# Patient Record
Sex: MALE | Race: WHITE
[De-identification: names, ages, dates, MRNs, and addresses within clinical notes are randomized per-mention and may not be internally consistent; named-entity substitution may affect disease eponyms.]

---

## 2018-04-24 ENCOUNTER — HOSPITAL ENCOUNTER (OUTPATIENT)
Dept: HOSPITAL 53 - M SDC | Age: 58
Discharge: HOME | End: 2018-04-24
Attending: ORTHOPAEDIC SURGERY
Payer: MEDICARE

## 2018-04-24 DIAGNOSIS — Z91.041: ICD-10-CM

## 2018-04-24 DIAGNOSIS — M75.121: ICD-10-CM

## 2018-04-24 DIAGNOSIS — M19.011: Primary | ICD-10-CM

## 2018-04-24 DIAGNOSIS — I10: ICD-10-CM

## 2018-04-24 DIAGNOSIS — E11.9: ICD-10-CM

## 2018-04-24 DIAGNOSIS — S43.431A: ICD-10-CM

## 2018-04-24 DIAGNOSIS — Z91.018: ICD-10-CM

## 2018-04-24 DIAGNOSIS — K21.9: ICD-10-CM

## 2018-04-24 LAB — GLUCOSE BLDC GLUCOMTR-MCNC: 118 MG/DL (ref 70–105)

## 2018-04-24 PROCEDURE — 29823 SHO ARTHRS SRG XTNSV DBRDMT: CPT

## 2018-04-24 RX ADMIN — MIDAZOLAM 1 MG: 1 INJECTION INTRAMUSCULAR; INTRAVENOUS at 11:10

## 2018-04-24 RX ADMIN — FENTANYL CITRATE 1 MCG: 50 INJECTION, SOLUTION INTRAMUSCULAR; INTRAVENOUS at 11:15

## 2018-04-24 RX ADMIN — SODIUM CHLORIDE, POTASSIUM CHLORIDE, SODIUM LACTATE AND CALCIUM CHLORIDE 1 MLS/HR: 600; 310; 30; 20 INJECTION, SOLUTION INTRAVENOUS at 10:15

## 2018-04-24 RX ADMIN — DEXTROSE MONOHYDRATE 1 MG: 50 INJECTION, SOLUTION INTRAVENOUS at 13:17

## 2018-04-24 RX ADMIN — FENTANYL CITRATE 1 MCG: 50 INJECTION, SOLUTION INTRAMUSCULAR; INTRAVENOUS at 11:10

## 2018-12-27 ENCOUNTER — HOSPITAL ENCOUNTER (OUTPATIENT)
Dept: HOSPITAL 53 - M LRY | Age: 58
End: 2018-12-27
Attending: NURSE PRACTITIONER
Payer: MEDICARE

## 2018-12-27 DIAGNOSIS — E11.9: Primary | ICD-10-CM

## 2018-12-27 DIAGNOSIS — E78.49: ICD-10-CM

## 2018-12-27 DIAGNOSIS — I10: ICD-10-CM

## 2018-12-27 LAB
ALBUMIN SERPL BCG-MCNC: 4.1 GM/DL (ref 3.2–5.2)
ALT SERPL W P-5'-P-CCNC: 26 U/L (ref 12–78)
BASOPHILS # BLD AUTO: 0.1 10^3/UL (ref 0–0.2)
BASOPHILS NFR BLD AUTO: 0.8 % (ref 0–1)
BILIRUB SERPL-MCNC: 0.6 MG/DL (ref 0.2–1)
BUN SERPL-MCNC: 17 MG/DL (ref 7–18)
CALCIUM SERPL-MCNC: 9.3 MG/DL (ref 8.5–10.1)
CHLORIDE SERPL-SCNC: 105 MEQ/L (ref 98–107)
CHOLEST SERPL-MCNC: 145 MG/DL (ref ?–200)
CHOLEST/HDLC SERPL: 3.62 {RATIO} (ref ?–5)
CO2 SERPL-SCNC: 23 MEQ/L (ref 21–32)
CREAT SERPL-MCNC: 0.97 MG/DL (ref 0.7–1.3)
EOSINOPHIL # BLD AUTO: 0.1 10^3/UL (ref 0–0.5)
EOSINOPHIL NFR BLD AUTO: 1 % (ref 0–3)
EST. AVERAGE GLUCOSE BLD GHB EST-MCNC: 143 MG/DL (ref 60–110)
GFR SERPL CREATININE-BSD FRML MDRD: > 60 ML/MIN/{1.73_M2} (ref 56–?)
GLUCOSE SERPL-MCNC: 144 MG/DL (ref 70–100)
HCT VFR BLD AUTO: 45.5 % (ref 42–52)
HDLC SERPL-MCNC: 40 MG/DL (ref 40–?)
HGB BLD-MCNC: 15.2 G/DL (ref 13.5–17.5)
LDLC SERPL CALC-MCNC: 65 MG/DL (ref ?–100)
LYMPHOCYTES # BLD AUTO: 2.3 10^3/UL (ref 1.5–4.5)
LYMPHOCYTES NFR BLD AUTO: 20.3 % (ref 24–44)
MCH RBC QN AUTO: 29.5 PG (ref 27–33)
MCHC RBC AUTO-ENTMCNC: 33.4 G/DL (ref 32–36.5)
MCV RBC AUTO: 88.3 FL (ref 80–96)
MONOCYTES # BLD AUTO: 0.8 10^3/UL (ref 0–0.8)
MONOCYTES NFR BLD AUTO: 7.2 % (ref 0–5)
NEUTROPHILS # BLD AUTO: 7.9 10^3/UL (ref 1.8–7.7)
NEUTROPHILS NFR BLD AUTO: 70.4 % (ref 36–66)
NONHDLC SERPL-MCNC: 105 MG/DL
PLATELET # BLD AUTO: 245 10^3/UL (ref 150–450)
POTASSIUM SERPL-SCNC: 4 MEQ/L (ref 3.5–5.1)
PROT SERPL-MCNC: 7.6 GM/DL (ref 6.4–8.2)
RBC # BLD AUTO: 5.15 10^6/UL (ref 4.3–6.1)
SODIUM SERPL-SCNC: 138 MEQ/L (ref 136–145)
TRIGL SERPL-MCNC: 201 MG/DL (ref ?–150)
WBC # BLD AUTO: 11.2 10^3/UL (ref 4–10)

## 2019-03-05 ENCOUNTER — HOSPITAL ENCOUNTER (OUTPATIENT)
Dept: HOSPITAL 53 - M SFHCLERA | Age: 59
End: 2019-03-05
Attending: NURSE PRACTITIONER
Payer: MEDICARE

## 2019-03-05 DIAGNOSIS — J02.9: Primary | ICD-10-CM

## 2019-04-13 ENCOUNTER — HOSPITAL ENCOUNTER (OUTPATIENT)
Dept: HOSPITAL 53 - M SFHCLERA | Age: 59
End: 2019-04-13
Attending: NURSE PRACTITIONER
Payer: MEDICARE

## 2019-04-13 DIAGNOSIS — J02.9: Primary | ICD-10-CM

## 2021-03-28 ENCOUNTER — HOSPITAL ENCOUNTER (OUTPATIENT)
Dept: HOSPITAL 53 - M LABSMTC | Age: 61
End: 2021-03-28
Attending: ANESTHESIOLOGY
Payer: MEDICARE

## 2021-03-28 DIAGNOSIS — Z01.812: Primary | ICD-10-CM

## 2021-03-28 DIAGNOSIS — Z20.822: ICD-10-CM

## 2021-04-24 ENCOUNTER — HOSPITAL ENCOUNTER (OUTPATIENT)
Dept: HOSPITAL 53 - M LABSMTC | Age: 61
End: 2021-04-24
Attending: ANESTHESIOLOGY
Payer: MEDICARE

## 2021-04-24 DIAGNOSIS — Z20.822: ICD-10-CM

## 2021-04-24 DIAGNOSIS — Z01.818: Primary | ICD-10-CM

## 2021-04-29 ENCOUNTER — HOSPITAL ENCOUNTER (OUTPATIENT)
Dept: HOSPITAL 53 - M OPP | Age: 61
Discharge: HOME | End: 2021-04-29
Attending: INTERNAL MEDICINE
Payer: MEDICARE

## 2021-04-29 VITALS — HEIGHT: 70 IN | BODY MASS INDEX: 39.65 KG/M2 | WEIGHT: 277 LBS

## 2021-04-29 VITALS — SYSTOLIC BLOOD PRESSURE: 184 MMHG | DIASTOLIC BLOOD PRESSURE: 108 MMHG

## 2021-04-29 DIAGNOSIS — Z79.84: ICD-10-CM

## 2021-04-29 DIAGNOSIS — Z12.11: Primary | ICD-10-CM

## 2021-04-29 DIAGNOSIS — K64.8: ICD-10-CM

## 2021-04-29 DIAGNOSIS — Z91.018: ICD-10-CM

## 2021-04-29 DIAGNOSIS — F17.210: ICD-10-CM

## 2021-04-29 DIAGNOSIS — Z80.0: ICD-10-CM

## 2021-04-29 DIAGNOSIS — Z88.8: ICD-10-CM

## 2021-04-29 DIAGNOSIS — K63.5: ICD-10-CM

## 2021-04-29 DIAGNOSIS — Z91.048: ICD-10-CM

## 2021-04-29 DIAGNOSIS — Z79.899: ICD-10-CM

## 2021-04-29 DIAGNOSIS — Z79.891: ICD-10-CM

## 2021-04-29 DIAGNOSIS — E11.9: ICD-10-CM

## 2021-04-29 DIAGNOSIS — Z86.010: ICD-10-CM

## 2021-04-29 NOTE — ROOR
________________________________________________________________________________

Patient Name: Carlos Bryant              Procedure Date: 4/29/2021 9:29 AM

MRN: [id number]                          Account Number: [id number]

YOB: 1960               Age: 60

Room: [facility]                            Gender: Male

Note Status: Finalized                 

________________________________________________________________________________

 

Procedure:            Colonoscopy

Indications:          High risk colon cancer surveillance: Personal history of 

                      colonic polyps

Providers:            Danie LEWIS MD

Referring MD:         Crystal Carrington NP

Requesting Provider:  

Medicines:            Monitored Anesthesia Care

Complications:        No immediate complications.

________________________________________________________________________________

Procedure:            Pre-Anesthesia Assessment:

                      - The heart rate, respiratory rate, oxygen saturations, 

                      blood pressure, adequacy of pulmonary ventilation, and 

                      response to care were monitored throughout the procedure.

                      The Colonoscope was introduced through the anus and 

                      advanced to the cecum, identified by appendiceal orifice 

                      and ileocecal valve. The colonoscopy was technically 

                      difficult and complex due to a redundant colon. The 

                      patient tolerated the procedure well. The quality of the 

                      bowel preparation was good.

                                                                                

Findings:

     The perianal and digital rectal examinations were normal.

     Three sessile polyps were found in the sigmoid colon and splenic flexure. 

     The polyps were 5 to 7 mm in size. These polyps were removed with a cold 

     snare. Resection and retrieval were complete.

     Internal hemorrhoids were found during retroflexion. The hemorrhoids were 

     moderate.

     The exam was otherwise without abnormality on direct and retroflexion 

     views.

                                                                                

Impression:           - Three 5 to 7 mm polyps in the sigmoid colon and at the 

                      splenic flexure, removed with a cold snare. Resected and 

                      retrieved.

                      - Internal hemorrhoids.

                      - The examination was otherwise normal on direct and 

                      retroflexion views.

Recommendation:       - Repeat colonoscopy in 3 years for surveillance.

                                                                                

Procedure Code(s):    --- Professional ---

                      99364, Colonoscopy, flexible; with removal of tumor(s), 

                      polyp(s), or other lesion(s) by snare technique

Diagnosis Code(s):    --- Professional ---

                      K64.8, Other hemorrhoids

                      K63.5, Polyp of colon

                      Z86.010, Personal history of colonic polyps

 

CPT copyright 2019 [employer]. All rights reserved.

 

The codes documented in this report are preliminary and upon  review may 

be revised to meet current compliance requirements.

 

Danie Lewis MD

________________

Danie LEWIS MD

4/29/2021 10:14:42 AM

Electronically signed by Danie LEWIS MD

Number of Addenda: 0

 

Note Initiated On: 4/29/2021 9:29 AM

Estimated Blood Loss: Estimated blood loss: none.

## 2021-06-16 ENCOUNTER — HOSPITAL ENCOUNTER (OUTPATIENT)
Dept: HOSPITAL 53 - M RAD | Age: 61
End: 2021-06-16
Attending: NURSE PRACTITIONER
Payer: COMMERCIAL

## 2021-06-16 DIAGNOSIS — N28.89: Primary | ICD-10-CM

## 2021-06-16 PROCEDURE — 74183 MRI ABD W/O CNTR FLWD CNTR: CPT

## 2021-06-17 NOTE — REP
INDICATION:

MASS LIKE LESION IN KIDNEYS.



COMPARISON:

[facility] 06/11/2021.



TECHNIQUE:

Multiple sequences obtained in the axial coronal planes prior to and following the

intravenous administration of 20 mL ProHance.



FINDINGS:

There is an enhancing solid mass in the lower pole of the right kidney consistent with

renal cell carcinoma.  This measures approximately 6.0 x 4.1 cm.  In the mid right

kidney there are 2 benign nonenhancing cysts measuring 1 cm in diameter.  In the left

kidney there are several benign nonenhancing cysts, the largest is bilobed and

exophytic in the lower pole and measures 5.4 cm in maximum diameter.  There is no

hydronephrosis bilaterally.



The visualized liver demonstrates diffuse fatty infiltration.  The gallbladder is

grossly unremarkable.  There is no intrahepatic or extrahepatic biliary dilatation.

The visualized spleen is unremarkable.  The adrenals and pancreas demonstrate no

abnormality.  I see no significant adenopathy in the visualized abdomen.  There is no

free fluid.



IMPRESSION:

There is an enhancing solid mass in the lower pole of the right kidney consistent with

renal cell carcinoma.  This measures approximately 6.0 x 4.1 cm.  Multiple bilateral

renal cysts.  No adenopathy.





<Electronically signed by Justice Winter > 06/17/21 [id number]

## 2021-09-08 ENCOUNTER — HOSPITAL ENCOUNTER (OUTPATIENT)
Dept: HOSPITAL 53 - M RAD | Age: 61
End: 2021-09-08
Attending: INTERNAL MEDICINE
Payer: COMMERCIAL

## 2021-09-08 DIAGNOSIS — E04.2: Primary | ICD-10-CM

## 2021-09-08 NOTE — REP
INDICATION:

RT THYROID NODULE.



COMPARISON:

CT cervical spine 06/17/2021.



TECHNIQUE:

Real-time sonographic evaluation of thyroid performed.



FINDINGS:

Right lobe measures 5.2 x 2.5 x 2.0 cm and left lobe 5.5 x 2.3 x 1.6 cm.  In the mid

right lobe there is a cyst with mild wall thickening and a few tiny echogenic foci in

the wall.  This measures 2.9 x 1.5 x 2.0 cm.  In the left lower pole there is a

hypoechoic nodule which measures 7 x 8 x 6 mm containing low-level echoes.  There is

an adjacent heterogeneous nodule mixed echogenicity measuring 1.2 x 1.0 x 1.0 cm.



IMPRESSION:

Cyst in the right lobe of the thyroid demonstrates mild wall thickening and a few

echogenic punctate calcifications in the wall of the cyst.  This appears benign.



Extremely hypoechoic nodule in the left lower pole could represent a complex cyst or

solid nodule, measuring 8 mm in maximum diameter.  Utilizing TI-RADS criteria this is

at most a TR 4 lesion, and given its subcentimeter size no follow-up is needed.



Heterogeneous nodule in the left lower pole 1.2 cm in diameter is relatively

isoechoic.  This is a TR 3 lesion and since it is less than 1.5 cm no follow-up is

needed.





<Electronically signed by Justice Winter > 09/08/21 [id number]

## 2021-11-09 ENCOUNTER — HOSPITAL ENCOUNTER (EMERGENCY)
Dept: HOSPITAL 53 - M ED | Age: 61
Discharge: HOME | End: 2021-11-09
Payer: COMMERCIAL

## 2021-11-09 VITALS — DIASTOLIC BLOOD PRESSURE: 92 MMHG | SYSTOLIC BLOOD PRESSURE: 180 MMHG

## 2021-11-09 VITALS — WEIGHT: 266.56 LBS | HEIGHT: 70 IN | BODY MASS INDEX: 38.16 KG/M2

## 2021-11-09 DIAGNOSIS — Z90.5: ICD-10-CM

## 2021-11-09 DIAGNOSIS — F17.200: ICD-10-CM

## 2021-11-09 DIAGNOSIS — Z79.899: ICD-10-CM

## 2021-11-09 DIAGNOSIS — C64.1: ICD-10-CM

## 2021-11-09 DIAGNOSIS — I10: ICD-10-CM

## 2021-11-09 DIAGNOSIS — E11.649: Primary | ICD-10-CM

## 2021-11-09 NOTE — CCD
Continuity of Care Document (CCD)

                             Created on: 10/28/2021



Carlos Bryant

External Reference #: [id number]

: 1960

Sex: Male



Demographics





                          Address                   [address] [place] [address]

 

                          Home Phone                [phone]

 

                          Preferred Language        Unknown

 

                          Marital Status            Never 

 

                          Worship Affiliation     Unknown

 

                          Race                      White

 

                          Ethnic Group              Not  or 





Author





                          Author                    Carlos CARRINGTON FN

 

                          Organization              Unknown

 

                          Address                   [address] [place] [address]



 

                          Phone                     [phone]







Care Team Providers





                    Care Team Member Name Role                Phone

 

                    [facility]                [phone]

 

                    Moris Desai   AUTM                [phone]

 

                    Crysatl Carrington  AUTM                [phone]







Problems





                    Active Problems     Provider            Date

 

                    Type II diabetes mellitus uncontrolled BELLA Severino, P

NP Onset: 2020

 

                    Essential hypertension BELLA Severino, PNP Onset: 2020

 

                    Pure hypercholesterolemia BELLA Severino, PNP Onset: 

 

                    Gastroesophageal reflux disease BELLA Severino, PNP Onse

t: 2020

 

                    Secondary erectile dysfunction BELLA Severino, PNP Onset

: 2020

 

                    Taking medication   BELLA Severino, PNP Onset: 

0

 

                    Right side sciatica BELLA Severino, PNP Onset: 

0

 

                    Secondary erectile dysfunction BELLA Severino, PNP Onset

: 2021







Social History





                Type            Date            Description     Comments

 

                Birth Sex                       Unknown          

 

                Tobacco Use     Start: Unknown End:  Quit             

 

                Tobacco Use     Start: Unknown  Never Smoked Cigars  

 

                Tobacco Use     Start: Unknown  Never Smoked A Pipe  

 

                Tobacco Use     Start: Unknown  Never Used Smokeless Tobacco  

 

                ETOH Use                        Denies alcohol use  

 

                Recreational Drug Use                 Denies Drug Use  

 

                Tobacco Use     Start: Unknown End: Unknown Patient is a former 

smoker  







Allergies and adverse reactions





             Active Allergies Criticality  Reaction | Severity Comments     Date

 

             Iodinated Diagnostic Agents Unable to assess criticality           

                2019







Medications





           Active Medications SIG        Qnty       Indications Ordering Provide

r Date

 

                          Fenofibrate                     145mg Tablets         

          1 tablet by 

mouth every day for triglycerides 90tabs                          Darian barboza MD 2021

 

                          Glipizide ER                     10mg Tablets ER 24HR 

                  Take 1 

Tablet By Mouth Once A Day 30tabs                          Olga Lidia tuttle M.D. 2021

 

                          Trazodone HCL                     50mg Tablets        

           take 1 tab by 

mouth at bedtime. 30tabs                          Darian Gomez MD 2021

 

                                        Blood Pressure Kit/Oscillating/Digital  

                    Kit                 

                Use as directed to monitor blood pressure twice a day 1units    

                      Darian Gomez MD                                      2021

 

                          Lisinopril                     10mg Tablets           

        take two tablets 

by mouth in the morning and 1 tab at bed time 90tabs                          Moi Gomez MD 

2021

 

                          Jardiance                     25mg Tablets            

       take one by mouth 

once a day      90tabs                          BELLA Severino, PNP 20

20

 

                          Carisoprodol                     350mg Tablets        

           1 by mouth at 

bedtime as needed                                 Unknown         

 

                          Blood Glucose Test                      Strips        

           please provide 

glucose test strips, covered by pt insurance for twice daily bs test dx: e11.65 

                    E11.65              Unknown             

 

                                        Blood Glucose Monitoring System         

            W/Device Kit                

             for three times a day blood sugar testing dx: e11.65              E

11.65       Unknown      



 

                          Mucinex                     600mg Tablets ER 12HR     

              1 by mouth 

twice a day as needed                                 Unknown         

 

                          Omeprazole                     20mg Capsules DR       

            1 by mouth 

every day  Written by Dr Malik                                 Unknown         



 

                          Topamax                     50mg Tablets              

     1 by mouth twice a 

day for headaches 180tabs                         BELLA Severino, PNP 

 

                          Novolog                     100Unit/ML Solution       

            10-15 units at

bedtime per sliding scale                                 Unknown         

 

                    Simple Diagnostics Lancing Device                      Misc 

                                      

                                        Unknown             

 

                          Hydrochlorothiazide                     25mg Tablets  

                 take one 

tablet by mouth every day 90tabs                          BELLA Severino, PN

P 

 

                          Lovastatin                     40mg Tablets           

        take one tablet by

mouth every evening with meal 90tabs                          BELLA Severino

, PNP 

 

                          Metformin HCL                     1000mg Tablets      

             take one 

tablet by mouth twice a day with food 180tabs                         BELLA Severino, PNP 



 

                          Meloxicam                     15mg Tablets            

       take one tablet by 

mouth every day 90tabs                          KIT Severino-BC, PNP 

 

                          Sildenafil Citrate                     20mg Tablets   

                Take One 

Tablet By Mouth as Directed                                 Unknown         

 

                                        Pharmacist Choice Ultra Thin Lancets 31G

                     31G Misc           

                                                    Unknown      

 

                          Hydrocodone-Acetaminophen                     5-325mg 

Tablets                   

Take One Tablet By Mouth Every 4 Hours Maximum Daily Dose   6 Tablets Written by
Dr Malik                                       Unknown         







Immunizations





                                        Description

 

                                        No Information Available







Vital Signs





                Date            Vital           Result          Comment

 

                10/25/2021  1:29pm BP Systolic     150 mmHg         

 

                    BP Diastolic        82 mmHg              

 

                    Heart Rate          76 /min              

 

                    Body Temperature    98.7 F             

 

                    Respiratory Rate    16 /min              

 

                    O2 % BldC Oximetry  96 %                 

 

                    Weight              266.00 lb            

 

                    Weight              120.658 kg           

 

                    Height              70 inches           5'10"

 

                    BMI (Body Mass Index) 38.2 kg/m2           

 

                    BSA (Body Surface Area) 2.36 m2              

 

                2021  8:07am BP Systolic     154 mmHg         

 

                    BP Diastolic        92 mmHg              

 

                    Heart Rate          70 /min              

 

                    Body Temperature    98.1 F             

 

                    Respiratory Rate    16 /min              

 

                    O2 % BldC Oximetry  97 %                 

 

                    Weight              266.50 lb            

 

                    Weight              120.884 kg           

 

                    Height              70 inches           5'10"

 

                    BMI (Body Mass Index) 38.2 kg/m2           

 

                    BSA (Body Surface Area) 2.36 m2              







Results





        Test    Acquired Date Facility Test    Result  H/L     Range   Note

 

                    RSV Dna Probe       10/25/2021          [facility]

             RSV Antigen  POSITIVE     Abnormal     Normal: Negative  

 

             RSV Antigen Reenter POSITIVE     Abnormal     Normal: Negative 1

 

                    Laboratory test finding 10/25/2021          [facility]

             Coronavirus Covid-19 Not Detected               Not Detected 2

 

                    Laboratory test finding 10/25/2021          [facility]

             Covid-19     <pending>                               

 

                    Covid-19            2021          [facility]

             Sars-CoV-2, Kristie Not Detected               Not Detected 3

 

             Sars-CoV-2, Kristie 2 Day Tat Performed                               

 

                    CBC With Differential 2021          [facility]

             White Blood Count 8.5 10       Normal       4.0-10.0      

 

             Red Blood Count 4.80 10      Normal       4.30-6.10     

 

             Hemoglobin   14.5 g/dL    Normal       13.5-17.5     

 

             Hematocrit   43.8 %       Normal       42.0-52.0     

 

             Mean Corpuscular Volume 91.3 fl      Normal       80.0-96.0     

 

             Mean Corpuscular Hemoglobin 30.2 pg      Normal       27.0-33.0    

 

 

             Mean Corpuscular HGB Conc 33.1 g/dL    Normal       32.0-36.5     

 

             Red Cell Distribution Width 14.0 %       Normal       11.5-14.5    

 

 

             Platelet Count, Automated 238 10       Normal       150-450       

 

             Neutrophils % 57.8 %       Normal       36.0-66.0     

 

             Lymph %      27.3 %       Normal       24.0-44.0     

 

             Mono %       11.2 %       High         2.0-8.0       

 

             Eos %        2.3 %        Normal       0.0-3.0       

 

             Baso %       1.2 %        High         0.0-1.0       

 

             Immature Granulocyte % 0.2 %        Normal       0-3.0         

 

             Nucleated Red Blood Cell % 0.0 %        Normal       0-0           

 

             Neutrophils # 4.9 10       Normal       1.5-8.5       

 

             Lymph #      2.3 10       Normal       1.5-5.0       

 

             Mono #       1.0 10       High         0.0-0.8       

 

             Eos #        0.2 10       Normal       0.0-0.5       

 

             Baso #       0.1 10       Normal       0.0-0.2       

 

                    Comprehensive Metabolic Profil 2021          [facility]

             Glucose, Fasting 112 mg/dL    High                 

 

             Blood Urea Nitrogen 31 mg/dL     High         7-18          

 

             Creatinine For GFR 2.36 mg/dL   High         0.70-1.30     

 

             Glomerular Filtration Rate 30.0         Low          >49          4

 

             Sodium Level 140 mEq/L    Normal       136-145       

 

             Potassium Serum 3.9 mEq/L    Normal       3.5-5.1       

 

             Chloride Level 109 mEq/L    High                 

 

             Carbon Dioxide Level 23 mEq/L     Normal       21-32         

 

             Anion Gap    8 mEq/L      Normal       8-16          

 

             Calcium Level 9.7 mg/dL    Normal       8.8-10.2      

 

             Ast/Sgot     15 U/L       Normal       7-37          

 

             Alt/SGPT     26 U/L       Normal       12-78         

 

             Alkaline Phosphatase 67 U/L       Normal               

 

             Bilirubin,Total 0.4 mg/dL    Normal       0.2-1.0       

 

             Total Protein 8.0 GM/DL    Normal       6.4-8.2       

 

             Albumin      4.3 GM/DL    Normal       3.2-5.2       

 

             Albumin/Globulin Ratio 1.2          Normal                     

 

                    Comprehensive Metabolic Panel 2021          [facility]

             Comprehensive Metabo (SEE NOTE)                             5, 6

 

             Sodium       139 mEq/L                 134 - 153     

 

             Potassium    4.4 mEq/L                 3.6 - 5.0     

 

             Chloride     105 mEq/L                 98 - 107      

 

             Co2          22 mEq/L                  22 - 30       

 

             Glucose      143 mg/dL    High         70 - 99       

 

             BUN          26 mg/dL     High         7 - 21        

 

             Creatinine   2.1 mg/dL    High         0.7 - 1.5     

 

             BUN/Creat    12                        8 - 27        

 

             Total Protein 6.9 g/dL                  6.3 - 8.2     

 

             Albumin      4.5 g/dL                  3.9 - 5.0     

 

             Globulin     2.4 GM/DL                 2.4 - 3.2     

 

             A/G Ratio    1.9                       0.8 - 2.0     

 

             Calcium      9.8 mg/dL                 8.4 - 10.2    

 

             Total Bili   <0.7 mg/dL                0.2 - 1.3     

 

             Alkaline Phos 53 U/L                    38 - 126      

 

             Sgot/Ast     16 U/L                    5 - 40        

 

             SGPT/Alt     12 U/L                    7 - 56        

 

             Anion Gap    12.0 mmol/L               8.0 - 16.0    

 

             Age          60 yrs                                  

 

             Non-Aa GFR   34 mL/min                               

 

             Afr Amer GFR 42 mL/min                              7

 

                    Lipid Panel         2021          [facility]

             Cve Panel    (SEE NOTE)                             8, 9

 

             Cholesterol  208 mg/dL    High         131 - 200     

 

             Triglycerides 328 mg/dL    High         35 - 160      

 

             HDL          42 mg/dL                  29 - 86       

 

             LDL          123 mg/dL                 65 - 175      

 

             Risk Factor  5.0          High         3.4 - 4.9     

 

             LDL/HDL      2.93                      1.00 - 3.55  10

 

                    Laboratory test finding 2021          [facility]

l

             TSH Highly Sensitive 0.57 uIU/mL               0.47 - 5.01   

 

             PSA - Diagnostic 1.05 ng/mL                0.00 - 4.00  11

 

                    Laboratory test finding 2021          [facility]

l

             Hgba1c       6.0 %                     4.4 - 6.1    12

 

                    CMP W/Egfr          2021          [facility]

             Comprehensive Metabo (SEE NOTE)                             13

 

             Sodium       138 mEq/L                 134 - 153     

 

             Potassium    4.5 mEq/L                 3.6 - 5.0     

 

             Chloride     104 mEq/L                 98 - 107      

 

             Co2          21 mEq/L     Low          22 - 30       

 

             Glucose      134 mg/dL    High         70 - 99       

 

             BUN          31 mg/dL     High         7 - 21        

 

             Creatinine   2.1 mg/dL    High         0.7 - 1.5     

 

             BUN/Creat    15                        8 - 27        

 

             Total Protein 7.7 g/dL                  6.3 - 8.2     

 

             Albumin      4.9 g/dL                  3.9 - 5.0     

 

             Globulin     2.8 GM/DL                 2.4 - 3.2     

 

             A/G Ratio    1.8                       0.8 - 2.0     

 

             Calcium      10.4 mg/dL   High         8.4 - 10.2    

 

             Total Bili   <0.7 mg/dL                0.2 - 1.3     

 

             Alkaline Phos 66 U/L                    38 - 126      

 

             Sgot/Ast     15 U/L                    5 - 40        

 

             SGPT/Alt     11 U/L                    7 - 56        

 

             Anion Gap    13.0 mmol/L               8.0 - 16.0    

 

             Age          60 yrs                                  

 

             Non-Aa GFR   34 mL/min                               

 

             Afr Amer GFR 42 mL/min                              14

 

                    CBC W/Auto Differential 2021          [facility]

l

             CBC W/Automated Diff (SEE NOTE)                             15

 

             WBC          8.2 10^3/uL               4.2 - 11.0    

 

             RBC          4.95 10^6/uL              4.50 - 6.30   

 

             Hemoglobin   15.0 g/dL                 14.0 - 16.0   

 

             Hematocrit   45.8 %                    41.0 - 51.0   

 

             MCV          92.5 fL                   80.0 - 94.0   

 

             MCH          30.3 pg                   27.0 - 34.0   

 

             MCHC         32.8 g/dL                 31.0 - 36.0   

 

             RDW          13.7 %                    11.5 - 14.8   

 

             Platelets    257 10^3/uL               150 - 450     

 

             MPV          10.8 fL      High         7.4 - 10.4    

 

             Neut         68.1 %                    37.0 - 80.0   

 

             Lymph        18.6 %       Low          25.0 - 40.0   

 

             Mono         8.3 %        High         3.0 - 8.0     

 

             Eos          2.8 %                     0.0 - 7.0     

 

             Baso         1.8 %                     0.0 - 2.0     

 

             %Ig          0.4 %        High         0.0 - 0.0     

 

             %NRBC        0.0 %                     0.0 - 0.0     

 

             #Neut        5.57 10^3/uL              2.00 - 6.90   

 

             #Lymph       1.52 10^3/uL              0.60 - 3.40   

 

             #Mono        0.68 10^3/uL              0.00 - 0.90   

 

             #Eos         0.23 10^3/uL              0.00 - 0.70   

 

             #Baso        0.15 10^3/uL              0.00 - 0.20   

 

             #Ig          0.03 10^3/uL              0.00 - 0.10   

 

             #NRBC        0.00 10^3/uL              0.00 - 0.00   

 

             Manual Diff  NOT INDICATED                             

 

             RBC Morph    NOT INDICATED                             

 

                    CBC W/Auto Differential 2021          [facility]

             CBC W/Automated Diff (SEE NOTE)                             16

 

             WBC          9.3 10^3/uL               4.2 - 11.0    

 

             RBC          5.69 10^6/uL              4.50 - 6.30   

 

             Hemoglobin   17.4 g/dL    High         14.0 - 16.0   

 

             Hematocrit   51.0 %                    41.0 - 51.0   

 

             MCV          89.6 fL                   80.0 - 94.0   

 

             MCH          30.6 pg                   27.0 - 34.0   

 

             MCHC         34.1 g/dL                 31.0 - 36.0   

 

             RDW          12.6 %                    11.5 - 14.8   

 

             Platelets    221 10^3/uL               150 - 450     

 

             MPV          9.7 fL                    7.4 - 10.4    

 

             Neut         64.4 %                    37.0 - 80.0   

 

             Lymph        23.2 %       Low          25.0 - 40.0   

 

             Mono         8.4 %        High         3.0 - 8.0     

 

             Eos          2.3 %                     0.0 - 7.0     

 

             Baso         1.4 %                     0.0 - 2.0     

 

             %Ig          0.3 %        High         0.0 - 0.0     

 

             %NRBC        0.0 %                     0.0 - 0.0     

 

             #Neut        6.01 10^3/uL              2.00 - 6.90   

 

             #Lymph       2.16 10^3/uL              0.60 - 3.40   

 

             #Mono        0.78 10^3/uL              0.00 - 0.90   

 

             #Eos         0.21 10^3/uL              0.00 - 0.70   

 

             #Baso        0.13 10^3/uL              0.00 - 0.20   

 

             #Ig          0.03 10^3/uL              0.00 - 0.10   

 

             #NRBC        0.00 10^3/uL              0.00 - 0.00   

 

             Manual Diff  NOT INDICATED                             

 

             RBC Morph    NOT INDICATED                             

 

                    [facility]Egfr          [facility] ([facility]                             17

 

             Sodium       137 mEq/L                 134 - 153     

 

             Potassium    3.9 mEq/L                 3.6 - 5.0     

 

             Chloride     99 mEq/L                  98 - 107      

 

             Co2          25 mEq/L                  22 - 30       

 

             Glucose      166 mg/dL    High         70 - 99       

 

             BUN          19 mg/dL                  7 - 21        

 

             Creatinine   1.0 mg/dL                 0.7 - 1.5     

 

             BUN/Creat    19                        8 - 27        

 

             Total Protein 7.8 g/dL                  6.3 - 8.2     

 

             Albumin      4.8 g/dL                  3.9 - 5.0     

 

             Globulin     3.0 GM/DL                 2.4 - 3.2     

 

             A/G Ratio    1.6                       0.8 - 2.0     

 

             Calcium      10.2 mg/dL                8.4 - 10.2    

 

             Total Bili   0.7 mg/dL                 0.2 - 1.3     

 

             Alkaline Phos 84 U/L                    38 - 126      

 

             Sgot/Ast     29 U/L                    5 - 40        

 

             SGPT/Alt     29 U/L                    7 - 56        

 

             Anion Gap    13.0 mmol/L               8.0 - 16.0    

 

             Age          60 yrs                                  

 

             Non-Aa GFR   >60 mL/min                              

 

             Afr Amer GFR >60 mL/min                             18

 

                    Laboratory test finding 2021          [facility]

             Hgba1c       7.1 %        High         4.4 - 6.1    19

 

                    Lipid Panel         2021          [facility]

             Cve Panel    (SEE NOTE)                             20

 

             Cholesterol  182 mg/dL                 131 - 200     

 

             Triglycerides 304 mg/dL    High         35 - 160      

 

             HDL          44 mg/dL                  29 - 86       

 

             LDL          105 mg/dL                 65 - 175      

 

             Risk Factor  4.1                       3.4 - 4.9     

 

             LDL/HDL      2.39                      1.00 - 3.55  21

 

                    Laboratory test finding 2021          [facility]

             TSH Highly Sensitive 0.69 uIU/mL               0.47 - 5.01   

 

                    Laboratory test finding 2021          [facility]

l

             PSA - Diagnostic 0.71 ng/mL                0.00 - 4.00  22







                          1                         {      PROCEDURAL CONTROL   

VALID                                            )

{      KIT LOT #             [id number]                                     )

{      KIT EXP DATE          22                                     )



 

                          2                         This nucleic acid amplificat

ion test was developed and its performance

characteristics determined by [facility]. Nucleic acid

amplification tests include RT-PCR and TMA. This test has not been

FDA cleared or approved. This test has been authorized by FDA under

an Emergency Use Authorization (EUA). This test is only authorized

for the duration of time the declaration that circumstances exist

justifying the authorization of the emergency use of in vitro

diagnostic tests for detection of SARS-CoV-2 virus and/or diagnosis

of COVID-19 infection under section 564(b)(1) of the Act, 21 U.S.C.

                                        360bbb-3(b) (1), unless the authorizatio

n is terminated or revoked

sooner.

When diagnostic testing is negative, the possibility of a false

negative result should be considered in the context of a patient's

recent exposures and the presence of clinical signs and symptoms

consistent with COVID-19. An individual without symptoms of COVID-19

and who is not shedding SARS-CoV-2 virus would expect to have a

negative (not detected) result in this assay.



 

                          3                         This nucleic acid amplificat

ion test was developed and its performance

characteristics determined by [facility]. Nucleic acid

amplification tests include RT-PCR and TMA. This test has not been

FDA cleared or approved. This test has been authorized by FDA under

an Emergency Use Authorization (EUA). This test is only authorized

for the duration of time the declaration that circumstances exist

justifying the authorization of the emergency use of in vitro

diagnostic tests for detection of SARS-CoV-2 virus and/or diagnosis

of COVID-19 infection under section 564(b)(1) of the Act, 21 U.S.C.

                                        360bbb-3(b) (1), unless the authorizatio

n is terminated or revoked

sooner.

When diagnostic testing is negative, the possibility of a false

negative result should be considered in the context of a patient's

recent exposures and the presence of clinical signs and symptoms

consistent with COVID-19. An individual without symptoms of COVID-19

and who is not shedding SARS-CoV-2 virus would expect to have a

negative (not detected) result in this assay.



 

                          4                         Units are mL/min/1.73 m2



Chronic Kidney Disease Staging per [facility]:



Stage I & II   GFR >=60       Normal to Mildly Decreased

Stage III      GFR 30-59      Moderately Decreased

Stage IV       GFR 15-29      Severely Decreased

Stage V        GFR <15        Very Little GFR Left

ESRD           GFR <15 on RRT



 

                          5                         Is patient fasting?  N

 

                          6                         COMPREHENSIVE METABOLIC PANE

L



 

                          7                         Male GFR Interprentation

                                        20-49 yrs     >60 mL/min   Normal

                                        50-59 yrs     >56 mL/min   Normal

                                        60-69 yrs     >49 mL/min   Normal

                                        70-79yrs      >42 mL/min   Normal

                                        80 and above  >35 mL/min   Normal

Female GFR  Interpretation

                                        20-39 yrs     >60 mL/min   Normal

                                        40-49 yrs     >58 mL/min   Normal

                                        50-59 yrs     >51 mL/min   Normal

                                        60-69 yrs     >45 mL/min   Normal

                                        70-79 yrs     >39 mL/min   Normal

                                        80 and above  >32 mL/min   Normal



 

                          8                         Is patient fasting?  Y

 

                          9                         LIPID PANEL



 

                          10                        CVE

RISK           CHOL/HDL       LDL/HDL

MEN:

                                        1/2  AVERAGE          3.43          1.00

AVERAGE          4.97          3.55

                                        2X   AVERAGE          9.55          6.25

                                        3X   AVERAGE         23.99          7.99

WOMEN:

                                        1/2  AVERAGE          3.27          1.47

AVERAGE          4.44          3.22

                                        2X   AVERAGE          7.05          5.03

                                        3X   AVERAGE         11.04          6.14



 

                          11                        \BLDo\PSA INTERPRETATION\BLD

x\

The PSA assay should not be used alone for a screening test

or diagnosis for presence or absence of malignant disease.

Predictions of disease recurrence should not be based solely

on values obtained from serial patient serum values.

The PSA result was determined by "[employer]", on the Roche

SUNITHA 6000. Values obtained with different assay methods

or kits cannot be used interchangeably.



 

                          12                        {A1]

{HB]



 

                          13                        COMPREHENSIVE METABOLIC PANE

L



 

                          14                        Male GFR Interprentation

                                        20-49 yrs     >60 mL/min   Normal

                                        50-59 yrs     >56 mL/min   Normal

                                        60-69 yrs     >49 mL/min   Normal

                                        70-79yrs      >42 mL/min   Normal

                                        80 and above  >35 mL/min   Normal

Female GFR  Interpretation

                                        20-39 yrs     >60 mL/min   Normal

                                        40-49 yrs     >58 mL/min   Normal

                                        50-59 yrs     >51 mL/min   Normal

                                        60-69 yrs     >45 mL/min   Normal

                                        70-79 yrs     >39 mL/min   Normal

                                        80 and above  >32 mL/min   Normal



 

                          15                        COMPLETE BLOOD COUNT



 

                          16                        COMPLETE BLOOD COUNT



 

                          17                        COMPREHENSIVE METABOLIC PANE

L



 

                          18                        Male GFR Interprentation

                                        20-49 yrs     >60 mL/min   Normal

                                        50-59 yrs     >56 mL/min   Normal

                                        60-69 yrs     >49 mL/min   Normal

                                        70-79yrs      >42 mL/min   Normal

                                        80 and above  >35 mL/min   Normal

Female GFR  Interpretation

                                        20-39 yrs     >60 mL/min   Normal

                                        40-49 yrs     >58 mL/min   Normal

                                        50-59 yrs     >51 mL/min   Normal

                                        60-69 yrs     >45 mL/min   Normal

                                        70-79 yrs     >39 mL/min   Normal

                                        80 and above  >32 mL/min   Normal



 

                          19                        {A1]

{HB]



 

                          20                        LIPID PANEL



 

                          21                        CVE

RISK           CHOL/HDL       LDL/HDL

MEN:

                                        1/2  AVERAGE          3.43          1.00

AVERAGE          4.97          3.55

                                        2X   AVERAGE          9.55          6.25

                                        3X   AVERAGE         23.99          7.99

WOMEN:

                                        1/2  AVERAGE          3.27          1.47

AVERAGE          4.44          3.22

                                        2X   AVERAGE          7.05          5.03

                                        3X   AVERAGE         11.04          6.14



 

                          22                        \BLDo\PSA INTERPRETATION\BLD

x\

The PSA assay should not be used alone for a screening test

or diagnosis for presence or absence of malignant disease.

Predictions of disease recurrence should not be based solely

on values obtained from serial patient serum values.

The PSA result was determined by "[employer]", on the Roche

SUNITHA 6000. Values obtained with different assay methods

or kits cannot be used interchangeably.









Procedures





                Date            Code            Description     Status

 

                10/25/2021      06562           Office/Outpatient Established Mo

d MDM 30-39 Min Completed

 

                2021      51313           Office/Outpatient Established SF

 MDM 10-19 Min Completed

 

                2021      61631           Office/Outpatient Established Mo

d MDM 30-39 Min Completed

 

                2021      22074           Office/Outpatient Established Mo

d MDM 30-39 Min Completed

 

                2021      36076           Office/Outpatient Established Mo

d MDM 30-39 Min Completed

 

                2021      46262           Electrocardiogram Complete Compl

eted







[employer]





                                        Description

 

                                        No Information Available







Encounters





           Type       Date       Location   Provider   Dx         Diagnosis

 

             Office Visit 10/25/2021  1:20p [facility] Crystal gamboa, [facility] 

R05.9                                   Cough, unspecified

 

                          Z11.52                    Encounter for screening for 

Covid-19

 

                          I10                       Essential (primary) hyperten

julia







Assessments





                Date            Code            Description     Provider

 

                10/25/2021      R05.9           Cough, unspecified Crystal Carrington

, [facility]

 

                10/25/2021      Z11.52          Encounter for screening for Covi

d-19 Crystal Pringleills, [facility]

 

                10/25/2021      I10             Essential (primary) hypertension

 Crystal Nevills, [facility]

 

                2021      E11.65          Type 2 diabetes mellitus with hy

perglycemia [facility]

 

                2021      E78.00          Pure hypercholesterolemia, unspe

cified [facility]

 

                2021      E11.65          Type 2 diabetes mellitus with hy

perglycemia Crystal Nevills, [facility]

 

                2021      E78.00          Pure hypercholesterolemia, unspe

cified Crystal Nevills, [facility]

 

                2021      I10             Essential (primary) hypertension

 Crystal Nevills, [facility]

 

                2021      K21.9           Gastro-esophageal reflux disease

 without esophagitis Crystal 

Nevills, P

 

                2021      R91.1           Solitary pulmonary nodule Crystal 

Nevills, FNP

 

                2021      G47.33          Obstructive sleep apnea (adult) 

(pediatric) Crystal Nevills, FNP

 

                2021      C64.1           Malignant neoplasm of right kidn

ey, except renal pelvis Crystal 

Nevills, FNP

 

                2021      Z79.899         Other long term (current) drug t

herapy Crystal Nevills, FNP

 

                2021      E11.65          Type 2 diabetes mellitus with hy

perglycemia Crystal Nevills, FNP

 

                2021      E78.00          Pure hypercholesterolemia, unspe

cified Crystal Nevills, FNP

 

                2021      Z12.5           Encounter for screening for jennifer

gnant neoplasm of prostate 

Crystal Nevills, FNP

 

                2021      I10             Essential (primary) hypertension

 Crystal Nevills, FNP

 

                2021      K21.9           Gastro-esophageal reflux disease

 without esophagitis Crystal 

Nevills, FNP

 

                2021      R91.1           Solitary pulmonary nodule Crystal 

Nevills, FNP

 

                2021      G47.33          Obstructive sleep apnea (adult) 

(pediatric) Crystal Nevills, FNP

 

                2021      C64.1           Malignant neoplasm of right kidn

ey, except renal pelvis Crystal 

Nevills, FNP

 

                2021      Z79.899         Other long term (current) drug t

herapy Crystal Nevills, FNP

 

                2021      E11.65          Type 2 diabetes mellitus with hy

perglycemia Crystal Nevills, FNP

 

                2021      E78.00          Pure hypercholesterolemia, unspe

cified Crystal Nevills, FNP

 

                2021      I10             Essential (primary) hypertension

 Crystal Nevills, FNP

 

                2021      N52.1           Erectile dysfunction due to dise

ases classified elsewhere Crystal

Nevills, FNP

 

                2021      R07.89          Other chest pain Crystal Nevills, 

FNP

 

                2021      K21.9           Gastro-esophageal reflux disease

 without esophagitis Crystal 

Nevills, FNP

 

                2021      Z79.899         Other long term (current) drug t

herapy Crystal Nevills, FNP

 

                2021      E11.65          Type 2 diabetes mellitus with hy

perglycemia Crystal Nevills, [facility]

 

                2021      E78.00          Pure hypercholesterolemia, unspe

cified Crystal Carrington, [facility]

 

                2021      Z79.899         Other long term (current) drug t

herapy Crystal Carrington, [facility]

 

                2021      Z12.5           Encounter for screening for jennifer

gnant neoplasm of prostate 

Crystalneal Pringleills, [facility]

 

                2021      Z68.41          Body mass index [BMI]40.0-44.9, 

adult Crystal Carrington, [facility]

 

                2021      N52.1           Erectile dysfunction due to dise

ases classified elsewhere Crystalneal Pringleills, [facility]

 

                2021      I10             Essential (primary) hypertension

 Crystal Carrington, [facility]

 

                2021      R07.89          Other chest pain Crystal PinoakilaSHAVONNE







Plan of Treatment

Future Appointment(s):* 2021  8:20 am - SHAVONNE Watson at [facility]

* 2021  8:00 am - [facility] at [facility]

10/25/2021 - Crystal SHAVONNE Carrington* R05.9 Cough, unspecified* Comments:* He had the
   labs done to test for RSV.Continue with Tesalon Perles as needed for 
  cough.Further plans to follow the lab results.We will continue to monitor.



* Follow up:* Patient to be contacted as soon as results are back. IF CONDITIONS
   WORSEN  AS NEEDED





* Z11.52 Encounter for screening for Covid-19* Comments:* He had the swabs done 
  to test for COVID.Further plans to follow the lab results.We will continue to 
  monitor.





* I10 Essential (primary) hypertension* Comments:* His BP was noted at 
  150/82.The patient was advised to continue with the current line of therapies.
   He will benefit from maintaining a low-sodium diet.  We will continue to 
  monitor.









Functional Status





                                        Description

 

                                        No Information Available







Mental Status





                                        Description

 

                                        No Information Available







Referrals





                Refer to      Reason for Referral Status          Appt Date

 

                          Ishmael Puckett        Patient requesting referral 

for pain management of chronic 

neck and back pain.       Sent                      

 

                                        [address] [place] [address] [phone]

 

                                          

 

                          Ingrid Velázquez,   Patient s/p recent right nep

hectomy.  Post op creatinine

 elevated at 2.1 on two separate occasions.  Please evaluate.  Thank you!  Sent 

                     

 [address]

 

                                        [place] [address]

 

                                        [phone]

 

                                          

 

                          [facility] Please see this pleasant

 59 y/o male s/p Right 

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  Closed               

     2021

 

                                        [address] [place] [address]

 

                                        [phone]

 

                                          

 

                          [facility] chest 2021 jeancarlos

ws evidence for bilateral 

pleural plaquing consistent ith previous asbestos exposure. There is also a 
nodular density superior segment LLL which warrants follow up study.  Please 
evaluate.  Thank you.     Closed                    [address]

 

                                        [place] [address]

 

                                        [phone]

 

                                          

 

                                                    Please evaluate finding of m

asslike lesion in the lower pole of the right 

kidney and systic lesions in the left kidney per imaging.  MRI of the Abdomen 
and Pelvis with and without contrast states most likely a large renal cell 
carcinoma.PT will hand deliver discs at appt. PLEASE EVALUATE AND TREAT AS SOON 
AS POSSIBLE. THANK YOU.   Closed                    2021

 

                                          

 

                Caleb Powers MD Chest pain.  HTN.  Current EKG showing PVC's

.  Closed          

06/15/2021

 

                                        [address] [place] [id number] [phone]

## 2021-11-09 NOTE — CCD
Summarization Of Episode

                             Created on: 2021



Joe Bryant

External Reference #: [id number]

: 1960

Sex: Male



Demographics





                          Address                   [address] [id number]

 

                          Home Phone                [phone]

 

                          Preferred Language        English

 

                          Marital Status            Unknown

 

                          Sabianist Affiliation     Unknown

 

                          Race                      White

 

                          Ethnic Group              Not  or 





Author





                          Author                    [facility]

 

                          Organization              [facility]

 

                          Address                   Unknown

 

                          Phone                     Unavailable







Support





                Name            Relationship    Address         Phone

 

                UE              Next Of Kin     Unknown         Unavailable

 

                    BETYAILEENFER    Next Of Kin         [id number] [id number] [phone]

 

                    NONE, PT PER        Next Of Kin         -

                          -, -  -                   -

 

                DISABLED        Next Of Kin     Unknown         Unavailable

 

                    CONTACT, OTHER NO   Next Of Kin         -

                                        -

                          -, [place]  -                  -

 

                    MAXIMUS PATIÑO       Next Of Kin         [id number] [phone]

 

                [place] [address] Unavailable







Care Team Providers





                    Care Team Member Name Role                Phone

 

                    LUIS MANUEL RUSSELL MD  Unavailable         Unavailable

 

                    NATALIOLUIS MANUEL PIZANO MD  Unavailable         Unavailable

 

                    NATALIOLUIS MANUEL MD  Unavailable         Unavailable

 

                    NATALIOLUIS MANUEL MD  Unavailable         Unavailable

 

                    NATALIOLUIS MANUEL MD  Unavailable         Unavailable

 

                    NATALIOLUIS MANUEL MD  Unavailable         Unavailable

 

                    NATALIOLUIS MANUEL MD  Unavailable         Unavailable

 

                    NATALIOLUIS MANUEL MD  Unavailable         Unavailable

 

                    NATALIOLUIS MANUEL MD  Unavailable         Unavailable

 

                    NATALIOLUIS MANUEL MD  Unavailable         Unavailable

 

                    NATALIOLUIS MANUEL MD  Unavailable         Unavailable

 

                    NATALIOLUIS MANUEL PIZANO MD  Unavailable         Unavailable

 

                    NATALIOLUIS MANUEL PIZANO MD  Unavailable         Unavailable

 

                    NATALIOLUIS MANUEL PIZANO MD  Unavailable         Unavailable

 

                    NATALIOLUIS MANUEL PIZANO MD  Unavailable         Unavailable

 

                    NATALIOLUIS MANUEL MD  Unavailable         Unavailable

 

                    NATALIOLUIS MANUEL MD  Unavailable         Unavailable

 

                    NATALIOLUIS MANUEL MD  Unavailable         Unavailable

 

                    NATALIOLUIS MANUEL PIZANO MD  Unavailable         Unavailable

 

                    NATALIOLUIS MANUEL PIZANO MD  Unavailable         Unavailable

 

                    NATALIOLUIS MANUEL MD  Unavailable         Unavailable

 

                    NATALIOLUIS MANUEL PIZANO MD  Unavailable         Unavailable

 

                    NATALIOLUIS MANUEL MD  Unavailable         Unavailable

 

                    NATALIOLUIS MANUEL MD  Unavailable         Unavailable

 

                    NATALIOLUIS MANUEL PIZANO MD  Unavailable         Unavailable

 

                    NATALIOLUIS MANUEL PIZANO MD  Unavailable         Unavailable

 

                    NATALIOLUIS MANUEL PIZANO MD  Unavailable         Unavailable

 

                    NATALIOLUIS MANUEL PIZANO MD  Unavailable         Unavailable

 

                    NATALIOLUIS MANUEL PIZANO MD  Unavailable         Unavailable

 

                    NATALIOLUIS MANUEL PIZANO MD  Unavailable         Unavailable

 

                    NATALIOLUIS MANUEL MD  Unavailable         Unavailable

 

                    NATALIOLUIS MANUEL MD  Unavailable         Unavailable

 

                    NATALIO, LUIS MANUEL WALLACE MD  Unavailable         Unavailable

 

                    NATALIO, A RODRIGO MD  Unavailable         Unavailable

 

                    NATALIO, A RODRIGO MD  Unavailable         Unavailable

 

                    NATALIO, A RODRIGO MD  Unavailable         Unavailable

 

                    NATALIO, A RODRIGO MD  Unavailable         Unavailable

 

                    NATALIO, LUIS MANUEL BOWMANEY MD  Unavailable         Unavailable

 

                    NATALIO, A RODRIGO MD  Unavailable         Unavailable

 

                    NATALIO, A RODRIGO MD  Unavailable         Unavailable

 

                    NATALIO, A RODRIGO MD  Unavailable         Unavailable

 

                    NATALIO, A RODRIGO MD  Unavailable         Unavailable

 

                    NATALIO, A RODRIGO MD  Unavailable         Unavailable

 

                    NATALIO, A RODRIGO MD  Unavailable         Unavailable

 

                    NATALIO, A RODRIGO MD  Unavailable         Unavailable

 

                    NATALIO, A RODRIGO MD  Unavailable         Unavailable

 

                    NATALIO, A RODRIGO MD  Unavailable         Unavailable

 

                    NATALIO, A RODRIGO MD  Unavailable         Unavailable

 

                    NATALIO, A RODRIGO MD  Unavailable         Unavailable

 

                    NATALIO, A RODRIGO MD  Unavailable         Unavailable

 

                    NATALIO, A RODRIGO MD  Unavailable         Unavailable

 

                    NATALIO, LUIS MANUEL BOWMANEY MD  Unavailable         Unavailable

 

                    NATALIO, A RODRIGO MD  Unavailable         Unavailable

 

                    NATALIO, A RODRIGO MD  Unavailable         Unavailable

 

                    NATALIO, A RODRIGO MD  Unavailable         Unavailable

 

                    NATALIO, LUIS MANUEL WALLACE MD  Unavailable         Unavailable

 

                    NATALIO, LUIS MANUEL WALLACE MD  Unavailable         Unavailable

 

                    NATALIO, LUIS MANUEL WALLACE MD  Unavailable         Unavailable

 

                    NATALIO, LUIS MANUEL WALLACE MD  Unavailable         Unavailable

 

                    NATALIO, LUIS MANUEL WALLACE MD  Unavailable         Unavailable

 

                    NATALIO, A RODRIGO MD  Unavailable         Unavailable

 

                    NATALIO, A RODRIGO MD  Unavailable         Unavailable

 

                    NATALIO, LUIS MANUEL WALLACE MD  Unavailable         Unavailable

 

                    NATALIO, LUIS MANUEL WALLACE MD  Unavailable         Unavailable

 

                    NATALIO, LUIS MANUEL WALLACE MD  Unavailable         Unavailable

 

                    NATALIO, LUIS MANUEL BOWMANEY MD  Unavailable         Unavailable

 

                    NATALIO, A RODRIGO MD  Unavailable         Unavailable

 

                    NATALIO, A RODRIGO MD  Unavailable         Unavailable

 

                    NATALIO, LUIS MANUEL BOWMANEY MD  Unavailable         Unavailable

 

                    NATALIO, LUIS MANUEL WALLACE MD  Unavailable         Unavailable

 

                    NATALIO, LUIS MANUEL WALLACE MD  Unavailable         Unavailable

 

                    NATALIO, LUIS MANUEL WALLACE MD  Unavailable         Unavailable

 

                    NATALIO, LUIS MANUEL WALLACE MD  Unavailable         Unavailable

 

                    NATALIO, LUIS MANUEL WALLACE MD  Unavailable         Unavailable

 

                    NATALIO, LUIS MANUEL BOWMANEY MD  Unavailable         Unavailable

 

                    NATALIO, A RODRIGO MD  Unavailable         Unavailable

 

                    NATALIO, A RODRIGO MD  Unavailable         Unavailable

 

                    NATALIO, LUIS MANUEL WALLACE MD  Unavailable         Unavailable

 

                    NATALIO, LUIS MANUEL WALLACE MD  Unavailable         Unavailable

 

                    NATALIO, LUIS MANUEL WALLACE MD  Unavailable         Unavailable

 

                    NATALIO, A RODRIGO MD  Unavailable         Unavailable

 

                    NATALIO, A RODRIGO MD  Unavailable         Unavailable

 

                    NATALIO, A RODRIGO MD  Unavailable         Unavailable

 

                    Nevills, C Crystal NP Unavailable         Unavailable

 

                    Nevills, C Crystal NP Unavailable         Unavailable

 

                    Nevills, C Crystal NP Unavailable         Unavailable

 

                    Nevills, C Crystal NP Unavailable         Unavailable

 

                    Nevills, C Crystal NP Unavailable         Unavailable

 

                    Nevills, C Crystal NP Unavailable         Unavailable

 

                    Nevills, C Crystal NP Unavailable         Unavailable

 

                    Nevills, C Crystal NP Unavailable         Unavailable

 

                    Nevills, C Crystal NP Unavailable         Unavailable

 

                    Nevills, C Crystal NP Unavailable         Unavailable

 

                    Nevills, C Crystal NP Unavailable         Unavailable

 

                    Nevills, C Crystal NP Unavailable         Unavailable

 

                    Nevills, C Crystal NP Unavailable         Unavailable

 

                    Nevills, C Crystal NP Unavailable         Unavailable

 

                    Nevills, C Crystal NP Unavailable         Unavailable

 

                    Nevills, C Crystal NP Unavailable         Unavailable

 

                    Nevills, C Crystal NP Unavailable         Unavailable

 

                    Nevills, C Crystal NP Unavailable         Unavailable

 

                    Nevills, C Crystal NP Unavailable         Unavailable

 

                    Nevills, C Crystal NP Unavailable         Unavailable

 

                    Nevills, C Crystal NP Unavailable         Unavailable

 

                    Nevills, C Crystal NP Unavailable         Unavailable

 

                    Nevills, C Crystal NP Unavailable         Unavailable

 

                    Nevills, C Crystal NP Unavailable         Unavailable

 

                    Nevills, C Crystal NP Unavailable         Unavailable

 

                    Nevills, C Crystal NP Unavailable         Unavailable

 

                    Nevills, C Crystal NP Unavailable         Unavailable

 

                    Nevills, C Crystal NP Unavailable         Unavailable

 

                    Nevills, C Crystal NP Unavailable         Unavailable

 

                    Nevills, C Crystal NP Unavailable         Unavailable

 

                    Nevills, C Crystal NP Unavailable         Unavailable

 

                    Nevills, C Crystal NP Unavailable         Unavailable

 

                    Nevills, C Crystal NP Unavailable         Unavailable

 

                    Nevills, C Crystal NP Unavailable         Unavailable

 

                    HALL, MAXIMUS GALLOWAY MD Unavailable         Unavailable

 

                    HALL, MAXIMUS GALLOWAY MD Unavailable         Unavailable

 

                    HALL, MAXIMUS GALLOWAY MD Unavailable         Unavailable

 

                    HALL, MAXIMUS GALLOWAY MD Unavailable         Unavailable

 

                    HALL, MAXIMUS GALLOWAY MD Unavailable         Unavailable

 

                    HALL, MAXIMUS GALLOWAY MD Unavailable         Unavailable

 

                    HALL, MAXIMUS GALLOWAY MD Unavailable         Unavailable

 

                    HALL, MAXIMUS GALLOWAY MD Unavailable         Unavailable

 

                    HALL, MAXIMUS GALLOWAY MD Unavailable         Unavailable

 

                    NIDIAENOLUANNE PIMENTELER PA Unavailable         Unavailable

 

                    SYMENOLUANNE PIMENTELER PA Unavailable         Unavailable

 

                    SYMENOLUANNE PIMENTELER PA Unavailable         Unavailable

 

                    SYMENOMARGARITO G CHRISTOPHER PA Unavailable         Unavailable

 

                    SYMENOW G FABYER PA Unavailable         Unavailable

 

                    SYMENOMARGARITO G NORBERTOOPHER PA Unavailable         Unavailable

 

                    SYMENOW G CHRISTOPHER PA Unavailable         Unavailable

 

                    SYMENOW, G CHRISTOPHER PA Unavailable         Unavailable

 

                    SYMENOW G CHRISTOPHER PA Unavailable         Unavailable

 

                    SYMENOW G CHRISTOPHER PA Unavailable         Unavailable

 

                    SYMENOW G CHRISTOPHER PA Unavailable         Unavailable

 

                    SYMENOW G CHRISTOPHER PA Unavailable         Unavailable

 

                    SYMENOW, G CHRISTOPHER PA Unavailable         Unavailable

 

                    SYMENOW, G CHRISTOPHER PA Unavailable         Unavailable

 

                    SYMENOW, G CHRISTOPHER PA Unavailable         Unavailable

 

                    SYMENOW, G CHRISTOPHER PA Unavailable         Unavailable

 

                    NATALIO, LUIS MANUEL WALLACE MD  Unavailable         Unavailable

 

                    NATALIO, LUIS MANUEL WALLACE MD  Unavailable         Unavailable

 

                    NATALIO, LUIS MANUEL WALLACE MD  Unavailable         Unavailable

 

                    NATALIO, A RODRIGO MD  Unavailable         Unavailable

 

                    NATALIO, A RODRIGO MD  Unavailable         Unavailable

 

                    NATALIO, A RODRIGO MD  Unavailable         Unavailable

 

                    NATALIO, A RODRIGO SALAS  Unavailable         Unavailable

 

                    NATALIO, A RODRIGO MD  Unavailable         Unavailable

 

                    NATALIO, A RODRIGO MD  Unavailable         Unavailable

 

                    NATALIO, A RODRIGO MD  Unavailable         Unavailable

 

                    NATALIO, A RODRIGO MD  Unavailable         Unavailable

 

                    NATALIO, A RODRIGO MD  Unavailable         Unavailable

 

                    NATALIO, A RODRIGO MD  Unavailable         Unavailable

 

                    NATALIO, A RODRIGO MD  Unavailable         Unavailable

 

                    NATALIO, A RODRIGO MD  Unavailable         Unavailable

 

                    NATALIO, LUIS MANUEL WALLACE MD  Unavailable         Unavailable

 

                    NATALIO, A RODRIGO MD  Unavailable         Unavailable

 

                    NATALIO, A RODRIGO SALAS  Unavailable         Unavailable

 

                    NATALIO, A RODRIGO MD  Unavailable         Unavailable

 

                    NATALIO, LUIS MANUEL WALLACE MD  Unavailable         Unavailable

 

                    NATALIO, LUIS MANUEL WALLACE MD  Unavailable         Unavailable

 

                    NATALIO, LUIS MANUEL WALLACE MD  Unavailable         Unavailable

 

                    NATALIO, LUIS MANUEL WALLACE MD  Unavailable         Unavailable

 

                    NATALIO, LUIS MANUEL WALLACE MD  Unavailable         Unavailable

 

                    NATALIO, LUIS MANUEL WALLACE MD  Unavailable         Unavailable

 

                    NATALIO, A RODRIGO MD  Unavailable         Unavailable

 

                    NATALIO, LUIS MANUEL WALLACE MD  Unavailable         Unavailable

 

                    NATALIO, LUIS MANUEL WALLACE MD  Unavailable         Unavailable

 

                    NATALIO, LUIS MANUEL WALLACE MD  Unavailable         Unavailable

 

                    NATALIO, LUIS MANUEL WALLACE MD  Unavailable         Unavailable

 

                    NATALIO, LUIS MANUEL WALLACE MD  Unavailable         Unavailable

 

                    NATALIO, LUIS MANUEL WALLACE MD  Unavailable         Unavailable

 

                    NATALIO, A RODRIGO MD  Unavailable         Unavailable

 

                    NATALIO, LUIS MANUEL WALLACE MD  Unavailable         Unavailable

 

                    NATALIO, LUIS MANUEL WALLACE MD  Unavailable         Unavailable

 

                    NATALIO, LUIS MANUEL WALLACE MD  Unavailable         Unavailable

 

                    NATALIO, LUIS MANUEL WALLACE MD  Unavailable         Unavailable

 

                    NATALIO, LUIS MANUEL WALLACE MD  Unavailable         Unavailable

 

                    NATALIO, LUIS MANUEL WALLACE MD  Unavailable         Unavailable

 

                    NATALIO, LUIS MANUEL WALLACE MD  Unavailable         Unavailable

 

                    NATALIO, A RODRIGO SALAS  Unavailable         Unavailable

 

                    NATALIO, LUIS MANUEL WALLACE MD  Unavailable         Unavailable

 

                    NATALIO, LUIS MANUEL WALLACE MD  Unavailable         Unavailable

 

                    NATALIO, LUIS MANUEL WALLACE MD  Unavailable         Unavailable

 

                    NATALIO, LUIS MANUEL WALLACE MD  Unavailable         Unavailable

 

                    NATALIO, LUIS MANUEL WALLACE MD  Unavailable         Unavailable

 

                    NATALIO, LUIS MANUEL WALLACE MD  Unavailable         Unavailable

 

                    NATALIO, A RODRIGO SALAS  Unavailable         Unavailable

 

                    NATALIO, LUIS MANUEL WALLACE MD  Unavailable         Unavailable

 

                    NATALIO, LUIS MANUEL WALLACE MD  Unavailable         Unavailable

 

                    NATALIO, LUIS MANUEL WALLACE MD  Unavailable         Unavailable

 

                    NATALIO, A RODRIGO MD  Unavailable         Unavailable

 

                    NATALIO, A RODRIGO MD  Unavailable         Unavailable

 

                    NATALIO, A RODRIGO MD  Unavailable         Unavailable

 

                    NATALIO, A RODRIGO MD  Unavailable         Unavailable

 

                    NATALIO, A RODRIGO MD  Unavailable         Unavailable

 

                    NATALIO, A RODRIGO MD  Unavailable         Unavailable

 

                    NATALIO, A RODRIGO MD  Unavailable         Unavailable

 

                    NATALIO, A RODRIGO MD  Unavailable         Unavailable

 

                    NATALIO, A RODRIGO MD  Unavailable         Unavailable

 

                    NATALIO, A RODRIGO MD  Unavailable         Unavailable

 

                    NATALIO, A RODRIGO MD  Unavailable         Unavailable

 

                    NATALIO, A ORDRIGO MD  Unavailable         Unavailable

 

                    NATALIO, A RODRIGO MD  Unavailable         Unavailable

 

                    NATALIO, A RODRIGO MD  Unavailable         Unavailable

 

                    NATALIO, A RODRIGO MD  Unavailable         Unavailable

 

                    NATALIO, A RODRIGO MD  Unavailable         Unavailable

 

                    NATALIO, A RODRIGO MD  Unavailable         Unavailable

 

                    NATALIO, A RODRIGO MD  Unavailable         Unavailable

 

                    NATALIO, A RODRIGO MD  Unavailable         Unavailable

 

                    NATALIO, A RODRIGO MD  Unavailable         Unavailable

 

                    NATALIO, A RODRIGO MD  Unavailable         Unavailable

 

                    NATALIO, A RODRIGO MD  Unavailable         Unavailable

 

                    NATALIO, A RODRIGO MD  Unavailable         Unavailable

 

                    NATALIO, A RODRIGO MD  Unavailable         Unavailable

 

                    NATALIO, A RODRIGO MD  Unavailable         Unavailable

 

                    NATALIO, A RODRIGO MD  Unavailable         Unavailable

 

                    NATALIO, A RODRIGO MD  Unavailable         Unavailable

 

                    NATALIO, A RODRIGO MD  Unavailable         Unavailable

 

                    NATALIO, A RODRIGO MD  Unavailable         Unavailable

 

                    NATALIO, A RODRIGO MD  Unavailable         Unavailable

 

                    NATALIO, A RODRIGO MD  Unavailable         Unavailable

 

                    NATALIO, A RODRIGO MD  Unavailable         Unavailable

 

                    NATALIO, A RODRIGO MD  Unavailable         Unavailable

 

                    NATALIO, A RODRIGO MD  Unavailable         Unavailable

 

                    NATALIO, A RODRIGO MD  Unavailable         Unavailable

 

                    NATALIO, A RODRIGO MD  Unavailable         Unavailable

 

                    NATALIO, A RODRIGO MD  Unavailable         Unavailable

 

                    NATALIO, A RODRIGO MD  Unavailable         Unavailable

 

                    NATALIO, A RODRIGO MD  Unavailable         Unavailable

 

                    NATALIO, A RODRIGO MD  Unavailable         Unavailable

 

                    NATALIO, A RODRIGO MD  Unavailable         Unavailable

 

                    NATALIO, A RODRIGO MD  Unavailable         Unavailable

 

                    NATALIO, A RODRIGO MD  Unavailable         Unavailable

 

                    NATALIO, A RODRIGO MD  Unavailable         Unavailable

 

                    NATALIO, A RODRIGO MD  Unavailable         Unavailable

 

                    NATALIO, A RODRIGO MD  Unavailable         Unavailable

 

                    NATALIO, A RODRIGO MD  Unavailable         Unavailable

 

                    NATALIO, A RODRIGO MD  Unavailable         Unavailable

 

                    NATALIO, A RODRIGO MD  Unavailable         Unavailable

 

                    NATALIO, A RODRIGO MD  Unavailable         Unavailable

 

                    NATALIO, A RODRIGO MD  Unavailable         Unavailable

 

                    NATALIO, A RODRIGO MD  Unavailable         Unavailable

 

                    NATALIO, A RODRIGO MD  Unavailable         Unavailable

 

                    NATALIO, A RODRIGO MD  Unavailable         Unavailable

 

                    NATALIO, A RODRIGO MD  Unavailable         Unavailable

 

                    NATALIO, A RODRIGO MD  Unavailable         Unavailable

 

                    NATALIO, A RODRIGO MD  Unavailable         Unavailable

 

                    NATALIO, LUIS MANUEL BOWMANEY MD  Unavailable         Unavailable

 

                    NATALIO, A RODRIGO MD  Unavailable         Unavailable

 

                    NATALIO, A RODRIGO MD  Unavailable         Unavailable

 

                    NATALIO, A RODRIGO MD  Unavailable         Unavailable

 

                    NATALIO, A RODRIGO MD  Unavailable         Unavailable

 

                    NATALIO, A RODRIGO MD  Unavailable         Unavailable

 

                    NATALIO, A RODRIGO MD  Unavailable         Unavailable

 

                    NATALIO, A RODRIGO MD  Unavailable         Unavailable

 

                    NATALIO, A RODRIGO MD  Unavailable         Unavailable

 

                    NATALIO, A RODRIGO MD  Unavailable         Unavailable

 

                    NATALIO, A RODRIGO MD  Unavailable         Unavailable

 

                    NATALIO, A RODRIGO MD  Unavailable         Unavailable

 

                    NATALIO, A RODRIGO MD  Unavailable         Unavailable

 

                    NATALIO, A RODRIGO MD  Unavailable         Unavailable

 

                    NATALIO, A RODRIGO MD  Unavailable         Unavailable

 

                    NATALIO, A RODRIGO MD  Unavailable         Unavailable

 

                    NATALIO, A RODRIGO MD  Unavailable         Unavailable

 

                    NATALIO, A RODRIGO MD  Unavailable         Unavailable

 

                    NATALIO, A RODRIGO MD  Unavailable         Unavailable

 

                    NATALIO, A RODRIGO MD  Unavailable         Unavailable

 

                    NATALIO, A RODRIGO MD  Unavailable         Unavailable

 

                    NATALIO, A RODRIGO MD  Unavailable         Unavailable

 

                    NATALIO, A RODRIGO MD  Unavailable         Unavailable

 

                    NATALIO, A RODRIGO MD  Unavailable         Unavailable

 

                    NATALIO, A RODRIGO MD  Unavailable         Unavailable

 

                    NATALIO, A RODRIGO MD  Unavailable         Unavailable

 

                    NATALIO, A RODRIGO MD  Unavailable         Unavailable

 

                    NATALIO, A RODRIGO MD  Unavailable         Unavailable

 

                    NATALIO, A RODRIGO MD  Unavailable         Unavailable

 

                    NATALIO, A RODRIGO MD  Unavailable         Unavailable

 

                    NATALIO, A RODRIGO MD  Unavailable         Unavailable

 

                    NATALIO, A RODRIGO MD  Unavailable         Unavailable

 

                    NATALIO, A RODRIGO MD  Unavailable         Unavailable

 

                    NATALIO, A RODRIGO MD  Unavailable         Unavailable

 

                    NATALIO, A RODRIGO MD  Unavailable         Unavailable

 

                    NATALIO, A RODRIGO MD  Unavailable         Unavailable

 

                    NATALIO, A RODRIGO MD  Unavailable         Unavailable

 

                    NATALIO, A RODRIGO MD  Unavailable         Unavailable

 

                    NATALIO, A RODRIGO MD  Unavailable         Unavailable

 

                    NATALIO, A RODRIGO MD  Unavailable         Unavailable

 

                    NATALIO, A RODRIGO MD  Unavailable         Unavailable

 

                    NATALIO, A RODRIGO MD  Unavailable         Unavailable

 

                    NATALIO, A RODRIGO MD  Unavailable         Unavailable

 

                    NATALIO, A RODRIGO MD  Unavailable         Unavailable

 

                    NATALIO, A RODRIGO MD  Unavailable         Unavailable

 

                    NATALIO, A RODRIGO MD  Unavailable         Unavailable

 

                    NATALIO, A RODRIGO MD  Unavailable         Unavailable

 

                    NATALIO, A RODRIGO MD  Unavailable         Unavailable

 

                    NATALIO, A RODRIGO MD  Unavailable         Unavailable

 

                    NATALIO, A RODRIGO MD  Unavailable         Unavailable

 

                    NATALIO, A RODRIGO MD  Unavailable         Unavailable

 

                    NATALIO, A RODRIGO MD  Unavailable         Unavailable

 

                    NATALIO, A RODRIGO MD  Unavailable         Unavailable

 

                    NATALIO, A RODRIGO MD  Unavailable         Unavailable

 

                    NATALIO, A RODRIGO MD  Unavailable         Unavailable

 

                    NATALIO, A RODRIGO MD  Unavailable         Unavailable

 

                    LUIS MANUEL RUSSELL MD  Unavailable         Unavailable

 

                    LUIS MANUEL RUSSELL MD  Unavailable         Unavailable

 

                    Crystal Carrington C    Unavailable         Unavailable

 

                    VALDIVIA, W RENÉE PA Unavailable         Unavailable

 

                    VALDIVIA, W RENÉE PA Unavailable         Unavailable

 

                    VALDIVIA, W RENÉE PA Unavailable         Unavailable

 

                    VALDIVIA, W RENÉE PA Unavailable         Unavailable

 

                    VALDIVIA, W RENÉE PA Unavailable         Unavailable

 

                    VALDIVIA, W RENÉE PA Unavailable         Unavailable

 

                    VALDIVIA, W RENÉE PA Unavailable         Unavailable

 

                    VALDIVIA, W RENÉE PA Unavailable         Unavailable

 

                    VALDIVIA, W RENÉE PA Unavailable         Unavailable

 

                    VALDIVIA, W RENÉE PA Unavailable         Unavailable

 

                    VALDIVIA, W RENÉE PA Unavailable         Unavailable

 

                    VALDIVIA, W RENÉE PA Unavailable         Unavailable

 

                    VALDIVIA, W RENÉE PA Unavailable         Unavailable

 

                    VALDIVIA, W RENÉE PA Unavailable         Unavailable

 

                    VALDIVIA, W RENÉE PA Unavailable         Unavailable

 

                    VALDIVIA, W RENÉE PA Unavailable         Unavailable

 

                    VALDIVIA, W RENÉE PA Unavailable         Unavailable

 

                    VALDIVIA, W RENÉE PA Unavailable         Unavailable

 

                    VALDIVIA, W RENÉE PA Unavailable         Unavailable

 

                    VALDIVIA, W RENÉE PA Unavailable         Unavailable

 

                    VALDIVIA, W RENÉE PA Unavailable         Unavailable

 

                    VALDIVIA, W RENÉE PA Unavailable         Unavailable

 

                    VALDIVIA, W RENÉE PA Unavailable         Unavailable

 

                    VALDIVIA, W RENÉE PA Unavailable         Unavailable

 

                    VALDIVIA, W RENÉE PA Unavailable         Unavailable

 

                    VALDIVIA, W RENÉE PA Unavailable         Unavailable

 

                    VALDIVIA, W RENÉE PA Unavailable         Unavailable

 

                    VALDIVIA, W RENÉE PA Unavailable         Unavailable

 

                    VALDIVIA, W RENÉE PA Unavailable         Unavailable

 

                    VALDIVIA, W RENÉE PA Unavailable         Unavailable

 

                    VALDIVIA, W RENÉE PA Unavailable         Unavailable

 

                    VALDIVIA, W RENÉE PA Unavailable         Unavailable

 

                    VALDIVIA, W RENÉE PA Unavailable         Unavailable

 

                    VALDIVIA, W RENÉE PA Unavailable         Unavailable

 

                    VALDIVIA, W RENÉE PA Unavailable         Unavailable

 

                    VALDIVIA, W RENÉE PA Unavailable         Unavailable

 

                    VALDIVIA, W RENÉE PA Unavailable         Unavailable

 

                    VALDIVIA, W RENÉE PA Unavailable         Unavailable

 

                    VALDIVIA, W RENÉE PA Unavailable         Unavailable

 

                    VALDIVIA, W RENÉE PA Unavailable         Unavailable

 

                    VALDIVIA, W RENÉE PA Unavailable         Unavailable

 

                    VALDIVIA, W RENÉE PA Unavailable         Unavailable

 

                    VALDIVIA, W RENÉE PA Unavailable         Unavailable

 

                    VALDIVIA, W RENÉE PA Unavailable         Unavailable

 

                    RASHARD, PRANAY SERGE PA Unavailable         Unavailable

 

                    RASHARD, PRANAY SERGE PA Unavailable         Unavailable

 

                    RASHARD, PRANAY SERGE PA Unavailable         Unavailable

 

                    RASHARD, PRANAY SERGE PA Unavailable         Unavailable

 

                    RASHARD, PRANAY SERGE PA Unavailable         Unavailable

 

                    RASHARD, PRANAY SERGE PA Unavailable         Unavailable

 

                    RASHARD, PRANAY SERGE PA Unavailable         Unavailable

 

                    RASHARD, PRANAY SERGE PA Unavailable         Unavailable

 

                    RASHARD, PRANAY SERGE PA Unavailable         Unavailable

 

                    RASHARD, PRANAY SERGE PA Unavailable         Unavailable

 

                    RASHARD, PRANAY SERGE PA Unavailable         Unavailable

 

                    RASHARD, PRANAY SERGE PA Unavailable         Unavailable

 

                    RASHARD, PRANAY SERGE PA Unavailable         Unavailable

 

                    RASHARD, PRANAY SERGE PA Unavailable         Unavailable

 

                    RASHARD, PRANAY SERGE PA Unavailable         Unavailable

 

                    RASHARD, PRANAY SERGE PA Unavailable         Unavailable

 

                    RASHARD, PRANAY SERGE PA Unavailable         Unavailable

 

                    RASHARD, PRANAY SERGE PA Unavailable         Unavailable

 

                    RASHARD, PRANAY SERGE PA Unavailable         Unavailable

 

                    RASHARD, PRANAY SERGE PA Unavailable         Unavailable

 

                    RASHARD, PRANAY SERGE PA Unavailable         Unavailable

 

                    RASHARD, PRANAY SERGE PA Unavailable         Unavailable

 

                    Jaya, Meryl Connie ANP-BC Unavailable         Unavailable

 

                    Jaya, Meryl Connie ANP-BC Unavailable         Unavailable

 

                    Jaya, Meryl Connie ANP-BC Unavailable         Unavailable

 

                    Jaya, Meryl Connie ANP-BC Unavailable         Unavailable

 

                    Jaya, Meryl Connie ANP-BC Unavailable         Unavailable

 

                    Jaya, Meryl Connie ANP-BC Unavailable         Unavailable

 

                    Jaya, Meryl Connie ANP-BC Unavailable         Unavailable

 

                    Jaya, Meryl Connie ANP-BC Unavailable         Unavailable

 

                    Jaya, Meryl Connie ANP-BC Unavailable         Unavailable

 

                    Jaya, Meryl Connie ANP-BC Unavailable         Unavailable

 

                    Jaya, Meryl Connie ANP-BC Unavailable         Unavailable

 

                    Jaya, Meryl Connie ANP-BC Unavailable         Unavailable

 

                    Jaya, Meryl Connie ANP-BC Unavailable         Unavailable

 

                    Jaya, Meryl Connie ANP-BC Unavailable         Unavailable

 

                    Jaya, Meryl Connie ANP-BC Unavailable         Unavailable

 

                    Jaya, Meryl Connie ANP-BC Unavailable         Unavailable

 

                    Jaya, Meryl Connie ANP-BC Unavailable         Unavailable

 

                    Jaya, Meryl Connie ANP-BC Unavailable         Unavailable

 

                    Jaya, Meryl Connie ANP-BC Unavailable         Unavailable

 

                    Jaya, Meryl Connie ANP-BC Unavailable         Unavailable

 

                    Jaya, Meryl Connie ANP-BC Unavailable         Unavailable

 

                    Jaya, Meryl Connie ANP-BC Unavailable         Unavailable

 

                    Jaya, Meryl Connie ANP-BC Unavailable         Unavailable

 

                    Jaya, Meryl Connie ANP-BC Unavailable         Unavailable

 

                    Jaya, Meryl Connie ANP-BC Unavailable         Unavailable

 

                    Jaya, Meryl Connie ANP-BC Unavailable         Unavailable

 

                    Jaya, Meryl Connie ANP-BC Unavailable         Unavailable

 

                    Jaya, Meryl Connie ANP-BC Unavailable         Unavailable

 

                    Jaya, Meryl Connie ANP-BC Unavailable         Unavailable

 

                    Jaya, Meryl Connie ANP-BC Unavailable         Unavailable

 

                    Jaya, Meryl Connie ANP-BC Unavailable         Unavailable

 

                    Jaya, Meryl Connie ANP-BC Unavailable         Unavailable

 

                    Jaya, Meryl Connie ANP-BC Unavailable         Unavailable

 

                    Jaya, Meryl Connie ANP-BC Unavailable         Unavailable

 

                    Jaya, Meryl Connie ANP-BC Unavailable         Unavailable

 

                    Jaya, Meryl Connie ANP-BC Unavailable         Unavailable

 

                    Jaya, Meryl Connie ANP-BC Unavailable         Unavailable

 

                    Jaya, Meryl Connie ANP-BC Unavailable         Unavailable

 

                    Jaya, Meryl Connie ANP-BC Unavailable         Unavailable

 

                    Jaya, Meryl Connie ANP-BC Unavailable         Unavailable

 

                    Jaya, Meryl Connie ANP-BC Unavailable         Unavailable

 

                    Jaya, Meryl Connie ANP-BC Unavailable         Unavailable

 

                    Jaya, Meryl Connie ANP-BC Unavailable         Unavailable

 

                    Jaya, Meryl Connie ANP-BC Unavailable         Unavailable

 

                    Jaya, Meryl Connie ANP-BC Unavailable         Unavailable

 

                    Jaya, Meryl Connie ANP-BC Unavailable         Unavailable

 

                    Jaya, Meryl Connie ANP-BC Unavailable         Unavailable

 

                    Jaya, Meryl Connie ANP-BC Unavailable         Unavailable

 

                    Jaya, Meryl Connie ANP-BC Unavailable         Unavailable

 

                    Jaya, Meryl Connie ANP-BC Unavailable         Unavailable

 

                    Jaya, Meryl Connie ANP-BC Unavailable         Unavailable

 

                    Jaya, Meryl Connie ANP-BC Unavailable         Unavailable

 

                    Jaya, Meryl Connie ANP-BC Unavailable         Unavailable

 

                    Jaya, Meryl Connie ANP-BC Unavailable         Unavailable

 

                    Jaya, Meryl Connie ANP-BC Unavailable         Unavailable

 

                    Jaya, Meryl Connie ANP-BC Unavailable         Unavailable

 

                    Jaya, Meryl Connie ANP-BC Unavailable         Unavailable

 

                    Jaya, Meryl Connie ANP-BC Unavailable         Unavailable

 

                    Jaya, Meryl Connie ANP-BC Unavailable         Unavailable

 

                    Jaya, Meryl Connie ANP-BC Unavailable         Unavailable

 

                    Jaya, Meryl Connie ANP-BC Unavailable         Unavailable

 

                    Jaya, Meryl Connie ANP-BC Unavailable         Unavailable

 

                    Jaya, Meryl Connie ANP-BC Unavailable         Unavailable

 

                    Jaya, Meryl Connie ANP-BC Unavailable         Unavailable

 

                    Jaya, Meryl Connie ANP-BC Unavailable         Unavailable

 

                    Jaya, Meryl Connie ANP-BC Unavailable         Unavailable

 

                    COREY Rosas FNP  Unavailable         Unavailable

 

                    COREY Rosas FNP  Unavailable         Unavailable

 

                    COREY Rosas FNP  Unavailable         Unavailable

 

                    Barter, D Abisai FNP  Unavailable         Unavailable

 

                    Barter, D Abisai FNP  Unavailable         Unavailable

 

                    Barter, D Abisai FNP  Unavailable         Unavailable

 

                    Barter, D Abisai FNP  Unavailable         Unavailable

 

                    Barter, D Abisai FNP  Unavailable         Unavailable

 

                    Barter, D Abisai FNP  Unavailable         Unavailable

 

                    Barter, D Abisai FNP  Unavailable         Unavailable

 

                    Barter, D Abisai FNP  Unavailable         Unavailable

 

                    Barter, D Abisai FNP  Unavailable         Unavailable

 

                    Barter, D Abisai FNP  Unavailable         Unavailable

 

                    Barter, D Abisai FNP  Unavailable         Unavailable

 

                    Barter, D Abisai FNP  Unavailable         Unavailable

 

                    Barter, D Abisai FNP  Unavailable         Unavailable

 

                    Barter, D Abisai FNP  Unavailable         Unavailable

 

                    Barter, D Abisai FNP  Unavailable         Unavailable

 

                    Barter, D Abisai FNP  Unavailable         Unavailable

 

                    Barter, D Abisai FNP  Unavailable         Unavailable

 

                    Barter, D Abisai FNP  Unavailable         Unavailable

 

                    Barter, D Abisai FNP  Unavailable         Unavailable

 

                    Barter, D Abisai FNP  Unavailable         Unavailable

 

                    Barter, D Abisai FNP  Unavailable         Unavailable

 

                    Barter, D Abisai FNP  Unavailable         Unavailable

 

                    Barter, D Abisai FNP  Unavailable         Unavailable

 

                    Barter, D Abisai FNP  Unavailable         Unavailable

 

                    Barter, D Abisai FNP  Unavailable         Unavailable

 

                    Barter, D Abisai FNP  Unavailable         Unavailable

 

                    Barter, D Abisai FNP  Unavailable         Unavailable

 

                    Barter, D Abisai FNP  Unavailable         Unavailable

 

                    Barter, D Abisai FNP  Unavailable         Unavailable

 

                    Barter, D Abisai FNP  Unavailable         Unavailable

 

                    Barter, D Abisai FNP  Unavailable         Unavailable

 

                    Barter, D Abisai FNP  Unavailable         Unavailable

 

                    Barter, D Abisai FNP  Unavailable         Unavailable

 

                    Barter, D Abisai FNP  Unavailable         Unavailable

 

                    Barter, D Abisai FNP  Unavailable         Unavailable

 

                    Barter, D Abisai FNP  Unavailable         Unavailable

 

                    Barter, D Abisai FNP  Unavailable         Unavailable

 

                    Barter, D Abisai FNP  Unavailable         Unavailable

 

                    Barter, D Abisai FNP  Unavailable         Unavailable

 

                    Barter, D Abisai FNP  Unavailable         Unavailable

 

                    Barter, D Abisai FNP  Unavailable         Unavailable

 

                    Barter, D Abisai FNP  Unavailable         Unavailable

 

                    Barter, D Abisai FNP  Unavailable         Unavailable

 

                    Barter, D Abisai FNP  Unavailable         Unavailable

 

                    Barter, D Abisai FNP  Unavailable         Unavailable

 

                    Barter, D Abisai FNP  Unavailable         Unavailable

 

                    Barter, D Abisai FNP  Unavailable         Unavailable

 

                    Barter, D Abisai FNP  Unavailable         Unavailable

 

                    Barter, D Abisai FNP  Unavailable         Unavailable

 

                    Barter, D Abisai FNP  Unavailable         Unavailable

 

                    JAY, MAQBOOL CALEB MD Unavailable         Unavailable

 

                    JAY, MAQBOOL CALEB MD Unavailable         Unavailable

 

                    JAY, MAQBOOL CALEB SALAS Unavailable         Unavailable

 

                    JAY, MAQBOOL CALEB MD Unavailable         Unavailable

 

                    JAY, MAQBOOL CALEB MD Unavailable         Unavailable

 

                    JAY, MAQBOOL CALEB MD Unavailable         Unavailable

 

                    JAY, MAQBOOL CALEB MD Unavailable         Unavailable

 

                    JAY, MAQBOOL CALEB MD Unavailable         Unavailable

 

                    JAY, MAQBOOL CALEB MD Unavailable         Unavailable

 

                    JAY, MAQBOOL CALEB MD Unavailable         Unavailable

 

                    JAY, MAQBOOL CALEB MD Unavailable         Unavailable

 

                    JAY, MAQBOOL CALEB MD Unavailable         Unavailable

 

                    JAY, MAQBOOL CALEB MD Unavailable         Unavailable

 

                    JAY, MAQBOOL CALEB MD Unavailable         Unavailable

 

                    JAY, MAQBOOL CALEB MD Unavailable         Unavailable

 

                    JAY, MAQBOOL CALEB MD Unavailable         Unavailable

 

                    JAY, MAQBOOL CALEB MD Unavailable         Unavailable

 

                    JAY, MAQBOOL CALEB MD Unavailable         Unavailable

 

                    JAY, MAQBOOL CALEB MD Unavailable         Unavailable

 

                    JAY, MAQBOOL CALEB MD Unavailable         Unavailable

 

                    JAY, MAQBOOL CALEB MD Unavailable         Unavailable

 

                    JAY, MAQBOOL CALEB MD Unavailable         Unavailable

 

                    JAY, MAQBOOL CALEB MD Unavailable         Unavailable

 

                    JAY, MAQBOOL CALEB MD Unavailable         Unavailable

 

                    JAY, MAQBOOL CALEB MD Unavailable         Unavailable

 

                    JAY, MAQBOOL CALEB MD Unavailable         Unavailable

 

                    JAY, MAQBOOL CALEB MD Unavailable         Unavailable

 

                    JAY, MAQBOOL CALEB MD Unavailable         Unavailable

 

                    JAY, MAQBOOL CALEB MD Unavailable         Unavailable

 

                    JAY, MAQBOOL CALEB MD Unavailable         Unavailable

 

                    JAY, MAQBOOL CALEB MD Unavailable         Unavailable

 

                    JAY, MAQBOOL CALEB MD Unavailable         Unavailable

 

                    JAY, MAQBOOL CALEB MD Unavailable         Unavailable

 

                    JAY, MAQBOOL CALEB MD Unavailable         Unavailable

 

                    JAY, MAQBOOL CALEB MD Unavailable         Unavailable

 

                    JAY, MAQBOOL CALEB MD Unavailable         Unavailable

 

                    JAY, MAQBOOL CALEB MD Unavailable         Unavailable

 

                    JAY, MAQBOOL CALEB MD Unavailable         Unavailable

 

                    JAY, MAQBOOL CALEB MD Unavailable         Unavailable

 

                    JAY, MAQBOOL CALEB MD Unavailable         Unavailable

 

                    JAY, MAQBOOL CALEB MD Unavailable         Unavailable

 

                    JAY, MAQBOOL CALEB MD Unavailable         Unavailable

 

                    JAY, MAQBOOL CALEB MD Unavailable         Unavailable

 

                    JAY, MAQBOOL CALEB MD Unavailable         Unavailable

 

                    JAY, MAQBOOL CALEB MD Unavailable         Unavailable

 

                    JAY, MAQBOOL CALEB MD Unavailable         Unavailable

 

                    JAY, MAQBOOL CALEB MD Unavailable         Unavailable

 

                    JAY, MAQBOOL CALEB MD Unavailable         Unavailable

 

                    JAY, MAQBOOL CALEB MD Unavailable         Unavailable

 

                    JAY, MAQBOOL CALEB MD Unavailable         Unavailable

 

                    JAY, MAQBOOL CALEB MD Unavailable         Unavailable

 

                    JAY, MAQBOOL CALEB MD Unavailable         Unavailable

 

                    JAY, MAQBOOL CALEB MD Unavailable         Unavailable

 

                    JAY, MAQBOOL CALEB MD Unavailable         Unavailable

 

                    JAY, MAQBOOL CALEB MD Unavailable         Unavailable

 

                    JAY, MAQBOOL CALEB MD Unavailable         Unavailable

 

                    JAY, MAQBOOL CALEB MD Unavailable         Unavailable

 

                    JAY, MAQBOOL CALEB MD Unavailable         Unavailable

 

                    JAY, MAQBOOL CALEB MD Unavailable         Unavailable

 

                    JAY, MAQBOOL CALEB MD Unavailable         Unavailable

 

                    JAY, MAQBOOL CALEB MD Unavailable         Unavailable

 

                    JAY, MAQBOOL CALEB MD Unavailable         Unavailable

 

                    JAY, MAQBOOL CALEB MD Unavailable         Unavailable

 

                    JAY, MAQBOOL CALEB MD Unavailable         Unavailable

 

                    JAY, MAQBOOL CALEB MD Unavailable         Unavailable

 

                    JAY, MAQBOOL CALEB MD Unavailable         Unavailable

 

                    JAY, MAQBOOL CALEB MD Unavailable         Unavailable

 

                    JAY, MAQBOOL CALEB MD Unavailable         Unavailable

 

                    JAY, MAQBOOL CALEB MD Unavailable         Unavailable

 

                    JAY, MAQBOOL CALEB MD Unavailable         Unavailable

 

                    JAY, MAQBOOL CALEB MD Unavailable         Unavailable

 

                    JAY, MAQBOOL CALEB MD Unavailable         Unavailable

 

                    JAY, MAQBOOL CALEB MD Unavailable         Unavailable

 

                    JAY, MAQBOOL CALEB MD Unavailable         Unavailable

 

                    JAY, MAQBOOL CALEB MD Unavailable         Unavailable

 

                    JAY, MAQBOOL CALEB MD Unavailable         Unavailable

 

                    JAY, MAQBOOL CALEB MD Unavailable         Unavailable

 

                    JAY, MAQBOOL CALEB MD Unavailable         Unavailable

 

                    Nevills, C Crystal NP Unavailable         Unavailable

 

                    Nevills, C Crystal NP Unavailable         Unavailable

 

                    Nevills, C Crystal NP Unavailable         Unavailable

 

                    Nevills, C Crystal NP Unavailable         Unavailable

 

                    Nevills, C Crystal NP Unavailable         Unavailable

 

                    Nevills, C Crystal NP Unavailable         Unavailable

 

                    Nevills, C Crystal NP Unavailable         Unavailable

 

                    Nevills, C Crystal NP Unavailable         Unavailable

 

                    Nevills, C Crystal NP Unavailable         Unavailable

 

                    Nevills, C Crystal NP Unavailable         Unavailable

 

                    Nevills, C Crystal NP Unavailable         Unavailable

 

                    Nevills, C Crystal NP Unavailable         Unavailable

 

                    Nevills, C Crystal NP Unavailable         Unavailable

 

                    Nevills, C Crystal NP Unavailable         Unavailable

 

                    Nevills, C Crystal NP Unavailable         Unavailable

 

                    Nevills, C Crystal NP Unavailable         Unavailable

 

                    Nevills, C Crystal NP Unavailable         Unavailable

 

                    Nevills, C Crystal NP Unavailable         Unavailable

 

                    Nevills, C Crystal NP Unavailable         Unavailable

 

                    Nevills, C Crystal NP Unavailable         Unavailable

 

                    Nevills, C Crystal NP Unavailable         Unavailable

 

                    Nevills, C Crystal NP Unavailable         Unavailable

 

                    Nevills, C Crystal NP Unavailable         Unavailable

 

                    Nevills, C Crystal NP Unavailable         Unavailable

 

                    Nevills, C Crystal NP Unavailable         Unavailable

 

                    Nevills, C Crystal NP Unavailable         Unavailable

 

                    Nevills, C Crystal NP Unavailable         Unavailable

 

                    Nevills, C Crystal NP Unavailable         Unavailable

 

                    Nevills, C Crystal NP Unavailable         Unavailable

 

                    Nevills, C Crystal NP Unavailable         Unavailable

 

                    Nevills, C Crystal NP Unavailable         Unavailable

 

                    Nevills, C Crystal NP Unavailable         Unavailable

 

                    Nevills, C Crystal NP Unavailable         Unavailable

 

                    Nevills, C Crystal NP Unavailable         Unavailable

 

                    NATALIO, LUIS MANUEL WALLACE MD  Unavailable         Unavailable

 

                    NATALIO, LUIS MANUEL WALLACE MD  Unavailable         Unavailable

 

                    NATALIO, LUIS MANUEL WALLACE MD  Unavailable         Unavailable

 

                    NATALIO, LUIS MANUEL WALLACE MD  Unavailable         Unavailable

 

                    NATALIO, LUIS MANUEL WALLACE MD  Unavailable         Unavailable

 

                    NATALIO, LUIS MANUEL WALLACE MD  Unavailable         Unavailable

 

                    NATALIO, LUIS MANUEL WALLACE MD  Unavailable         Unavailable

 

                    NATALIO, LUIS MANUEL WALLACE MD  Unavailable         Unavailable

 

                    NATALIO, LUIS MANUEL WALLACE MD  Unavailable         Unavailable

 

                    NATALIO, LUIS MANUEL WALLACE MD  Unavailable         Unavailable

 

                    NATALIO, LUIS MANUEL WALLACE MD  Unavailable         Unavailable

 

                    NATALIO, LUIS MANUEL WALLACE MD  Unavailable         Unavailable

 

                    NATALIO, LUIS MANUEL WALLACE MD  Unavailable         Unavailable

 

                    NATALIO, LUIS MANUEL WALLACE MD  Unavailable         Unavailable

 

                    NATALIO, LUIS MANUEL WALLACE MD  Unavailable         Unavailable

 

                    NATALIO, LUIS MANUEL WALLACE MD  Unavailable         Unavailable

 

                    NATALIO, LUIS MANUEL WALLACE MD  Unavailable         Unavailable

 

                    NATALIO, LUIS MANUEL WALLACE MD  Unavailable         Unavailable

 

                    NATALIO, LUIS MANUEL WALLACE MD  Unavailable         Unavailable

 

                    NATALIO, LUIS MANUEL WALLACE MD  Unavailable         Unavailable

 

                    NATALIO, LUIS MANUEL WALLACE MD  Unavailable         Unavailable

 

                    NATALIO, LUIS MANUEL WALLACE MD  Unavailable         Unavailable

 

                    NATALIO, LUIS MANUEL WALLACE MD  Unavailable         Unavailable

 

                    NATALIO, LUIS MANUEL WALLACE MD  Unavailable         Unavailable

 

                    NATALIO, LUIS MANUEL WALLACE MD  Unavailable         Unavailable

 

                    NATALIO, A RODRIGO MD  Unavailable         Unavailable

 

                    NATALIO, A RODRIGO MD  Unavailable         Unavailable

 

                    NATALIO, LUIS MANUEL BOWMANEY MD  Unavailable         Unavailable

 

                    NATALIO, A RODRIGO MD  Unavailable         Unavailable

 

                    NATALIO, A RODRIGO MD  Unavailable         Unavailable

 

                    NATALIO, A RODRIGO MD  Unavailable         Unavailable

 

                    NATALIO, A RODRIGO MD  Unavailable         Unavailable

 

                    NATALIO, A RODRIGO MD  Unavailable         Unavailable

 

                    NATALIO, A RODRIGO MD  Unavailable         Unavailable

 

                    NATALIO, A RODRIGO MD  Unavailable         Unavailable

 

                    NATALIO, A RODRIGO MD  Unavailable         Unavailable

 

                    NATALIO, A RODRIGO MD  Unavailable         Unavailable

 

                    NATALIO, A RODRIGO MD  Unavailable         Unavailable

 

                    NATALIO, A RODRIGO MD  Unavailable         Unavailable

 

                    NATALIO, A RODRIGO MD  Unavailable         Unavailable

 

                    NATALIO, A RODRIGO MD  Unavailable         Unavailable

 

                    NATALIO, A RODRIGO MD  Unavailable         Unavailable

 

                    NATALIO, A RODRIGO MD  Unavailable         Unavailable

 

                    NATALIO, A RODRIGO MD  Unavailable         Unavailable

 

                    NATALIO, A RODRIGO MD  Unavailable         Unavailable

 

                    NATALIO, A RODRIGO MD  Unavailable         Unavailable

 

                    NATALIO, A RODRIGO MD  Unavailable         Unavailable

 

                    NATALIO, A RODRIGO MD  Unavailable         Unavailable

 

                    NATALIO, A RODRIGO MD  Unavailable         Unavailable

 

                    NATALIO, A RODRIGO MD  Unavailable         Unavailable

 

                    NATALIO, A RODRIGO MD  Unavailable         Unavailable

 

                    NATALIO, A RODRIGO MD  Unavailable         Unavailable

 

                    NATALIO, A RODRIGO MD  Unavailable         Unavailable

 

                    NATALIO, A RODRIGO MD  Unavailable         Unavailable

 

                    NATALIO, A RODRIGO MD  Unavailable         Unavailable

 

                    NATALIO, A RODRIGO MD  Unavailable         Unavailable

 

                    NATALIO, A RODRIGO MD  Unavailable         Unavailable

 

                    NATALIO, A RODRIGO MD  Unavailable         Unavailable

 

                    NATALIO, A RODRIGO MD  Unavailable         Unavailable

 

                    NATALIO, A RODRIGO MD  Unavailable         Unavailable

 

                    NATALIO, A RODRIGO MD  Unavailable         Unavailable

 

                    NATALIO, A RODRIGO MD  Unavailable         Unavailable

 

                    NATALIO, A RODRIGO MD  Unavailable         Unavailable

 

                    NATALIO, A RODRIGO MD  Unavailable         Unavailable

 

                    NATALIO, A RODRIGO MD  Unavailable         Unavailable

 

                    NATALIO, A RODRIGO MD  Unavailable         Unavailable

 

                    NATALIO, A RODRIGO MD  Unavailable         Unavailable

 

                    NATALIO, A RODRIGO MD  Unavailable         Unavailable

 

                    NATALIO, A RODRIGO MD  Unavailable         Unavailable

 

                    NATALIO, A RODRIGO MD  Unavailable         Unavailable

 

                    NATALIO, A RODRIGO MD  Unavailable         Unavailable

 

                    NATALIO, A RODRIGO MD  Unavailable         Unavailable

 

                    NATALIO, A RODRIGO MD  Unavailable         Unavailable

 

                    NATALIO, A RODRIGO MD  Unavailable         Unavailable

 

                    NATALIO, A RODRIGO MD  Unavailable         Unavailable

 

                    NATALIO, A RODRIGO MD  Unavailable         Unavailable

 

                    NATALIO, A RODRIGO MD  Unavailable         Unavailable

 

                    NATALIO, A RODRIGO MD  Unavailable         Unavailable

 

                    NATALIO, A RODRIGO MD  Unavailable         Unavailable

 

                    NATALIO, A RODRIGO MD  Unavailable         Unavailable

 

                    NATALIO, A RODRIGO MD  Unavailable         Unavailable

 

                    NATALIO, A RODRIGO MD  Unavailable         Unavailable

 

                    NATALIO, A RODRIGO MD  Unavailable         Unavailable

 

                    DANIEL, L GÉNESIS PA Unavailable         Unavailable

 

                    DANIEL, L GÉNESIS PA Unavailable         Unavailable

 

                    DANIEL, L GÉNESIS PA Unavailable         Unavailable

 

                    DANIEL, L GÉNESIS PA Unavailable         Unavailable

 

                    DANIEL, L GÉNESIS PA Unavailable         Unavailable

 

                    DANIEL, L GÉNESIS PA Unavailable         Unavailable

 

                    DANIEL, L GÉNESIS PA Unavailable         Unavailable

 

                    DANIEL, L GÉNESIS PA Unavailable         Unavailable

 

                    DANIEL, L GÉNESIS PA Unavailable         Unavailable

 

                    DANIEL, L GÉNESIS PA Unavailable         Unavailable

 

                    DANIEL, L GÉNESIS PA Unavailable         Unavailable

 

                    DANIEL, L GÉNESIS PA Unavailable         Unavailable

 

                    DANIEL, L GÉNESIS PA Unavailable         Unavailable

 

                    DANIEL, L GÉNESIS PA Unavailable         Unavailable

 

                    DANIEL, L GÉNESIS PA Unavailable         Unavailable

 

                    DANIEL, L GÉNESIS PA Unavailable         Unavailable

 

                    DANIEL, L GÉNESIS PA Unavailable         Unavailable

 

                    DANIEL, L GÉNESIS PA Unavailable         Unavailable

 

                    DANIEL, L GÉNESIS PA Unavailable         Unavailable

 

                    DANIEL, L GÉNESIS PA Unavailable         Unavailable

 

                    DANIEL, L GÉNESIS PA Unavailable         Unavailable

 

                    DANIEL, L GÉNESIS PA Unavailable         Unavailable

 

                    Dieudonne Gonzalez MD Unavailable         Unavailable

 

                    Dieudonne Gonzalez MD Unavailable         Unavailable

 

                    Dieudonne Gonzalez MD Unavailable         Unavailable

 

                    Dieudonne Gonzalez MD Unavailable         Unavailable

 

                    Dieudonne Gonzalez MD Unavailable         Unavailable

 

                    Dieudonne Gonzalez MD Unavailable         Unavailable

 

                    Dieudonne Gonzalez MD Unavailable         Unavailable

 

                    Dieudonne Gonzalez MD Unavailable         Unavailable

 

                    Dieudonne Gonzalez MD Unavailable         Unavailable

 

                    Dieudonne Gonzalez MD Unavailable         Unavailable

 

                    Dieudonne Gonzalez MD Unavailable         Unavailable

 

                    Dieudonne Gonzalez MD Unavailable         Unavailable

 

                    Dieudonne Gonzalez MD Unavailable         Unavailable

 

                    Dieudonne Gonzalez MD Unavailable         Unavailable

 

                    Dieudonne Gonzalez MD Unavailable         Unavailable

 

                    Dieudonne Gonzalez MD Unavailable         Unavailable

 

                    Dieudonne Gonzalez MD Unavailable         Unavailable

 

                    Dieudonne Gonzalez MD Unavailable         Unavailable

 

                    Dieudonne Gonzalez MD Unavailable         Unavailable

 

                    Dieudonne Gonzalez MD Unavailable         Unavailable

 

                    Dieudonne Gonzalez MD Unavailable         Unavailable

 

                    Dieudonne Gonzalez MD Unavailable         Unavailable

 

                    Dieudonne Gonzalez MD Unavailable         Unavailable

 

                    Dieudonne Gonzalez MD Unavailable         Unavailable

 

                    Dieudonne Gonzalez MD Unavailable         Unavailable

 

                    Dieudonne Gonzalez MD Unavailable         Unavailable

 

                    Deiudonne Gonzalez MD Unavailable         Unavailable

 

                    Dieudonne Gonzalez MD Unavailable         Unavailable

 

                    Dieudonne Gonzalez MD Unavailable         Unavailable

 

                    Dieudonne Gonzalez MD Unavailable         Unavailable

 

                    Dieudonne Gonzalez MD Unavailable         Unavailable

 

                    Dieudonne Gonzalez MD Unavailable         Unavailable

 

                    Dieudonne Gonzalez MD Unavailable         Unavailable

 

                    Dieudonne Gonzalez MD Unavailable         Unavailable

 

                    Dieudonne Gonzalez MD Unavailable         Unavailable

 

                    Dieudonne Gonzalez MD Unavailable         Unavailable

 

                    Dieudonne Gonzalez MD Unavailable         Unavailable

 

                    Dieudonne Gonzalez MD Unavailable         Unavailable

 

                    Dieudonne Gonzalez MD Unavailable         Unavailable

 

                    Dieudonne Gonzalez MD Unavailable         Unavailable

 

                    Dieudonne Gonzalez MD Unavailable         Unavailable

 

                    Dieudonne Gonzalez MD Unavailable         Unavailable

 

                    Dieudonne Gonzalez MD Unavailable         Unavailable

 

                    Dieudonne Gonzalez MD Unavailable         Unavailable

 

                    Dieudonne Gonzalez MD Unavailable         Unavailable

 

                    Dieudonne Gonzalez MD Unavailable         Unavailable

 

                    Dieudonne Gonzalez MD Unavailable         Unavailable

 

                    Dieudonne Gonzalez MD Unavailable         Unavailable

 

                    Dieudonne Gonzalez MD Unavailable         Unavailable

 

                    Dieudonne Gonzalez MD Unavailable         Unavailable

 

                    Dieudonne Gonzalez MD Unavailable         Unavailable

 

                    Dieudonne Gonzalez MD Unavailable         Unavailable

 

                    Dieudonne Gonzalez MD Unavailable         Unavailable

 

                    Dieudonne Gonzalez MD Unavailable         Unavailable

 

                    Jaya, Meryl Connie ANP-BC Unavailable         Unavailable

 

                    Jaya, Meryl Connie ANP-BC Unavailable         Unavailable

 

                    Jaya, Meryl Connie ANP-BC Unavailable         Unavailable

 

                    Jaya, Meryl Connie ANP-BC Unavailable         Unavailable

 

                    Jaya, Meryl Connie ANP-BC Unavailable         Unavailable

 

                    Jaya, Meryl Connie ANP-BC Unavailable         Unavailable

 

                    Jaya, Meryl Connie ANP-BC Unavailable         Unavailable

 

                    Jaya, Meryl Connie ANP-BC Unavailable         Unavailable

 

                    Jaya, Meryl Connie ANP-BC Unavailable         Unavailable

 

                    Jaya, Meryl Connie ANP-BC Unavailable         Unavailable

 

                    Jaya, Meryl Connie ANP-BC Unavailable         Unavailable

 

                    Jaya, Meryl Connie ANP-BC Unavailable         Unavailable

 

                    Jaya, Meryl Connie ANP-BC Unavailable         Unavailable

 

                    Jaya, Meryl Connie ANP-BC Unavailable         Unavailable

 

                    Jaya, Meryl Connie ANP-BC Unavailable         Unavailable

 

                    Jaya, Meryl Connie ANP-BC Unavailable         Unavailable

 

                    Jaya, Meryl Connie ANP-BC Unavailable         Unavailable

 

                    Jaya, Meryl Connie ANP-BC Unavailable         Unavailable

 

                    Jaya, Meryl Connie ANP-BC Unavailable         Unavailable

 

                    Jaya, Meryl Connie ANP-BC Unavailable         Unavailable

 

                    Jaya, Meryl Connie ANP-BC Unavailable         Unavailable

 

                    Jaya, Meryl Connie ANP-BC Unavailable         Unavailable

 

                    Jaya, Meryl Connie ANP-BC Unavailable         Unavailable

 

                    Jaya, Meryl Connie ANP-BC Unavailable         Unavailable

 

                    Jaya, Meryl Connie ANP-BC Unavailable         Unavailable

 

                    Jaya, Meryl Connie ANP-BC Unavailable         Unavailable

 

                    Jaya, Meryl Connie ANP-BC Unavailable         Unavailable

 

                    Jaya, Meryl Connie ANP-BC Unavailable         Unavailable

 

                    Jaya, Meryl Connie ANP-BC Unavailable         Unavailable

 

                    Jaya, Meryl Connie ANP-BC Unavailable         Unavailable

 

                    Jaya, Meryl Connie ANP-BC Unavailable         Unavailable

 

                    Jaya, Meryl Connie ANP-BC Unavailable         Unavailable

 

                    Jaya, Meryl Connie ANP-BC Unavailable         Unavailable

 

                    Jaya, Meryl Connie ANP-BC Unavailable         Unavailable

 

                    Jaya, Meryl Connie ANP-BC Unavailable         Unavailable

 

                    Jaya, Meryl Connie ANP-BC Unavailable         Unavailable

 

                    Jaya, Meryl Connie ANP-BC Unavailable         Unavailable

 

                    Jaya, Meryl Connie ANP-BC Unavailable         Unavailable

 

                    Jaya, Meryl Connie ANP-BC Unavailable         Unavailable

 

                    Jaya, Meryl Connie ANP-BC Unavailable         Unavailable

 

                    Jaya, Meryl Connie ANP-BC Unavailable         Unavailable

 

                    Jaya, Meryl Connie ANP-BC Unavailable         Unavailable

 

                    Jaya, Meryl Connie ANP-BC Unavailable         Unavailable

 

                    Jaya, Meryl Connie ANP-BC Unavailable         Unavailable

 

                    Jaya, Meryl Connie ANP-BC Unavailable         Unavailable

 

                    Jaya, Meryl Connie ANP-BC Unavailable         Unavailable

 

                    Jaya, Meryl Connie ANP-BC Unavailable         Unavailable

 

                    Jaya, Meryl Connie ANP-BC Unavailable         Unavailable

 

                    Jaya, Meryl Connie ANP-BC Unavailable         Unavailable

 

                    Jaya, Meryl Connie ANP-BC Unavailable         Unavailable

 

                    Jaya, Meryl Connie ANP-BC Unavailable         Unavailable

 

                    Jaya, Meryl Connie ANP-BC Unavailable         Unavailable

 

                    Jaya, Meryl Connie ANP-BC Unavailable         Unavailable

 

                    Jaya, Meryl Connie ANP-BC Unavailable         Unavailable

 

                    Jaya, Meryl Connie ANP-BC Unavailable         Unavailable

 

                    Jaya, Meryl Connie ANP-BC Unavailable         Unavailable

 

                    Jaya, Emryl Connie ANP-BC Unavailable         Unavailable

 

                    Jaya, Meryl Connie ANP-BC Unavailable         Unavailable

 

                    Jaya, Meryl Connie ANP-BC Unavailable         Unavailable

 

                    Jaya, Meryl Connie ANP-BC Unavailable         Unavailable

 

                    Jaya, Meryl Connie ANP-BC Unavailable         Unavailable

 

                    Ajya, Meryl Connie ANP-BC Unavailable         Unavailable

 

                    Jaya, Meryl Connie ANP-BC Unavailable         Unavailable

 

                    Jaya, Meryl Connie ANP-BC Unavailable         Unavailable

 

                    Jaya, Meryl Connie ANP-BC Unavailable         Unavailable

 

                    Jaya, Meryl Connie ANP-BC Unavailable         Unavailable



                                  



Re-disclosure Warning

          The records that you are about to access may contain information from 
federally-assisted alcohol or drug abuse programs. If such information is 
present, then the following federally mandated warning applies: This information
has been disclosed to you from records protected by federal confidentiality 
rules (42 CFR part 2). The federal rules prohibit you from making any further 
disclosure of this information unless further disclosure is expressly permitted 
by the written consent of the person to whom it pertains or as otherwise 
permitted by 42 CFR part 2. A general authorization for the release of medical 
or other information is NOT sufficient for this purpose. The Federal rules 
restrict any use of the information to criminally investigate or prosecute any 
alcohol or drug abuse patient.The records that you are about to access may 
contain highly sensitive health information, the redisclosure of which is 
protected by Article 27-F of the [facility] Public Health law. If you 
continue you may have access to information: Regarding HIV / AIDS; Provided by 
facilities licensed or operated by the [facility] Office of Mental Health; 
or Provided by the [facility] Office for People With Developmental 
Disabilities. If such information is present, then the following [place] 
mandated warning applies: This information has been disclosed to you from 
confidential records which are protected by state law. State law prohibits you 
from making any further disclosure of this information without the specific 
written consent of the person to whom it pertains, or as otherwise permitted by 
law. Any unauthorized further disclosure in violation of state law may result in
a fine or FDC sentence or both. A general authorization for the release of 
medical or other information is NOT sufficient authorization for further disc
losure.                                                                         
    



Family History

          



             Family Member Name Family Member Gender Family Member Status Date o

f Status 

Description                             Data Source(s)

 

           Unknown    Male       Problem                          [facility] ([facility]



                                                                                
       



Encounters

          



           Encounter  Providers  Location   Date       Indications Data Source(s

)

 

                Outpatient      Attender: Crystal Carrington NPConsultant: Abisai SCHNEIDER                 2021 

07:59:00 AM EDT - 2021 07:59:00 AM EDT                           [facility]

 

                Outpatient      Attender: Crystal Carrington  Family Practice 10/25

/2021 01:20:00 PM EDT

                                                    [facility] ([facility]

[facility])

 

                Outpatient      Attender: Crystal Carrington NPConsultant: Abisai emmanuel [facility]                 10/25/2021 

01:03:00 PM EDT - 10/25/2021 01:03:00 PM EDT                           [facility]

 

                Outpatient      Attender: Crystal Carrington NPConsultant: Abisai SCHNEIDER                 2021 

11:51:00 AM EDT - 2021 12:51:00 PM EDT                           [facility]

 

                Outpatient      Attender: Crystal GONZALEZonsultant: Abisai MARVIN                 2021 

09:24:00 AM EDT - 2021 09:24:00 AM EDT                           [facility]

 

                Outpatient      Attender: Crystal GONZALEZonsultant: Abisai SCHNEIDER                 2021 

10:39:00 AM EDT - 2021 10:39:00 AM EDT                           [facility]

 

                Outpatient      Attender: Crystal Carrington NP [facility]  10:20:00 AM EDT

                                                    MEDENT ([facility])

 

                Outpatient      Attender: Crystal Carrington NPConsultant: Abisai emmanuel [facility]                 2021 

08:02:00 AM EDT - 2021 08:02:00 AM EDT                           [facility]

 

                Outpatient      Attender: Crystal Carrington NP [facility]  08:00:00 AM EDT

                                                    MEDENT ([facility])

 

                Office Visit    Attender: RODRIGO Reilly/ [facility]

 2021 

03:15:00 PM EDT                                     MEDENT ([facility])

 

           Outpatient Attender: RODRIGO RUSSELL MD            2021 06:33:21 A

M EDT            Lab [place]

[facility]

 

                Inpatient       Attender: RODRIGO RUSSELL MDAdmitter: RODRIGO RUSSELL MD                 2021 

05:30:00 AM EDT - 2021 01:14:00 PM EDT RIGHT KIDNEY MASS D49.511 [facility]

 

                                        RIGHT KIDNEY MASS D49.511 

 

                                        Patient discharged. 

 

                           ([facility]) Attender: VICKIE RUSSELL MDAdmitter: RODRIGO RUSSELL MDConsultant: Crystal Carrington                 2021 05:30:00 AM EDT 

                [facility]

 

                Outpatient      Attender: CALEB POWERS MD [facility]  01:30:00 PM EDT

                                                    MEDENT ([facility])

 

                Outpatient      Attender: RODRIGO RUSSELL MDConsultant: Abisai Rosas

 SOURAV                 2021 

12:31:00 PM EDT - 2021 01:31:00 PM EDT                           [facility]

 

                Outpatient      Attender: Crystal Carrington NPConsultant: Abisai MARVIN                 2021 

09:17:00 AM EDT - 2021 09:17:00 AM EDT                           [facility]

 

                Outpatient      Attender: RODRIGO Reilly/ [facility]

 2021 03:00:00 

PM EDT                                              MEDENT ([facility])

 

                Outpatient      Attender: Moris Palmer/Yi/García/CURTIS cherry 2021 

10:00:00 AM EDT                                     MEDENT ([facility]

act[facility], )

 

                Unknown                         [address]

[place] [address] 2021 12:00:00 AM 

EDT                                                 [id number] ([facility])

 

                Outpatient      Attender: CALEB POWERS MD Medical [facility] 06/15

/2021 02:45:00 PM EDT

                                                    MEDENT ([facility])

 

                          Emergency                 Attender: MADISON HALL MDA

tender: GÉNESIS Loaiza: Connie BELTRANBC              EMERGENCY ROOM-ER         2021 08:30:00 PM EDT -

 2021 10:05:00 

PM EDT                                              [facility]

 

                                        Patient discharged. 

 

                Outpatient      Attender: Crystal Carrington NPConsultant: Abisai emmanuel [facility]                 2021 

08:57:00 AM EDT - 2021 08:57:00 AM EDT                           [facility]

 

                Outpatient      Attender: Crystal Carrington NPConsultant: Abisai emmanuel [facility]                 2021 

07:58:00 AM EDT - 2021 07:58:00 AM EDT                           [facility]

 

                Outpatient      Attender: Connie PANTOJA-AYESHAonsultant: Abisai espinoza [facility]                 2021 

08:50:00 AM EST - 2021 08:50:00 AM [facility]

 

                Outpatient      Attender: Connie PANTOJA-AYESHAonsultant: Abisai epsinoza [facility]                 2021 

08:19:00 AM [facility] - 2021 08:19:00 AM [facility]

 

                Outpatient      Attender: Connie PANTOJA-AYESHAonsultant: Abisai espinoza [facility]                 2020 

08:39:00 AM [facility] - 2020 08:39:00 AM [facility]

 

                    Emergency           Attender: CHRISTOPHER SYMENOW PAReferrer

: Connie PANTOJA-BC EMERGENCY 

ROOM-ER             02/15/2020 09:00:00 AM EST - 02/15/2020 09:07:00 AM [facility]

 

                                        Patient discharged. 

 

                Emergency       Attender: SERGE MATHEWS                  10:22:00 AM EST - 

2017 11:00:00 AM [facility]

 

                Emergency       Attender: RENÉE MATHEWS                 2014 11:55:00 AM EDT - 2014

01:44:00 PM [facility]

 

                Emergency       Attender: RENÉE MATHEWS                 2014 06:52:00 PM EDT - 2014

07:49:00 PM [facility]



                                                                                
                                                                                
                                                                                
                                                                                
                                                      



Immunizations

          



             Vaccine      Date         Status       Description  Data Source(s)

 

                          COVID-19 (Moderna), mRNA, LNP-S, PF, 100 mcg/0.5 mL do

se 2021 12:00:00 AM 

EDT                 completed                               [facility]

 

             COVID-19 VACCINE Moderna 2021 12:00:00 AM EDT completed      

           [facility]

 

                                        Vaccine Series Complete: YESThis Data wa

s Submitted to [facility]. 

 

                          COVID-19 (Moderna), mRNA, LNP-S, PF, 100 mcg/0.5 mL do

se 2021 12:00:00 AM 

EDT                 completed                               [facility]

 

             COVID-19 VACCINE Moderna 2021 12:00:00 AM EDT completed      

           [facility]

 

                                        Vaccine Series Complete: NOThis Data was

 Submitted to [facility]. 



                                                                                
                                     



Medications

          



          Medication Brand Name Start Date Product Form Dose      Route     Admi

nistrative 

Instructions Pharmacy Instructions Status     Indications Reaction   Description

 Data 

Source(s)

 

                          Acetaminophen 325 MG / Hydrocodone Bitartrate 5 MG Ora

l Tablet 5-325 mg 

HYDROCODONE/ACETAMINOPHEN 2021 12:00:00 AM EDT tablet       180           

            TAKE 2 TABLETS

BY MOUTH 3 TIMES A DAY MAXIMUM DAILY DOSE = 6 TABLETS TAKE 2 TABLETS BY MOUTH 3 

TIMES A DAY MAXIMUM DAILY DOSE = 6 TABLETS SOLD: 2021                     

                   [facility]

 

      Zithromax Z-Edson Zithromax Z-Edson 2021 12:00:00 AM EDT                

               active             

                                        MEDENT ([facility])

 

                    benzonatate 100 MG Oral Capsule [Tessalon Perles] Tessalon P

erles     2021 

12:00:00 AM EDT               ORAL                 active                      M

EDENT ([facility])

 

             benzonatate 100 MG Oral Capsule BENZONATATE  2021 12:00:00 AM

 EDT capsule      

30                                      TAKE ONE CAPSULE BY MOUTH EVERY 8 HOURS 

AS NEEDED FOR COUGH TAKE ONE 

CAPSULE BY MOUTH EVERY 8 HOURS AS NEEDED FOR COUGH SOLD: 2021             

                           [facility]

 

          250 mg              2021 12:00:00 AM EDT tablet    6            

       TAKE BY MOUTH AS DIRECTED 

COMPLETE ALL OF ANTIBIOTIC AS DIRECTED  TAKE BY MOUTH AS DIRECTED COMPLETE ALL O

F

ANTIBIOTIC AS DIRECTED SOLD: 2021                                        [facility]

[facility]

 

          350 mg              10/29/2021 12:00:00 AM EDT tablet    90           

       TAKE 1 TABLET BY MOUTH 3 TIMES A

DAY MAXIMUM DAILY DOSE = 3 TABLETS      TAKE 1 TABLET BY MOUTH 3 TIMES A DAY MAX

IMUM 

DAILY DOSE = 3 TABLETS SOLD: 10/29/2021                                        [facility]

[facility]

 

                          Acetaminophen 325 MG / Hydrocodone Bitartrate 5 MG Ora

l Tablet 5-325 mg 

HYDROCODONE/ACETAMINOPHEN 10/24/2021 12:00:00 AM EDT tablet       60            

            TAKE ONE TABLET

BY MOUTH EVERY 4 HOURS MAXIMUM DAILY DOSE = 6 TABLETS TAKE ONE TABLET BY MOUTH 

EVERY 4 HOURS MAXIMUM DAILY DOSE = 6 TABLETS SOLD: 10/25/2021                   

                     [facility]

 

                          Acetaminophen 325 MG / Hydrocodone Bitartrate 5 MG Ora

l Tablet 5-325 mg 

HYDROCODONE/ACETAMINOPHEN 2021 12:00:00 AM EDT tablet       120           

            TAKE ONE 

TABLET BY MOUTH EVERY 6 HOURS . MAXIMUM DAILY DOSE = 4 TABLETS TAKE ONE TABLET 

BY MOUTH EVERY 6 HOURS . MAXIMUM DAILY DOSE = 4 TABLETS SOLD: 2021        

                                

[facility]

 

          350 mg              2021 12:00:00 AM EDT tablet    90           

       TAKE ONE TABLET BY MOUTH THREE 

TIMES A DAY , MAXIMUM DAILY DOSE = 3 TABLETS TAKE ONE TABLET BY MOUTH THREE 

TIMES A DAY , MAXIMUM DAILY DOSE = 3 TABLETS SOLD: 2021                   

                     [facility]

 

          20 mg               2021 12:00:00 AM EDT capsule,delayed release

(DR/EC) 60                  TAKE ONE 

CAPSULE BY MOUTH TWICE A DAY MAXIMUM DAILY DOSE = 2 CAPSULES TAKE ONE CAPSULE BY

MOUTH TWICE A DAY MAXIMUM DAILY DOSE = 2 CAPSULES SOLD: 2021              

                          [facility]

 

          20 mg               2021 12:00:00 AM EDT capsule,delayed release

(DR/EC) 60                  TAKE ONE 

CAPSULE BY MOUTH TWICE A DAY MAXIMUM DAILY DOSE = 2 CAPSULES TAKE ONE CAPSULE BY

MOUTH TWICE A DAY MAXIMUM DAILY DOSE = 2 CAPSULES SOLD: 10/11/2021              

                          [facility]

 

          10 mg               2021 12:00:00 AM EDT tablet extended release

 24hr 30                  TAKE 1 

TABLET BY MOUTH ONCE A DAY TAKE 1 TABLET BY MOUTH ONCE A DAY SOLD: 10/10/2021   

              

                                                    [facility]

 

          10 mg               2021 12:00:00 AM EDT tablet extended release

 24hr 30                  TAKE 1 

TABLET BY MOUTH ONCE A DAY TAKE 1 TABLET BY MOUTH ONCE A DAY SOLD: 2021   

              

                                                    [facility]

 

          350 mg              2021 12:00:00 AM EDT tablet    90           

       TAKE 1 TABLET BY MOUTH 3 TIMES A

DAY MAXIMUM DAILY DOSE = 3 TABLETS      TAKE 1 TABLET BY MOUTH 3 TIMES A DAY MAX

IMUM 

DAILY DOSE = 3 TABLETS SOLD: 2021                                        [facility]

[facility]

 

                          Acetaminophen 325 MG / Hydrocodone Bitartrate 5 MG Ora

l Tablet 5-325 mg 

HYDROCODONE/ACETAMINOPHEN 2021 12:00:00 AM EDT tablet       180           

            TAKE 1 TABLET 

BY MOUTH EVERY 4 HOURS MAX DAILY DOSE = 6 TABLETS TAKE 1 TABLET BY MOUTH EVERY 4

HOURS MAX DAILY DOSE = 6 TABLETS SOLD: 2021                               

         [facility]

 

          10 mg               2021 12:00:00 AM EDT tablet    90           

       TAKE TWO TABLETS BY MOUTH EVERY 

MORNING & 1 AT BEDTIME    TAKE TWO TABLETS BY MOUTH EVERY MORNING & 1 AT BEDTIME

 

SOLD: 10/06/2021                                                 [facility]

 

          10 mg               2021 12:00:00 AM EDT tablet    90           

       TAKE TWO TABLETS BY MOUTH EVERY 

MORNING & 1 AT BEDTIME    TAKE TWO TABLETS BY MOUTH EVERY MORNING & 1 AT BEDTIME

 

SOLD: 2021                                                 [facility]

 

                          Acetaminophen 325 MG / Hydrocodone Bitartrate 5 MG Ora

l Tablet 5-325 mg 

HYDROCODONE/ACETAMINOPHEN 2021 12:00:00 AM EDT tablet       180           

            TAKE ONE 

TABLET BY MOUTH EVERY 4 HOURS MAXIMUM DAILY DOSE = 6 TABLETS TAKE ONE TABLET BY 

MOUTH EVERY 4 HOURS MAXIMUM DAILY DOSE = 6 TABLETS SOLD: 2021             

                           [facility]

 

          350 mg              2021 12:00:00 AM EDT tablet    90           

       TAKE ONE TABLET BY MOUTH THREE 

TIMES A DAY MAXIMUM DAILY DOSE = 3 TABLETS TAKE ONE TABLET BY MOUTH THREE TIMES 

A DAY MAXIMUM DAILY DOSE = 3 TABLETS SOLD: 2021                           

             [facility]

 

      Technetium TC 99M [place]       2021 12:00:00 AM EDT              

                 completed        

                                                    MEDENT ([facility])

 

                                        Medication administered onsite 

 

       Dobutamine IV Injection 250MG        2021 12:00:00 AM EDT          

                          completed  

                                                            MEDENT ([facility])

 

                                        Medication administered onsite 

 

                Docusate Sodium 100 MG Oral Capsule [Colace] Colace          2021 12:00:00 AM EDT  

                ORAL                    active                          MEDENT (

[facility])

 

          350 mg              2021 12:00:00 AM EDT tablet    90           

       TAKE ONE TABLET BY MOUTH THREE 

TIMES A DAY MAXIMUM DAILY DOSE = 3 TABLETS TAKE ONE TABLET BY MOUTH THREE TIMES 

A DAY MAXIMUM DAILY DOSE = 3 TABLETS SOLD: 2021                           

             [facility]

 

                          Acetaminophen 325 MG / Hydrocodone Bitartrate 5 MG Ora

l Tablet 5-325 mg 

HYDROCODONE/ACETAMINOPHEN 2021 12:00:00 AM EDT tablet       180           

            TAKE ONE 

TABLET BY MOUTH EVERY 4 HOURS MAXIMUM DAILY DOSE = 6 TABLETS TAKE ONE TABLET BY 

MOUTH EVERY 4 HOURS MAXIMUM DAILY DOSE = 6 TABLETS SOLD: 2021             

                           [facility]

 

          10 mg               2021 12:00:00 AM EDT tablet    60           

       TAKE TWO TABLETS BY MOUTH ONCE 

DAILY      TAKE TWO TABLETS BY MOUTH ONCE DAILY SOLD: 2021                

                  [facility]

 

                          Acetaminophen 325 MG / Hydrocodone Bitartrate 5 MG Ora

l Tablet 5-325 mg 

HYDROCODONE/ACETAMINOPHEN 2021 12:00:00 AM EDT tablet       180           

            TAKE 1 TABLET 

BY MOUTH EVERY 4 HOURS MAX DAILY DOSE = 6 TABLETS TAKE 1 TABLET BY MOUTH EVERY 4

 HOURS MAX DAILY DOSE = 6 TABLETS SOLD: 2021                              

          [facility]

 

          350 mg              2021 12:00:00 AM EDT tablet    90           

       TAKE ONE TABLET BY MOUTH THREE 

TIMES A DAY MAXIMUM DAILY DOSE = 3 TABLETS TAKE ONE TABLET BY MOUTH THREE TIMES 

A DAY MAXIMUM DAILY DOSE = 3 TABLETS SOLD: 2021                           

             [facility]

 

          10 mg               2021 12:00:00 AM EDT tablet extended release

 24hr 30                  TAKE 1 

TABLET BY MOUTH ONCE A DAY TAKE 1 TABLET BY MOUTH ONCE A DAY SOLD: 2021   

              

                                                    [facility]

 

          10 mg               2021 12:00:00 AM EDT tablet extended release

 24hr 30                  TAKE 1 

TABLET BY MOUTH ONCE A DAY TAKE 1 TABLET BY MOUTH ONCE A DAY SOLD: 2021   

              

                                                    [facility]

 

          145 mg              2021 12:00:00 AM EDT tablet    90           

       TAKE ONE TABLET BY MOUTH EVERY 

DAY FOR TRIGLYCERIDES     TAKE ONE TABLET BY MOUTH EVERY DAY FOR TRIGLYCERIDES S

OLD:

 2021                                                     [facility]

 

          10 mg               2021 12:00:00 AM EDT tablet extended release

 24hr 30                  TAKE 1 

TABLET BY MOUTH ONCE A DAY TAKE 1 TABLET BY MOUTH ONCE A DAY SOLD: 2021   

              

                                                    [facility]

 

                    24 HR Glipizide 10 MG Extended Release Oral Tablet Glipizide

 ER        2021 

12:00:00 AM EDT                                    active                      M

EDENT ([facility])

 

          Fenofibrate 145 MG Oral Tablet Fenofibrate 2021 12:00:00 AM EDT 

                    ORAL       

                      active                                      MEDENT ([facility])

 

          50 mg               2021 12:00:00 AM EDT tablet    90           

       TAKE THREE TABLETS BY MOUTH AT 

BEDTIME    TAKE THREE TABLETS BY MOUTH AT BEDTIME SOLD: 2021              

                    [facility]

 

          50 mg               2021 12:00:00 AM EDT tablet    90           

       TAKE THREE TABLETS BY MOUTH AT 

BEDTIME    TAKE THREE TABLETS BY MOUTH AT BEDTIME SOLD: 2021              

                    [facility]

 

          10 mg               2021 12:00:00 AM EDT tablet    60           

       TAKE TWO TABLETS BY MOUTH ONCE 

DAILY      TAKE TWO TABLETS BY MOUTH ONCE DAILY SOLD: 2021                

                  [facility]

 

          50 mg               2021 12:00:00 AM EDT tablet    90           

       TAKE THREE TABLETS BY MOUTH AT 

BEDTIME    TAKE THREE TABLETS BY MOUTH AT BEDTIME SOLD: 2021              

                    [facility]

 

                    Trazodone Hydrochloride 50 MG Oral Tablet Trazodone HCL     

  2021 12:00:00 AM 

EDT                  ORAL                 active                      MEDENT ([facility])

 

        Blood Pressure Kit/Oscillating/Digital         2021 12:00:00 AM ED

T                                         

active                                                          MEDENT ([facility])

 

        Lisinopril 10 MG Oral Tablet Lisinopril 2021 12:00:00 AM EDT      

           ORAL                    

active                                                          MEDENT ([facility])

 

          50 mg               2021 12:00:00 AM EDT tablet    90           

       TAKE THREE TABLETS BY MOUTH AT 

BEDTIME    TAKE THREE TABLETS BY MOUTH AT BEDTIME SOLD: 2021              

                    [facility]

 

          10 mg               2021 12:00:00 AM EDT tablet    60           

       TAKE TWO TABLETS BY MOUTH ONCE 

DAILY      TAKE TWO TABLETS BY MOUTH ONCE DAILY SOLD: 2021                

                  [facility]

 

          50 mg               2021 12:00:00 AM EDT tablet    90           

       TAKE THREE TABLETS BY MOUTH AT 

BEDTIME    TAKE THREE TABLETS BY MOUTH AT BEDTIME SOLD: 10/10/2021              

                    [facility]

 

                          Acetaminophen 325 MG / Hydrocodone Bitartrate 5 MG Ora

l Tablet 5-325 mg 

HYDROCODONE/ACETAMINOPHEN 2021 12:00:00 AM EDT tablet       180           

            TAKE ONE 

TABLET BY MOUTH EVERY 4 HOURS MAXIMUM DAILY DOSE = 6 TABLETS TAKE ONE TABLET BY 

MOUTH EVERY 4 HOURS MAXIMUM DAILY DOSE = 6 TABLETS SOLD: 2021             

                           [facility]

 

          50 mg               2021 12:00:00 AM EDT tablet    90           

       TAKE THREE TABLETS BY MOUTH AT 

BEDTIME    TAKE THREE TABLETS BY MOUTH AT BEDTIME SOLD: 2021              

                    [facility]

 

          350 mg              2021 12:00:00 AM EDT tablet    90           

       TAKE ONE TABLET BY MOUTH THREE 

TIMES A DAY MAXIMUM DAILY DOSE = 3 TABLETS TAKE ONE TABLET BY MOUTH THREE TIMES 

A DAY MAXIMUM DAILY DOSE = 3 TABLETS SOLD: 2021                           

             [facility]

 

          350 mg              2021 12:00:00 AM EDT tablet    90           

       TAKE 1 TABLET BY MOUTH 3 TIMES A

 DAY MAXIMUM DAILY DOSE = 3 TABLETS     TAKE 1 TABLET BY MOUTH 3 TIMES A DAY MAX

IMUM

 DAILY DOSE = 3 TABLETS SOLD: 2021                                        

[facility]

 

          5-325 mg            2021 12:00:00 AM EDT tablet    180          

       TAKE ONE TABLET BY MOUTH 

EVERY 4 HOURS MAXIMUM DAILY DOSE = 6 TABLETS TAKE ONE TABLET BY MOUTH EVERY 4 

HOURS MAXIMUM DAILY DOSE = 6 TABLETS SOLD: 2021                           

             [facility]

 

          5-325 mg            2021 12:00:00 AM EST tablet    180          

       TAKE ONE TABLET BY MOUTH 

EVERY 4 HOURS MAXIMUM DAILY DOSE = 6 TABLETS TAKE ONE TABLET BY MOUTH EVERY 4 

HOURS MAXIMUM DAILY DOSE = 6 TABLETS SOLD: 2021                           

             [facility]

 

          20 mg               2021 12:00:00 AM EST capsule,delayed release

(DR/EC) 60                  TAKE ONE 

CAPSULE BY MOUTH TWICE A DAY TAKE ONE CAPSULE BY MOUTH TWICE A DAY SOLD: 

2021                                                      [facility]

 

          20 mg               2021 12:00:00 AM EST capsule,delayed release

(DR/EC) 60                  TAKE ONE 

CAPSULE BY MOUTH TWICE A DAY TAKE ONE CAPSULE BY MOUTH TWICE A DAY SOLD: 

2021                                                      [facility]

 

          20 mg               2021 12:00:00 AM EST capsule,delayed release

(DR/EC) 60                  TAKE ONE 

CAPSULE BY MOUTH TWICE A DAY TAKE ONE CAPSULE BY MOUTH TWICE A DAY SOLD: 

2021                                                      [facility]

 

          20 mg               2021 12:00:00 AM EST capsule,delayed release

(DR/EC) 60                  TAKE ONE 

CAPSULE BY MOUTH TWICE A DAY TAKE ONE CAPSULE BY MOUTH TWICE A DAY SOLD: 

2021                                                      [facility]

 

          350 mg              2021 12:00:00 AM EST tablet    90           

       TAKE ONE TABLET BY MOUTH THREE 

TIMES A DAY MAXIMUM DAILY DOSE = 3 TABLETS TAKE ONE TABLET BY MOUTH THREE TIMES 

A DAY MAXIMUM DAILY DOSE = 3 TABLETS SOLD: 2021                           

             [facility]

 

          20 mg               2021 12:00:00 AM EST capsule,delayed release

(DR/EC) 60                  TAKE ONE 

CAPSULE BY MOUTH TWICE A DAY TAKE ONE CAPSULE BY MOUTH TWICE A DAY SOLD: 

2021                                                      [facility]

 

          20 mg               2021 12:00:00 AM EST capsule,delayed release

(DR/EC) 60                  TAKE ONE 

CAPSULE BY MOUTH TWICE A DAY TAKE ONE CAPSULE BY MOUTH TWICE A DAY SOLD: 

2021                                                      [facility]

 

                    Magnesium Hydroxide 80 MG/ML Oral Suspension Milk Of Magnesi

a    2021 

12:00:00 AM EST               ORAL                 active                      M

EDENT ([facility], 

)

 

        Suprep Bowel Prep Kit Suprep Bowel Prep Kit 2021 12:00:00 AM EST  

                                

             active                                              MEDENT ([facility] )

 

             Carisoprodol 350 MG Oral Tablet CARISOPRODOL 2021 12:00:00 AM

 EST tablet       

90                                      TAKE ONE TABLET BY MOUTH THREE TIMES A D

AY MAXIMUM DAILY DOSE = 3 TABLETS 

TAKE ONE TABLET BY MOUTH THREE TIMES A DAY MAXIMUM DAILY DOSE = 3 TABLETS SOLD: 

2021                                                      [facility]

 

          5-325 mg            2021 12:00:00 AM EST tablet    180          

       TAKE ONE TABLET BY MOUTH 

EVERY 4 HOURS MAXIMUM DAILY DOSE = 6 TABLETS TAKE ONE TABLET BY MOUTH EVERY 4 

HOURS MAXIMUM DAILY DOSE = 6 TABLETS SOLD: 2021                           

             [facility]

 

          25 mg               2021 12:00:00 AM EST tablet    30           

       TAKE ONE TABLET BY MOUTH EVERY 

DAY        TAKE ONE TABLET BY MOUTH EVERY DAY SOLD: 2021                  

                [facility]

 

          25 mg               2021 12:00:00 AM EST tablet    30           

       TAKE ONE TABLET BY MOUTH EVERY 

DAY        TAKE ONE TABLET BY MOUTH EVERY DAY SOLD: 2021                  

                [facility]

 

          25 mg               2021 12:00:00 AM EST tablet    30           

       TAKE ONE TABLET BY MOUTH EVERY 

DAY        TAKE ONE TABLET BY MOUTH EVERY DAY SOLD: 2021                  

                [facility]

 

          25 mg               2021 12:00:00 AM EST tablet    30           

       TAKE ONE TABLET BY MOUTH EVERY 

DAY        TAKE ONE TABLET BY MOUTH EVERY DAY SOLD: 2021                  

                [facility]

 

          25 mg               2021 12:00:00 AM EST tablet    30           

       TAKE ONE TABLET BY MOUTH EVERY 

DAY        TAKE ONE TABLET BY MOUTH EVERY DAY SOLD: 2021                  

                [facility]

 

          25 mg               2021 12:00:00 AM EST tablet    30           

       TAKE ONE TABLET BY MOUTH EVERY 

DAY        TAKE ONE TABLET BY MOUTH EVERY DAY SOLD: 2021                  

                [facility]

 

          5 mg                01/15/2021 12:00:00 AM EST tablet    90           

       TAKE ONE TABLET BY MOUTH EVERY DAY

           TAKE ONE TABLET BY MOUTH EVERY DAY SOLD: 2021                  

                [facility]

 

          1,000 mg            01/15/2021 12:00:00 AM EST tablet    180          

       TAKE ONE TABLET BY MOUTH 

TWICE A DAY WITH FOOD     TAKE ONE TABLET BY MOUTH TWICE A DAY WITH FOOD SOLD: 

2021                                                      [facility]

 

          Lovastatin 40 MG Oral Tablet LOVASTATIN 01/15/2021 12:00:00 AM EST tab

let    90                  

TAKE ONE TABLET BY MOUTH EVERY EVENING WITH A MEAL TAKE ONE TABLET BY MOUTH 

EVERY EVENING WITH A MEAL SOLD: 2021                                      

  [facility]

 

          50 mg               01/15/2021 12:00:00 AM EST tablet    180          

       TAKE ONE TABLET BY MOUTH TWICE A

 DAY FOR HEADACHES        TAKE ONE TABLET BY MOUTH TWICE A DAY FOR HEADACHES SOL

D: 

2021                                                      [facility]

 

                    Metformin hydrochloride 1000 MG Oral Tablet 1,000 mg METFORM

IN HCL       01/15/2021 

12:00:00 AM EST tablet       180                       TAKE ONE TABLET BY MOUTH 

TWICE A DAY WITH FOOD TAKE

 ONE TABLET BY MOUTH TWICE A DAY WITH FOOD SOLD: 2021                     

                   [facility]

 

          Lovastatin 40 MG Oral Tablet LOVASTATIN 01/15/2021 12:00:00 AM EST tab

let    90                  

TAKE ONE TABLET BY MOUTH EVERY EVENING WITH A MEAL TAKE ONE TABLET BY MOUTH 

EVERY EVENING WITH A MEAL SOLD: 2021                                      

  [facility]

 

          5 mg                01/15/2021 12:00:00 AM EST tablet    90           

       TAKE ONE TABLET BY MOUTH EVERY DAY

           TAKE ONE TABLET BY MOUTH EVERY DAY SOLD: 2021                  

                [facility]

 

          Lovastatin 40 MG Oral Tablet LOVASTATIN 01/15/2021 12:00:00 AM EST tab

let    90                  

TAKE ONE TABLET BY MOUTH EVERY EVENING WITH A MEAL TAKE ONE TABLET BY MOUTH 

EVERY EVENING WITH A MEAL SOLD: 2021                                      

  [facility]

 

          1,000 mg            01/15/2021 12:00:00 AM EST tablet    180          

       TAKE ONE TABLET BY MOUTH 

TWICE A DAY WITH FOOD     TAKE ONE TABLET BY MOUTH TWICE A DAY WITH FOOD SOLD: 

2021                                                      [facility]

 

          10 mg               01/15/2021 12:00:00 AM EST tablet    90           

       TAKE ONE TABLET BY MOUTH EVERY 

DAY        TAKE ONE TABLET BY MOUTH EVERY DAY SOLD: 2021                  

                [facility]

 

          Lovastatin 40 MG Oral Tablet LOVASTATIN 01/15/2021 12:00:00 AM EST tab

let    90                  

TAKE ONE TABLET BY MOUTH EVERY EVENING WITH A MEAL TAKE ONE TABLET BY MOUTH 

EVERY EVENING WITH A MEAL SOLD: 2021                                      

  [facility]

 

          50 mg               01/15/2021 12:00:00 AM EST tablet    180          

       TAKE ONE TABLET BY MOUTH TWICE A

 DAY FOR HEADACHES        TAKE ONE TABLET BY MOUTH TWICE A DAY FOR HEADACHES SOL

D: 

2021                                                      [facility]

 

          50 mg               01/15/2021 12:00:00 AM EST tablet    180          

       TAKE ONE TABLET BY MOUTH TWICE A

 DAY FOR HEADACHES        TAKE ONE TABLET BY MOUTH TWICE A DAY FOR HEADACHES SOL

D: 

2021                                                      [facility]

 

          1,000 mg            01/15/2021 12:00:00 AM EST tablet    180          

       TAKE ONE TABLET BY MOUTH 

TWICE A DAY WITH FOOD     TAKE ONE TABLET BY MOUTH TWICE A DAY WITH FOOD SOLD: 

2021                                                      [facility]

 

          50 mg               01/15/2021 12:00:00 AM EST tablet    180          

       TAKE ONE TABLET BY MOUTH TWICE A

 DAY FOR HEADACHES        TAKE ONE TABLET BY MOUTH TWICE A DAY FOR HEADACHES SOL

D: 

2021                                                      [facility]

 

          350 mg              2021 12:00:00 AM EST tablet    90           

       TAKE ONE TABLET BY MOUTH THREE 

TIMES A DAY MAXIMUM DAILY DOSE = 3 TABLETS TAKE ONE TABLET BY MOUTH THREE TIMES 

A DAY MAXIMUM DAILY DOSE = 3 TABLETS SOLD: 2021                           

             [facility]

 

          5-325 mg            2021 12:00:00 AM EST tablet    180          

       TAKE ONE TABLET BY MOUTH 

EVERY 4 HOURS MAXIMUM DAILY DOSE = 6 TABLETS TAKE ONE TABLET BY MOUTH EVERY 4 

HOURS MAXIMUM DAILY DOSE = 6 TABLETS SOLD: 2021                           

             [facility]

 

          5-325 mg            2020 12:00:00 AM EST tablet    180          

       TAKE ONE TABLET BY MOUTH 

EVERY 4 HOURS MAXIMUM DAILY DOSE = 6 TABLETS TAKE ONE TABLET BY MOUTH EVERY 4 

HOURS MAXIMUM DAILY DOSE = 6 TABLETS SOLD: 2020                           

             [facility]

 

          350 mg              2020 12:00:00 AM EST tablet    90           

       TAKE 1 TABLET BY MOUTH 3 TIMES A

 DAY MAXIMUM DAILY DOSE = 3 TABLETS     TAKE 1 TABLET BY MOUTH 3 TIMES A DAY MAX

IMUM

 DAILY DOSE = 3 TABLETS SOLD: 2020                                        

[facility]

 

          100 mg              2020 12:00:00 AM EST capsule   28           

       TAKE ONE CAPSULE BY MOUTH TWICE

 A DAY FOR 2 WEEKS        TAKE ONE CAPSULE BY MOUTH TWICE A DAY FOR 2 WEEKS SOLD

: 

2020                                                      [facility]

 

          20 mg               11/10/2020 12:00:00 AM EST capsule,delayed release

(DR/EC) 60                  TAKE ONE 

CAPSULE BY MOUTH TWICE A DAY TAKE ONE CAPSULE BY MOUTH TWICE A DAY SOLD: 

2021                                                      [facility]

 

          20 mg               11/10/2020 12:00:00 AM EST capsule,delayed release

(DR/EC) 60                  TAKE ONE 

CAPSULE BY MOUTH TWICE A DAY TAKE ONE CAPSULE BY MOUTH TWICE A DAY SOLD: 

11/10/2020                                                      [facility]

 

          20 mg               11/10/2020 12:00:00 AM EST capsule,delayed release

(DR/EC) 60                  TAKE ONE 

CAPSULE BY MOUTH TWICE A DAY TAKE ONE CAPSULE BY MOUTH TWICE A DAY SOLD: 

2021                                                      [facility]

 

          20 mg               11/10/2020 12:00:00 AM EST capsule,delayed release

(DR/EC) 60                  TAKE ONE 

CAPSULE BY MOUTH TWICE A DAY TAKE ONE CAPSULE BY MOUTH TWICE A DAY SOLD: 

12/10/2020                                                      [facility]

 

                    empagliflozin 25 MG Oral Tablet [Jardiance] Jardiance       

    2020 12:00:00 AM EST

                     ORAL                 active                      [facility]

 

          5-325 mg            2020 12:00:00 AM EST tablet    180          

       TAKE 1 TABLET BY MOUTH EVERY 

4 HOURS MAXIMUM DAILY DOSE = 6 TABLETS  TAKE 1 TABLET BY MOUTH EVERY 4 HOURS 

MAXIMUM DAILY DOSE = 6 TABLETS SOLD: 2020                                 

       [facility]

 

          25 mg               2020 12:00:00 AM EST tablet    14           

       TAKE 1 TABLET BY MOUTH ONCE A DAY

 FOR 14 DAYS TAKE 1 TABLET BY MOUTH ONCE A DAY FOR 14 DAYS SOLD: 2020     

                      

                                        [facility]

 

          350 mg              2020 12:00:00 AM EST tablet    90           

       TAKE 1 TABLET BY MOUTH THREE 

TIMES A DAY MAXIMUM DAILY DOSE = 3 TABLETS TAKE 1 TABLET BY MOUTH THREE TIMES A 

DAY MAXIMUM DAILY DOSE = 3 TABLETS SOLD: 2020                             

           [facility]

 

          25 mg               10/14/2020 12:00:00 AM EDT tablet    90           

       TAKE ONE TABLET BY MOUTH EVERY 

DAY        TAKE ONE TABLET BY MOUTH EVERY DAY SOLD: 2021                  

                [facility]

 

          25 mg               10/14/2020 12:00:00 AM EDT tablet    90           

       TAKE ONE TABLET BY MOUTH EVERY 

DAY        TAKE ONE TABLET BY MOUTH EVERY DAY SOLD: 2021                  

                [facility]

 

          15 mg               10/14/2020 12:00:00 AM EDT tablet    90           

       TAKE ONE TABLET BY MOUTH EVERY 

DAY        TAKE ONE TABLET BY MOUTH EVERY DAY SOLD: 2020                  

                [facility]

 

          15 mg               10/14/2020 12:00:00 AM EDT tablet    90           

       TAKE ONE TABLET BY MOUTH EVERY 

DAY        TAKE ONE TABLET BY MOUTH EVERY DAY SOLD: 2021                  

                [facility]

 

          15 mg               10/14/2020 12:00:00 AM EDT tablet    90           

       TAKE ONE TABLET BY MOUTH EVERY 

DAY        TAKE ONE TABLET BY MOUTH EVERY DAY SOLD: 2021                  

                [facility]

 

          25 mg               10/14/2020 12:00:00 AM EDT tablet    90           

       TAKE ONE TABLET BY MOUTH EVERY 

DAY        TAKE ONE TABLET BY MOUTH EVERY DAY SOLD: 2020                  

                [facility]

 

          25 mg               10/14/2020 12:00:00 AM EDT tablet    90           

       TAKE ONE TABLET BY MOUTH EVERY 

DAY        TAKE ONE TABLET BY MOUTH EVERY DAY SOLD: 2021                  

                [facility]

 

          15 mg               10/14/2020 12:00:00 AM EDT tablet    90           

       TAKE ONE TABLET BY MOUTH EVERY 

DAY        TAKE ONE TABLET BY MOUTH EVERY DAY SOLD: 2021                  

                [facility]

 

          5-325 mg            10/02/2020 12:00:00 AM EDT tablet    180          

       TAKE ONE TABLET BY MOUTH 

EVERY 4 HOURS MAXIMUM DAILY DOSE = 6 TABLETS TAKE ONE TABLET BY MOUTH EVERY 4 

HOURS MAXIMUM DAILY DOSE = 6 TABLETS SOLD: 10/02/2020                           

             [facility]

 

          350 mg              10/02/2020 12:00:00 AM EDT tablet    90           

       TAKE ONE TABLET BY MOUTH THREE 

TIMES A DAY MAXIMUM DAILY DOSE = 3 TABLETS TAKE ONE TABLET BY MOUTH THREE TIMES 

A DAY MAXIMUM DAILY DOSE = 3 TABLETS SOLD: 10/02/2020                           

             [facility]

 

          300 mg              2020 12:00:00 AM EDT tablet    15           

       TAKE ONE TABLET BY MOUTH EVERY 

DAY        TAKE ONE TABLET BY MOUTH EVERY DAY SOLD: 2020                  

                [facility]

 

          300 mg              2020 12:00:00 AM EDT tablet    5            

       TAKE ONE TABLET BY MOUTH EVERY 

DAY        TAKE ONE TABLET BY MOUTH EVERY DAY SOLD: 10/19/2020                  

                [facility]

 

          20 mg               2020 12:00:00 AM EDT capsule,delayed release

(DR/EC) 60                  TAKE 1 

CAPSULE BY MOUTH TWO TIMES A DAY MAXIMUM DAILY DOSE = 2 TAKE 1 CAPSULE BY MOUTH 

TWO TIMES A DAY MAXIMUM DAILY DOSE = 2 SOLD: 10/02/2020                         

               [facility]



                                                                                
                                                                                
                                                                                
                                                                                
                                                                                
                                                                                
                                                                                
                                                                                
                                                                                
                                                                                
                                                                                
                                                                                
                                                                                
                                                                                
        



Insurance Providers

          



             Payer name   Policy type / Coverage type Policy ID    Covered party

 ID Covered 

party's relationship to [employer] Policy [employer]             Plan Information

 

                [facility] [employer] [facility] () Workers Compensation [id number]    

[id number] Self                                    4

[id number]

 

                [facility] [employer] [facility] () Workers Compensation [id number]    

[id number] Self                                    4

[id number]

 

                [facility] Workers Compensation [id number]    

[id number] Self                                    4

[id number]

 

                [facility] Workers Compensation [id number]    

[id number] Self                                    4

[id number]

 

                [facility] Workers Compensation [id number]    

[id number] Self                                    4

[id number]

 

                [facility] Workers Compensation [id number]    

[id number] Self                                    4

[id number]

 

                [facility] Workers Compensation [id number]    

[id number] Self                                    4

[id number]

 

                [facility] Workers Compensation [id number]    

[id number] Self                                    4

[id number]

 

                [facility] Workers Compensation [id number]    

[id number] Self                                    4

[id number]

 

                [facility] Workers Compensation [id number]     

[id number] Self                                    4

[id number]

 

                [employer] Workers Compensation [id number]     

[id number] Self                                    4

[id number]

 

                [employer]) Workers Compensation [id number]     

[id number] Self                                    4

[id number]

 

                [facility] Workers Compensation [id number]     

[id number] Self                                    4

[id number]

 

                [facility] Workers Compensation [id number]     

[id number] Self                                    4

[id number]

 

                [facility] Workers Compensation [id number]     

[id number] Self                                    4

[id number]

 

                [facility] Workers Compensation [id number]     

[id number] Self                                    4

[id number]

 

                [facility] Workers Compensation [id number]     

[id number] Self                                    4

[id number]

 

          MEDICARE            [id number]           SP                  [id number]

A

 

                [employer] [facility] Workers Compensation [id number]        

[id number] Self                                    6

[id number]

 

                [employer] ([facility] Workers Compensation [id number]        

[id number] Self                                    6

[id number]

 

          MEDICARE COMPLETE           [id number]           SP                  [id number]

[id number]

 

                [facility]) Commercial      [id number]     

[id number] Self                                    9

[id number]

 

          MEDICARE COMPLETE           [id number]           SP                  [id number]

[id number]

 

          [facility]        [id number]           18            

      [id number]

 

          [employer] CP DUAL COMPL-CLINIC [place]        [id number]           18            

      [id number]

 

          [facility] MEDICARE COMPLETE - CLINIC           [id number]           18      

            [id number]

 

          [employer] CP DUAL COMP  -PHYSICIAN [place]        [id number]           18      

            [id number]

 

          [employer] CP DUAL COMPL-CLINIC [place]        [id number]           18          

        [id number]

 

                    ANSI-Medicare Part B [id number]   

                            

[id number]

 

          [employer] MEDICARE HMO         [id number]           18                  

[id number]

 

                    ANSI-Medicare Part B [id number]   

                            

[id number]

 

                    ANSI-Medicare Part B [id number]   

                            

[id number]

 

          SELF PAY  SP        UNAVAILABLE                               UNAVAILA

BLE

 

          [facility]        [id number]             S         

          [id number]

 

                              [id number]                               [id number]

A

 

                    ANSI-Medicare Part B [id number]   

                            

[id number]

 

          [employer] GOLD PLUS            -O/P         [id number]           18     

             [id number]

 

          [employer] GOLD           [id number]           SP                  [id number]

7

 

          [employer] GOLD CLASSIC           [id number]           S                   

[id number]

 

          [employer] GOLD PLUS            -PHYSICIAN         [id number]           1

8                  [id number]

 

          MEDICARE  BRITTANY       [id number] S                   [id number]

[id number]

 

          [employer]    HEA       [id number] S                   [id number]

 

          MEDICARE COMPLETE           [id number]           SP                  [id number]

[id number]

 

          [employer] GOLD           [id number]           SP                  [id number]

7

 

          [facility] MED[facility]           [id number]           S          

         [id number]

 

          [facility] MEDICARE           [id number]           S            

       [id number]

 

          [facility] MEDICARE DIVISION           [id number]           S            

       [id number]

 

          MEDICARE - [place]           [id number]           S                  

 [id number]

 

          [employer] CHOICE CARE CLAIMS   -PHYSICIAN           [id number]           1

8                  [id number]

 

          [employer] CHOICE CARE CLAIMS   -CLINIC           [id number]           18  

                [id number]

 

          MEDICARE COMPLETE           [id number]           SP                  

[id number]

 

          [employer] MEDICARE COMPLETE [place]        [id number]           18               

   [id number]

 

          UNHC MEDICARE COMPLETE    -PHYS [place]        [id number]           18      

            [id number]

 

          [employer] MEDICARE COMPLETE [place]        [id number]           18             

     [id number]

 

          [facility] MEDICARE COMPLETE [place]        [id number]           18             

     [id number]

 

          [facility] MEDICARE COMPLETE    -PHYS CO        [id number]           18    

              [id number]

 

          MEDICARE COMPLETE-UHC O         [id number] S               

    [id number]

 

          [employer] MEDICARE           [id number]           S            

       [id number]



                                                                                
                                                                                
                                                                                
                                                                                
                                                                                
                                                                                
                                                                                
                                                                                
                  



Problems, Conditions, and Diagnoses

          



           Code       Display Name Description Problem Type Effective Dates Data

 Source(s)

 

             I10          Essential (primary) hypertension Essential (primary) h

ypertension Diagnosis    

10/25/2021 01:03:00 PM EDT              [facility]

 

                           ENCOUNTER FOR SCREENING FOR COVID-19 ENCOUNTER F

OR SCREENING FOR COVID-19 

Diagnosis                 10/25/2021 01:03:00 PM EDT [facility]

 

                    B974                Respiratory syncytial virus as the cause

 of diseases classified elsewhere 

Respiratory syncytial virus as the cause of diseases classified elsewhere 

Diagnosis                 10/25/2021 01:03:00 PM EDT [facility]

 

                    J069                Acute upper respiratory infection, unspe

cified Acute upper respiratory 

infection, unspecified Diagnosis           10/25/2021 01:03:00 PM EDT [facility]

 

                    I08360              Other long term (current) drug therapy O

ther long term (current) drug 

therapy             Diagnosis           2021 10:39:00 AM [facility]

 

                    [id number]                Malignant neoplasm of right kidney, exce

pt renal pelvis Malignant neoplasm 

of right kidney, except renal pelvis Diagnosis           2021 10:39:00 AM 

T 

[facility]

 

                                   Obstructive sleep apnea (adult) (pediatr

ic) Obstructive sleep apnea 

(adult) (pediatric) Diagnosis           2021 10:39:00 AM T [facility]

 

             R911         Solitary pulmonary nodule Solitary pulmonary nodule Di

agnosis    2021 

10:39:00 AM T                         [facility]

 

                    K219                Gastro-esophageal reflux disease without

 esophagitis Gastro-esophageal 

reflux disease without esophagitis Diagnosis           2021 10:39:00 AM ED

[facility]

 

                                   Pure hypercholesterolemia, unspecified P

ure hypercholesterolemia, 

unspecified         Diagnosis           2021 10:39:00 AM [facility]

 

                                   Type 2 diabetes mellitus with hyperglyce

kyler Type 2 diabetes mellitus with 

hyperglycemia       Diagnosis           2021 10:39:00 AM [facility]

 

                    Z125                Encounter for screening for malignant ne

oplasm of prostate Encounter for 

screening for malignant neoplasm of prostate Diagnosis                  08:02:00 AM 

[facility]

 

                    [id number]              Neoplasm of unspecified behavior of righ

t kidney Neoplasm of unspecified 

behavior of right kidney Diagnosis           2021 12:31:00 PM [facility]

 

                                   Other specified abnormal findings of blo

od chemistry Other specified 

abnormal findings of blood chemistry Diagnosis           2021 12:31:00 PM 

[facility]

 

                [id number]          Other abnormal findings in urine Other abnormal 

findings in urine 

Diagnosis                 2021 12:31:00 PM [facility]

 

             Y93.89       Activity, other specified ACTIVITY, OTHER SPECIFIED Di

agnosis    2021 

08:30:00 PM [facility]

 

                    Y92.89              Other specified places as the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                  Date                Data Source

 

                    [id number]         10/25/2021 01:25:00 PM EDT [facility] ([facility])









          Name      Value     Range     Interpretation Code Description Data Karen

rce(s) Supporting 

Document(s)

 

                                        Respiratory syncytial virus Ag [Presence

] in Unspecified specimen by Immunoassay

           Laboratory test result                                  [facility] ([facility])  









                    ID                  Date                Data Source

 

                    [id number]         2021 12:04:00 PM EDT [facility]









          Name      Value     Range     Interpretation Code Description Data Karen

rce(s) Supporting 

Document(s)

 

          SARS coronavirus 2 RNA Not Detected                               [facility]

[place]     

 

                                        This lab was ordered by [facility]

lay and reported by [facility]. 









                    ID                  Date                Data Source

 

                    [id number]     2021 04:20:00 PM EDT [facility]









          Name      Value     Range     Interpretation Code Description Data Karen

rce(s) Supporting 

Document(s)

 

          SARS-CoV-2, KRISTIE Not Detected Not Detected                     [facility]  

 

                                        This nucleic acid amplification test was

 developed and its 

performancecharacteristics determined by [facility]. Nucleic 
acidamplification tests include RT-PCR and TMA. This test has not beenFDA 
cleared or approved. This test has been authorized by FDA underan Emergency Use 
Authorization (EUA). This test is only authorizedfor the duration of time the 
declaration that circumstances existjustifying the authorization of the 
emergency use of in vitrodiagnostic tests for detection of SARS-CoV-2 virus 
and/or diagnosisof COVID-19 infection under section 564(b)(1) of the Act, 21 
U.S.C.360bbb-3(b) (1), unless the authorization is terminated or 
revokedsooner.When diagnostic testing is negative, the possibility of a 
falsenegative result should be considered in the context of a patient'srecent 
exposures and the presence of clinical signs and symptomsconsistent with COVID-
19. An individual without symptoms of COVID-19and who is not shedding SARS-CoV-2
 virus would expect to have anegative (not detected) result in this assay. 

 

          SARS-CoV-2, KRISTIE 2 DAY TAT Performed                               [facility]  









                    ID                  Date                Data Source

 

                    [id number]         2021 11:35:00 AM EDT [facility] ([facility])









          Name      Value     Range     Interpretation Code Description Data Karen

rce(s) Supporting 

Document(s)

 

           Laboratory test finding (navigational concept) Laboratory test result

                                  

[facility] ([facility])  

 

           Sars-CoV-2, Kristie Laboratory test result                               

   [facility] ([facility])                                 

 

                                        This nucleic acid amplification test was

 developed and its performance

characteristics determined by [facility]. Nucleic acid

amplification tests include RT-PCR and TMA. This test has not been

FDA cleared or approved. This test has been authorized by FDA under

an Emergency Use Authorization (EUA). This test is only authorized

for the duration of time the declaration that circumstances exist

justifying the authorization of the emergency use of in vitro

diagnostic tests for detection of SARS-CoV-2 virus and/or diagnosis

of COVID-19 infection under section 564(b)(1) of the Act, 21 U.S.C.

                                        360bbb-3(b) (1), unless the authorizatio

n is terminated or revoked

sooner.

When diagnostic testing is negative, the possibility of a false

negative result should be considered in the context of a patient's

recent exposures and the presence of clinical signs and symptoms

consistent with COVID-19. An individual without symptoms of COVID-19

and who is not shedding SARS-CoV-2 virus would expect to have a

negative (not detected) result in this assay.

 









                    ID                  Date                Data Source

 

                    [id number]         2021 03:48:00 PM EDT [facility] ([facility])









          Name      Value     Range     Interpretation Code Description Data Karen

rce(s) Supporting 

Document(s)

 

           Glucose, Fasting 112 mg/dL       Above high normal            M

[facility] ([facility])                        

 

           Blood Urea Nitrogen 31 mg/dL   7-18       Above high normal          

  [facility] ([facility])                        

 

           Creatinine For GFR 2.36 mg/dL 0.70-1.30  Above high normal           

 [facility] ([facility])                   

 

           Glomerular Filtration Rate 30.0                  Below low normal    

        [facility] ([facility])                        

 

                                        <content>Units are mL/min/1.73 

m2</content><br/><content></content><br/><content>Chronic Kidney Disease Staging
 per NKF:</content><br/><content></content><br/><content>Stage I & II   GFR >=60
       Normal to Mildly Decreased</content><br/><content>Stage III      GFR 30-
59      Moderately Decreased</content><br/><content>Stage IV       GFR 15-29    
  Severely Decreased</content><br/><content>Stage V        GFR <15        Very 
Little GFR Left</content><br/><content>ESRD           GFR <15 on 
RRT</content><br/><content></content> 

 

           Sodium Level 140 meq/L  136-145    Normal (applies to non-numeric res

ults)            [facility] 

([facility])         

 

           Potassium Serum 3.9 meq/L  3.5-5.1    Normal (applies to non-numeric 

results)            

[facility] ([facility])  

 

           Chloride Level 109 meq/L       Above high normal            [facility]

[facility] ([facility])                        

 

           Carbon Dioxide Level 23 meq/L   21-32      Normal (applies to non-num

serge results)            

[facility] ([facility])  

 

           Calcium Level 9.7 mg/dL  8.8-10.2   Normal (applies to non-numeric re

sults)            

[facility] ([facility])  

 

           Anion Gap  8 meq/L    8-16       Normal (applies to non-numeric resul

ts)            [facility] 

([facility])         

 

           Ast/Sgot   15 U/L     7-37       Normal (applies to non-numeric resul

ts)            [facility] ([facility])                   

 

           Alt/SGPT   26 U/L     12-78      Normal (applies to non-numeric resul

ts)            [facility] ([facility])                  

 

           Alkaline Phosphatase 67 U/L          Normal (applies to non-num

serge results)            

[facility] ([facility])  

 

           Bilirubin,Total 0.4 mg/dL  0.2-1.0    Normal (applies to non-numeric 

results)            

[facility] ([facility])  

 

           Total Protein 8.0 GM/DL  6.4-8.2    Normal (applies to non-numeric re

sults)            [facility]        

 

           Albumin    4.3 GM/DL  3.2-5.2    Normal (applies to non-numeric resul

ts)            [facility]         

 

           Albumin/Globulin Ratio 1.2                   Normal (applies to non-n

umeric results)            [facility] 

([facility])         









                    ID                  Date                Data Source

 

                    [id number]         2021 03:48:00 PM EDT [facility] ([facility])









          Name      Value     Range     Interpretation Code Description Data Karen

rce(s) Supporting 

Document(s)

 

           Red Blood Count 4.80 10    4.30-6.10  Normal (applies to non-numeric 

results)            

[facility]  

 

           White Blood Count 8.5 10     4.0-10.0   Normal (applies to non-numeri

c results)            

[facility] ([facility])  

 

           Hematocrit 43.8 %     42.0-52.0  Normal (applies to non-numeric resul

ts)            [facility]         

 

           Hemoglobin 14.5 g/dL  13.5-17.5  Normal (applies to non-numeric resul

ts)            [facility]         

 

                Mean Corpuscular Volume 91.3 fl         80.0-96.0       Normal (

applies to non-numeric 

results)                                [facility] ([facility]) 

 

 

                Mean Corpuscular Hemoglobin 30.2 pg         27.0-33.0       Norm

al (applies to non-numeric 

results)                                [facility] ([facility]) 

 

 

                Mean Corpuscular HGB Conc 33.1 g/dL       32.0-36.5       Normal

 (applies to non-numeric 

results)                                [facility]) 

 

 

                Platelet Count, Automated 238 10          150-450         Normal

 (applies to non-numeric results)

                                        [facility] ([facility]) 

 

 

                Red Cell Distribution Width 14.0 %          11.5-14.5       Norm

al (applies to non-numeric 

results)                                [facility]) 

 

 

           Neutrophils % 57.8 %     36.0-66.0  Normal (applies to non-numeric re

sults)            [facility] 

([facility])         

 

           Mono %     11.2 %     2.0-8.0    Above high normal            [facility] 

([facility])                                 

 

           Lymph %    27.3 %     24.0-44.0  Normal (applies to non-numeric resul

ts)            [facility] 

([facility])         

 

           Eos %      2.3 %      0.0-3.0    Normal (applies to non-numeric resul

ts)            [facility] ([facility])                   

 

           Immature Granulocyte % 0.2 %      0-3.0      Normal (applies to non-n

umeric results)            

[facility] ([facility])  

 

           Baso %     1.2 %      0.0-1.0    Above high normal            [facility] 

([facility])

                                         

 

           Neutrophils # 4.9 10     1.5-8.5    Normal (applies to non-numeric re

sults)            [facility] 

([facility])         

 

           Nucleated Red Blood Cell % 0.0 %      0-0        Normal (applies to n

on-numeric results)            

[facility] ([facility])  

 

           Lymph #    2.3 10     1.5-5.0    Normal (applies to non-numeric resul

ts)            [facility] 

([facility])         

 

           Mono #     1.0 10     0.0-0.8    Above high normal            [facility] 

([facility])                                 

 

           Eos #      0.2 10     0.0-0.5    Normal (applies to non-numeric resul

ts)            [facility] ([facility])                   

 

           Baso #     0.1 10     0.0-0.2    Normal (applies to non-numeric resul

ts)            [facility] ([facility])                  









                    ID                  Date                Data Source

 

                    [id number]     2021 07:01:00 PM EDT [facility]









          Name      Value     Range     Interpretation Code Description Data Karen

rce(s) Supporting 

Document(s)

 

          COMPREHENSIVE METABOLIC PANEL                                         

[facility]  

 

                                            COMPREHENSIVE METABOLIC PANEL 

 

           Sodium [Moles/volume] in Serum or Plasma 139 mEq/L  134 - 153        

                [facility]                                 

 

           Potassium [Moles/volume] in Serum or Plasma 4.4 mEq/L  3.6 - 5.0     

                   [facility]                            

 

           Chloride [Moles/volume] in Serum or Plasma 105 mEq/L  98 - 107       

                  [facility]                                 

 

           Carbon dioxide, total [Moles/volume] in Serum or Plasma 22 MEQ/L   22

 - 30                          

[facility]                   

 

           Glucose [Mass/volume] in Serum or Plasma 143 MG/DL  70 - 99    H     

                [facility]                                 

 

          BUN       26 MG/DL  7 - 21    H                   [facility]  

 

           Creatinine [Mass/volume] in Serum or Plasma 2.1 MG/DL  0.7 - 1.5  H  

                   [facility]                            

 

          BUN/CREAT 12        8 - 27                        [facility]  

 

           Protein [Mass/volume] in Serum or Plasma 6.9 G/DL   6.3 - 8.2        

                [facility]                                 

 

           Albumin [Mass/volume] in Serum or Plasma 4.5 G/DL   3.9 - 5.0        

                [facility]                                 

 

           Globulin [Mass/volume] in Serum by calculation 2.4 GM/DL  2.4 - 3.2  

                      [facility]                            

 

          A/G RATIO 1.9       0.8 - 2.0                     [facility]  

 

           Calcium [Mass/volume] in Serum or Plasma 9.8 MG/DL  8.4 - 10.2       

                [facility]                                 

 

           Bilirubin.total [Mass/volume] in Serum or Plasma <0.7 MG/DL 0.2 - 1.3

                        

[facility]                   

 

                    Alkaline phosphatase [Enzymatic activity/volume] in Serum or

 Plasma 53 U/L              38 - 

126                                             [facility]  

 

                          Aspartate aminotransferase [Enzymatic activity/volume]

 in Serum or Plasma 16 U/L

             5 - 40                                 [facility]  

 

                    Alanine aminotransferase [Enzymatic activity/volume] in Seru

m or Plasma 12 U/L              7

- 56                                            [facility]  

 

           Anion gap 3 in Serum or Plasma 12.0 mmol/L 8.0 - 16.0                

       [facility]

                                         

 

          AGE       60 yrs                                  [facility]

al  

 

          [facility]AA G[facility] 34 mL[facility]min                               [facility]

[facility]  

 

          AFR AMER GFR 42 mL/min                               [facility]

[facility]  

 

                                                                     Male GFR In

terprentation                  20-49 yrs

    >60 mL/min   Normal                  50-59 yrs     >56 mL/min   Normal      
           60-69 yrs     >49 mL/min   Normal                  70-79yrs      >42 
mL/min   Normal                  80 and above  >35 mL/min   Normal              
     Female GFR  Interpretation                  20-39 yrs     >60 mL/min   
Normal                  40-49 yrs     >58 mL/min   Normal                  50-59
yrs     >51 mL/min   Normal                  60-69 yrs     >45 mL/min   Normal  
               70-79 yrs     >39 mL/min   Normal                  80 and above  
>32 mL/min   Normal 









                    ID                  Date                Data Source

 

                    [id number]         2021 09:29:00 AM EDT [facility] ([facility])









          Name      Value     Range     Interpretation Code Description Data Karen

rce(s) Supporting 

Document(s)

 

           Comprehensive [facility] Laboratory test result                          

        [facility] ([facility])                                 

 

                                        Is patient fasting?  N 

 

          Sodium    139 meq/L 134-153                       [facility] ([facility])  

 

                                        Is patient fasting?  N 

 

          Potassium 4.4 meq/L 3.6-5.0                       [facility] ([facility])  

 

                                        Is patient fasting?  N 

 

          Chloride  105 meq/L                         [facility] ([facility])  

 

                                        Is patient fasting?  N 

 

          Co2       22 meq/L  22-30                         [facility] ([facility])  

 

                                        Is patient fasting?  N 

 

           Glucose    143 mg/dL  70-99      Above high normal            [facility] 

([facility])                                 

 

                                        Is patient fasting?  N 

 

          BUN       26 mg/dL  7-21      Above high normal           [facility]  

 

                                        Is patient fasting?  N 

 

           Creatinine 2.1 mg/dL  0.7-1.5    Above high normal            [facility] 

([facility])                                 

 

                                        Is patient fasting?  N 

 

          BUN/Creat 12        8-27                          [facility]  

 

                                        Is patient fasting?  N 

 

          Total Protein 6.9 g/dL  6.3-8.2                       [facility] ([facility])  

 

                                        Is patient fasting?  N 

 

          Albumin   4.5 g/dL  3.9-5.0                       [facility] ([facility])  

 

                                        Is patient fasting?  N 

 

          Globulin  2.4 GM/DL 2.4-3.2                       [facility]  

 

                                        Is patient fasting?  N 

 

          A/G Ratio 1.9       0.8-2.0                       [facility] ([facility])  

 

                                        Is patient fasting?  N 

 

          Calcium   9.8 mg/dL 8.4-10.2                      [facility] ([facility])  

 

                                        Is patient fasting?  N 

 

           Total Bili Laboratory test result 0.2-1.3                          [facility]

[facility] ([facility])                                 

 

                                        Is patient fasting?  N 

 

          Alkaline Phos 53 U/L                            [facility] ([facility])  

 

                                        Is patient fasting?  N 

 

          Sgot/Ast  16 U/L    5-40                          [facility] ([facility])  

 

                                        Is patient fasting?  N 

 

          SGPT/Alt  12 U/L    7-56                          [facility] ([facility])  

 

                                        Is patient fasting?  N 

 

          Anion Gap 12.0 mmol/L 8.0-16.0                      [facility] ([facility])  

 

                                        Is patient fasting?  N 

 

          Age       60 yrs                                  [facility] ([facility])  

 

                                        Is patient fasting?  N 

 

          Non-Aa GFR 34 mL/min                               [facility] ([facility])  

 

                                        Is patient fasting?  N 

 

          Afr Amer GFR 42 mL/min                               [facility] ([facility])  

 

                                        Is patient fasting?  N 









                    ID                  Date                Data Source

 

                    [id number]         2021 08:41:00 AM EDT [facility] ([facility])









          Name      Value     Range     Interpretation Code Description Data Karen

rce(s) Supporting 

Document(s)

 

           Thyrotropin [Units/volume] in Serum or Plasma 0.57 uIU/mL 0.47-5.01  

                      [facility] 

([facility])         

 

                                        Is patient fasting?  Y 

 

           Prostate specific Ag [Mass/volume] in Serum or Plasma 1.05 ng/mL 0.00

-4.00                        

[facility] ([facility])  

 

                                        Is patient fasting?  Y 









                    ID                  Date                Data Source

 

                    [id number]         2021 08:41:00 AM EDT [facility] ([facility])









          Name      Value     Range     Interpretation Code Description Data Karen

rce(s) Supporting 

Document(s)

 

          Cve Panel Laboratory test result                               [facility] 

([facility])  

 

                                        Is patient fasting?  Y 

 

           Triglycerides 328 mg/dL       Above high normal            [facility]

NT ([facility])                        

 

                                        Is patient fasting?  Y 

 

           Cholesterol 208 mg/dL  131-200    Above high normal            [facility]

 ([facility])                                

 

                                        Is patient fasting?  Y 

 

          HDL       42 mg/dL  29-86                         [facility] ([facility])  

 

                                        Is patient fasting?  Y 

 

          LDL       123 mg/dL                         [facility] ([facility])  

 

                                        Is patient fasting?  Y 

 

           Risk Factor 5.0        3.4-4.9    Above high normal            [facility]

 ([facility])                                 

 

                                        Is patient fasting?  Y 

 

          LDL/HDL   2.93      1.00-3.55                     [facility] ([facility])  

 

                                        Is patient fasting?  Y 









                    ID                  Date                Data Source

 

                    [id number]         2021 08:41:00 AM EDT [facility] ([facility])









          Name      Value     Range     Interpretation Code Description Data Karen

rce(s) Supporting 

Document(s)

 

           Hemoglobin A1c/Hemoglobin.total in Blood 6.0 %      4.4-6.1          

                [facility] ([facility])                       

 

                                        Is patient fasting?  Y 









                    ID                  Date                Data Source

 

                    [id number]         2021 08:41:00 AM EDT [facility] ([facility])









          Name      Value     Range     Interpretation Code Description Data Karen

rce(s) Supporting 

Document(s)

 

           Comprehensive Metabo Laboratory test result                          

        [facility] ([facility])                                

 

                                        Is patient fasting?  Y 

 

          Sodium    138 meq/L 134-153                       [facility] ([facility])  

 

                                        Is patient fasting?  Y 

 

          Potassium 4.5 meq/L 3.6-5.0                       [facility] ([facility])  

 

                                        Is patient fasting?  Y 

 

          Chloride  104 meq/L                         [facility] ([facility])  

 

                                        Is patient fasting?  Y 

 

          Co2       21 meq/L  22-30     Below low normal           [facility] ([facility])  

 

                                        Is patient fasting?  Y 

 

           Glucose    134 mg/dL  70-99      Above high normal            [facility] 

([facility])                                 

 

                                        Is patient fasting?  Y 

 

          BUN       31 mg/dL  7-21      Above high normal           [facility] ([facility])  

 

                                        Is patient fasting?  Y 

 

           Creatinine 2.1 mg/dL  0.7-1.5    Above high normal            [facility] 

([facility])                                 

 

                                        Is patient fasting?  Y 

 

          BUN/Creat 15        8-27                          [facility] ([facility])  

 

                                        Is patient fasting?  Y 

 

          Total Protein 7.7 g/dL  6.3-8.2                       [facility] ([facility])  

 

                                        Is patient fasting?  Y 

 

          Albumin   4.9 g/dL  3.9-5.0                       [facility] ([facility])  

 

                                        Is patient fasting?  Y 

 

          Globulin  2.8 GM/DL 2.4-3.2                       [facility] ([facility])  

 

                                        Is patient fasting?  Y 

 

          A/G Ratio 1.8       0.8-2.0                       [facility] ([facility])  

 

                                        Is patient fasting?  Y 

 

           Calcium    10.4 mg/dL 8.4-10.2   Above high normal            [facility] 

([facility])                                 

 

                                        Is patient fasting?  Y 

 

           Total Bili Laboratory test result 0.2-1.3                          ME

[facility] ([facility])                                 

 

                                        Is patient fasting?  Y 

 

          Sgot/Ast  15 U/L    5-40                          [facility] ([facility])  

 

                                        Is patient fasting?  Y 

 

          Alkaline Phos 66 U/L                            [facility] ([facility])  

 

                                        Is patient fasting?  Y 

 

          SGPT/Alt  11 U/L    7-56                          [facility] ([facility])  

 

                                        Is patient fasting?  Y 

 

          Anion Gap 13.0 mmol/L 8.0-16.0                      [facility] ([facility])  

 

                                        Is patient fasting?  Y 

 

          Age       60 yrs                                  [facility] ([facility])  

 

                                        Is patient fasting?  Y 

 

          Non-Aa GFR 34 mL/min                               [facility] ([facility])  

 

                                        Is patient fasting?  Y 

 

          Afr Amer GFR 42 mL/min                               [facility] ([facility])  

 

                                        Is patient fasting?  Y 









                    ID                  Date                Data Source

 

                    [id number]         2021 08:41:00 AM EDT [facility] ([facility])









          Name      Value     Range     Interpretation Code Description Data Karen

rce(s) Supporting 

Document(s)

 

           CBC W/Automated Diff Laboratory test result                          

        [facility] ([facility])                                

 

                                        Is patient fasting?  Y 

 

          WBC       8.2 10^3/uL 4.2-11.0                      [facility] ([facility])  

 

                                        Is patient fasting?  Y 

 

          RBC       4.95 10^6/uL 4.50-6.30                     [facility] ([facility])  

 

                                        Is patient fasting?  Y 

 

          Hemoglobin 15.0 g/dL 14.0-16.0                     [facility] ([facility])  

 

                                        Is patient fasting?  Y 

 

          MCV       92.5 fL   80.0-94.0                     [facility] ([facility])  

 

                                        Is patient fasting?  Y 

 

          Hematocrit 45.8 %    41.0-51.0                     [facility] ([facility])  

 

                                        Is patient fasting?  Y 

 

          MCH       30.3 pg   27.0-34.0                     [facility] ([facility])  

 

                                        Is patient fasting?  Y 

 

          MCHC      32.8 g/dL 31.0-36.0                     [facility] ([facility])  

 

                                        Is patient fasting?  Y 

 

          RDW       13.7 %    11.5-14.8                     [facility] ([facility])  

 

                                        Is patient fasting?  Y 

 

           MPV        10.8 fL    7.4-10.4   Above high normal            [facility] 

([facility])

                                         

 

                                        Is patient fasting?  Y 

 

          Platelets 257 10^3/uL 150-450                       [facility] ([facility])  

 

                                        Is patient fasting?  Y 

 

          Neut      68.1 %    37.0-80.0                     [facility] ([facility])  

 

                                        Is patient fasting?  Y 

 

          Mono      8.3 %     3.0-8.0   Above high normal           [facility] ([facility])  

 

                                        Is patient fasting?  Y 

 

           Lymph      18.6 %     25.0-40.0  Below low normal            [facility] (

[facility])                                 

 

                                        Is patient fasting?  Y 

 

          Eos       2.8 %     0.0-7.0                       [facility] ([facility])  

 

                                        Is patient fasting?  Y 

 

          Baso      1.8 %     0.0-2.0                       [facility] ([facility])  

 

                                        Is patient fasting?  Y 

 

          %Ig       0.4 %     0.0-0.0   Above high normal           [facility] ([facility])  

 

                                        Is patient fasting?  Y 

 

          %NRBC     0.0 %     0.0-0.0                       [facility] ([facility])  

 

                                        Is patient fasting?  Y 

 

          #Neut     5.57 10^3/uL 2.00-6.90                     [facility] ([facility])  

 

                                        Is patient fasting?  Y 

 

          #Lymph    1.52 10^3/uL 0.60-3.40                     [facility] ([facility])  

 

                                        Is patient fasting?  Y 

 

          #Mono     0.68 10^3/uL 0.00-0.90                     [facility] ([facility])  

 

                                        Is patient fasting?  Y 

 

          #Eos      0.23 10^3/uL 0.00-0.70                     [facility] ([facility])  

 

                                        Is patient fasting?  Y 

 

          #Baso     0.15 10^3/uL 0.00-0.20                     [facility] ([facility])  

 

                                        Is patient fasting?  Y 

 

          #Ig       0.03 10^3/uL 0.00-0.10                     [facility] ([facility])  

 

                                        Is patient fasting?  Y 

 

          #NRBC     0.00 10^3/uL 0.00-0.00                     [facility] ([facility])  

 

                                        Is patient fasting?  Y 

 

           Manual Diff Laboratory test result                                  M

[facility] ([facility])

                                         

 

                                        Is patient fasting?  Y 

 

          RBC Morph Laboratory test result                               [facility] 

([facility])  

 

                                        Is patient fasting?  Y 









                    ID                  Date                Data Source

 

                    [id number]     2021 06:45:00 PM EDT [facility]









          Name      Value     Range     Interpretation Code Description Data Karen

rce(s) Supporting 

Document(s)

 

          CVE PANEL                                         [facility]

al  

 

                                            LIPID PANEL 

 

           Cholesterol [Mass/volume] in Serum or Plasma 208 MG/DL  131 - 200  H 

                    [facility]                            

 

           Deprecated Triglyceride [Mass/volume] in Serum or Plasma 328 MG/DL  3

5 - 160   H                     

[facility]                   

 

          HDL       42 MG/DL  29 - 86                       [facility]

al  

 

                    Cholesterol in LDL [Mass/volume] in Serum or Plasma by Direc

t assay 123 mg/dL           65

 - 175                                          [facility]  

 

                    Cholesterol.total/Cholesterol in HDL [Mass Ratio] in Serum o

r Plasma 5.0                 3.4 - 

4.9             H                               [facility]  

 

          LDL/HDL   2.93      1.00 - 3.55                     [facility]

ital  

 

                                        CVE         RISK           CHOL/HDL     

  LDL/HDLMEN:    1/2  AVERAGE          

3.43          1.00         AVERAGE          4.97          3.55    2X   AVERAGE  
       9.55          6.25    3X   AVERAGE         23.99          7.99WOMEN:    
1/2  AVERAGE          3.27          1.47         AVERAGE          4.44          
3.22    2X   AVERAGE          7.05          5.03    3X   AVERAGE         11.04  
       6.14 









                    ID                  Date                Data Source

 

                    [id number]     2021 06:45:00 PM EDT [facility]









          Name      Value     Range     Interpretation Code Description Data Karen

rce(s) Supporting 

Document(s)

 

          COMPREHENSIVE METABOLIC PANEL                                         

[facility]  

 

                                            COMPREHENSIVE METABOLIC PANEL 

 

           Sodium [Moles/volume] in Serum or Plasma 138 mEq/L  134 - 153        

                [facility]                                 

 

           Potassium [Moles/volume] in Serum or Plasma 4.5 mEq/L  3.6 - 5.0     

                   [facility]                            

 

           Chloride [Moles/volume] in Serum or Plasma 104 mEq/L  98 - 107       

                  [facility]                                 

 

           Carbon dioxide, total [Moles/volume] in Serum or Plasma 21 MEQ/L   22

 - 30    L                     

[facility]                   

 

           Glucose [Mass/volume] in Serum or Plasma 134 MG/DL  70 - 99    H     

                [facility]                                 

 

          BUN       31 MG/DL  7 - 21    H                   [facility]

al  

 

           Creatinine [Mass/volume] in Serum or Plasma 2.1 MG/DL  0.7 - 1.5  H  

                   [facility]                            

 

          BUN/CREAT 15        8 - 27                        [facility]

al  

 

           Protein [Mass/volume] in Serum or Plasma 7.7 G/DL   6.3 - 8.2        

                [facility]                                 

 

           Albumin [Mass/volume] in Serum or Plasma 4.9 G/DL   3.9 - 5.0        

                [facility]                                 

 

           Globulin [Mass/volume] in Serum by calculation 2.8 GM/DL  2.4 - 3.2  

                      [facility]                            

 

          A/G RATIO 1.8       0.8 - 2.0                     [facility]  

 

           Calcium [Mass/volume] in Serum or Plasma 10.4 MG/DL 8.4 - 10.2 H     

                [facility]                                

 

           Bilirubin.total [Mass/volume] in Serum or Plasma <0.7 MG/DL 0.2 - 1.3

                        

[facility]                   

 

                    Alkaline phosphatase [Enzymatic activity/volume] in Serum or

 Plasma 66 U/L              38 - 

126                                             [facility]  

 

                          Aspartate aminotransferase [Enzymatic activity/volume]

 in Serum or Plasma 15 U/L

             5 - 40                                 [facility]  

 

                    Alanine aminotransferase [Enzymatic activity/volume] in Seru

m or Plasma 11 U/L              7

- 56                                            [facility]  

 

           Anion gap 3 in Serum or Plasma 13.0 mmol/L 8.0 - 16.0                

       [facility]

                                         

 

          AGE       60 yrs                                  [facility]

[facility]  

 

          NON-AA GFR 34 mL/min                               [facility]

[facility]  

 

          AFR AMER GFR 42 mL/min                               [facility]

pital  

 

                                                                     Male GFR In

terprentation                  20-49 yrs

    >60 mL/min   Normal                  50-59 yrs     >56 mL/min   Normal      
           60-69 yrs     >49 mL/min   Normal                  70-79yrs      >42 
mL/min   Normal                  80 and above  >35 mL/min   Normal              
     Female GFR  Interpretation                  20-39 yrs     >60 mL/min   
Normal                  40-49 yrs     >58 mL/min   Normal                  50-59
yrs     >51 mL/min   Normal                  60-69 yrs     >45 mL/min   Normal  
               70-79 yrs     >39 mL/min   Normal                  80 and above  
>32 mL/min   Normal 









                    ID                  Date                Data Source

 

                    [id number]     2021 06:24:00 PM EDT [facility]









          Name      Value     Range     Interpretation Code Description Data Karen

rce(s) Supporting 

Document(s)

 

                          Thyrotropin [Units/volume] in Serum or Plasma by Detec

tion limit <= 0.05 mIU/L 

0.57 uIU/mL  0.47 - 5.01                            [facility]  









                    ID                  Date                Data Source

 

                    [id number]     2021 06:24:00 PM EDT [facility]









          Name      Value     Range     Interpretation Code Description Data Karen

rce(s) Supporting 

Document(s)

 

             Prostate specific Ag [Mass/volume] in Serum or Plasma 1.05 ng/mL   

0.00 - 4.00                

                          [facility]     

 

                                                            \BLDo\PSA INTERPRETA

TION\BLDx\      The PSA assay should not

 be used alone for a screening test       or diagnosis for presence or absence 
of malignant disease.      Predictions of disease recurrence should not be based
 solely          on values obtained from serial patient serum values.        The
 PSA result was determined by "[employer]", on the Roche        SUNITHA 6000. Values 
obtained with different assay methods               or kits cannot be used 
interchangeably. 









                    ID                  Date                Data Source

 

                    [id number]     2021 05:59:00 PM EDT [facility]









          Name      Value     Range     Interpretation Code Description Data Karen

rce(s) Supporting 

Document(s)

 

           Hemoglobin A1c/Hemoglobin.total in Blood 6.0 %      4.4 - 6.1        

                [facility]                                 

 

                                        {A1]{HB] 









                    ID                  Date                Data Source

 

                    [id number]     2021 05:45:00 PM EDT [facility]









          Name      Value     Range     Interpretation Code Description Data Karen

rce(s) Supporting 

Document(s)

 

          CBC W/AUTOMATED DIFF                                         [facility]  

 

                                            COMPLETE BLOOD COUNT 

 

           Leukocytes [#/volume] in Blood by Automated count 8.2 10^3/uL 4.2 - 1

1.0                       

[facility]                   

 

             Erythrocytes [#/volume] in Blood by Automated count 4.95 10^6/uL 4.

50 - 6.30                

                          [facility]     

 

           Hemoglobin [Mass/volume] in Blood 15.0 g/dL  14.0 - 16.0             

          [facility]                                 

 

           Hematocrit [Volume Fraction] of Blood by Automated count 45.8 %     4

1.0 - 51.0                       

[facility]                   

 

                    Erythrocyte mean corpuscular volume [Entitic volume] by Auto

mated count 92.5 fL             

80.0 - 94.0                                     [facility]  

 

                          Erythrocyte mean corpuscular hemoglobin [Entitic mass]

 by Automated count 30.3 

pg           27.0 - 34.0                            [facility]  

 

                                        Erythrocyte mean corpuscular hemoglobin 

concentration [Mass/volume] by Automated

 count     32.8 g/dL  31.0 - 36.0                       [facility]  

 

             Erythrocyte distribution width [Ratio] by Automated count 13.7 %   

    11.5 - 14.8                

                          [facility]     

 

           Platelets [#/volume] in Blood by Automated count 257 10^3/uL 150 - 45

0                        

[facility]                   

 

                    Platelet mean volume [Entitic volume] in Blood by Automated 

count 10.8 fL             7.4 - 

10.4            H                               [facility]  

 

           Neutrophils/100 leukocytes in Blood by Automated count 68.1 %     37.

0 - 80.0                       

[facility]                   

 

           Lymphocytes/100 leukocytes in Blood by Manual count 18.6 %     25.0 -

 40.0 L                     

[facility]                   

 

           Monocytes/100 leukocytes in Blood by Automated count 8.3 %      3.0 -

 8.0  H                     

[facility]                   

 

           Eosinophils/100 leukocytes in Blood by Automated count 2.8 %      0.0

 - 7.0                        

[facility]                   

 

           Basophils/100 leukocytes in Blood by Automated count 1.8 %      0.0 -

 2.0                        

[facility]                   

 

          %IG       0.4 %     0.0 - 0.0 H                   [facility]

al  

 

          %NRBC     0.0 %     0.0 - 0.0                     [facility]

al  

 

           Neutrophils [#/volume] in Blood by Automated count 5.57 10^3/uL 2.00 

- 6.90                       

[facility]                   

 

           Lymphocytes [#/volume] in Blood by Automated count 1.52 10^3/uL 0.60 

- 3.40                       

[facility]                   

 

           Monocytes [#/volume] in Blood by Automated count 0.68 10^3/uL 0.00 - 

0.90                       

[facility]                   

 

           Eosinophils [#/volume] in Blood by Automated count 0.23 10^3/uL 0.00 

- 0.70                       

[facility]                   

 

           Basophils [#/volume] in Blood by Automated count 0.15 10^3/uL 0.00 - 

0.20                       

[facility]                   

 

          #IG       0.03 10^3/uL 0.00 - 0.10                     [facility]

ospital  

 

          #NRBC     0.00 10^3/uL 0.00 - 0.00                     [facility] H

ospital  

 

          MANUAL DIFF NOT INDICATED                               [facility]  

 

          RBC MORPH NOT INDICATED                               [facility]

spital  









                    ID                  Date                Data Source

 

                    [id number]         2021 02:48:00 PM EDT [facility] 

[facility])









          Name      Value     Range     Interpretation Code Description Data Karen

rce(s) Supporting 

Document(s)

 

           Creatinine [Mass/volume] in Serum or Plasma 2.66 mg/dL 0.72-1.25     

                   [facility] 

([facility])  

 

           Glucose [Mass/volume] in Serum or Plasma 64.0 mg/dL 70.0-99.0        

                [facility] 

([facility])  

 

             Carbon dioxide, total [Moles/volume] in Serum or Plasma 21.0 mmol/L

  22.0-31.0                 

                          [facility] ([facility])  

 

           Calcium [Mass/volume] in Serum or Plasma 10.1 mg/dL 8.4-10.2         

                [facility] 

([facility])  

 

           Urea nitrogen [Mass/volume] in Serum or Plasma 35.0 mg/dL 7.0-24.0   

                      [facility] 

([facility])  

 

           Sodium [Moles/volume] in Serum or Plasma 140.0 mmol/L 136.0-145.0    

                   [facility] 

([facility])  

 

          BUN/Creat Ratio 13.2                                    [facility] ([facility])  

 

           Anion gap in Serum or Plasma 16.2                                    

    [facility] ([facility])                     

 

          K         4.2 mmol/L 3.6-5.2                       [facility] ([facility])  

 

           Chloride [Moles/volume] in Serum or Plasma 107.0 mmol/L 98.0-107.0   

                    [facility] 

([facility])  

 

           eGFR -  Descent 29.0                                        ME

[facility]ENT ([facility])                                      

 

           eGFR -- Non- Descent 23.9                                     

   [facility] ([facility]

 [facility])                                  









                    ID                  Date                Data Source

 

                    [id number]         2021 02:25:00 PM EDT [facility] 

[facility])









          Name      Value     Range     Interpretation Code Description Data Karen

rce(s) Supporting 

Document(s)

 

           Glucose [Presence] in Urine Laboratory test result                   

               [facility] ([facility])             

 

           Protein [Presence] in Urine by Test strip Laboratory test result     

                             [facility] 

([facility])  

 

           Ua Nitrite Laboratory test result                                  ME

[facility] ([facility]

 [facility])                                  

 

           Ua Leuko   Laboratory test result                                  ME

[facility] ([facility] 

[facility])                                   

 

           Blood [Presence] in Urine by Visual Laboratory test result           

                       [facility] 

([facility])  

 

           Color of Urine Laboratory test result                                

  [facility] ([facility])                     

 

           Clarity of Urine Laboratory test result                              

    [facility] ([facility])                     

 

           Ketones [Presence] in Urine by Test strip Laboratory test result     

                             [facility] 

([facility])  

 

           Bilirubin.total [Presence] in Urine by Test strip Laboratory test res

ult                                  

[facility] ([facility])  

 

           pH of Urine by Test strip 5.5        5.0-7.5                         

 [facility] ([facility])                     

 

           Ua Specific Gravity 1.010      1.003-1.030                       [facility]

NT ([facility])                     

 

           Urobilinogen [Mass/volume] in Urine by Test strip 0.2 E.U./dL 0.0-1.0

                          [facility]

 ([facility])  









                    ID                  Date                Data Source

 

                    [id number]            2021 07:50:59 AM EDT [facility] [place] 

[facility]









          Name      Value     Range     Interpretation Code Description Data Karen

rce(s) Supporting 

Document(s)

 

          SODIUM    141 mmol/L (136-145)                     [facility] [facility]

  

 

          POTASSIUM 3.2 mmol/L (3.6-5.2) L                   [facility]

  

 

          CHLORIDE  108 mmol/L (100-108)                     [facility]

  

 

          CO2       22 mmol/L (22-31)                       [facility] 

 

 

          ANION GAP 11 mmol/L (7-16)                        [facility] 

 

 

          UREA NITROGEN 12 mg/dL  (7-24)                        [facility] [facility]  

 

          CREATININE 1.74 mg/dL (0.80-1.30) H                   [facility] [place] [facility]  

 

          BUN/CREAT RATIO 6.9 RATIO (10.0-20.0) L                   [facility] [place]

 [facility]  

 

          GLUCOSE   147 mg/dL (70-99)   H                   [facility] 

 

 

          CALCIUM   7.8 mg/dL (8.4-10.2) L                   [facility]

  

 

          GFR       40 ml/min/1.73m2 (>59)     L                   [facility] [place] 

[facility]  

 

          GFR (AFRICAN AMER) 49 ml/min/1.73m2 (>59)     L                   [facility] 

[facility] [facility]  

 

          GFR INTERPRETATION                                         [facility]  

 

                                        ----------------------------------------

--NORMAL KIDNEY FUNCTION   OR MILD 

DISEASE       - GFR >OR= 60CHRONIC KIDNEY DISEASE   - GFR 15 - 59RENAL FAILURE  
          - GFR   <15------------------------------------------Est. GFR 
calculation based on the MDRDstudy equation, which assumes a steadystate for 
creatinine.  Est. GFR should notbe used for medication dosing. 









                    ID                  Date                Data Source

 

                    [id number]            2021 07:20:52 AM EDT [facility] [place] 

[facility]









          Name      Value     Range     Interpretation Code Description Data Karen

rce(s) Supporting 

Document(s)

 

          WBC       9.5 10*3/uL (4.1-11.0)                     [facility] [place] [facility]

[place]  

 

          RBC       4.64 10*6/uL (4.60-6.10)                     [facility] [place] [facility]  

 

          HGB       14.1 g/dL (13.5-18.0)                     [facility] [place] [facility]  

 

          HCT       42.1 %    (41.0-53.0)                     [facility] [place] [facility]  

 

          MCV       90.9 fL   (80.0-95.0)                     [facility] [place] [facility]  

 

          MCH       30.4 pg   (27.0-32.0)                     [facility] [place] [facility]  

 

          MCHC      33.5 g/dL (32.0-36.0)                     [facility] [place] [facility]  

 

          RDW       13.9 %    (10.5-14.5)                     [facility] [place] [facility]  

 

          PLT       168 10*3/uL (150-450)                     [facility] [place] [facility]  

 

          MPV       8.2 fL    (7.1-10.7)                     [facility]

  









                    ID                  Date                Data Source

 

                    [id number]            2021 10:03:16 PM EDT [facility]









          Name      Value     Range     Interpretation Code Description Data Karen

rce(s) Supporting 

Document(s)

 

          POC GLUCOSE 141 mg/dL (70-99)   H                   [facility]  

 

                                        NOTIFIED NURSEPERFORMED BY  CLINICAL S

TAFF 









                    ID                  Date                Data Source

 

                    [id number]         2021 12:16:00 PM EDT [facility] [place])









          Name      Value     Range     Interpretation Code Description Data Karen

rce(s) Supporting 

Document(s)

 

           Surgical pathology study Laboratory test result                      

            [facility] ([facility] [place])                    

 



[facility] [place] [id number] [place] [address]

Tel# [phone]  Fax# [phone]



SURGICAL PATHOLOGY REPORT



Patient Name:JOE BRYANT

:1960



Received:2021

Accession #:[id number]

Specimen(s) Received:

 A: Right renal hilar tissue

B: Right kidney



Clinical Diagnosis and History:

 Enhancing solid mass in the lower pole of the right kidney, 6.0 x 4.1 cm.



DIAGNOSIS:

A)     RIGHT RENAL HILAR TISSUE, EXCISION - ADIPOSE TISSUE WITH ONE 

     LYMPH NODE, NEGATIVE FOR MALIGNANCY (0/1).



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 

     CARCINOMA (SEE CASE SUMMARY BELOW).





     CASE SUMMARY:



     SPECIMEN LATERALITY:

          RIGHT.



     TUMOR SITE:

          LOWER POLE.



     TUMOR SIZE:

          5.0 CM.



     TUMOR FOCALITY:

          UNIFOCAL.



     HISTOLOGIC TYPE:

          CLEAR CELL RENAL CELL CARCINOMA.



     SARCOMATOID FEATURES:

          NOT IDENTIFIED.



     RHABDOID FEATURES:

          NOT IDENTIFIED.



     HISTOLOGIC GRADE (WHO / ISUP Grade):

          GRADE 1.



     TUMOR NECROSIS:

          NOT IDENTIFIED.



     TUMOR EXTENSION:

          LIMITED TO KIDNEY.



     MARGINS:

          PERINEPHRIC FAT:  NEGATIVE.



          RENAL SINUS SOFT TISSUE:  NEGATIVE.

          

          URETERAL MARGINS:  NEGATIVE.

                         

          RENAL VEIN MARGIN:  NEGATIVE.



          RENAL ARTERY MARGIN:  NEGATIVE.

     

          GEROTA'S FASCIAL MARGIN:     NEGATIVE.



     LYMPHOVASCULAR  INVASION:

          NOT IDENTIFIED.



     REGIONAL LYMPH NODES (INCLUDING PART A):

          NUMBER OF LYMPH NODES INVOLVED:  0.



          NUMBER OF LYMPH NODES EXAMINED:  1.     



     PATHOLOGIC STAGE CLASSIFICATION (pTNM, AJCC 8TH EDITION):

          pT1b  pN0



     OTHER FINDINGS IN NON-NEOPLASTIC KIDNEY:

          1.     TWO SEPARATE URETERS, SUGGESTIVE OF DUPLEX KIDNEY.

          2.     MILD ARTERIOLAR NEPHROSCLEROSIS.

          3.     CHRONIC INFLAMMATION. 

 

GROSS DESCRIPTION:

 Specimen A received in formalin labeled "right renal hilar tissue" is an

irregular cauterized portion of yellow to brown soft tissue measuring 2.5

x 2.0 x 1.0 cm.  There is a moderate to abundant amount of cautery

artifact.  The cut surface is yellow, glistening, lobulated adipose

tissue.  No identifiable structures are found grossly.  The specimen is

sectioned and entirely submitted for microscopic examination. (1 block)  



Specimen B received in formalin labeled "right kidney" is a right radical

nephrectomy weighing 880 gm.  There is an abundant amount of attached

perinephric fat and along the superior pole a portion of the perinephric

fat is torn.  The specimen measures 20 cm from superior to inferior, 15 cm

from medial to lateral and 8 cm from anterior to deep.  There is Gerota's

fascia along the anterior to superior portion that is unremarkable,

tan-gray and measures 8.0 x 3.5 cm.  There are two ureters identified. 

The first ureter is associated with the superior pole, measures 8 cm from

the hilum, 0.5 cm in length and the mucosa is glistening, striated,

tan-gray.  The second ureter is located 1.5 cm lateral to the first,

measures 4 cm in length from the hilum, 0.5 cm in diameter and has a

glistening, striated, tan-gray mucosal surface.  The second is associated

with the mid to inferior portion of the kidney.  There is one renal vein

and one renal artery both measuring 0.5 cm in length from the hilum and

the renal vein mucosa is smooth, glistening, gray.  There is minimal

atherosclerosis of the renal artery.  Located in the inferior pole of the

kidney, toward the superficial aspect, is a cystic tumor measuring 5.0 x

                                        4.5 cm.  The tumor is mottled, orange-ye

llow to tan-red and possibly

involves the renal sinus grossly.  Portions of the tumor are encapsulated,

extend toward the hilar adipose tissue, inferior parenchyma and

superficial parenchyma, however, the tumor is still confined to the

kidney.  The tumor compress the second/inferior ureter and comes to within

                                        3.7 cm of the renal vein margin.  There 

is minimal necrosis found within

the tumor.  The inferior renal pelvis is glistening, tan-pink and the

surrounding uninvolved parenchyma is tan-pink with a well demarcated

corticomedullary junction averaging 0.9 cm in greatest dimension.  The

renal parenchyma associated with each pelvis is homogeneous, red-brown and

appears to coalesce in the mid portion.  The cut surface of the superior

renal pelvis is slightly misshapen, however, no additional anomalies are

found.  There are no hilar lymph nodes found.  The adrenal gland is not

present.  Representative sections are submitted for microscopic

examination as follows:  UM - superior ureter margin; VM - vascular

margin; UM2 - inferior ureter margin; T - tumor (4 - tumor to inferior

pelvis, 5,6 - tumor to sinus fat, 7 - tumor to superficial perpendicular,

                                        8 - tumor to lateral, 9 - tumor to media

l/hilar tissue); SP - superior

pelvis; IP - inferior pelvis; R - random, mid.  (12 blocks)    



niraj

[facility]



Reported: 2021

***Electronically Signed Out By Armando Mcfarland MD***         

[facility]



[place] [address]



Technical component performed at [facility],

[facility], Histopathology, [address] [place] [address], [id number].

Reported at [facility], [address] [place] [address], [facility].

This report may include immunohistochemical or in-situ hybridization

results. Testing was developed and the performance characteristics

determined by [facility], as required by

CLIA '88. The FDA has determined that approval for specific use is not

necessary for clinical use. The quality of Hematoxylin and Eosin stains

and as applicable, for all immunohistochemical and/or special stains,

including positive and negative controls, were reviewed and considered

appropriate.



ICD codes:

 C64.1

CPT4 codes: 

A: 87878L

B: 32967B

 









                    ID                  Date                Data Source

 

                    [id number]            2021 07:01:49 PM EDT [place], [place] [address]Tel# [phone]  Fax# [phone] PATHOLOGY REPORTPatient 
Name:JOE BRYANT:1960Received:ccession #:[id number]
[id number](s) Received: A: Right renal hilar tissueB: Right kidneyClinical 
Diagnosis and History: Enhancing solid mass in the lower pole of the right 
kidney, 6.0 x 4.1 cm.DIAGNOSIS:A)     RIGHT RENAL HILAR TISSUE, EXCISION - 
ADIPOSE TISSUE WITH ONE      LYMPH NODE, NEGATIVE FOR MALIGNANCY (0/1).B)     
RIGHT KIDNEY, RADICAL NEPHRECTOMY - CLEAR CELL RENAL CELL      CARCINOMA (SEE 
CASE SUMMARY BELOW).     CASE SUMMARY:     SPECIMEN LATERALITY:          RIGHT. 
    TUMOR SITE:          LOWER POLE.     TUMOR SIZE:          5.0 CM.     TUMOR 
FOCALITY:          UNIFOCAL.     HISTOLOGIC TYPE:          CLEAR CELL RENAL CELL
 CARCINOMA.     SARCOMATOID FEATURES:          NOT IDENTIFIED.     RHABDOID 
FEATURES:          NOT IDENTIFIED.     HISTOLOGIC GRADE (WHO / ISUP Grade):     
     GRADE 1.     TUMOR NECROSIS:          NOT IDENTIFIED.     TUMOR EXTENSION: 
         LIMITED TO KIDNEY.     MARGINS:          PERINEPHRIC FAT:  NEGATIVE.   
       RENAL SINUS SOFT TISSUE:  NEGATIVE.                    URETERAL MARGINS: 
 NEGATIVE.                                   RENAL VEIN MARGIN:  NEGATIVE.      
    RENAL ARTERY MARGIN:  NEGATIVE.               GEROTA'S FASCIAL MARGIN:     
NEGATIVE.     LYMPHOVASCULAR  INVASION:          NOT IDENTIFIED.     REGIONAL 
LYMPH NODES (INCLUDING PART A):          NUMBER OF LYMPH NODES INVOLVED:  0.    
      NUMBER OF LYMPH NODES EXAMINED:  1.          PATHOLOGIC STAGE 
CLASSIFICATION (pTNM, AJCC 8TH EDITION):          pT1b  pN0     OTHER FINDINGS 
IN NON-NEOPLASTIC KIDNEY:          1.     TWO SEPARATE URETERS, SUGGESTIVE OF 
DUPLEX KIDNEY.          2.     MILD ARTERIOLAR NEPHROSCLEROSIS.          3.     
CHRONIC INFLAMMATION.  GROSS DESCRIPTION: Specimen A received in formalin 
labeled "right renal hilar tissue" is anirregular cauterized portion of yellow 
to brown soft tissue measuring 2.5x 2.0 x 1.0 cm.  There is a moderate to 
abundant amount of cauteryartifact.  The cut surface is yellow, glistening, 
lobulated adiposetissue.  No identifiable structures are found grossly.  The 
specimen issectioned and entirely submitted for microscopic examination. (1 
block)  Specimen B received in formalin labeled "right kidney" is a right 
radicalnephrectomy weighing 880 gm.  There is an abundant amount of 
attachedperinephric fat and along the superior pole a portion of the 
perinephricfat is torn.  The specimen measures 20 cm from superior to inferior, 
15 cmfrom medial to lateral and 8 cm from anterior to deep.  There is 
Gerota'sfascia along the anterior to superior portion that is unremarkable,tan-
gray and measures 8.0 x 3.5 cm.  There are two ureters identified. The first 
ureter is associated with the superior pole, measures 8 cm fromthe hilum, 0.5 cm
 in length and the mucosa is glistening, striated,tan-gray.  The second ureter 
is located 1.5 cm lateral to the first,measures 4 cm in length from the hilum, 
0.5 cm in diameter and has aglistening, striated, tan-gray mucosal surface.  The
 second is associatedwith the mid to inferior portion of the kidney.  There is 
one renal veinand one renal artery both measuring 0.5 cm in length from the 
hilum andthe renal vein mucosa is smooth, glistening, gray.  There is 
minimalatherosclerosis of the renal artery.  Located in the inferior pole of 
thekidney, toward the superficial aspect, is a cystic tumor measuring 5.0 x4.5 
cm.  The tumor is mottled, orange-yellow to tan-red and possiblyinvolves the r
enal sinus grossly.  Portions of the tumor are encapsulated,extend toward the 
hilar adipose tissue, inferior parenchyma andsuperficial parenchyma, however, 
the tumor is still confined to thekidney.  The tumor compress the 
second/inferior ureter and comes to within3.7 cm of the renal vein margin.  
There is minimal necrosis found withinthe tumor.  The inferior renal pelvis is 
glistening, tan-pink and thesurrounding uninvolved parenchyma is tan-pink with a
 well demarcatedcorticomedullary junction averaging 0.9 cm in greatest 
dimension.  Therenal parenchyma associated with each pelvis is homogeneous, red-
brown andappears to coalesce in the mid portion.  The cut surface of the 
superiorrenal pelvis is slightly misshapen, however, no additional anomalies 
arefound.  There are no hilar lymph nodes found.  The adrenal gland is 
notpresent.  Representative sections are submitted for microscopicexamination as
 follows:  UM - superior ureter margin; VM - vascularmargin; UM2 - inferior 
ureter margin; T - tumor (4 - tumor to inferiorpelvis, 5,6 - tumor to sinus fat,
 7 - tumor to superficial perpendicular,8 - tumor to lateral, 9 - tumor to 
medial/hilar tissue); SP - superiorpelvis; IP - inferior pelvis; R - random, 
mid.  (12 blocks)    elizabeth/mecReported: 2021***Electronically Signed Out 
By Armando Mcfarland MD***         [facility]lc[facility] Associates [place], [place] [address]Technical component performed at [facility], Histopathology, [address] [place] [address], [id number].Reported at [facility], [address] [place] [address], [phone].This report may 
include immunohistochemical or in-situ hybridizationresults. Testing was 
developed and the performance characteristicsdetermined by [facility], as required byCLIA '88. The FDA has determined that 
approval for specific use is notnecessary for clinical use. The quality of 
Hematoxylin and Eosin stainsand as applicable, for all immunohistochemical 
and/or special stains,including positive and negative controls, were reviewed 
and consideredappropriate.ICD codes: C64.1CPT4 codes: A: 93978VT: 66614Y  









          Name      Value     Range     Interpretation Code Description Data Karen

rce(s) Supporting 

Document(s)

 

                                                                       









                    ID                  Date                Data Source

 

                    [id number]            2021 06:41:00 AM EDT [place], [place] [address]PATIENT NAME:           BETY 

EDWINDATE OF BIRTH:          1960REPORT:                 OPERATIONPATIENT 
NUMBER:         [id number] STATUS:         IPMEDICAL RECORD NUMBER:  
[id number] OF ADMISSION:      ATE OF DISCHARGE:ROOM:             
      DATE OF PROCEDURE:      REOPERATIVE DIAGNOSIS:  Right renal 
mass.POSTOPERATIVE DIAGNOSIS:  Right renal mass.PROCEDURE PERFORMED:  Right 
robotic-assisted laparoscopic radicalnephrectomy.SURGEON:  ANDREW AngeloSSISTANT:  JULIA HortaTHESIA:  General.DRAINS:  10 mm Doug-
Mcbride.COMPLICATIONS:  None.INDICATION FOR OPERATION:  Mr. Bryant is a 60-year-old
 gentleman, recentlyfound to have a 6 cm enhancing mass of the right lower pole 
of the kidneywhich is highly worrisome for potential malignancy.  He presents 
today fora right robotic radical nephrectomy.DESCRIPTION OF PROCEDURE:  After 
adequate induction of general anesthesia,a 16-French Dominique catheter was placed 
into the bladder.  The patient wasthen prepped and draped in usual sterile 
fashion in the left lateraldecubitus position.  All pressure points were 
carefully padded.  Initially,a stab incision was made in the mid epigastrium and
 the Veress needleplaced into the peritoneal cavity.  The peritoneal cavity was 
insufflatedto 15 mmHg without difficulty.  An 8 mm trocar port was placed just 
to theright of the umbilicus.  The 30-degree angle downward angled lens was 
thenplaced into the patient's abdomen, which was carefully 
inspectedsystematically.  No significant abnormalities were seen.  Two da Vincip
orts were placed in the right lower quadrant, another in the right 
upperquadrant, a 12-mm assistant port in the right lower quadrant, and finally 
a5-mm assistant port in a subxiphoid position.  The da Becky robot was 
thendocked in a standard fashion.The colon was initially mobilized in the medial
 to psoas to expose Gerota'sfascia over the entire kidney.  Adhesions of the 
liver were incised and theliver was retracted cephalad.  The duodenum was then 
Kocherized andultimately the vena cava was exposed.  The tissue just lateral to 
the venacava was dissected down and the main renal vein was encountered in 
theusual and expected location.  The gonadal vein was similarly identified andit
 was ligated and transected.  Dissection was then carried posteriorly. 
Ultimately, with careful and tedious dissection, the left main renal veinand 
artery were individually exposed and isolated.  The artery was thenquadruply 
ligated using Weck locking hemoclips and transected.  The mainrenal vein was 
then ligated using number 1 silk tie and multiple Wecklocking hemoclips were 
placed as well.  The vein was transected. Dissection was then carried 
posteriorly.  The ureter was identified,isolated, and transected after being 
hemoclipped.  Dissection was thencarried superiorly.  The adrenal was left in 
situ and Gerota's fasciaincised so as to allow dissection of the upper pole.  
Ultimately, theentire kidney was freed up.At this point, the da Becky robot was 
undocked.  One of the right lowerquadrant incision was extended to approximately
 7 cm.  The 's handwas then placed into the abdomen and the specimen was
 removed and sent forpermanent section.  Hemostasis was reconfirmed.  The 
abdominal fascia wasclosed with a number 2 Ti-Cron suture in a running fashion 
and finally theskin edges were reapproximated using skin staples after copious 
irrigationof the wound.  Sterile dressings were applied.  A Doug-Mcbride drain 
whichhad been placed after removal of the specimen was secured with 2-0 nylon. 
The patient tolerated the procedure well and there were no 
intraoperativecomplications.  Estimated blood loss was 200 mL.  The patient was 
taken tothe recovery room in stable condition.DICTATED BY:  Rodrigo Russell, 
MDDictated:  2021 12:00DT:  2021 12:03Job #:  
[id number]**NOTE:  [facility] computer generated reports are not 
confirmed orauthenticated unless they are signed by the provider**Electronically
 Authenticated by:RODRIGO RUSSELL MD On 2021 06:41 AM EDT 









          Name      Value     Range     Interpretation Code Description Data Karen

rce(s) Supporting 

Document(s)

 

                                                                       









                    ID                  Date                Data Source

 

                    [id number]            2021 12:24:04 AM EDT [facility] [place] 

[facility]

 

                                        SPEC EXP DATE             07/15/2021PATI

ENT ABO/Rh             O 

POSITIVEANTIBODY SCREEN            NEGATIVETESTING SITE               PERFORMED 
AT [address] [place], [place] [address]UNIT NUMBER               [id number] 
COMPONENT TYPE       LEUKOPOOR RED CELLS (PT B)UNIT DIVISION             
00STATUS OF UNIT             REL FROM ALLOCTRANSFUSION STATUS         OK TO 
TRANSFUSECROSSMATCH RESULT          COMPATIBLEUNIT NUMBER               
[id number] COMPONENT TYPE       LEUKOPOOR RED CELLS (PT B)UNIT DIVISION 
            00STATUS OF UNIT             REL FROM ALLOCTRANSFUSION STATUS       
  OK TO TRANSFUSECROSSMATCH RESULT          COMPATIBLE 









          Name      Value     Range     Interpretation Code Description Data Karen

rce(s) Supporting 

Document(s)

 

          TYPE AND SCREEN                                         [facility] [place] [facility]

[facility]  

 

                                        PATIENT ABO/Rh             O POSITIVE 









                    ID                  Date                Data Source

 

                    [id number]            2021 06:58:49 AM EDT [facility] [place] 

[facility]









          Name      Value     Range     Interpretation Code Description Data Karen

rce(s) Supporting 

Document(s)

 

          SODIUM    140 mmol/L (136-145)                     [facility] [place] [facility] [facility]

  

 

          POTASSIUM 3.7 mmol/L (3.6-5.2)                     [facility] [place] [facility]

  

 

          CHLORIDE  106 mmol/L (100-108)                     [facility] [place] [facility]

  

 

          CO2       24 mmol/L (22-31)                       [facility] [place] [facility] [facility] 

 

 

          ANION GAP 10 mmol/L (7-16)                        [facility] [place] [facility] [facility] 

 

 

          UREA NITROGEN 15 mg/dL  (7-24)                        [facility] [place] [facility]  

 

          CREATININE 1.11 mg/dL (0.80-1.30)                     [facility] [place] [facility]  

 

          BUN/CREAT RATIO 13.5 RATIO (10.0-20.0)                     [facility]  

 

          GLUCOSE   99 mg/dL  (70-99)                       [facility] [place] [facility] 

 

 

          CALCIUM   9.3 mg/dL (8.4-10.2)                     [facility] [place] [facility]

  

 

          TOTAL PROTEIN 7.8 g/dL  (6.4-8.2)                     [facility] [place] [facility]  

 

          ALBUMIN   4.5 g/dL  (3.2-4.5)                     [facility] [place] [facility] 

 

 

          GLOBULIN  3.3 g/dL  (2.7-4.3)                     [facility] [place] [facility] 

 

 

          ALB/GLOB RATIO 1.4 RATIO                               [facility] [place] [facility]  

 

          ALKALINE PHOSPHATASE 51 U/L    ()                      [facility]  

 

          BILIRUBIN,TOTAL 0.8 mg/dL (0.0-1.0)                     [facility] [place] [facility] [facility]

[facility]  

 

                                        PLEASE NOTE:Total bilirubin results may 

be falselyelevated in patients taking 

Eltrombopag. 

 

          AST (SGOT) 41 U/L    (11-39)   H                   [facility] [place] [facility]

  

 

          ALT (SGPT) 43 U/L    (12-78)                       [facility] [place] [facility]

  

 

          GFR       >60 ml/min/1.73m2 (>59)                         [facility] [place]

 [facility]  

 

          GFR (AFRICAN AMER) >60 ml/min/1.73m2 (>59)                         [facility]

 [place] [facility]  

 

          GFR INTERPRETATION                                         [facility]  

 

                                        ----------------------------------------

--NORMAL KIDNEY FUNCTION   OR MILD 

DISEASE       - GFR >OR= 60CHRONIC KIDNEY DISEASE   - GFR 15 - 59RENAL FAILURE  
          - GFR   <15------------------------------------------Est. GFR 
calculation based on the MDRDstudy equation, which assumes a steadystate for 
creatinine.  Est. GFR should notbe used for medication dosing. 









                    ID                  Date                Data Source

 

                    [id number]            2021 06:33:21 AM EDT [facility] [place] 

[facility]









          Name      Value     Range     Interpretation Code Description Data Karen

rce(s) Supporting 

Document(s)

 

          WBC       8.0 10*3/uL (4.1-11.0)                     [facility] [place] [facility]

[place]  

 

          RBC       5.23 10*6/uL (4.60-6.10)                     [facility] [place] [facility]

 [facility]  

 

          HGB       16.0 g/dL (13.5-18.0)                     [facility] [place] [facility]  

 

          HCT       47.1 %    (41.0-53.0)                     [facility] [place] [facility]  

 

                                        [facility] 

[id number] 

 

          MCV       90.1 fL   (80.0-95.0)                     [facility] [place] [facility]  

 

          MCH       30.6 pg   (27.0-32.0)                     [facility] [place] [facility]

[facility]  

 

          MCHC      34.0 g/dL (32.0-36.0)                     [facility] [place] [facility]  

 

          RDW       13.9 %    (10.5-14.5)                     [facility] [place] [facility]

[facility]  

 

          PLT       207 10*3/uL (150-450)                     [facility] [place] [facility]

[facility]  

 

          MPV       7.8 fL    (7.1-10.7)                     [facility] [place] [facility]

  









                    ID                  Date                Data Source

 

                    [id number]            2021 05:06:55 PM EDT [facility] [place] 

[facility]









          Name      Value     Range     Interpretation Code Description Data Karen

rce(s) Supporting 

Document(s)

 

          POC GLUCOSE 107 mg/dL (70-99)   H                   [facility] [place] [facility]

[facility]  

 

                                        NOTIFIED NURSEPERFORMED BY  CLINICAL S

TAFF 









                    ID                  Date                Data Source

 

                    [id number]         2021 12:36:00 PM EDT [facility] [place]









          Name      Value     Range     Interpretation Code Description Data Karen

rce(s) Supporting 

Document(s)

 

           CBC W/Automated Diff Laboratory test result                          

        [facility] [place]                     

 

                                        COMPLETE BLOOD COUNT

 

 

           Hemoglobin 15.9 g/dL  14.0-16.0                        [facility] 

[place]                                      

 

          WBC       7.8 10^3/uL 4.2-11.0                      [facility] [place]  

 

          RBC       5.24 10^6/uL 4.50-6.30                     [facility] [place]  

 

          Hematocrit 46.5 %    41.0-51.0                     [facility] [place] 

 

 

          MCV       88.7 fL   80.0-94.0                     [facility] [place]  

 

          MCH       30.3 pg   27.0-34.0                     [facility] [place]  

 

          MCHC      34.2 g/dL 31.0-36.0                     [facility] [place]  

 

           Platelets  222 10^3/uL 150-450                          [facility] 

[place]                                      

 

          RDW       13.1 %    11.5-14.8                     [facility] [place]  

 

          Lymph     20.2 %    25.0-40.0                     [facility] [place]  

 

          Neut      68.3 %    37.0-80.0                     [facility]  

 

          MPV       9.4 fL    7.4-10.4                      [facility]  

 

          Eos       1.5 %     0.0-7.0                       [facility]  

 

          Mono      8.7 %     3.0-8.0                       [facility]  

 

          Baso      1.2 %     0.0-2.0                       [facility] [place]  

 

          %Ig       0.1 %     0.0-0.0                       [facility] [place]  

 

          %NRBC     0.0 %     0.0-0.0                       [facility] [place]  

 

           #Neut      5.31 10^3/uL 2.00-6.90                        [facility] [place]

                                         

 

           #Lymph     1.57 10^3/uL 0.60-3.40                        [facility]                                      

 

          #Eos      0.12 10^3/uL 0.00-0.70                     [facility] [place] 

 

 

           #Baso      0.09 10^3/uL 0.00-0.20                        [facility]

                                         

 

           #Mono      0.68 10^3/uL 0.00-0.90                        [facility]

                                         

 

           #NRBC      0.00 10^3/uL 0.00-0.00                        [facility] [place]

                                         

 

          #Ig       0.01 10^3/uL 0.00-0.10                     [facility] [place]  

 

           Manual Diff Laboratory test result                                  M

[facility]                     

 

           RBC Morph  Laboratory test result                                  ME

[facility] [place]                                   









                    ID                  Date                Data Source

 

                    [id number]         2021 12:36:00 PM EDT [facility])









          Name      Value     Range     Interpretation Code Description Data Karen

rce(s) Supporting 

Document(s)

 

          Protime   12.0 s    11.0-15.5                     [facility]  

 

          Inr       0.88      0.93-1.23                     [facility]  

 

          PTT       28.8 s    24.8-36.7                     [facility]  

 

                                        \BLDo\INR INTERPRETATION\BLDx\

Therapeutic range for Coumadin and related oral anticoagulants.

                                        -International Normalized Ratio (INR): 2

.0 - 3.0 for Venous

Thrombosis, Pulmonary Embolus, Tissue heart valves, Acute MI

Atrial Fibrillation, Valvular heart disease and recurrent

Systemic Embolism.

                                        -International Normalized Ratio (INR): 2

.5 - 3.5 for Mechanical

Prosthetic valve.

 









                    ID                  Date                Data Source

 

                    [id number]         2021 12:36:00 PM EDT [facility])









          Name      Value     Range     Interpretation Code Description Data Karen

rce(s) Supporting 

Document(s)

 

          Sodium    141 meq/L 134-153                       [facility]  

 

           Comprehensive Metabo Laboratory test result                          

        [facility] [place]                     

 

                                        COMPREHENSIVE METABOLIC PANEL

 

 

          Chloride  105 meq/L                         [facility]  

 

          Potassium 3.8 meq/L 3.6-5.0                       [facility] 

 

 

          Glucose   129 mg/dL 70-99                         [facility]  

 

          Co2       24 meq/L  22-30                         [facility]  

 

           Creatinine 0.9 mg/dL  0.7-1.5                          [facility]

                                         

 

          BUN       15 mg/dL  7-21                          [facility]  

 

           Total Protein 7.4 g/dL   6.3-8.2                          [facility] 

[place]                                      

 

          BUN/Creat 17        8-27                          [facility]  

 

          Globulin  2.7 GM/DL 2.4-3.2                       [facility]  

 

          Albumin   4.7 g/dL  3.9-5.0                       [facility]  

 

          A/G Ratio 1.7       0.8-2.0                       [facility]  

 

           Total Bili Laboratory test result 0.2-1.3                          ME

[facility]                     

 

          Calcium   9.8 mg/dL 8.4-10.2                      [facility]  

 

          Sgot/Ast  23 U/L    5-40                          [facility]  

 

          Alkaline Phos 59 U/L                            [facility] 

 

 

          SGPT/Alt  24 U/L    7-56                          [facility]  

 

           Anion Gap  12.0 mmol/L 8.0-16.0                         [facility]                                      

 

          Age       60 yrs                                  [facility]  

 

           Non-Aa GFR Laboratory test result                                  ME

[facility]

 [facility])                                  

 

           Afr Amer GFR Laboratory test result                                  

[facility] [place]                     

 

                                        Male GFR Interprentation

                                        20-49 yrs     >60 mL/min   Normal

                                        50-59 yrs     >56 mL/min   Normal

                                        60-69 yrs     >49 mL/min   Normal

                                        70-79yrs      >42 mL/min   Normal

                                        80 and above  >35 mL/min   Normal

Female GFR  Interpretation

                                        20-39 yrs     >60 mL/min   Normal

                                        40-49 yrs     >58 mL/min   Normal

                                        50-59 yrs     >51 mL/min   Normal

                                        60-69 yrs     >45 mL/min   Normal

                                        70-79 yrs     >39 mL/min   Normal

                                        80 and above  >32 mL/min   Normal

 









                    ID                  Date                Data Source

 

                    [id number]         2021 12:36:00 PM EDT [facility])









          Name      Value     Range     Interpretation Code Description Data Karen

rce(s) Supporting 

Document(s)

 

           Culture Urine Laboratory test result                                 

 [facility]                     

 

                                        _CULTURE URINE_

^$[id number]

^^[id number]

                                        $$[id number]

^^[id number]

                                        $$[id number]

                                        $$[id number]

                                        $$[id number]

                                        $$[id number]

                                        $$[id number]

                                        $$[id number]

                                        $$312934

                                        $$[id number]

                                        $$148694

                                        $$[id number]

                                        $$[id number]

                                        $$[id number]

                                        $$[id number]

                                        $$[id number]

                                        $$[id number]

                                        $$[id number]

                                        $$207210

                                        $$[id number]

                                        $$[id number]

                                        $$[id number]

                                        $$[id number]

                                        $$[id number]

                                        $$[id number]

^^[id number]

                                        $$[id number]

                                        $$[id number]

                                        $$[id number]



                                        -- Continued on next page --

Patient: BETY THAPA                  Order: [id number]                   Page 2

Culture: CULTURE URINE                                           Status: Final

===============================================================================





                                        -- Continued on next page --

Patient: BETY THAPA                  Order: [id number]                   Page 2

Culture: CULTURE URINE                                          Status: Prelim

===============================================================================



                                        $$[id number]

                                        $$[id number]



REPORTED DATE/TIME: 2021 15:05

Culture: CULTURE URINE                                           Status: Final



Urine Culture,Comprehensive:                                                  P1

No growth in 36 - 48 hours.

**** Previous result entered on 07/10/2021 10:46 ET ****

No growth after 18-24 hours.





P1 Test performed by: [facility] Antonio MENDOZA #: [id number]

                                        [address] [place] [address]

                                        [id number]

[facility] [address]

Medical Director  :   Reyes Araceli B MD                       NPI #:

      :

                                        07/10/21.1157.XMT.SENT REF

                                        21.0624.XMT.SENT REF



 









                    ID                  Date                Data Source

 

                    [id number]         2021 12:36:00 PM EDT [facility] [place]









          Name      Value     Range     Interpretation Code Description Data Karen

rce(s) Supporting 

Document(s)

 

           Urinalysis Laboratory test result                                  ME

[facility] [place]                                  

 

                                        URINALYSIS

 

 

           Source     Laboratory test result                                  ME

[facility] 

[place]                                      

 

           Color      Laboratory test result                                  ME

[facility] 

[place]                                      

 

           Clarity    Laboratory test result                                  ME

[facility]

 [place]                                     

 

           Spec Gravity 1.015      1.001-1.030                       [facility] 

[place]                                      

 

          pH        6.5       5-9                           [facility] [place]  

 

           Glucose    1000                  Abnormal (applies to non-numeric res

ults)            [facility]             

 

           Bilirubin  Laboratory test result                                  ME

[facility] [place]                                   

 

           Ketone     Laboratory test result                                  ME

[facility] 

[place]                                      

 

          Protein   30                                      [facility] [place]  

 

           Nitrite    Laboratory test result                                  ME

[facility]

 [place]                                     

 

           Leuk Est   Laboratory test result                                  ME

[facility] [place]                                   

 

           Blood      Laboratory test result                                  ME

[facility] 

[place]                                      

 

           Urobilinogen Laboratory test result                                  

[facility]                     

 

           Epithelial Laboratory test result                                  ME

[facility] [place]                                  

 

           Microscopic Laboratory test result                                  M

[facility]                     

 

           Mucous     Laboratory test result                                  [facility]

[facility] 

[place]                                      









                    ID                  Date                Data Source

 

                    [id number]     2021 06:24:00 AM EDT [facility]

 Hospital









          Name      Value     Range     Interpretation Code Description Data Karen

rce(s) Supporting 

Document(s)

 

          CULTURE URINE                                         [facility]

spital  

 

                                            _CULTURE 

URINE_$$[id number]$$[id number]$$544965$$579314$$580985$$846002$$808161$$334602$$224169$$

[id number]$$910544$$072551$$317714$$708069$$963680$$861695$$423894$$118269$$[id number]$$

[id number]$$823672$$565925$$446447$$563169$$842385$$016260$$[id number]                  
       -- Continued on next page --Patient: BETY DIAZ L                  
Order: [id number]                   Page 2Culture: CULTURE URINE                     
                      Status: 
Final===========================================================================

====                         -- Continued on next page --Patient: BETY THAPA 
                 Order: [id number]                   Page 2Culture: CULTURE URINE    
                                      Status: 
Prelim==========================================================================

=====$$[id number]$$[id number] DATE/TIME: 2021 15:05Culture: CULTURE URINE 
                                          Status: FinalUrine 
Culture,Comprehensive:                                                  P1No 
growth in 36 - 48 hours.    **** Previous result entered on 07/10/2021 10:46 ET 
****No growth after 18-24 hours.P1 Test performed by: [facility] #: [id number]                      [address] [place] [address]           
           [id number]                      [facility] [address]Medical Director  :   Reyes Araceli B MD                       NPI #:Lab Di
gilson      :                                                                   
                 07/10/21.1157.XMT.SENT REF                                     
                                               21.0624.XMT.SENT REF 









                    ID                  Date                Data Source

 

                    [id number]     2021 02:21:00 PM EDT [facility]









          Name      Value     Range     Interpretation Code Description Data Karen

rce(s) Supporting 

Document(s)

 

          URINALYSIS                                         [facility]

sheila  

 

                                            URINALYSIS 

 

          SOURCE    Clean Catch                               [facility]

ital  

 

          COLOR     yellow    NORMAL: Yellow                     [facility]

[facility]  

 

          CLARITY   clear     NORMAL: Clear                     [facility]

[facility]  

 

           Specific gravity of Urine by Test strip 1.015      1.001 - 1.030     

                  [facility]                                 

 

          pH        6.5       5 - 9                         [facility]

[facility]  

 

           Glucose [Mass/volume] in Urine by Test strip 1000       NORMAL: Negat

brittany A                     [facility]                            

 

           Bilirubin.total [Presence] in Urine by Test strip NEG        NORMAL: 

Negative                       

[facility]                   

 

           Ketones [Presence] in Urine by Test strip NEG        NORMAL: Negative

                       [facility]                                

 

           Protein [Mass/volume] in Urine by Test strip 30         NORMAL: Negat

brittany                       [facility]                            

 

           Nitrite [Presence] in Urine by Test strip Negative   NORMAL: Negative

                       [facility]                           

 

          BLOOD     NEG       NORMAL: Negative                     [facility]  

 

          LEUK EST  NEG       NORMAL: Negative                     [facility]  

 

           Urobilinogen [Mass/volume] in Urine by Test strip NOR        less eren

n 1.0 mg/dL                       

[facility]                   

 

          MICROSCOPIC See Below                               [facility]

ital  

 

          EPITHELIAL FEW       NORMAL: NONE SEEN                     [facility]  

 

                Mucus [Presence] in Urine sediment by Light microscopy Trace    

       NORMAL: NONE SEEN  

                                        [facility]  









                    ID                  Date                Data Source

 

                    [id number]     2021 01:38:00 PM EDT [facility]









          Name      Value     Range     Interpretation Code Description Data Karen

rce(s) Supporting 

Document(s)

 

          COMPREHENSIVE METABOLIC PANEL                                         

[facility]  

 

                                            COMPREHENSIVE METABOLIC PANEL 

 

           Sodium [Moles/volume] in Serum or Plasma 141 mEq/L  134 - 153        

                [facility]                                 

 

           Potassium [Moles/volume] in Serum or Plasma 3.8 mEq/L  3.6 - 5.0     

                   [facility]                            

 

           Chloride [Moles/volume] in Serum or Plasma 105 mEq/L  98 - 107       

                  [facility]                                 

 

           Carbon dioxide, total [Moles/volume] in Serum or Plasma 24 MEQ/L   22

 - 30                          

[facility]                   

 

           Glucose [Mass/volume] in Serum or Plasma 129 MG/DL  70 - 99    H     

                [facility]                                 

 

          BUN       15 MG/DL  7 - 21                        [facility]

al  

 

           Creatinine [Mass/volume] in Serum or Plasma 0.9 MG/DL  0.7 - 1.5     

                   [facility]                            

 

          BUN/CREAT 17        8 - 27                        [facility]

al  

 

           Protein [Mass/volume] in Serum or Plasma 7.4 G/DL   6.3 - 8.2        

                [facility]                                 

 

           Albumin [Mass/volume] in Serum or Plasma 4.7 G/DL   3.9 - 5.0        

                [facility]                                 

 

           Globulin [Mass/volume] in Serum by calculation 2.7 GM/DL  2.4 - 3.2  

                      [facility]                            

 

          A/G RATIO 1.7       0.8 - 2.0                     [facility]  

 

           Calcium [Mass/volume] in Serum or Plasma 9.8 MG/DL  8.4 - 10.2       

                [facility]                                 

 

           Bilirubin.total [Mass/volume] in Serum or Plasma <0.7 MG/DL 0.2 - 1.3

                        

[facility]                   

 

                    Alkaline phosphatase [Enzymatic activity/volume] in Serum or

 Plasma 59 U/L              38 - 

126                                             [facility]  

 

                          Aspartate aminotransferase [Enzymatic activity/volume]

 in Serum or Plasma 23 U/L

             5 - 40                                 [facility]  

 

                    Alanine aminotransferase [Enzymatic activity/volume] in Seru

m or Plasma 24 U/L              7

- 56                                            [facility]  

 

           Anion gap 3 in Serum or Plasma 12.0 mmol/L 8.0 - 16.0                

       [facility]

                                         

 

          AGE       60 yrs                                  [facility]

al  

 

          NON-AA GFR >60 mL/min                               [facility]

ital  

 

          AFR AMER GFR >60 mL/min                               [facility]

spital  

 

                                                                     Male GFR In

terprentation                  20-49 yrs

    >60 mL/min   Normal                  50-59 yrs     >56 mL/min   Normal      
           60-69 yrs     >49 mL/min   Normal                  70-79yrs      >42 
mL/min   Normal                  80 and above  >35 mL/min   Normal              
     Female GFR  Interpretation                  20-39 yrs     >60 mL/min   
Normal                  40-49 yrs     >58 mL/min   Normal                  50-59
yrs     >51 mL/min   Normal                  60-69 yrs     >45 mL/min   Normal  
               70-79 yrs     >39 mL/min   Normal                  80 and above  
>32 mL/min   Normal 









                    ID                  Date                Data Source

 

                    [id number]     2021 01:09:00 PM EDT [facility]









          Name      Value     Range     Interpretation Code Description Data Karen

rce(s) Supporting 

Document(s)

 

          Prothrombin time (PT) 12.0 SECONDS 11.0 - 15.5                     [facility]  

 

           INR in Platelet poor plasma by Coagulation assay 0.88       0.93 - 1.

23 L                     [facility]                            

 

           aPTT in Blood by Coagulation assay 28.8 SECONDS 24.8 - 36.7          

             [facility]                                 

 

                                                                      \BLDo\INR 

INTERPRETATION\BLDx\          

Therapeutic range for Coumadin and related oral anticoagulants.          -
International Normalized Ratio (INR): 2.0 - 3.0 for Venous           Thrombosis,
Pulmonary Embolus, Tissue heart valves, Acute MI           Atrial Fibrillation, 
Valvular heart disease and recurrent           Systemic Embolism.          -
International Normalized Ratio (INR): 2.5 - 3.5 for Mechanical           
Prosthetic valve. 









                    ID                  Date                Data Source

 

                    [id number]     2021 12:57:00 PM EDT [facility]









          Name      Value     Range     Interpretation Code Description Data Karen

rce(s) Supporting 

Document(s)

 

          CBC W/AUTOMATED DIFF                                         [facility]  

 

                                            COMPLETE BLOOD COUNT 

 

           Leukocytes [#/volume] in Blood by Automated count 7.8 10^3/uL 4.2 - 1

1.0                       

[facility]                   

 

             Erythrocytes [#/volume] in Blood by Automated count 5.24 10^6/uL 4.

50 - 6.30                

                          [facility]     

 

           Hemoglobin [Mass/volume] in Blood 15.9 g/dL  14.0 - 16.0             

          [facility]                                 

 

           Hematocrit [Volume Fraction] of Blood by Automated count 46.5 %     4

1.0 - 51.0                       

[facility]                   

 

                    Erythrocyte mean corpuscular volume [Entitic volume] by Auto

mated count 88.7 fL             

80.0 - 94.0                                     [facility]  

 

                          Erythrocyte mean corpuscular hemoglobin [Entitic mass]

 by Automated count 30.3 

pg           27.0 - 34.0                            [facility]  

 

                                        Erythrocyte mean corpuscular hemoglobin 

concentration [Mass/volume] by Automated

 count     34.2 g/dL  31.0 - 36.0                       [facility]  

 

             Erythrocyte distribution width [Ratio] by Automated count 13.1 %   

    11.5 - 14.8                

                          [facility]     

 

           Platelets [#/volume] in Blood by Automated count 222 10^3/uL 150 - 45

0                        

[facility]                   

 

                    Platelet mean volume [Entitic volume] in Blood by Automated 

count 9.4 fL              7.4 - 

10.4                                            [facility]  

 

           Neutrophils/100 leukocytes in Blood by Automated count 68.3 %     37.

0 - 80.0                       

[facility]                   

 

           Lymphocytes/100 leukocytes in Blood by Manual count 20.2 %     25.0 -

 40.0 L                     

[facility]                   

 

           Monocytes/100 leukocytes in Blood by Automated count 8.7 %      3.0 -

 8.0  H                     

[facility]                   

 

           Eosinophils/100 leukocytes in Blood by Automated count 1.5 %      0.0

 - 7.0                        

[facility]                   

 

           Basophils/100 leukocytes in Blood by Automated count 1.2 %      0.0 -

 2.0                        

[facility]                   

 

          %IG       0.1 %     0.0 - 0.0 H                   [facility]

al  

 

          %NRBC     0.0 %     0.0 - 0.0                     [facility]

al  

 

           Neutrophils [#/volume] in Blood by Automated count 5.31 10^3/uL 2.00 

- 6.90                       

[facility]                   

 

           Lymphocytes [#/volume] in Blood by Automated count 1.57 10^3/uL 0.60 

- 3.40                       

[facility]                   

 

           Monocytes [#/volume] in Blood by Automated count 0.68 10^3/uL 0.00 - 

0.90                       

[facility]                   

 

           Eosinophils [#/volume] in Blood by Automated count 0.12 10^3/uL 0.00 

- 0.70                       

[facility]                   

 

           Basophils [#/volume] in Blood by Automated count 0.09 10^3/uL 0.00 - 

0.20                       

[facility]                   

 

          #IG       0.01 10^3/uL 0.00 - 0.10                     [facility]

ospital  

 

          #NRBC     0.00 10^3/uL 0.00 - 0.00                     [facility]

ospital  

 

          MANUAL DIFF NOT INDICATED                               [facility]  

 

          RBC MORPH NOT INDICATED                               [facility]

spital  









                    ID                  Date                Data Source

 

                    [id number]         2021 02:22:00 PM EDT [facility] [place]









          Name      Value     Range     Interpretation Code Description Data Karen

rce(s) Supporting 

Document(s)

 

           Glucose [Presence] in Urine Laboratory test result                   

               [facility] [place]             

 

           Protein [Presence] in Urine by Test strip Laboratory test result     

                             [facility] 

([facility] [place])  

 

           Ua Nitrite Laboratory test result                                  [facility]

[facility] [place]                                  

 

           Ua Leuko   Laboratory test result                                  [facility]

[facility] [place]                                   

 

           Blood [Presence] in Urine by Visual Laboratory test result           

                       [facility] 

([facility] [place])  

 

           Color of Urine Laboratory test result                                

  [facility] [place]                     

 

           Ketones [Presence] in Urine by Test strip Laboratory test result     

                             [facility] 

([facility] [place])  

 

           Clarity of Urine Laboratory test result                              

    [facility] [place]                     

 

           Ua Specific Gravity 1.020      1.003-1.030                       [facility]

[facility] ([facility] [place]                     

 

           Urobilinogen [Mass/volume] in Urine by Test strip 0.2 E.U./dL 0.0-1.0

                          [facility]

 ([facility] [place])  

 

           Bilirubin.total [Presence] in Urine by Test strip Laboratory test res

ult                                  

[facility] [place])  

 

           pH of Urine by Test strip 5.0        5.0-7.5                         

 [facility] [place]                     









                    ID                  Date                Data Source

 

                    [id number]       2021 12:55:00 AM EDT [facility]

l

 

                                          Patient: JOE BRYANT Observation Re

port - Physicians/Mid Levels MRN: 

[id number].VisitID: [id number] [place], [place] 
[address] [phone], MRegistration Date/Time: 2021 17:56 Weight:126 kg 
(S). Height/Length:70 inches (S). BMI:39.9        FAMILY HISTORYNo significant 
family medical history.        (Electronically signed by Madison Hall M.D. 
2021 00:29)   









          Name      Value     Range     Interpretation Code Description Data Karen

rce(s) Supporting 

Document(s)

 

                                                                       









                    ID                  Date                Data Source

 

                    [id number]       2021 09:31:00 PM EDT [facility]

l

 

                                        DATE OF EXAMINATION: 2021 19:25 EDT

 ABD/PEL NO CONTRAST HISTORY: Trauma, 

pain Comparison:None TECHNIQUE: This CT exam was performed using the following 
dose reductiontechniques: automated exposure control, adjustment of mA and/or kV
 according tothe patient's size, and use of iterative reconstruction technique. 
Standard contiguous axial spiral imaging was obtained from the dome of 
thediaphragms through the symphysis pubis without oral contrast and 
withoutintravenous contrast administration and with coronal reformatting. 
Studiessuboptimal secondary to lack of intravenous contrast. Solid organ injury 
cannotbe completely evaluated. FINDINGS: ABDOMEN:Liver: Liver is mildly 
diminished in attenuation consistent with fatty change.No focal lesions are 
identified on this unenhanced examination. Gallbladder and bile ducts: The 
gallbladder is contracted and cannot beevaluated further. Pancreas: Pancreas is 
normal Spleen: Spleen is normal Adrenals: Normal adrenal glands. No adrenal 
masses Kidneys and ureters: There is a masslike lesion in the lower pole the 
rightkidney measuring 3.1 x 3.8 cm. There are complex cystic lesions extending 
fromthe mid to lower pole of the left kidney. Renal ultrasound recommended 
tofurther evaluate. No perinephric fluid or soft tissue stranding. There is a 2 
mmnonobstructing stone in the midpole of the right kidney posteriorly. Stomach 
and bowel: Stomach is unremarkable. There is a small hiatal hernia. Nodilated 
small bowel or bowel wall thickening. Moderate amount stool throughoutthe colon.
 There are scattered colonic diverticula without radiographic evidenceof divert
iculitis Appendix: The appendix is normal Peritoneum/retroperitoneum:No free air
 or free fluid. No collections Lymph nodes:There are several small mesenteric 
nodes in the root of small bowelmesentery. This is a nonspecific finding. Soft 
tissues:There is mild soft tissue stranding overlying the left hemipelvis.This 
is likely secondary to traumatic injury. There are small fat-containingbilateral
 inguinal hernias. There is a small fat-containing umbilical hernia Bones:There 
are bilateral pars defects at L5. No evidence of anterolisthesis. Novisualized 
fractures. There is degenerative disc disease at multiple levels inthe lumbar 
spine with narrowing, endplate sclerosis, and small osteophyteformation. 
Vessels:Atherosclerotic change of aorta without aneurysmal dilatation 
PELVIS:Bladder: Bladder is unremarkable. Reproductive: Unremarkable as 
visualized    IMPRESSION: 1. No fractures identified.2. No evidence of solid 
organ injury however evaluation is limited given thelack of intravenous 
contrast.3. There is mild stranding in the soft tissues overlying the left 
hemipelvislikely secondary to traumatic soft tissue injury.4. There is a 
masslike lesion in the lower pole of the right kidney. Renalultrasound or 
alternatively MRI using renal mass protocol is recommended tofurther evaluate. 
There are cystic lesions in the left kidney which could befurther evaluated with
 either ultrasound or MRI.  Findings and recommendations discussed with Dr. Hall
 at 9:24 PM on 2021  Electronically signed in  by: Nathan Rojas M.D. 
2021 21:25 EDT  









          Name      Value     Range     Interpretation Code Description Data Karen

rce(s) Supporting 

Document(s)

 

                                                                       









                    ID                  Date                Data Source

 

                    [id number]       2021 09:17:00 PM EDT [facility]

l

 

                                        PROCEDURE: CHEST W/O IV CONTRAST DATE AN

D TIME: 2021 19:25 EDT HISTORY: 

Trauma, pain COMPARISON: None TECHNIQUE: CT of the chest was performed without 
IV contrast. Coronalreformatted images were submitted for review. 
FINDINGS:Lungs, pleura and large airways: There is bilateral calcified pleural 
plaquingconsistent with previous asbestos exposure. There is a 5.9 mm nodular 
density inthe superior segment left lower lobe abutting the pleural surface, 
series 5image 67. There are focal opacities at the lung bases. On the right 
thismeasures 3.4 x 2.0 cm and on the left 2.6 x 1.3 cm. These are both seen best
 onseries 5 image 93. Heart: Normal size. No pericardial effusion. There are 
coronary arterycalcifications. Mediastinum and Shant: No enlarged lymph nodes. 
Limited evaluation of shant givenlack of IV contrast. Vessels: There is 
atherosclerotic change of aorta with ectasia of the ascendingaorta measuring 
approximately 3.7 cm Chest wall and lower neck: The patient is post cervical 
spine fusion. No acutefindings. Chest wall soft tissues are unremarkable.  
Bones: No fractures identified. IMPRESSION: No acute injury. There is evidence 
for bilateral calcified pleuralplaquing consistent with previous asbestos 
exposure. There are confluentopacities in both lung bases likely representing 
scarring however short-term, 6month follow-up is recommended. There is also a 
nodular density in the superiorsegment of the left lower lobe which could also 
be evaluated in follow-up study,6 months time.. Electronically signed in  
by: Nathan Rojas M.D. 2021 21:11 EDT  









          Name      Value     Range     Interpretation Code Description Data [facility]

rce(s) Supporting 

Document(s)

 

                                                                       









                    ID                  Date                Data Source

 

                    [id number] REFLEX 2021 06:31:00 PM EDT [facility]

ital

 

                                        TSYSORDER [id number] 









          Name      Value     Range     Interpretation Code Description [facility] 

Document(s)

 

          URINE COLOR. [facility]  

 

          URINE APPEARANCE CLEAR                                   [facility]

l  

 

          URINE GLUCOSE (UA) 500 mg/dL NEGATIVE  H                   [facility]

sheila  

 

          URINE BILIRUBIN NEGATIVE  NEGATIVE                      [facility]

  

 

          URINE KETONE NEGATIVE mg/dL NEGATIVE                      [facility]

al  

 

          SPECIFIC GRAVITY,URINE 1.025     1.005-1.030                     [facility]  

 

          URINE BLOOD NEGATIVE  NEGATIVE                      [facility]  

 

          PH,URINE  7.0       5.0-9.0                       [facility]  

 

          URINE PROTEIN NEGATIVE mg/dL NEGATIVE                      [facility]  

 

          URINE UROBILINOGEN 0.2 mg/dL 0-1                           [facility]  

 

          URINE NITRATE NEGATIVE  NEGATIVE                      [facility]  

 

          URINE LEUKOCYTE ESTERASE NEGATIVE  NEGATIVE                      [facility]  









                    ID                  Date                Data Source

 

                    [id number]     2021 11:12:00 AM EDT [facility]









          Name      Value     Range     Interpretation Code Description Data Karen

rce(s) Supporting 

Document(s)

 

             Prostate specific Ag [Mass/volume] in Serum or Plasma 0.71 ng/mL   

0.00 - 4.00                

                          [facility]     

 

                                                            \BLDo\PSA INTERPRETA

TION\BLDx\      The PSA assay should not

 be used alone for a screening test       or diagnosis for presence or absence 
of malignant disease.      Predictions of disease recurrence should not be based
 solely          on values obtained from serial patient serum values.        The
 PSA result was determined by "[employer]CL[employer]", on the Roche        SUNITHA 6000. Values 
obtained with different assay methods               or kits cannot be used 
interchangeably. 









                    ID                  Date                Data Source

 

                    [id number]     2021 07:19:00 PM EDT [facility]









          Name      Value     Range     Interpretation Code Description Data Karen

rce(s) Supporting 

Document(s)

 

                          Thyrotropin [Units/volume] in Serum or Plasma by Detec

tion limit <= 0.05 mIU/L 

0.69 uIU/mL  0.47 - 5.01                            [facility]  









                    ID                  Date                Data Source

 

                    [id number]     2021 07:04:00 PM EDT [facility]









          Name      Value     Range     Interpretation Code Description Data Karen

rce(s) Supporting 

Document(s)

 

          CVE PANEL                                         [facility]

al  

 

                                            LIPID PANEL 

 

           Cholesterol [Mass/volume] in Serum or Plasma 182 MG/DL  131 - 200    

                    [facility]                            

 

           Deprecated Triglyceride [Mass/volume] in Serum or Plasma 304 MG/DL  3

5 - 160   H                     

[facility]                   

 

          HDL       44 MG/DL  29 - 86                       [facility]

al  

 

                    Cholesterol in LDL [Mass/volume] in Serum or Plasma by Direc

t assay 105 mg/dL           65

 - 175                                          [facility]  

 

                    Cholesterol.total/Cholesterol in HDL [Mass Ratio] in Serum o

r Plasma 4.1                 3.4 - 

4.9                                             [facility]  

 

          LDL/HDL   2.39      1.00 - 3.55                     [facility]

ital  

 

                                        CVE         RISK           CHOL/HDL     

  LDL/HDLMEN:    1/2  AVERAGE          

3.43          1.00         AVERAGE          4.97          3.55    2X   AVERAGE  
       9.55          6.25    3X   AVERAGE         23.99          7.99WOMEN:    
1/2  AVERAGE          3.27          1.47         AVERAGE          4.44          
3.22    2X   AVERAGE          7.05          5.03    3X   AVERAGE         11.04  
       6.14 









                    ID                  Date                Data Source

 

                    [id number]     2021 07:04:00 PM EDT [facility]









          Name      Value     Range     Interpretation Code Description Data Karen

rce(s) Supporting 

Document(s)

 

          COMPREHENSIVE METABOLIC PANEL                                         

[facility]  

 

                                            COMPREHENSIVE METABOLIC PANEL 

 

           Sodium [Moles/volume] in Serum or Plasma 137 mEq/L  134 - 153        

                [facility]                                 

 

           Potassium [Moles/volume] in Serum or Plasma 3.9 mEq/L  3.6 - 5.0     

                   [facility]                            

 

           Chloride [Moles/volume] in Serum or Plasma 99 mEq/L   98 - 107       

                  [facility]                                 

 

           Carbon dioxide, total [Moles/volume] in Serum or Plasma 25 MEQ/L   22

 - 30                          

[facility]                   

 

           Glucose [Mass/volume] in Serum or Plasma 166 MG/DL  70 - 99    H     

                [facility]                                 

 

          BUN       19 MG/DL  7 - 21                        [facility]

al  

 

           Creatinine [Mass/volume] in Serum or Plasma 1.0 MG/DL  0.7 - 1.5     

                   [facility]                            

 

          BUN/CREAT 19        8 - 27                        [facility]

al  

 

           Protein [Mass/volume] in Serum or Plasma 7.8 G/DL   6.3 - 8.2        

                [facility]                                 

 

           Albumin [Mass/volume] in Serum or Plasma 4.8 G/DL   3.9 - 5.0        

                [facility]                                 

 

           Globulin [Mass/volume] in Serum by calculation 3.0 GM/DL  2.4 - 3.2  

                      [facility]                            

 

          A/G RATIO 1.6       0.8 - 2.0                     [facility]

al  

 

           Calcium [Mass/volume] in Serum or Plasma 10.2 MG/DL 8.4 - 10.2       

                [facility]                                

 

           Bilirubin.total [Mass/volume] in Serum or Plasma 0.7 MG/DL  0.2 - 1.3

                        

[facility]                   

 

                    Alkaline phosphatase [Enzymatic activity/volume] in Serum or

 Plasma 84 U/L              38 - 

126                                             [facility]  

 

                          Aspartate aminotransferase [Enzymatic activity/volume]

 in Serum or Plasma 29 U/L

             5 - 40                                 [facility]  

 

                    Alanine aminotransferase [Enzymatic activity/volume] in Seru

m or Plasma 29 U/L              7

- 56                                            [facility]  

 

           Anion gap 3 in Serum or Plasma 13.0 mmol/L 8.0 - 16.0                

       [facility]

                                         

 

          AGE       60 yrs                                  [facility]

al  

 

          NON-AA GFR >60 mL/min                               [facility]

ital  

 

          AFR AMER GFR >60 mL/min                               [facility]

spital  

 

                                                                     Male GFR In

terprentation                  20-49 yrs

    >60 mL/min   Normal                  50-59 yrs     >56 mL/min   Normal      
           60-69 yrs     >49 mL/min   Normal                  70-79yrs      >42 
mL/min   Normal                  80 and above  >35 mL/min   Normal              
     Female GFR  Interpretation                  20-39 yrs     >60 mL/min   
Normal                  40-49 yrs     >58 mL/min   Normal                  50-59
yrs     >51 mL/min   Normal                  60-69 yrs     >45 mL/min   Normal  
               70-79 yrs     >39 mL/min   Normal                  80 and above  
>32 mL/min   Normal 









                    ID                  Date                Data Source

 

                    [id number]     2021 06:54:00 PM EDT [facility]









          Name      Value     Range     Interpretation Code Description Data Karen

rce(s) Supporting 

Document(s)

 

           Hemoglobin A1c/Hemoglobin.total in Blood 7.1 %      4.4 - 6.1  H     

                [facility]                                 

 

                                        {A1]{HB] 









                    ID                  Date                Data Source

 

                    [id number]     2021 06:53:00 PM EDT [facility]









          Name      Value     Range     Interpretation Code Description Data Karen

rce(s) Supporting 

Document(s)

 

          CBC W/AUTOMATED DIFF                                         [facility]  

 

                                            COMPLETE BLOOD COUNT 

 

           Leukocytes [#/volume] in Blood by Automated count 9.3 10^3/uL 4.2 - 1

1.0                       

[facility]                   

 

             Erythrocytes [#/volume] in Blood by Automated count 5.69 10^6/uL 4.

50 - 6.30                

                          [facility]     

 

           Hemoglobin [Mass/volume] in Blood 17.4 g/dL  14.0 - 16.0 H           

          [facility]                                 

 

           Hematocrit [Volume Fraction] of Blood by Automated count 51.0 %     4

1.0 - 51.0                       

[facility]                   

 

                    Erythrocyte mean corpuscular volume [Entitic volume] by Auto

mated count 89.6 fL             

80.0 - 94.0                                     [facility]  

 

                          Erythrocyte mean corpuscular hemoglobin [Entitic mass]

 by Automated count 30.6 

pg           27.0 - 34.0                            [facility]  

 

                                        Erythrocyte mean corpuscular hemoglobin 

concentration [Mass/volume] by Automated

 count     34.1 g/dL  31.0 - 36.0                       [facility]  

 

             Erythrocyte distribution width [Ratio] by Automated count 12.6 %   

    11.5 - 14.8                

                          [facility]     

 

           Platelets [#/volume] in Blood by Automated count 221 10^3/uL 150 - 45

0                        

[facility]                   

 

                    Platelet mean volume [Entitic volume] in Blood by Automated 

count 9.7 fL              7.4 - 

10.4                                            [facility]  

 

           Neutrophils/100 leukocytes in Blood by Automated count 64.4 %     37.

0 - 80.0                       

[facility]                   

 

           Lymphocytes/100 leukocytes in Blood by Manual count 23.2 %     25.0 -

 40.0 L                     

[facility]                   

 

           Monocytes/100 leukocytes in Blood by Automated count 8.4 %      3.0 -

 8.0  H                     

[facility]                   

 

           Eosinophils/100 leukocytes in Blood by Automated count 2.3 %      0.0

 - 7.0                        

[facility]                   

 

           Basophils/100 leukocytes in Blood by Automated count 1.4 %      0.0 -

 2.0                        

[facility]                   

 

          %IG       0.3 %     0.0 - 0.0 H                   [facility]

[facility]  

 

          %NRBC     0.0 %     0.0 - 0.0                     [facility]

al  

 

           Neutrophils [#/volume] in Blood by Automated count 6.01 10^3/uL 2.00 

- 6.90                       

[facility]                   

 

           Lymphocytes [#/volume] in Blood by Automated count 2.16 10^3/uL 0.60 

- 3.40                       

[facility]                   

 

           Monocytes [#/volume] in Blood by Automated count 0.78 10^3/uL 0.00 - 

0.90                       

[facility]                   

 

           Eosinophils [#/volume] in Blood by Automated count 0.21 10^3/uL 0.00 

- 0.70                       

[facility]                   

 

           Basophils [#/volume] in Blood by Automated count 0.13 10^3/uL 0.00 - 

0.20                       

[facility]                   

 

          #IG       0.03 10^3/uL 0.00 - 0.10                     [facility]

ospital  

 

          #NRBC     0.00 10^3/uL 0.00 - 0.00                     [facility]

ospital  

 

          MANUAL DIFF NOT INDICATED                               [facility]  

 

          RBC MORPH NOT INDICATED                               [facility]

spital  









                    ID                  Date                Data Source

 

                    [id number]         2021 08:40:00 AM EDT [facility]









          Name      Value     Range     Interpretation Code Description Data Karen

rce(s) Supporting 

Document(s)

 

           Prostate specific Ag [Mass/volume] in Serum or Plasma 0.71 ng/mL 0.00

-4.00                        

[facility] ([facility])  

 

                                        \BLDo\PSA INTERPRETATION\BLDx\

The PSA assay should not be used alone for a screening test

or diagnosis for presence or absence of malignant disease.

Predictions of disease recurrence should not be based solely

on values obtained from serial patient serum values.

The PSA result was determined by "[employer]CL[employer]", on the Roche

SUNITHA 6000. Values obtained with different assay methods

or kits cannot be used interchangeably.

 









                    ID                  Date                Data Source

 

                    [id number]         2021 08:40:00 AM EDT [facility]









          Name      Value     Range     Interpretation Code Description Data Karen

rce(s) Supporting 

Document(s)

 

           Thyrotropin [Units/volume] in Serum or Plasma 0.69 uIU/mL 0.47-5.01  

                      [facility] 

([facility])         

 

                                        Is patient fasting?  Y 









                    ID                  Date                Data Source

 

                    [id number]         2021 08:40:00 AM EDT [facility]









          Name      Value     Range     Interpretation Code Description Data Karen

rce(s) Supporting 

Document(s)

 

          Cve Panel Laboratory test result                               [facility] 

([facility])  

 

                                        LIPID PANEL

 

 

           Triglycerides 304 mg/dL       Above high normal            [facility]

[facility] ([facility])                        

 

                                        Is patient fasting?  Y 

 

          Cholesterol 182 mg/dL 131-200                       [facility] ([facility])  

 

                                        Is patient fasting?  Y 

 

          HDL       44 mg/dL  29-86                         [facility] ([facility])  

 

                                        Is patient fasting?  Y 

 

          LDL       105 mg/dL                         [facility] ([facility])  

 

                                        Is patient fasting?  Y 

 

          Risk Factor 4.1       3.4-4.9                       [facility] ([facility])  

 

                                        Is patient fasting?  Y 

 

          LDL/HDL   2.39      1.00-3.55                     [facility] ([facility])  

 

                                        CVE

RISK           CHOL/HDL       LDL/HDL

MEN:

                                        1/2  AVERAGE          3.43          1.00

AVERAGE          4.97          3.55

                                        2X   AVERAGE          9.55          6.25

                                        3X   AVERAGE         23.99          7.99

WOMEN:

                                        1/2  AVERAGE          3.27          1.47

AVERAGE          4.44          3.22

                                        2X   AVERAGE          7.05          5.03

                                        3X   AVERAGE         11.04          6.14

 









                    ID                  Date                Data Source

 

                    [id number]         2021 08:40:00 AM EDT [facility] ([facility])









          Name      Value     Range     Interpretation Code Description Data Karen

rce(s) Supporting 

Document(s)

 

           Hemoglobin A1c/Hemoglobin.total in Blood 7.1 %      4.4-6.1    Above 

high normal            

[facility] ([facility])  

 

                                        {A1]

{HB]

 









                    ID                  Date                Data Source

 

                    [id number]         2021 08:40:00 AM EDT [facility] ([facility])









          Name      Value     Range     Interpretation Code Description Data Karen

rce(s) Supporting 

Document(s)

 

           Comprehensive Metabo Laboratory test result                          

        [facility] ([facility])                                 

 

                                        COMPREHENSIVE METABOLIC PANEL

 

 

          Sodium    137 meq/L 134-153                       [facility] ([facility])  

 

                                        Is patient fasting?  Y 

 

          Potassium 3.9 meq/L 3.6-5.0                       [facility] ([facility])  

 

                                        Is patient fasting?  Y 

 

          Chloride  99 meq/L                          [facility] ([facility])  

 

                                        Is patient fasting?  Y 

 

           Glucose    166 mg/dL  70-99      Above high normal            [facility] 

([facility])                                 

 

                                        Is patient fasting?  Y 

 

          Co2       25 meq/L  22-30                         [facility] ([facility])  

 

                                        Is patient fasting?  Y 

 

          BUN       19 mg/dL  7-21                          [facility] ([facility])  

 

                                        Is patient fasting?  Y 

 

          Creatinine 1.0 mg/dL 0.7-1.5                       [facility] ([facility])  

 

                                        Is patient fasting?  Y 

 

          BUN/Creat 19        8-27                          [facility] ([facility])  

 

                                        Is patient fasting?  Y 

 

          Albumin   4.8 g/dL  3.9-5.0                       [facility] ([facility])  

 

                                        Is patient fasting?  Y 

 

          Total Protein 7.8 g/dL  6.3-8.2                       [facility] ([facility])  

 

                                        Is patient fasting?  Y 

 

          Globulin  3.0 GM/DL 2.4-3.2                       [facility] ([facility])  

 

                                        Is patient fasting?  Y 

 

          Calcium   10.2 mg/dL 8.4-10.2                      [facility] ([facility])  

 

                                        Is patient fasting?  Y 

 

          A/G Ratio 1.6       0.8-2.0                       [facility] ([facility])  

 

                                        Is patient fasting?  Y 

 

          Total Bili 0.7 mg/dL 0.2-1.3                       [facility] ([facility])  

 

                                        Is patient fasting?  Y 

 

          Sgot/Ast  29 U/L    5-40                          [facility] ([facility])  

 

                                        Is patient fasting?  Y 

 

          Alkaline Phos 84 U/L                            [facility] ([facility])  

 

                                        Is patient fasting?  Y 

 

          Anion Gap 13.0 mmol/L 8.0-16.0                      [facility] ([facility])  

 

                                        Is patient fasting?  Y 

 

          SGPT/Alt  29 U/L    7-56                          [facility] ([facility])  

 

                                        Is patient fasting?  Y 

 

          Age       60 yrs                                  [facility] ([facility])  

 

                                        Is patient fasting?  Y 

 

           Afr Amer GFR Laboratory test result                                  

[facility] ([facility])                                 

 

                                        Male GFR Interprentation

                                        20-49 yrs     >60 mL/min   Normal

                                        50-59 yrs     >56 mL/min   Normal

                                        60-69 yrs     >49 mL/min   Normal

                                        70-79yrs      >42 mL/min   Normal

                                        80 and above  >35 mL/min   Normal

Female GFR  Interpretation

                                        20-39 yrs     >60 mL/min   Normal

                                        40-49 yrs     >58 mL/min   Normal

                                        50-59 yrs     >51 mL/min   Normal

                                        60-69 yrs     >45 mL/min   Normal

                                        70-79 yrs     >39 mL/min   Normal

                                        80 and above  >32 mL/min   Normal

 

 

          Non-Aa GFR Laboratory test result                               [facility]

 ([facility]) 



 

                                        Is patient fasting?  Y 









                    ID                  Date                Data Source

 

                    [id number]         2021 08:40:00 AM EDT [facility] ([facility])









          Name      Value     Range     Interpretation Code Description Data Karen

rce(s) Supporting 

Document(s)

 

           CBC W/Automated Diff Laboratory test result                          

        [facility] ([facility])                                 

 

                                        COMPLETE BLOOD COUNT

 

 

          WBC       9.3 10^3/uL 4.2-11.0                      [facility] ([facility])  

 

                                        Is patient fasting?  Y 

 

          RBC       5.69 10^6/uL 4.50-6.30                     [facility] ([facility])  

 

                                        Is patient fasting?  Y 

 

           Hemoglobin 17.4 g/dL  14.0-16.0  Above high normal            [facility] 

([facility])                        

 

                                        Is patient fasting?  Y 

 

          Hematocrit 51.0 %    41.0-51.0                     [facility] ([facility])  

 

                                        Is patient fasting?  Y 

 

          MCV       89.6 fL   80.0-94.0                     [facility] ([facility])  

 

                                        Is patient fasting?  Y 

 

          MCH       30.6 pg   27.0-34.0                     [facility] ([facility])  

 

                                        Is patient fasting?  Y 

 

          MCHC      34.1 g/dL 31.0-36.0                     [facility] ([facility])  

 

                                        Is patient fasting?  Y 

 

          RDW       12.6 %    11.5-14.8                     [facility] ([facility])  

 

                                        Is patient fasting?  Y 

 

          Platelets 221 10^3/uL 150-450                       [facility] ([facility])  

 

                                        Is patient fasting?  Y 

 

          MPV       9.7 fL    7.4-10.4                      [facility] ([facility])  

 

                                        Is patient fasting?  Y 

 

          Neut      64.4 %    37.0-80.0                     [facility] ([facility])  

 

                                        Is patient fasting?  Y 

 

           Lymph      23.2 %     25.0-40.0  Below low normal            [facility] (

[facility])                                 

 

                                        Is patient fasting?  Y 

 

          Eos       2.3 %     0.0-7.0                       [facility] ([facility])  

 

                                        Is patient fasting?  Y 

 

          Mono      8.4 %     3.0-8.0   Above high normal           [facility] ([facility])  

 

                                        Is patient fasting?  Y 

 

          Baso      1.4 %     0.0-2.0                       [facility] ([facility])  

 

                                        Is patient fasting?  Y 

 

          %NRBC     0.0 %     0.0-0.0                       [facility] ([facility])  

 

                                        Is patient fasting?  Y 

 

          %Ig       0.3 %     0.0-0.0   Above high normal           [facility] ([facility])  

 

                                        Is patient fasting?  Y 

 

          #Neut     6.01 10^3/uL 2.00-6.90                     [facility] ([facility])  

 

                                        Is patient fasting?  Y 

 

          #Lymph    2.16 10^3/uL 0.60-3.40                     [facility] ([facility])  

 

                                        Is patient fasting?  Y 

 

          #Mono     0.78 10^3/uL 0.00-0.90                     [facility] ([facility])  

 

                                        Is patient fasting?  Y 

 

          #Eos      0.21 10^3/uL 0.00-0.70                     [facility] ([facility])  

 

                                        Is patient fasting?  Y 

 

          #Baso     0.13 10^3/uL 0.00-0.20                     [facility] ([facility])  

 

                                        Is patient fasting?  Y 

 

          #Ig       0.03 10^3/uL 0.00-0.10                     [facility] ([facility])  

 

                                        Is patient fasting?  Y 

 

          #NRBC     0.00 10^3/uL 0.00-0.00                     [facility] ([facility])  

 

                                        Is patient fasting?  Y 

 

          RBC Morph Laboratory test result                               [facility] 

([facility])  

 

                                        Is patient fasting?  Y 

 

           Manual Diff Laboratory test result                                  M

[facility] ([facility])

                                         

 

                                        Is patient fasting?  Y 









                    ID                  Date                Data Source

 

                    [id number]         2021 09:37:00 AM EDT [facility] ([facility], )









          Name      Value     Range     Interpretation Code Description Data Karen

rce(s) Supporting 

Document(s)

 

           Surgical pathology study Laboratory test result                      

            [facility] ([facility], )                            

 

                                        FINAL DIAGNOSIS



A - Colon, sigmoid polyp, polypectomy:

Fragments of tubular adenoma.



B - Colon, splenic flexure polyps, polypectomy:

One fragment of tubular adenoma.

Hyperplastic polyps also present.

                                        2021 - 112



CLINICAL DIAGNOSIS



Colon polyps, family H/O colon CA

                                        2021 - 1451



GROSS DIAGNOSIS



A - Received in formalin labeled "sigmoid colon polyp" and consists of

one fragment of tan tissue measuring 0.8 x 0.3 x 0.3 cm.   All in one.



B - Received in formalin labeled "splenic flexure polyps" and consists

of three fragments of tan tissue measuring   0.7 x 0.5 x 0.2 cm. in

aggregate.  All in one.

                                        -SV

                                        2021 - 



Signed________ HAMLET ANGEL MD 2021 [id number]

 









                    ID                  Date                Data Source

 

                    [id number]           2021 08:35:00 AM [facility]









          Name      Value     Range     Interpretation Code Description Data Karen

rce(s) Supporting 

Document(s)

 

                                        SARS-CoV-2 (COVID-19) RNA [Presence] in 

Respiratory specimen by KRISTIE with probe 

detection  Not Detected                                  [facility]      

 

                                        This lab was ordered by [facility] and reported by [employer]. 









                    ID                  Date                Data Source

 

                    [id number]         2021 08:00:00 AM [facility]









          Name      Value     Range     Interpretation Code Description Data Karen

rce(s) Supporting 

Document(s)

 

          SARS coronavirus 2 RNA Not Detected                               [facility]     

 

                                        This lab was ordered by [facility] and reported by [facility]. 









                    ID                  Date                Data Source

 

                    [id number]         2021 08:15:00 AM EST [facility] ([facility])









          Name      Value     Range     Interpretation Code Description Data Karen

rce(s) Supporting 

Document(s)

 

                Thyrotropin [Units/volume] in Serum or Plasma 0.39 uIU/mL     0.

47-5.01       Below low 

normal                                  [facility] ([facility]) 

 

 

                                        Is patient fasting?  Y 









                    ID                  Date                Data Source

 

                    [id number]         2021 08:15:00 AM EST [facility] ([facility])









          Name      Value     Range     Interpretation Code Description Data Karen

rce(s) Supporting 

Document(s)

 

          Cve Panel Laboratory test result                               [facility] 

([facility])  

 

                                        LIPID PANEL

 

 

          Cholesterol 155 mg/dL 131-200                       [facility] ([facility])  

 

                                        Is patient fasting?  Y 

 

           Triglycerides 185 mg/dL       Above high normal            [facility]

NT ([facility])                        

 

                                        Is patient fasting?  Y 

 

          HDL       38 mg/dL  29-86                         [facility] ([facility])  

 

                                        Is patient fasting?  Y 

 

          LDL       96 mg/dL                          [facility] ([facility])  

 

                                        Is patient fasting?  Y 

 

          Risk Factor 4.1       3.4-4.9                       [facility] ([facility])  

 

                                        Is patient fasting?  Y 

 

          LDL/HDL   2.53      1.00-3.55                     [facility] ([facility])  

 

                                        CVE

RISK           CHOL/HDL       LDL/HDL

MEN:

                                        1/2  AVERAGE          3.43          1.00

AVERAGE          4.97          3.55

                                        2X   AVERAGE          9.55          6.25

                                        3X   AVERAGE         23.99          7.99

WOMEN:

                                        1/2  AVERAGE          3.27          1.47

AVERAGE          4.44          3.22

                                        2X   AVERAGE          7.05          5.03

                                        3X   AVERAGE         11.04          6.14

 









                    ID                  Date                Data Source

 

                    [id number]         2021 08:15:00 AM EST [facility] ([facility])









          Name      Value     Range     Interpretation Code Description Data Karen

rce(s) Supporting 

Document(s)

 

           Hemoglobin A1c/Hemoglobin.total in Blood 7.2 %      4.4-6.1    Above 

high normal            

[facility] ([facility])  

 

                                        {A1]

{HB]

 









                    ID                  Date                Data Source

 

                    [id number]         2021 08:15:00 AM EST [facility] ([facility])









          Name      Value     Range     Interpretation Code Description Data Karen

rce(s) Supporting 

Document(s)

 

           Comprehensive Metabo Laboratory test result                          

        [facility] ([facility])                                 

 

                                        COMPREHENSIVE METABOLIC PANEL

 

 

          Sodium    139 meq/L 134-153                       [facility] ([facility])  

 

                                        Is patient fasting?  Y 

 

          Potassium 4.2 meq/L 3.6-5.0                       [facility] ([facility])  

 

                                        Is patient fasting?  Y 

 

          Chloride  104 meq/L                         [facility] ([facility])  

 

                                        Is patient fasting?  Y 

 

          Co2       22 meq/L  22-30                         [facility] ([facility])  

 

                                        Is patient fasting?  Y 

 

           Glucose    172 mg/dL  70-99      Above high normal            [facility] 

([facility])                                 

 

                                        Is patient fasting?  Y 

 

          BUN       19 mg/dL  7-21                          [facility] ([facility])  

 

                                        Is patient fasting?  Y 

 

          Creatinine 0.9 mg/dL 0.7-1.5                       [facility] ([facility])  

 

                                        Is patient fasting?  Y 

 

          BUN/Creat 21        8-27                          [facility] ([facility])  

 

                                        Is patient fasting?  Y 

 

          Total Protein 6.8 g/dL  6.3-8.2                       [facility] ([facility])  

 

                                        Is patient fasting?  Y 

 

          Albumin   4.6 g/dL  3.9-5.0                       [facility] ([facility])  

 

                                        Is patient fasting?  Y 

 

           Globulin   2.2 GM/DL  2.4-3.2    Below low normal            [facility] (

[facility])                                 

 

                                        Is patient fasting?  Y 

 

          Calcium   9.7 mg/dL 8.4-10.2                      [facility] ([facility])  

 

                                        Is patient fasting?  Y 

 

           A/G Ratio  2.1        0.8-2.0    Above high normal            [facility] 

([facility])                                 

 

                                        Is patient fasting?  Y 

 

           Total Bili Laboratory test result 0.2-1.3                          ME

[facility] ([facility])                                 

 

                                        Is patient fasting?  Y 

 

          Alkaline Phos 73 U/L                            [facility] ([facility])  

 

                                        Is patient fasting?  Y 

 

          Sgot/Ast  18 U/L    5-40                          [facility] ([facility])  

 

                                        Is patient fasting?  Y 

 

          SGPT/Alt  21 U/L    7-56                          [facility] ([facility])  

 

                                        Is patient fasting?  Y 

 

          Anion Gap 13.0 mmol/L 8.0-16.0                      [facility] ([facility])  

 

                                        Is patient fasting?  Y 

 

          Non-Aa GFR Laboratory test result                               [facility]

 ([facility]) 



 

                                        Is patient fasting?  Y 

 

          Age       60 yrs                                  [facility] ([facility])  

 

                                        Is patient fasting?  Y 

 

           Afr Amer GFR Laboratory test result                                  

[facility] ([facility])                                 

 

                                        Male GFR Interprentation

                                        20-49 yrs     >60 mL/min   Normal

                                        50-59 yrs     >56 mL/min   Normal

                                        60-69 yrs     >49 mL/min   Normal

                                        70-79yrs      >42 mL/min   Normal

                                        80 and above  >35 mL/min   Normal

Female GFR  Interpretation

                                        20-39 yrs     >60 mL/min   Normal

                                        40-49 yrs     >58 mL/min   Normal

                                        50-59 yrs     >51 mL/min   Normal

                                        60-69 yrs     >45 mL/min   Normal

                                        70-79 yrs     >39 mL/min   Normal

                                        80 and above  >32 mL/min   Normal

 









                    ID                  Date                Data Source

 

                    [id number]         2021 08:15:00 AM EST [facility] ([facility])









          Name      Value     Range     Interpretation Code Description Data Karen

rce(s) Supporting 

Document(s)

 

           CBC W/Automated Diff Laboratory test result                          

        [facility] ([facility])                                 

 

                                        COMPLETE BLOOD COUNT

 

 

          WBC       7.8 10^3/uL 4.2-11.0                      [facility] ([facility])  

 

                                        Is patient fasting?  Y 

 

          Hemoglobin 15.4 g/dL 14.0-16.0                     [facility] ([facility])  

 

                                        Is patient fasting?  Y 

 

          RBC       5.15 10^6/uL 4.50-6.30                     [facility] ([facility])  

 

                                        Is patient fasting?  Y 

 

          MCV       89.1 fL   80.0-94.0                     [facility] ([facility])  

 

                                        Is patient fasting?  Y 

 

          Hematocrit 45.9 %    41.0-51.0                     [facility] ([facility])  

 

                                        Is patient fasting?  Y 

 

          MCHC      33.6 g/dL 31.0-36.0                     [facility] ([facility])  

 

                                        Is patient fasting?  Y 

 

          MCH       29.9 pg   27.0-34.0                     [facility] ([facility])  

 

                                        Is patient fasting?  Y 

 

          RDW       13.0 %    11.5-14.8                     [facility] ([facility])  

 

                                        Is patient fasting?  Y 

 

          Platelets 224 10^3/uL 150-450                       [facility] ([facility])  

 

                                        Is patient fasting?  Y 

 

          Neut      61.0 %    37.0-80.0                     [facility] ([facility])  

 

                                        Is patient fasting?  Y 

 

          MPV       10.3 fL   7.4-10.4                      [facility] ([facility])  

 

                                        Is patient fasting?  Y 

 

          Lymph     26.0 %    25.0-40.0                     [facility] ([facility])  

 

                                        Is patient fasting?  Y 

 

          Eos       1.8 %     0.0-7.0                       [facility] ([facility])  

 

                                        Is patient fasting?  Y 

 

          Mono      9.1 %     3.0-8.0   Above high normal           [facility] ([facility])  

 

                                        Is patient fasting?  Y 

 

          Baso      1.8 %     0.0-2.0                       [facility] ([facility])  

 

                                        Is patient fasting?  Y 

 

          %Ig       0.3 %     0.0-0.0   Above high normal           [facility] ([facility])  

 

                                        Is patient fasting?  Y 

 

          #Neut     4.74 10^3/uL 2.00-6.90                     [facility] ([facility])  

 

                                        Is patient fasting?  Y 

 

          %NRBC     0.0 %     0.0-0.0                       [facility] ([facility])  

 

                                        Is patient fasting?  Y 

 

          #Lymph    2.02 10^3/uL 0.60-3.40                     [facility] ([facility])  

 

                                        Is patient fasting?  Y 

 

          #Mono     0.71 10^3/uL 0.00-0.90                     [facility] ([facility])  

 

                                        Is patient fasting?  Y 

 

          #Eos      0.14 10^3/uL 0.00-0.70                     [facility] ([facility])  

 

                                        Is patient fasting?  Y 

 

          #Baso     0.14 10^3/uL 0.00-0.20                     [facility] ([facility])  

 

                                        Is patient fasting?  Y 

 

          #NRBC     0.00 10^3/uL 0.00-0.00                     [facility] ([facility])  

 

                                        Is patient fasting?  Y 

 

          #Ig       0.02 10^3/uL 0.00-0.10                     [facility] ([facility])  

 

                                        Is patient fasting?  Y 

 

           Manual Diff Laboratory test result                                  M

[facility] ([facility])

                                         

 

                                        Is patient fasting?  Y 

 

          RBC Morph Laboratory test result                               [facility] 

([facility])  

 

                                        Is patient fasting?  Y 









                    ID                  Date                Data Source

 

                    [id number]     2021 07:41:00 PM EST [facility]









          Name      Value     Range     Interpretation Code Description Data Karen

rce(s) Supporting 

Document(s)

 

          [facility]                                         [facility]

[facility]  

 

                                            LIPID PANEL 

 

           Cholesterol [Mass/volume] in Serum or Plasma 155 MG/DL  131 - 200    

                    [facility]                            

 

           Deprecated Triglyceride [Mass/volume] in Serum or Plasma 185 MG/DL  3

5 - 160   H                     

[facility]                   

 

          HDL       38 MG/DL  29 - 86                       [facility]

al  

 

                    Cholesterol in LDL [Mass/volume] in Serum or Plasma by Direc

t assay 96 mg/dL            65 

- 175                                           [facility]  

 

                    Cholesterol.total/Cholesterol in HDL [Mass Ratio] in Serum o

r Plasma 4.1                 3.4 - 

4.9                                             [facility]  

 

          LDL/HDL   2.53      1.00 - 3.55                     [facility]

ital  

 

                                        CVE         RISK           CHOL/HDL     

  LDL/HDLMEN:    1/2  AVERAGE          

3.43          1.00         AVERAGE          4.97          3.55    2X   AVERAGE  
       9.55          6.25    3X   AVERAGE         23.99          7.99WOMEN:    
1/2  AVERAGE          3.27          1.47         AVERAGE          4.44          
3.22    2X   AVERAGE          7.05          5.03    3X   AVERAGE         11.04  
       6.14 









                    ID                  Date                Data Source

 

                    [id number]     2021 05:57:00 PM [facility]









          Name      Value     Range     Interpretation Code Description Data Karen

rce(s) Supporting 

Document(s)

 

           Hemoglobin A1c/Hemoglobin.total in Blood 7.2 %      4.4 - 6.1  H     

                [facility]                                 

 

                                        {A1]{HB] 









                    ID                  Date                Data Source

 

                    [id number]     2021 05:57:00 PM [facility]









          Name      Value     Range     Interpretation Code Description Data Karen

rce(s) Supporting 

Document(s)

 

                          Thyrotropin [Units/volume] in Serum or Plasma by Detec

tion limit <= 0.05 mIU/L 

0.39 uIU/mL  0.47 - 5.01  L                         [facility]  









                    ID                  Date                Data Source

 

                    [id number]     2021 05:57:00 PM [facility]









          Name      Value     Range     Interpretation Code Description Data Karen

rce(s) Supporting 

Document(s)

 

          COMPREHENSIVE METABOLIC PANEL                                         

[facility]  

 

                                            COMPREHENSIVE METABOLIC PANEL 

 

           Sodium [Moles/volume] in Serum or Plasma 139 mEq/L  134 - 153        

                [facility]                                 

 

           Potassium [Moles/volume] in Serum or Plasma 4.2 mEq/L  3.6 - 5.0     

                   [facility]                            

 

           Chloride [Moles/volume] in Serum or Plasma 104 mEq/L  98 - 107       

                  [facility]                                 

 

           Carbon dioxide, total [Moles/volume] in Serum or Plasma 22 MEQ/L   22

 - 30                          

[facility]                   

 

           Glucose [Mass/volume] in Serum or Plasma 172 MG/DL  70 - 99    H     

                [facility]                                 

 

          BUN       19 MG/DL  7 - 21                        [facility]

al  

 

           Creatinine [Mass/volume] in Serum or Plasma 0.9 MG/DL  0.7 - 1.5     

                   [facility]                            

 

          BUN/CREAT 21        8 - 27                        [facility]

al  

 

           Protein [Mass/volume] in Serum or Plasma 6.8 G/DL   6.3 - 8.2        

                [facility]                                 

 

           Albumin [Mass/volume] in Serum or Plasma 4.6 G/DL   3.9 - 5.0        

                [facility]                                 

 

           Globulin [Mass/volume] in Serum by calculation 2.2 GM/DL  2.4 - 3.2  

L                     [facility]                            

 

          A/G RATIO 2.1       0.8 - 2.0 H                   [facility]  

 

           Calcium [Mass/volume] in Serum or Plasma 9.7 MG/DL  8.4 - 10.2       

                [facility]                                 

 

           Bilirubin.total [Mass/volume] in Serum or Plasma <0.7 MG/DL 0.2 - 1.3

                        

[facility]                   

 

                    Alkaline phosphatase [Enzymatic activity/volume] in Serum or

 Plasma 73 U/L              38 - 

126                                             [facility]  

 

                          Aspartate aminotransferase [Enzymatic activity/volume]

 in Serum or Plasma 18 U/L

             5 - 40                                 [facility]  

 

                    Alanine aminotransferase [Enzymatic activity/volume] in Seru

m or Plasma 21 U/L              7

- 56                                            [facility]  

 

           Anion gap 3 in Serum or Plasma 13.0 mmol/L 8.0 - 16.0                

       [facility]

                                         

 

          AGE       60 yrs                                  [facility]

al  

 

          NON-AA GFR >60 mL/min                               [facility]

ital  

 

          AFR AMER GFR >60 mL/min                               [facility]

spital  

 

                                                                     Male GFR In

terprentation                  20-49 yrs

    >60 mL/min   Normal                  50-59 yrs     >56 mL/min   Normal      
           60-69 yrs     >49 mL/min   Normal                  70-79yrs      >42 
mL/min   Normal                  80 and above  >35 mL/min   Normal              
     Female GFR  Interpretation                  20-39 yrs     >60 mL/min   
Normal                  40-49 yrs     >58 mL/min   Normal                  50-59
yrs     >51 mL/min   Normal                  60-69 yrs     >45 mL/min   Normal  
               70-79 yrs     >39 mL/min   Normal                  80 and above  
>32 mL/min   Normal 









                    ID                  Date                Data Source

 

                    [id number]     2021 05:30:00 PM EST [facility]









          Name      Value     Range     Interpretation Code Description Data Karen

rce(s) Supporting 

Document(s)

 

          CBC W/AUTOMATED DIFF                                         [facility]  

 

                                            COMPLETE BLOOD COUNT 

 

           Leukocytes [#/volume] in Blood by Automated count 7.8 10^3/uL 4.2 - 1

1.0                       

[facility]                   

 

             Erythrocytes [#/volume] in Blood by Automated count 5.15 10^6/uL 4.

50 - 6.30                

                          [facility]     

 

           Hemoglobin [Mass/volume] in Blood 15.4 g/dL  14.0 - 16.0             

          [facility]                                 

 

           Hematocrit [Volume Fraction] of Blood by Automated count 45.9 %     4

1.0 - 51.0                       

[facility]                   

 

                    Erythrocyte mean corpuscular volume [Entitic volume] by Auto

mated count 89.1 fL             

80.0 - 94.0                                     [facility]  

 

                          Erythrocyte mean corpuscular hemoglobin [Entitic mass]

 by Automated count 29.9 

pg           27.0 - 34.0                            [facility]  

 

                                        Erythrocyte mean corpuscular hemoglobin 

concentration [Mass/volume] by Automated

 count     33.6 g/dL  31.0 - 36.0                       [facility]  

 

             Erythrocyte distribution width [Ratio] by Automated count 13.0 %   

    11.5 - 14.8                

                          [facility]     

 

           Platelets [#/volume] in Blood by Automated count 224 10^3/uL 150 - 45

0                        

[facility]                   

 

                    Platelet mean volume [Entitic volume] in Blood by Automated 

count 10.3 fL             7.4 - 

10.4                                            [facility]  

 

           Neutrophils/100 leukocytes in Blood by Automated count 61.0 %     37.

0 - 80.0                       

[facility]                   

 

           Lymphocytes/100 leukocytes in Blood by Manual count 26.0 %     25.0 -

 40.0                       

[facility]                   

 

           Monocytes/100 leukocytes in Blood by Automated count 9.1 %      3.0 -

 8.0  H                     

[facility]                   

 

           Eosinophils/100 leukocytes in Blood by Automated count 1.8 %      0.0

 - 7.0                        

[facility]                   

 

           Basophils/100 leukocytes in Blood by Automated count 1.8 %      0.0 -

 2.0                        

[facility]                   

 

          %IG       0.3 %     0.0 - 0.0 H                   [facility]

[facility]  

 

          %NRBC     0.0 %     0.0 - 0.0                     [facility]

al  

 

           Neutrophils [#/volume] in Blood by Automated count 4.74 10^3/uL 2.00 

- 6.90                       

[facility]                   

 

           Lymphocytes [#/volume] in Blood by Automated count 2.02 10^3/uL 0.60 

- 3.40                       

[facility]                   

 

           Monocytes [#/volume] in Blood by Automated count 0.71 10^3/uL 0.00 - 

0.90                       

[facility]                   

 

           Eosinophils [#/volume] in Blood by Automated count 0.14 10^3/uL 0.00 

- 0.70                       

[facility]                   

 

           Basophils [#/volume] in Blood by Automated count 0.14 10^3/uL 0.00 - 

0.20                       

[facility]                   

 

          #IG       0.02 10^3/uL 0.00 - 0.10                     [facility]

ospital  

 

          #NRBC     0.00 10^3/uL 0.00 - 0.00                     [facility]

ospital  

 

          MANUAL DIFF NOT INDICATED                               [facility]  

 

          RBC MORPH NOT INDICATED                               [facility]

spital  









                    ID                  Date                Data Source

 

                    [id number]         2020 05:16:00 PM EST [facility] ([facility])









          Name      Value     Range     Interpretation Code Description Data Karen

rce(s) Supporting 

Document(s)

 

           Thyrotropin [Units/volume] in Serum or Plasma 0.60 uIU/mL 0.47-5.01  

                      [facility]         

 

                                        

FASTING~.~.~<DG1.3.1>E11.65</DG1.3.1><DG1.3.1>E78.00</DG1.3.1><DG1.3.1>Z79.899</

DG1.3.1><DG1 

 

           Prostate specific Ag [Mass/volume] in Serum or Plasma 0.57 ng/mL 0.00

-4.00                        

[facility] ([facility])  

 

                                        \BLDo\PSA INTERPRETATION\BLDx\

The PSA assay should not be used alone for a screening test

or diagnosis for presence or absence of malignant disease.

Predictions of disease recurrence should not be based solely

on values obtained from serial patient serum values.

The PSA result was determined by "[employer]", on the Roche

SUNITHA 6000. Values obtained with different assay methods

or kits cannot be used interchangeably.

 









                    ID                  Date                Data Source

 

                    [id number]         2020 05:16:00 PM EST [facility] ([facility])









          Name      Value     Range     Interpretation Code Description Data Karen

rce(s) Supporting 

Document(s)

 

           Comprehensive [facility]abo Laboratory test result                          

        [facility] ([facility])                                

 

                                        COMPREHENSIVE METABOLIC PANEL

 

 

          Sodium    136 meq/L 134-153                       [facility] ([facility])  

 

                                        

FASTING~.~.~<DG1.3.1>E11.65</DG1.3.1><DG1.3.1>E78.00</DG1.3.1><DG1.3.1>Z79.899</

DG1.3.1><DG1 

 

          Potassium 3.8 meq/L 3.6-5.0                       [facility] ([facility])  

 

                                        

FASTING~.~.~<DG1.3.1>E11.65</DG1.3.1><DG1.3.1>E78.00</DG1.3.1><DG1.3.1>Z79.899</

DG1.3.1><DG1 

 

          Chloride  100 meq/L                         [facility] ([facility])  

 

                                        

FASTING~.~.~<DG1.3.1>E11.65</DG1.3.1><DG1.3.1>E78.00</DG1.3.1><DG1.3.1>Z79.899</

DG1.3.1><DG1 

 

           Glucose    129 mg/dL       Above high normal            [facility] 

([facility])                                 

 

                                        

FASTING~.~.~<DG1.3.1>E11.65</DG1.3.1><DG1.3.1>E78.00</DG1.3.1><DG1.3.1>Z79.899</

DG1.3.1><DG1 

 

          Co2       23 meq/L  22-30                         [facility] ([facility])  

 

                                        

FASTING~.~.~<DG1.3.1>E11.65</DG1.3.1><DG1.3.1>E78.00</DG1.3.1><DG1.3.1>Z79.899</

DG1.3.1><DG1 

 

          BUN       18 mg/dL  7-21                          [facility] ([facility])  

 

                                        

FASTING~.~.~<DG1.3.1>E11.65</DG1.3.1><DG1.3.1>E78.00</DG1.3.1><DG1.3.1>Z79.899</

DG1.3.1><DG1 

 

          Creatinine 1.0 mg/dL 0.7-1.5                       [facility] ([facility])  

 

                                        

FASTING~.~.~<DG1.3.1>E11.65</DG1.3.1><DG1.3.1>E78.00</DG1.3.1><DG1.3.1>Z79.899</

DG1.3.1><DG1 

 

          BUN/Creat 18        8-27                          [facility] ([facility])  

 

                                        

FASTING~.~.~<DG1.3.1>E11.65</DG1.3.1><DG1.3.1>E78.00</DG1.3.1><DG1.3.1>Z79.899</

DG1.3.1><DG1 

 

          Total Protein 7.8 g/dL  6.3-8.2                       [facility] ([facility])  

 

                                        

FASTING~.~.~<DG1.3.1>E11.65</DG1.3.1><DG1.3.1>E78.00</DG1.3.1><DG1.3.1>Z79.899</

DG1.3.1><DG1 

 

          Albumin   4.9 g/dL  3.9-5.0                       [facility] ([facility])  

 

                                        

FASTING~.~.~<DG1.3.1>E11.65</DG1.3.1><DG1.3.1>E78.00</DG1.3.1><DG1.3.1>Z79.899</

DG1.3.1><DG1 

 

          Globulin  2.9 GM/DL 2.4-3.2                       [facility] ([facility])  

 

                                        

FASTING~.~.~<DG1.3.1>E11.65</DG1.3.1><DG1.3.1>E78.00</DG1.3.1><DG1.3.1>Z79.899</

DG1.3.1><DG1 

 

          A/G Ratio 1.7       0.8-2.0                       [facility] ([facility])  

 

                                        

FASTING~.~.~<DG1.3.1>E11.65</DG1.3.1><DG1.3.1>E78.00</DG1.3.1><DG1.3.1>Z79.899</

DG1.3.1><DG1 

 

           Calcium    10.3 mg/dL 8.4-10.2   Above high normal            [facility] 

([facility])                                 

 

                                        

FASTING~.~.~<DG1.3.1>E11.65</DG1.3.1><DG1.3.1>E78.00</DG1.3.1><DG1.3.1>Z79.899</

DG1.3.1><DG1 

 

          Total Bili 0.9 mg/dL 0.2-1.3                       [facility] ([facility])  

 

                                        

FASTING~.~.~<DG1.3.1>E11.65</DG1.3.1><DG1.3.1>E78.00</DG1.3.1><DG1.3.1>Z79.899</

DG1.3.1><DG1 

 

          Sgot/Ast  21 U/L    5-40                          [facility] ([facility])  

 

                                        

FASTING~.~.~<DG1.3.1>E11.65</DG1.3.1><DG1.3.1>E78.00</DG1.3.1><DG1.3.1>Z79.899</

DG1.3.1><DG1 

 

          Alkaline Phos 84 U/L                            [facility] ([facility])  

 

                                        

FASTING~.~.~<DG1.3.1>E11.65</DG1.3.1><DG1.3.1>E78.00</DG1.3.1><DG1.3.1>Z79.899</

DG1.3.1><DG1 

 

          SGPT/Alt  25 U/L    7-56                          [facility] ([facility])  

 

                                        

FASTING~.~.~<DG1.3.1>E11.65</DG1.3.1><DG1.3.1>E78.00</DG1.3.1><DG1.3.1>Z79.899</

DG1.3.1><DG1 

 

          Anion Gap 13.0 mmol/L 8.0-16.0                      [facility] ([facility])  

 

                                        

FASTING~.~.~<DG1.3.1>E11.65</DG1.3.1><DG1.3.1>E78.00</DG1.3.1><DG1.3.1>Z79.899</

DG1.3.1><DG1 

 

          Age       60 yrs                                  [facility] ([facility])  

 

                                        

FASTING~.~.~<DG1.3.1>E11.65</DG1.3.1><DG1.3.1>E78.00</DG1.3.1><DG1.3.1>Z79.899</

DG1.3.1><DG1 

 

          Non-Aa GFR Laboratory test result                               [facility]

 ([facility]) 

 

 

                                        

FASTING~.~.~<DG1.3.1>E11.65</DG1.3.1><DG1.3.1>E78.00</DG1.3.1><DG1.3.1>Z79.899</

DG1.3.1><DG1 

 

           Afr Amer GFR Laboratory test result                                  

[facility] ([facility])                                 

 

                                        Male GFR Interprentation

                                        20-49 yrs     >60 mL/min   Normal

                                        50-59 yrs     >56 mL/min   Normal

                                        60-69 yrs     >49 mL/min   Normal

                                        70-79yrs      >42 mL/min   Normal

                                        80 and above  >35 mL/min   Normal

Female GFR  Interpretation

                                        20-39 yrs     >60 mL/min   Normal

                                        40-49 yrs     >58 mL/min   Normal

                                        50-59 yrs     >51 mL/min   Normal

                                        60-69 yrs     >45 mL/min   Normal

                                        70-79 yrs     >39 mL/min   Normal

                                        80 and above  >32 mL/min   Normal

 









                    ID                  Date                Data Source

 

                    [id number]         2020 05:16:00 PM EST [facility] ([facility])









          Name      Value     Range     Interpretation Code Description Data Karen

rce(s) Supporting 

Document(s)

 

          Cve Panel Laboratory test result                               [facility]  

 

                                        LIPID PANEL

 

 

          Cholesterol 174 mg/dL 131-200                       [facility] ([facility])  

 

                                        

FASTING~.~.~<DG1.3.1>E11.65</DG1.3.1><DG1.3.1>E78.00</DG1.3.1><DG1.3.1>Z79.899</

DG1.3.1><DG1 

 

           Triglycerides 246 mg/dL       Above high normal            [facility]

[facility] ([facility])                        

 

                                        

FASTING~.~.~<DG1.3.1>E11.65</DG1.3.1><DG1.3.1>E78.00</DG1.3.1><DG1.3.1>Z79.899</

DG1.3.1><DG1 

 

          HDL       38 mg/dL  29-86                         [facility]  

 

                                        

FASTING~.~.~<DG1.3.1>E11.65</DG1.3.1><DG1.3.1>E78.00</DG1.3.1><DG1.3.1>Z79.899</

DG1.3.1><DG1 

 

          LDL       102 mg/dL                         [facility] ([facility])  

 

                                        

FASTING~.~.~<DG1.3.1>E11.65</DG1.3.1><DG1.3.1>E78.00</DG1.3.1><DG1.3.1>Z79.899</

DG1.3.1><DG1 

 

          LDL/HDL   2.68      1.00-3.55                     [facility] ([facility])  

 

                                        CVE

RISK           CHOL/HDL       LDL/HDL

MEN:

                                        1/2  AVERAGE          3.43          1.00

AVERAGE          4.97          3.55

                                        2X   AVERAGE          9.55          6.25

                                        3X   AVERAGE         23.99          7.99

WOMEN:

                                        1/2  AVERAGE          3.27          1.47

AVERAGE          4.44          3.22

                                        2X   AVERAGE          7.05          5.03

                                        3X   AVERAGE         11.04          6.14

 

 

          Risk Factor 4.6       3.4-4.9                       [facility] ([facility])  

 

                                        

FASTING~.~.~<DG1.3.1>E11.65</DG1.3.1><DG1.3.1>E78.00</DG1.3.1><DG1.3.1>Z79.899</

DG1.3.1><DG1 









                    ID                  Date                Data Source

 

                    [id number]     2020 07:30:00 PM EST [facility]

 Hospital









          Name      Value     Range     Interpretation Code Description Data Karen

rce(s) Supporting 

Document(s)

 

          CVE PANEL                                         [facility]

al  

 

                                            LIPID PANEL 

 

           Cholesterol [Mass/volume] in Serum or Plasma 174 MG/DL  131 - 200    

                    [facility]                            

 

           Deprecated Triglyceride [Mass/volume] in Serum or Plasma 246 MG/DL  3

5 - 160   H                     

[facility]                   

 

          HDL       38 MG/DL  29 - 86                       [facility]

al  

 

                    Cholesterol in LDL [Mass/volume] in Serum or Plasma by Direc

t assay 102 mg/dL           65

 - 175                                          [facility]  

 

                    Cholesterol.total/Cholesterol in HDL [Mass Ratio] in Serum o

r Plasma 4.6                 3.4 - 

4.9                                             [facility]  

 

          LDL/HDL   2.68      1.00 - 3.55                     [facility]

ital  

 

                                        CVE         RISK           CHOL/HDL     

  LDL/HDLMEN:    1/2  AVERAGE          

3.43          1.00         AVERAGE          4.97          3.55    2X   AVERAGE  
       9.55          6.25    3X   AVERAGE         23.99          7.99WOMEN:    
1/2  AVERAGE          3.27          1.47         AVERAGE          4.44          
3.22    2X   AVERAGE          7.05          5.03    3X   AVERAGE         11.04  
       6.14 









                    ID                  Date                Data Source

 

                    [id number]     2020 07:30:00 PM [facility]









          Name      Value     Range     Interpretation Code Description Data Karen

rce(s) Supporting 

Document(s)

 

          COMPREHENSIVE METABOLIC PANEL                                         

[facility]  

 

                                            COMPREHENSIVE METABOLIC PANEL 

 

           Sodium [Moles/volume] in Serum or Plasma 136 mEq/L  134 - 153        

                [facility]                                 

 

           Potassium [Moles/volume] in Serum or Plasma 3.8 mEq/L  3.6 - 5.0     

                   [facility]                            

 

           Chloride [Moles/volume] in Serum or Plasma 100 mEq/L  98 - 107       

                  [facility]                                 

 

           Carbon dioxide, total [Moles/volume] in Serum or Plasma 23 MEQ/L   22

 - 30                          

[facility]                   

 

           Glucose [Mass/volume] in Serum or Plasma 129 MG/DL  65 - 110   H     

                [facility]                                 

 

          BUN       18 MG/DL  7 - 21                        [facility]

al  

 

           Creatinine [Mass/volume] in Serum or Plasma 1.0 MG/DL  0.7 - 1.5     

                   [facility]                            

 

          BUN/CREAT 18        8 - 27                        [facility]

al  

 

           Protein [Mass/volume] in Serum or Plasma 7.8 G/DL   6.3 - 8.2        

                [facility]                                 

 

           Albumin [Mass/volume] in Serum or Plasma 4.9 G/DL   3.9 - 5.0        

                [facility]                                 

 

           Globulin [Mass/volume] in Serum by calculation 2.9 GM/DL  2.4 - 3.2  

                      [facility]                            

 

          A/G RATIO 1.7       0.8 - 2.0                     [facility]  

 

           Calcium [Mass/volume] in Serum or Plasma 10.3 MG/DL 8.4 - 10.2 H     

                [facility]                                

 

           Bilirubin.total [Mass/volume] in Serum or Plasma 0.9 MG/DL  0.2 - 1.3

                        

[facility]                   

 

                    Alkaline phosphatase [Enzymatic activity/volume] in Serum or

 Plasma 84 U/L              38 - 

126                                             [facility]  

 

                          Aspartate aminotransferase [Enzymatic activity/volume]

 in Serum or Plasma 21 U/L

             5 - 40                                 [facility]  

 

                    Alanine aminotransferase [Enzymatic activity/volume] in Seru

m or Plasma 25 U/L              7

- 56                                            [facility]  

 

           Anion gap 3 in Serum or Plasma 13.0 mmol/L 8.0 - 16.0                

       [facility]

                                         

 

          AGE       60 yrs                                  [facility]

al  

 

          NON-AA GFR >60 mL/min                               [facility]

ital  

 

          AFR AMER GFR >60 mL/min                               [facility]

spital  

 

                                                                     Male GFR In

terprentation                  20-49 yrs

    >60 mL/min   Normal                  50-59 yrs     >56 mL/min   Normal      
           60-69 yrs     >49 mL/min   Normal                  70-79yrs      >42 
mL/min   Normal                  80 and above  >35 mL/min   Normal              
     Female GFR  Interpretation                  20-39 yrs     >60 mL/min   
Normal                  40-49 yrs     >58 mL/min   Normal                  50-59
yrs     >51 mL/min   Normal                  60-69 yrs     >45 mL/min   Normal  
               70-79 yrs     >39 mL/min   Normal                  80 and above  
>32 mL/min   Normal 









                    ID                  Date                Data Source

 

                    [id number]     2020 07:20:00 PM [facility]









          Name      Value     Range     Interpretation Code Description Data Karen

rce(s) Supporting 

Document(s)

 

             Prostate specific Ag [Mass/volume] in Serum or Plasma 0.57 ng/mL   

0.00 - 4.00                

                          [facility]     

 

                                                            \BLDo\PSA INTERPRETA

TION\BLDx\      The PSA assay should not

 be used alone for a screening test       or diagnosis for presence or absence 
of malignant disease.      Predictions of disease recurrence should not be based
 solely          on values obtained from serial patient serum values.        The
 PSA result was determined by "[employer]", on the Roche        SUNITHA 6000. Values 
obtained with different assay methods               or kits cannot be used 
interchangeably. 









                    ID                  Date                Data Source

 

                    [id number]     2020 07:20:00 PM [facility]









          Name      Value     Range     Interpretation Code Description Data Karen

rce(s) Supporting 

Document(s)

 

                          Thyrotropin [Units/volume] in Serum or Plasma by Detec

tion limit <= 0.05 mIU/L 

0.60 uIU/mL  0.47 - 5.01                            [facility]  









                    ID                  Date                Data Source

 

                    [id number]         2020 05:10:00 PM EST [facility] ([facility])









          Name      Value     Range     Interpretation Code Description Data Karen

rce(s) Supporting 

Document(s)

 

           Hemoglobin A1c/Hemoglobin.total in Blood 7.1 %      4.4-6.1    Above 

high normal            

[facility] ([facility])  

 

                                        {A1]

{HB]

 









                    ID                  Date                Data Source

 

                    [id number]     2020 07:20:00 PM [facility]









          Name      Value     Range     Interpretation Code Description Data Karen

rce(s) Supporting 

Document(s)

 

           Hemoglobin A1c/Hemoglobin.total in Blood 7.1 %      4.4 - 6.1  H     

                [facility]                                 

 

                                        {A1]{HB] 









                    ID                  Date                Data Source

 

                    [id number]     2020 05:38:00 PM EST [facility]









          Name      Value     Range     Interpretation Code Description Data Karen

rce(s) Supporting 

Document(s)

 

          CBC W/AUTOMATED DIFF                                         [facility]  

 

                                            COMPLETE BLOOD COUNT 

 

           Leukocytes [#/volume] in Blood by Automated count 10.6 10^3/uL 4.2 - 

11.0                       

[facility]                   

 

             Erythrocytes [#/volume] in Blood by Automated count 5.42 10^6/uL 4.

50 - 6.30                

                          [facility]     

 

           Hemoglobin [Mass/volume] in Blood 16.6 g/dL  14.0 - 16.0 H           

          [facility]                                 

 

           Hematocrit [Volume Fraction] of Blood by Automated count 48.2 %     4

1.0 - 51.0                       

[facility]                   

 

                    Erythrocyte mean corpuscular volume [Entitic volume] by Auto

mated count 88.9 fL             

80.0 - 94.0                                     [facility]  

 

                          Erythrocyte mean corpuscular hemoglobin [Entitic mass]

 by Automated count 30.6 

pg           27.0 - 34.0                            [facility]  

 

                                        Erythrocyte mean corpuscular hemoglobin 

concentration [Mass/volume] by Automated

 count     34.4 g/dL  31.0 - 36.0                       [facility]  

 

             Erythrocyte distribution width [Ratio] by Automated count 13.2 %   

    11.5 - 14.8                

                          [facility]     

 

           Platelets [#/volume] in Blood by Automated count 246 10^3/uL 150 - 45

0                        

[facility]                   

 

                    Platelet mean volume [Entitic volume] in Blood by Automated 

count 10.4 fL             7.4 - 

10.4                                            [facility]  

 

           Neutrophils/100 leukocytes in Blood by Automated count 64.1 %     37.

0 - 80.0                       

[facility]                   

 

           Lymphocytes/100 leukocytes in Blood by Manual count 25.4 %     25.0 -

 40.0                       

[facility]                   

 

           Monocytes/100 leukocytes in Blood by Automated count 7.3 %      3.0 -

 8.0                        

[facility]                   

 

           Eosinophils/100 leukocytes in Blood by Automated count 1.6 %      0.0

 - 7.0                        

[facility]                   

 

           Basophils/100 leukocytes in Blood by Automated count 1.2 %      0.0 -

 2.0                        

[facility]                   

 

          %IG       0.4 %     0.0 - 0.0 H                   [facility]

al  

 

          %NRBC     0.0 %     0.0 - 0.0                     [facility]

al  

 

           Neutrophils [#/volume] in Blood by Automated count 6.82 10^3/uL 2.00 

- 6.90                       

[facility]                   

 

           Lymphocytes [#/volume] in Blood by Automated count 2.70 10^3/uL 0.60 

- 3.40                       

[facility]                   

 

           Monocytes [#/volume] in Blood by Automated count 0.78 10^3/uL 0.00 - 

0.90                       

[facility]                   

 

           Eosinophils [#/volume] in Blood by Automated count 0.17 10^3/uL 0.00 

- 0.70                       

[facility]                   

 

           Basophils [#/volume] in Blood by Automated count 0.13 10^3/uL 0.00 - 

0.20                       

[facility]                   

 

          #IG       0.04 10^3/uL 0.00 - 0.10                     [facility]

ospital  

 

          #NRBC     0.00 10^3/uL 0.00 - 0.00                     [facility]

ospital  

 

          MANUAL DIFF NOT INDICATED                               [facility]  

 

          RBC MORPH NOT INDICATED                               [facility]

spital  









                    ID                  Date                Data Source

 

                    [id number]         2020 09:28:00 AM EST [facility] ([facility])









          Name      Value     Range     Interpretation Code Description Data Karen

rce(s) Supporting 

Document(s)

 

          RBC       5.42 10^6/uL 4.50-6.30                     [facility] ([facility])  

 

                                        

FASTING~.~.~<DG1.3.1>E11.65</DG1.3.1><DG1.3.1>E78.00</DG1.3.1><DG1.3.1>Z79.899</

DG1.3.1><DG1 

 

          WBC       10.6 10^3/uL 4.2-11.0                      [facility] ([facility])  

 

                                        

FASTING~.~.~<DG1.3.1>E11.65</DG1.3.1><DG1.3.1>E78.00</DG1.3.1><DG1.3.1>Z79.899</

DG1.3.1><DG1 

 

           CBC W/Automated Diff Laboratory test result                          

        [facility] ([facility])                                

 

                                        

FASTING~.~.~<DG1.3.1>E11.65</DG1.3.1><DG1.3.1>E78.00</DG1.3.1><DG1.3.1>Z79.899</

DG1.3.1><DG1 

 

          Hematocrit 48.2 %    41.0-51.0                     [facility] ([facility])  

 

                                        

FASTING~.~.~<DG1.3.1>E11.65</DG1.3.1><DG1.3.1>E78.00</DG1.3.1><DG1.3.1>Z79.899</

DG1.3.1><DG1 

 

           Hemoglobin 16.6 g/dL  14.0-16.0  Above high normal            [facility] 

([facility])                        

 

                                        

FASTING~.~.~<DG1.3.1>E11.65</DG1.3.1><DG1.3.1>E78.00</DG1.3.1><DG1.3.1>Z79.899</

DG1.3.1><DG1 

 

          MCV       88.9 fL   80.0-94.0                     [facility] ([facility])  

 

                                        

FASTING~.~.~<DG1.3.1>E11.65</DG1.3.1><DG1.3.1>E78.00</DG1.3.1><DG1.3.1>Z79.899</

DG1.3.1><DG1 

 

          MCHC      34.4 g/dL 31.0-36.0                     [facility] ([facility])  

 

                                        

FASTING~.~.~<DG1.3.1>E11.65</DG1.3.1><DG1.3.1>E78.00</DG1.3.1><DG1.3.1>Z79.899</

DG1.3.1><DG1 

 

          MCH       30.6 pg   27.0-34.0                     [facility] ([facility])  

 

                                        

FASTING~.~.~<DG1.3.1>E11.65</DG1.3.1><DG1.3.1>E78.00</DG1.3.1><DG1.3.1>Z79.899</

DG1.3.1><DG1 

 

          Platelets 246 10^3/uL 150-450                       [facility] ([facility])  

 

                                        

FASTING~.~.~<DG1.3.1>E11.65</DG1.3.1><DG1.3.1>E78.00</DG1.3.1><DG1.3.1>Z79.899</

DG1.3.1><DG1 

 

          RDW       13.2 %    11.5-14.8                     [facility] ([facility])  

 

                                        

FASTING~.~.~<DG1.3.1>E11.65</DG1.3.1><DG1.3.1>E78.00</DG1.3.1><DG1.3.1>Z79.899</

DG1.3.1><DG1 

 

          MPV       10.4 fL   7.4-10.4                      [facility] ([facility])  

 

                                        

FASTING~.~.~<DG1.3.1>E11.65</DG1.3.1><DG1.3.1>E78.00</DG1.3.1><DG1.3.1>Z79.899</

DG1.3.1><DG1 

 

          Neut      64.1 %    37.0-80.0                     [facility] ([facility])  

 

                                        

FASTING~.~.~<DG1.3.1>E11.65</DG1.3.1><DG1.3.1>E78.00</DG1.3.1><DG1.3.1>Z79.899</

DG1.3.1><DG1 

 

          Mono      7.3 %     3.0-8.0                       [facility] ([facility])  

 

                                        

FASTING~.~.~<DG1.3.1>E11.65</DG1.3.1><DG1.3.1>E78.00</DG1.3.1><DG1.3.1>Z79.899</

DG1.3.1><DG1 

 

          Eos       1.6 %     0.0-7.0                       [facility] ([facility])  

 

                                        

FASTING~.~.~<DG1.3.1>E11.65</DG1.3.1><DG1.3.1>E78.00</DG1.3.1><DG1.3.1>Z79.899</

DG1.3.1><DG1 

 

          Lymph     25.4 %    25.0-40.0                     [facility] ([facility])  

 

                                        

FASTING~.~.~<DG1.3.1>E11.65</DG1.3.1><DG1.3.1>E78.00</DG1.3.1><DG1.3.1>Z79.899</

DG1.3.1><DG1 

 

          %Ig       0.4 %     0.0-0.0   Above high normal           [facility] ([facility])  

 

                                        

FASTING~.~.~<DG1.3.1>E11.65</DG1.3.1><DG1.3.1>E78.00</DG1.3.1><DG1.3.1>Z79.899</

DG1.3.1><DG1 

 

          Baso      1.2 %     0.0-2.0                       [facility] ([facility])  

 

                                        

FASTING~.~.~<DG1.3.1>E11.65</DG1.3.1><DG1.3.1>E78.00</DG1.3.1><DG1.3.1>Z79.899</

DG1.3.1><DG1 

 

          %NRBC     0.0 %     0.0-0.0                       [facility] ([facility])  

 

                                        

FASTING~.~.~<DG1.3.1>E11.65</DG1.3.1><DG1.3.1>E78.00</DG1.3.1><DG1.3.1>Z79.899</

DG1.3.1><DG1 

 

          #Lymph    2.70 10^3/uL 0.60-3.40                     [facility]  

 

                                        

FASTING~.~.~<DG1.3.1>E11.65</DG1.3.1><DG1.3.1>E78.00</DG1.3.1><DG1.3.1>Z79.899</

DG1.3.1><DG1 

 

          #Mono     0.78 10^3/uL 0.00-0.90                     [facility] ([facility])  

 

                                        

FASTING~.~.~<DG1.3.1>E11.65</DG1.3.1><DG1.3.1>E78.00</DG1.3.1><DG1.3.1>Z79.899</

DG1.3.1><DG1 

 

          #Neut     6.82 10^3/uL 2.00-6.90                     [facility] ([facility])  

 

                                        

FASTING~.~.~<DG1.3.1>E11.65</DG1.3.1><DG1.3.1>E78.00</DG1.3.1><DG1.3.1>Z79.899</

DG1.3.1><DG1 

 

          #Eos      0.17 10^3/uL 0.00-0.70                     [facility] ([facility])  

 

                                        

FASTING~.~.~<DG1.3.1>E11.65</DG1.3.1><DG1.3.1>E78.00</DG1.3.1><DG1.3.1>Z79.899</

DG1.3.1><DG1 

 

          #Baso     0.13 10^3/uL 0.00-0.20                     [facility]  

 

                                        

FASTING~.~.~<DG1.3.1>E11.65</DG1.3.1><DG1.3.1>E78.00</DG1.3.1><DG1.3.1>Z79.899</

DG1.3.1><DG1 

 

          #Ig       0.04 10^3/uL 0.00-0.10                     [facility] ([facility])  

 

                                        

FASTING~.~.~<DG1.3.1>E11.65</DG1.3.1><DG1.3.1>E78.00</DG1.3.1><DG1.3.1>Z79.899</

DG1.3.1><DG1 

 

          #NRBC     0.00 10^3/uL 0.00-0.00                     [facility]  

 

                                        

FASTING~.~.~<DG1.3.1>E11.65</DG1.3.1><DG1.3.1>E78.00</DG1.3.1><DG1.3.1>Z79.899</

DG1.3.1><DG1 

 

           Manual Diff Laboratory test result                                  M

[facility] ([facility])

                                         

 

                                        

FASTING~.~.~<DG1.3.1>E11.65</DG1.3.1><DG1.3.1>E78.00</DG1.3.1><DG1.3.1>Z79.899</

DG1.3.1><DG1 

 

          RBC Morph Laboratory test result                               [facility] 

([facility])  

 

                                        

FASTING~.~.~<DG1.3.1>E11.65</DG1.3.1><DG1.3.1>E78.00</DG1.3.1><DG1.3.1>Z79.899</

DG1.3.1><DG1 









                    ID                  Date                Data Source

 

                    [id number]         2020 09:11:00 AM EST [facility]









          Name      Value     Range     Interpretation Code Description Data Karen

rce(s) Supporting 

Document(s)

 

           Thyrotropin [Units/volume] in Serum or Plasma Laboratory test result 

                                 

[facility] ([facility])  

 

                Prostate specific Ag [Mass/volume] in Serum or Plasma Laboratory

 test result                  

                                        [facility] 

 









                    ID                  Date                Data Source

 

                    [id number]         2020 09:11:00 AM EST [facility]









          Name      Value     Range     Interpretation Code Description Data Karen

rce(s) Supporting 

Document(s)

 

           Hemoglobin A1c/Hemoglobin.total in Blood Laboratory test result      

                            [facility] 

([facility])         







                                        Procedure

 

                                          



                                                                                
                                                                                
                                                                                
                                                                                
                                                                                
                                                                                
                                                                                
                                                                                
                                                                                
                                                                                
                                                                                
                                                                                
                                                                                
                  



Social History

          



           Code       Duration   Value      Status     Description Data Source(s

)

 

             Smoking      2021 12:00:00 AM EDT Never Smoked Cigarettes com

pleted    Never 

Smoked Cigarettes                       [facility] ([facility] [place]



                                                                                
                  



Vital Signs

          



                    ID                  Date                Data Source

 

                    UNK                                      









           Name       Value      Range      Interpretation Code Description Data

 Source(s)

 

           Heart rate 76 /min                          76 /min    [facility]

 

           Body temperature 98.7 [degF]                       98.7 [degF] [facility]

 ([facility])

 

           Respiratory rate 16 /min                          16 /min    [facility] (

[facility])

 

           Body surface area Derived from formula 2.36 m2                       

   2.36 m2    [facility] ([facility])

 

           Oxygen saturation in Arterial blood by Pulse oximetry 96 %           

                  96 %       [facility] 

([facility])

 

           Body weight 266.00 [lb_av]                       266.00 [lb_av] [facility]

T ([facility])

 

           Body height 70 [in_i]                        70 [in_i]  [facility] ([facility])

 

                                        5'10" 

 

           Body mass index (BMI) [Ratio] 38.2 kg/m2                       38.2 k

g/m2 [facility] ([facility]

 

           Body weight 120.658 kg                       120.658 kg [facility] ([facility])

 

           Systolic blood pressure 150 mm[Hg]                       150 mm[Hg] M

[facility] ([facility])

 

           Diastolic blood pressure 82 mm[Hg]                        82 mm[Hg]  

[facility] ([facility]

 

           Heart rate 70 /min                          70 /min    [facility] ([facility])

 

           Body temperature 98.1 [degF]                       98.1 [degF] [facility]

 ([facility])

 

           Body weight 266.50 [lb_av]                       266.50 [lb_av] [facility]

T ([facility])

 

           Diastolic blood pressure 92 mm[Hg]                        92 mm[Hg]  

[facility] ([facility])

 

           Systolic blood pressure 154 mm[Hg]                       154 mm[Hg] M

[facility] ([facility])

 

           Respiratory rate 16 /min                          16 /min    [facility] (

[facility])

 

           Oxygen saturation in Arterial blood by Pulse oximetry 97 %           

                  97 %       [facility] 

([facility])

 

           Body weight 120.884 kg                       120.884 kg [facility] ([facility])

 

           Body height 70 [in_i]                        70 [in_i]  [facility] ([facility])

 

                                        5'10" 

 

           Body mass index (BMI) [Ratio] 38.2 kg/m2                       38.2 k

g/m2 [facility] ([facility])

 

           Body surface area Derived from formula 2.36 m2                       

   2.36 m2    [facility] ([facility])

 

           Body height 70 [in_i]                        70 [in_i]  [facility]

 

                                        5'10" 

 

           Body weight 266.00 [lb_av]                       266.00 [lb_av] [facility]

T ([facility] [place])

 

           Body weight 120.658 kg                       120.658 kg [facility] [place]

 

           Body mass index (BMI) [Ratio] 38.2 kg/m2                       38.2 k

g/m2 [facility] ([facility]

 

           Systolic blood pressure 159 mm[Hg]                       159 mm[Hg] M

[facility] ([facility]

 

           Diastolic blood pressure 100 mm[Hg]                       100 mm[Hg] 

[facility] ([facility]

 

           Heart rate 71 /min                          71 /min    [facility] [place]

 

           Body temperature 98.1 [degF]                       98.1 [degF] [facility]

 ([facility]

 

           Body height 176.784 cm            Normal (applies to non-numeric resu

lts) 176.784 cm 

[facility]

 

             Systolic blood pressure 163 mm[Hg]                Normal (applies t

o non-numeric results) 163

 mm[Hg]                                 [facility]

 

             Diastolic blood pressure 84 mm[Hg]                 Normal (applies 

to non-numeric results) 84 

mm[Hg]                                  [facility]

 

           Heart rate 68 min                Normal (applies to non-numeric resul

ts) 68 min     [facility]

 

           Respiratory rate 20 min                Normal (applies to non-numeric

 results) 20 min     [facility]

 

           Body temperature 36.6 bertrand              Normal (applies to non-numeric

 results) 36.6 bertrand   

[facility]

 

                Deprecated Oxygen saturation in Capillary blood by Oximetry 94 %

                            Normal 

(applies to non-numeric results) 94 %                      [facility]

 

                Body mass index (BMI) [Ratio] 32.49 kg/m2                     No

rmal (applies to non-numeric 

results)                  32.49 kg/m2               [facility]

 

             Body weight Measured 102.7 kg                  Normal (applies to n

on-numeric results) 102.7 kg

                                        [facility]

 

           Oxygen saturation in Arterial blood by Pulse oximetry 97 %           

                  97 %       [facility] 

([facility])

 

           Body height 70 [in_i]                        70 [in_i]  [facility] ([facility])

 

                                        5'10" 

 

           Body weight 273.00 [lb_av]                       273.00 [lb_av] [facility]

T ([facility])

 

           Body mass index (BMI) [Ratio] 39.2 kg/m2                       39.2 k

g/m2 [facility] ([facility])

 

           Body temperature 98.1 [degF]                       98.1 [degF] [facility]

 ([facility])

 

           Systolic blood pressure 180 mm[Hg]                       180 mm[Hg] M

[facility] ([facility])

 

           Diastolic blood pressure 100 mm[Hg]                       100 mm[Hg] 

[facility] ([facility])

 

           Heart rate 76 /min                          76 /min    [facility] ([facility])

 

           Systolic blood pressure 184 mm[Hg]                       184 mm[Hg] M

[facility] ([facility])

 

           Diastolic blood pressure 81 mm[Hg]                        81 mm[Hg]  

[facility] ([facility]

 

           Heart rate 80 /min                          80 /min    [facility] [place]

 

           Body height 70 [in_i]                        70 [in_i]  [facility]

 

           Body weight 280.00 [lb_av]                       280.00 [lb_av] [facility]

T ([facility]

 

           Body weight 127.008 kg                       127.008 kg [facility] [place]

 

           Body mass index (BMI) [Ratio] 40.2 kg/m2                       40.2 k

g/m2 [facility]

 

           Systolic blood pressure 190 mm[Hg]                       190 mm[Hg] M

[facility]

 

           Diastolic blood pressure 86 mm[Hg]                        86 mm[Hg]  

[facility] ([facility])

 

           Heart rate 79 /min                          79 /min    [facility] ([facility])

 

           Oxygen saturation in Arterial blood by Pulse oximetry 96 %           

                  96 %       [facility] [facility] 

([facility])

 

                                        Room Air 

 

           Body height 70 [in_i]                        70 [in_i]  [facility] ([facility])

 

                                        5'10" 

 

           Body weight 280.00 [lb_av]                       280.00 [lb_av] [facility]

T ([facility])

 

           Body mass index (BMI) [Ratio] 40.2 kg/m2                       40.2 k

g/m2 [facility] ([facility])

 

           Ideal body weight 166 [lb_av]                       166 [lb_av] [facility]

T ([facility])

 

           Body weight 127.008 kg                       127.008 kg [facility] ([facility])

 

           Body surface area Derived from formula 2.41 m2                       

   2.41 m2    [facility] ([facility])

 

           Systolic blood pressure 216 mm[Hg]                       216 mm[Hg] M

[facility]

 

           Diastolic blood pressure 90 mm[Hg]                        90 mm[Hg]  

[facility]

 

           Heart rate 76 /min                          76 /min    [facility]

 

           Oxygen saturation in Arterial blood by Pulse oximetry 94 %           

                  94 %       [facility] 

([facility])

 

           Body height 70 [in_i]                        70 [in_i]  [facility] ([facility])

 

                                        5'10" 

 

           Body weight 282.00 [lb_av]                       282.00 [lb_av] [facility]

T ([facility])

 

           Body mass index (BMI) [Ratio] 40.5 kg/m2                       40.5 k

g/m2 [facility] ([facility])

 

           Body temperature 97.9 [degF]                       97.9 [degF] [facility]

 ([facility])

 

           Body weight 283.00 [lb_av]                       283.00 [lb_av] [facility]

T ([facility])

 

           Body weight 128.369 kg                       128.369 kg [facility] ([facility])

 

           Body height 70 [in_i]                        70 [in_i]  [facility] ([facility])

 

                                        5'10" 

 

           Body mass index (BMI) [Ratio] 40.6 kg/m2                       40.6 k

g/m2 [facility] ([facility])

 

           Body surface area Derived from formula 2.42 m2                       

   2.42 m2    [facility] ([facility])

 

           Systolic blood pressure 148 mm[Hg]                       148 mm[Hg] M

[facility] ([facility])

 

           Diastolic blood pressure 80 mm[Hg]                        80 mm[Hg]  

[facility] ([facility])

 

           [facility]                          78 [facility]

 

           Body temperature 96.4 [degF]                       96.4 [degF] [facility]

 ([facility])

 

           Respiratory rate 18 /min                          18 /min    [facility] (

[facility])

 

           Oxygen saturation in Arterial blood by Pulse oximetry 97 %           

                  97 %       [facility] 

([facility])

 

           Systolic blood pressure 168 mm[Hg]                       168 mm[Hg] M

[facility] ([facility])

 

           Diastolic blood pressure 88 mm[Hg]                        88 mm[Hg]  

[facility] ([facility])

 

           Heart rate 90 /min                          90 /min    [facility] ([facility])

 

           Body temperature 97.2 [degF]                       97.2 [degF] [facility]

 ([facility])

 

           Respiratory rate 18 /min                          18 /min    [facility] (

[facility])

 

           Oxygen saturation in Arterial blood by Pulse oximetry 97 %           

                  97 %       [facility] 

([facility])

 

           Body weight 283.25 [lb_av]                       283.25 [lb_av] [facility]

T ([facility])

 

           Body [facility] 128.482 kg                       128.[facility]

 

           Body height 70 [in_i]                        70 [in_i]  [facility] ([facility])

 

                                        5'10" 

 

           Body mass index (BMI) [Ratio] 40.6 kg/m2                       40.6 k

g/m2 [facility] ([facility])

 

           Body surface area Derived from formula 2.42 m2                       

   2.42 m2    [facility] ([facility])

 

           Body weight 287.00 [lb_av]                       287.00 [lb_av] [facility]

T ([facility])

 

           Body mass index (BMI) [Ratio] 41.2 kg/m2                       41.2 k

g/m2 [facility] ([facility])

 

           Systolic blood pressure 144 mm[Hg]                       144 mm[Hg] M

[facility] ([facility])

 

           Diastolic blood pressure 84 mm[Hg]                        84 mm[Hg]  

[facility] ([facility])

 

           Body height 70 [in_i]                        70 [in_i]  [facility] ([facility])

 

                                        5'10" 

 

           Ideal body weight 166 [lb_av]                       166 [lb_av] [facility]

T ([facility])

 

           Body weight 130.183 kg                       130.183 kg [facility] ([facility])

 

           Body surface area Derived from formula 2.43 m2                       

   2.43 m2    [facility] ([facility])

 

           Systolic blood pressure--sitting 180 mm[Hg]                       180

 mm[Hg] [facility] ([facility])

 

           Diastolic blood pressure--sitting 80 mm[Hg]                        80

 mm[facility]Hg]  [facility]

 

           Heart rate 74 /min                          74 /min    [facility] ([facility])

 

           Diastolic blood pressure 84 mm[Hg]                        84 mm[Hg]  

[facility] ([facility])

 

           Respiratory rate 18 /min                          18 /min    [facility] (

[facility])

 

           Body temperature 96.8 [degF]                       96.8 [degF] [facility]

 ([facility])

 

           Oxygen saturation in Arterial blood by Pulse oximetry 96 %           

                  96 %       [facility] 

([facility])

 

           Body weight 131.657 kg                       131.657 kg [facility] ([facility])

 

           Body weight 290.25 [lb_av]                       290.25 [lb_av] [facility]

T ([facility])

 

           Body mass index (BMI) [Ratio] 41.6 kg/m2                       41.6 k

[facility]

 

           Body height 70 [in_i]                        70 [in_i]  [facility] ([facility])

 

                                        5'10" 

 

           Body surface area Derived from formula 2.45 m2                       

   2.45 m2    [facility] ([facility])

 

           Systolic blood pressure 148 mm[Hg]                       148 mm[Hg] M

[facility] ([facility])

 

           Diastolic blood pressure 80 mm[Hg]                        80 mm[Hg]  

[facility] ([facility])

 

           Systolic blood pressure 150 mm[Hg]                       150 mm[Hg] M

[facility] ([facility])

 

           Body temperature 96.8 [degF]                       96.8 [degF] [facility]

 ([facility])

 

           Heart rate 72 /min                          72 /min    [facility]

 

           Body weight 281.50 [lb_av]                       281.50 [lb_av] [facility]

T ([facility])

 

           Respiratory rate 18 /min                          18 /min    [facility] (

[facility])

 

           Oxygen saturation in Arterial blood by Pulse oximetry 96 %           

                  96 %       [facility] 

([facility])

 

           Body weight 127.688 kg                       127.688 [facility]

 

           Body height 70 [in_i]                        70 [in_i]  [facility]

 

                                        5'10" 

 

           Body mass index (BMI) [Ratio] 40.4 kg/m2                       40.4 k

g/m2 [facility] ([facility])

 

           Body surface area Derived from formula 2.41 m2                       

   2.41 m2    [facility] ([facility])

## 2021-11-09 NOTE — CCD
Continuity of Care Document (CCD)

                             Created on: 10/29/2021



Carlos Bryant

External Reference #: [id number]

: 1960

Sex: Male



Demographics





                          Address                   [address] [place] [address]

 

                          Home Phone                [phone]

 

                          Preferred Language        Unknown

 

                          Marital Status            Never 

 

                          Shinto Affiliation     Unknown

 

                          Race                      White

 

                          Ethnic Group              Not  or 





Author





                          Author                    Carlos CARRINGTON FN

 

                          Organization              Unknown

 

                          Address                   [address] [place] [address]



 

                          Phone                     [phone]







Care Team Providers





                    Care Team Member Name Role                Phone

 

                    [facility]                [phone]

 

                    Moris Desai   AUTM                [phone]

 

                    Crystal Carrington  AUTM                [phone]







Problems





                    Active Problems     Provider            Date

 

                    Type II diabetes mellitus uncontrolled BELLA Severino, P

NP Onset: 2020

 

                    Essential hypertension BELLA Severino, PNP Onset: 2020

 

                    Pure hypercholesterolemia BELLA Severino, PNP Onset: 

 

                    Gastroesophageal reflux disease BELLA Severino, PNP Onse

t: 2020

 

                    Secondary erectile dysfunction BELLA Severino, PNP Onset

: 2020

 

                    Taking medication   BELLA Severino, PNP Onset: 

0

 

                    Right side sciatica BELLA Severino, PNP Onset: 

0

 

                    Secondary erectile dysfunction BELLA Severino, PNP Onset

: 2021







Social History





                Type            Date            Description     Comments

 

                Birth Sex                       Unknown          

 

                Tobacco Use     Start: Unknown End:  Quit             

 

                Tobacco Use     Start: Unknown  Never Smoked Cigars  

 

                Tobacco Use     Start: Unknown  Never Smoked A Pipe  

 

                Tobacco Use     Start: Unknown  Never Used Smokeless Tobacco  

 

                ETOH Use                        Denies alcohol use  

 

                Recreational Drug Use                 Denies Drug Use  

 

                Tobacco Use     Start: Unknown End: Unknown Patient is a former 

smoker  







Allergies and adverse reactions





             Active Allergies Criticality  Reaction | Severity Comments     Date

 

             Iodinated Diagnostic Agents Unable to assess criticality           

                2019







Medications





           Active Medications SIG        Qnty       Indications Ordering Provide

r Date

 

                          Fenofibrate                     145mg Tablets         

          1 tablet by 

mouth every day for triglycerides 90tabs                          Darian barboza MD 2021

 

                          Glipizide ER                     10mg Tablets ER 24HR 

                  Take 1 

Tablet By Mouth Once A Day 30tabs                          Olga Lidia tuttle M.D. 2021

 

                          Trazodone HCL                     50mg Tablets        

           take 1 tab by 

mouth at bedtime. 30tabs                          Darian Gomez MD 2021

 

                                        Blood Pressure Kit/Oscillating/Digital  

                    Kit                 

                Use as directed to monitor blood pressure twice a day 1units    

                      Darian Gomez MD                                      2021

 

                          Lisinopril                     10mg Tablets           

        take two tablets 

by mouth in the morning and 1 tab at bed time 90tabs                          Moi Gomez MD 

2021

 

                          Jardiance                     25mg Tablets            

       take one by mouth 

once a day      90tabs                          BELLA Severino, PNP 20

20

 

                          Carisoprodol                     350mg Tablets        

           1 by mouth at 

bedtime as needed                                 Unknown         

 

                          Blood Glucose Test                      Strips        

           please provide 

glucose test strips, covered by pt insurance for twice daily bs test dx: e11.65 

                    E11.65              Unknown             

 

                                        Blood Glucose Monitoring System         

            W/Device Kit                

             for three times a day blood sugar testing dx: e11.65              E

11.65       Unknown      



 

                          Mucinex                     600mg Tablets ER 12HR     

              1 by mouth 

twice a day as needed                                 Unknown         

 

                          Omeprazole                     20mg Capsules DR       

            1 by mouth 

every day  Written by Dr Malik                                 Unknown         



 

                          Topamax                     50mg Tablets              

     1 by mouth twice a 

day for headaches 180tabs                         BELLA Severino, PNP 

 

                          Novolog                     100Unit/ML Solution       

            10-15 units at

bedtime per sliding scale                                 Unknown         

 

                    Simple Diagnostics Lancing Device                      Misc 

                                      

                                        Unknown             

 

                          Hydrochlorothiazide                     25mg Tablets  

                 take one 

tablet by mouth every day 90tabs                          BELLA Severino, PN

P 

 

                          Lovastatin                     40mg Tablets           

        take one tablet by

mouth every evening with meal 90tabs                          BELLA Severino

, PNP 

 

                          Metformin HCL                     1000mg Tablets      

             take one 

tablet by mouth twice a day with food 180tabs                         BELLA Severino, PNP 



 

                          Meloxicam                     15mg Tablets            

       take one tablet by 

mouth every day 90tabs                          KIT Severino-BC, PNP 

 

                          Sildenafil Citrate                     20mg Tablets   

                Take One 

Tablet By Mouth as Directed                                 Unknown         

 

                                        Pharmacist Choice Ultra Thin Lancets 31G

                     31G Misc           

                                                    Unknown      

 

                          Hydrocodone-Acetaminophen                     5-325mg 

Tablets                   

Take One Tablet By Mouth Every 4 Hours Maximum Daily Dose   6 Tablets Written by
Dr Malik                                       Unknown         







Immunizations





                                        Description

 

                                        No Information Available







Vital Signs





                Date            Vital           Result          Comment

 

                10/25/2021  1:29pm BP Systolic     150 mmHg         

 

                    BP Diastolic        82 mmHg              

 

                    Heart Rate          76 /min              

 

                    Body Temperature    98.7 F             

 

                    Respiratory Rate    16 /min              

 

                    O2 % BldC Oximetry  96 %                 

 

                    Weight              266.00 lb            

 

                    Weight              120.658 kg           

 

                    Height              70 inches           5'10"

 

                    BMI (Body Mass Index) 38.2 kg/m2           

 

                    BSA (Body Surface Area) 2.36 m2              

 

                2021  8:07am BP Systolic     154 mmHg         

 

                    BP Diastolic        92 mmHg              

 

                    Heart Rate          70 /min              

 

                    Body Temperature    98.1 F             

 

                    Respiratory Rate    16 /min              

 

                    O2 % BldC Oximetry  97 %                 

 

                    Weight              266.50 lb            

 

                    Weight              120.884 kg           

 

                    Height              70 inches           5'10"

 

                    BMI (Body Mass Index) 38.2 kg/m2           

 

                    BSA (Body Surface Area) 2.36 m2              







Results





        Test    Acquired Date Facility Test    Result  H/L     Range   Note

 

                    RSV Dna Probe       10/25/2021          [facility]

             RSV Antigen  POSITIVE     Abnormal     Normal: Negative  

 

             RSV Antigen Reenter POSITIVE     Abnormal     Normal: Negative 1

 

                    Laboratory test finding 10/25/2021          [facility]

             Coronavirus Covid-19 Not Detected               Not Detected 2

 

                    Covid-19            2021          [facility]

             Sars-CoV-2, Kristie Not Detected               Not Detected 3

 

             Sars-CoV-2, Kristie 2 Day Tat Performed                               

 

                    CBC With Differential 2021          [facility]

             White Blood Count 8.5 10       Normal       4.0-10.0      

 

             Red Blood Count 4.80 10      Normal       4.30-6.10     

 

             Hemoglobin   14.5 g/dL    Normal       13.5-17.5     

 

             Hematocrit   43.8 %       Normal       42.0-52.0     

 

             Mean Corpuscular Volume 91.3 fl      Normal       80.0-96.0     

 

             Mean Corpuscular Hemoglobin 30.2 pg      Normal       27.0-33.0    

 

 

             Mean Corpuscular HGB Conc 33.1 g/dL    Normal       32.0-36.5     

 

             Red Cell Distribution Width 14.0 %       Normal       11.5-14.5    

 

 

             Platelet Count, Automated 238 10       Normal       150-450       

 

             Neutrophils % 57.8 %       Normal       36.0-66.0     

 

             Lymph %      27.3 %       Normal       24.0-44.0     

 

             Mono %       11.2 %       High         2.0-8.0       

 

             Eos %        2.3 %        Normal       0.0-3.0       

 

             Baso %       1.2 %        High         0.0-1.0       

 

             Immature Granulocyte % 0.2 %        Normal       0-3.0         

 

             Nucleated Red Blood Cell % 0.0 %        Normal       0-0           

 

             Neutrophils # 4.9 10       Normal       1.5-8.5       

 

             Lymph #      2.3 10       Normal       1.5-5.0       

 

             Mono #       1.0 10       High         0.0-0.8       

 

             Eos #        0.2 10       Normal       0.0-0.5       

 

             Baso #       0.1 10       Normal       0.0-0.2       

 

                    Comprehensive Metabolic Profil 2021          [facility]

             Glucose, Fasting 112 mg/dL    High                 

 

             Blood Urea Nitrogen 31 mg/dL     High         7-18          

 

             Creatinine For GFR 2.36 mg/dL   High         0.70-1.30     

 

             Glomerular Filtration Rate 30.0         Low          >49          4

 

             Sodium Level 140 mEq/L    Normal       136-145       

 

             Potassium Serum 3.9 mEq/L    Normal       3.5-5.1       

 

             Chloride Level 109 mEq/L    High                 

 

             Carbon Dioxide Level 23 mEq/L     Normal       21-32         

 

             Anion Gap    8 mEq/L      Normal       8-16          

 

             Calcium Level 9.7 mg/dL    Normal       8.8-10.2      

 

             Ast/Sgot     15 U/L       Normal       7-37          

 

             Alt/SGPT     26 U/L       Normal       12-78         

 

             Alkaline Phosphatase 67 U/L       Normal               

 

             Bilirubin,Total 0.4 mg/dL    Normal       0.2-1.0       

 

             Total Protein 8.0 GM/DL    Normal       6.4-8.2       

 

             Albumin      4.3 GM/DL    Normal       3.2-5.2       

 

             Albumin/Globulin Ratio 1.2          Normal                     

 

                    Comprehensive Metabolic Panel 2021          [facility]

             Comprehensive Metabo (SEE NOTE)                             5, 6

 

             Sodium       139 mEq/L                 134 - 153     

 

             Potassium    4.4 mEq/L                 3.6 - 5.0     

 

             Chloride     105 mEq/L                 98 - 107      

 

             Co2          22 mEq/L                  22 - 30       

 

             Glucose      143 mg/dL    High         70 - 99       

 

             BUN          26 mg/dL     High         7 - 21        

 

             Creatinine   2.1 mg/dL    High         0.7 - 1.5     

 

             BUN/Creat    12                        8 - 27        

 

             Total Protein 6.9 g/dL                  6.3 - 8.2     

 

             Albumin      4.5 g/dL                  3.9 - 5.0     

 

             Globulin     2.4 GM/DL                 2.4 - 3.2     

 

             A/G Ratio    1.9                       0.8 - 2.0     

 

             Calcium      9.8 mg/dL                 8.4 - 10.2    

 

             Total Bili   <0.7 mg/dL                0.2 - 1.3     

 

             Alkaline Phos 53 U/L                    38 - 126      

 

             Sgot/Ast     16 U/L                    5 - 40        

 

             SGPT/Alt     12 U/L                    7 - 56        

 

             Anion Gap    12.0 mmol/L               8.0 - 16.0    

 

             Age          60 yrs                                  

 

             Non-Aa GFR   34 mL/min                               

 

             Afr Amer GFR 42 mL/min                              7

 

                    Lipid Panel         2021          [facility]

             Cve Panel    (SEE NOTE)                             8, 9

 

             Cholesterol  208 mg/dL    High         131 - 200     

 

             Triglycerides 328 mg/dL    High         35 - 160      

 

             HDL          42 mg/dL                  29 - 86       

 

             LDL          123 mg/dL                 65 - 175      

 

             Risk Factor  5.0          High         3.4 - 4.9     

 

             LDL/HDL      2.93                      1.00 - 3.55  10

 

                    Laboratory test finding 2021          [facility]

l

             TSH Highly Sensitive 0.57 uIU/mL               0.47 - 5.01   

 

             PSA - Diagnostic 1.05 ng/mL                0.00 - 4.00  11

 

                    Laboratory test finding 2021          [facility]

l

             Hgba1c       6.0 %                     4.4 - 6.1    12

 

                    CMP W/Egfr          2021          [facility]

             Comprehensive Metabo (SEE NOTE)                             13

 

             Sodium       138 mEq/L                 134 - 153     

 

             Potassium    4.5 mEq/L                 3.6 - 5.0     

 

             Chloride     104 mEq/L                 98 - 107      

 

             Co2          21 mEq/L     Low          22 - 30       

 

             Glucose      134 mg/dL    High         70 - 99       

 

             BUN          31 mg/dL     High         7 - 21        

 

             Creatinine   2.1 mg/dL    High         0.7 - 1.5     

 

             BUN/Creat    15                        8 - 27        

 

             Total Protein 7.7 g/dL                  6.3 - 8.2     

 

             Albumin      4.9 g/dL                  3.9 - 5.0     

 

             Globulin     2.8 GM/DL                 2.4 - 3.2     

 

             A/G Ratio    1.8                       0.8 - 2.0     

 

             Calcium      10.4 mg/dL   High         8.4 - 10.2    

 

             Total Bili   <0.7 mg/dL                0.2 - 1.3     

 

             Alkaline Phos 66 U/L                    38 - 126      

 

             Sgot/Ast     15 U/L                    5 - 40        

 

             SGPT/Alt     11 U/L                    7 - 56        

 

             Anion Gap    13.0 mmol/L               8.0 - 16.0    

 

             Age          60 yrs                                  

 

             Non-Aa GFR   34 mL/min                               

 

             Afr Amer GFR 42 mL/min                              14

 

                    CBC W/Auto Differential 2021          [facility]

l

             CBC W/Automated Diff (SEE NOTE)                             15

 

             WBC          8.2 10^3/uL               4.2 - 11.0    

 

             RBC          4.95 10^6/uL              4.50 - 6.30   

 

             Hemoglobin   15.0 g/dL                 14.0 - 16.0   

 

             Hematocrit   45.8 %                    41.0 - 51.0   

 

             MCV          92.5 fL                   80.0 - 94.0   

 

             MCH          30.3 pg                   27.0 - 34.0   

 

             MCHC         32.8 g/dL                 31.0 - 36.0   

 

             RDW          13.7 %                    11.5 - 14.8   

 

             Platelets    257 10^3/uL               150 - 450     

 

             MPV          10.8 fL      High         7.4 - 10.4    

 

             Neut         68.1 %                    37.0 - 80.0   

 

             Lymph        18.6 %       Low          25.0 - 40.0   

 

             Mono         8.3 %        High         3.0 - 8.0     

 

             Eos          2.8 %                     0.0 - 7.0     

 

             Baso         1.8 %                     0.0 - 2.0     

 

             %Ig          0.4 %        High         0.0 - 0.0     

 

             %NRBC        0.0 %                     0.0 - 0.0     

 

             #Neut        5.57 10^3/uL              2.00 - 6.90   

 

             #Lymph       1.52 10^3/uL              0.60 - 3.40   

 

             #Mono        0.68 10^3/uL              0.00 - 0.90   

 

             #Eos         0.23 10^3/uL              0.00 - 0.70   

 

             #Baso        0.15 10^3/uL              0.00 - 0.20   

 

             #Ig          0.03 10^3/uL              0.00 - 0.10   

 

             #NRBC        0.00 10^3/uL              0.00 - 0.00   

 

             Manual Diff  NOT INDICATED                             

 

             RBC Morph    NOT INDICATED                             

 

                    CBC W/Auto Differential 2021          [facility]

             CBC W/Automated Diff (SEE NOTE)                             16

 

             WBC          9.3 10^3/uL               4.2 - 11.0    

 

             RBC          5.69 10^6/uL              4.50 - 6.30   

 

             Hemoglobin   17.4 g/dL    High         14.0 - 16.0   

 

             Hematocrit   51.0 %                    41.0 - 51.0   

 

             MCV          89.6 fL                   80.0 - 94.0   

 

             MCH          30.6 pg                   27.0 - 34.0   

 

             MCHC         34.1 g/dL                 31.0 - 36.0   

 

             RDW          12.6 %                    11.5 - 14.8   

 

             Platelets    221 10^3/uL               150 - 450     

 

             MPV          9.7 fL                    7.4 - 10.4    

 

             Neut         64.4 %                    37.0 - 80.0   

 

             Lymph        23.2 %       Low          25.0 - 40.0   

 

             Mono         8.4 %        High         3.0 - 8.0     

 

             Eos          2.3 %                     0.0 - 7.0     

 

             Baso         1.4 %                     0.0 - 2.0     

 

             %Ig          0.3 %        High         0.0 - 0.0     

 

             %NRBC        0.0 %                     0.0 - 0.0     

 

             #Neut        6.01 10^3/uL              2.00 - 6.90   

 

             #Lymph       2.16 10^3/uL              0.60 - 3.40   

 

             #Mono        0.78 10^3/uL              0.00 - 0.90   

 

             #Eos         0.21 10^3/uL              0.00 - 0.70   

 

             #Baso        0.13 10^3/uL              0.00 - 0.20   

 

             #Ig          0.03 10^3/uL              0.00 - 0.10   

 

             #NRBC        0.00 10^3/uL              0.00 - 0.00   

 

             Manual Diff  NOT INDICATED                             

 

             RBC Morph    NOT INDICATED                             

 

                    CMP W/Egfr          2021          [facility]

             Comprehensive Metabo (SEE NOTE)                             17

 

             Sodium       137 mEq/L                 134 - 153     

 

             Potassium    3.9 mEq/L                 3.6 - 5.0     

 

             Chloride     99 mEq/L                  98 - 107      

 

             Co2          25 mEq/L                  22 - 30       

 

             Glucose      166 mg/dL    High         70 - 99       

 

             BUN          19 mg/dL                  7 - 21        

 

             Creatinine   1.0 mg/dL                 0.7 - 1.5     

 

             BUN/Creat    19                        8 - 27        

 

             Total Protein 7.8 g/dL                  6.3 - 8.2     

 

             Albumin      4.8 g/dL                  3.9 - 5.0     

 

             Globulin     3.0 GM/DL                 2.4 - 3.2     

 

             A/G Ratio    1.6                       0.8 - 2.0     

 

             Calcium      10.2 mg/dL                8.4 - 10.2    

 

             Total Bili   0.7 mg/dL                 0.2 - 1.3     

 

             Alkaline Phos 84 U/L                    38 - 126      

 

             Sgot/Ast     29 U/L                    5 - 40        

 

             SGPT/Alt     29 U/L                    7 - 56        

 

             Anion Gap    13.0 mmol/L               8.0 - 16.0    

 

             Age          60 yrs                                  

 

             Non-Aa GFR   >60 mL/min                              

 

             Afr Amer GFR >60 mL/min                             18

 

                    Laboratory test finding 2021          [facility]

             Hgba1c       7.1 %        High         4.4 - 6.1    19

 

                    Lipid Panel         2021          [facility]

             Cve Panel    (SEE NOTE)                             20

 

             Cholesterol  182 mg/dL                 131 - 200     

 

             Triglycerides 304 mg/dL    High         35 - 160      

 

             HDL          44 mg/dL                  29 - 86       

 

             LDL          105 mg/dL                 65 - 175      

 

             Risk Factor  4.1                       3.4 - 4.9     

 

             LDL/HDL      2.39                      1.00 - 3.55  21

 

                    Laboratory test finding 2021          [facility]

             TSH Highly Sensitive 0.69 uIU/mL               0.47 - 5.01   

 

                    Laboratory test finding 2021          [facility]

             PSA - Diagnostic 0.71 ng/mL                0.00 - 4.00  22







                          1                         {      PROCEDURAL CONTROL   

VALID                                            )

{      KIT LOT #             [id number]                                     )

{      KIT EXP DATE          22                                     )



 

                          2                         This nucleic acid amplificat

ion test was developed and its performance

characteristics determined by [facility]. Nucleic acid

amplification tests include RT-PCR and TMA. This test has not been

FDA cleared or approved. This test has been authorized by FDA under

an Emergency Use Authorization (EUA). This test is only authorized

for the duration of time the declaration that circumstances exist

justifying the authorization of the emergency use of in vitro

diagnostic tests for detection of SARS-CoV-2 virus and/or diagnosis

of COVID-19 infection under section 564(b)(1) of the Act, 21 U.S.C.

                                        360bbb-3(b) (1), unless the authorizatio

n is terminated or revoked

sooner.

When diagnostic testing is negative, the possibility of a false

negative result should be considered in the context of a patient's

recent exposures and the presence of clinical signs and symptoms

consistent with COVID-19. An individual without symptoms of COVID-19

and who is not shedding SARS-CoV-2 virus would expect to have a

negative (not detected) result in this assay.



 

                          3                         This nucleic acid amplificat

ion test was developed and its performance

characteristics determined by [facility]. Nucleic acid

amplification tests include RT-PCR and TMA. This test has not been

FDA cleared or approved. This test has been authorized by FDA under

an Emergency Use Authorization (EUA). This test is only authorized

for the duration of time the declaration that circumstances exist

justifying the authorization of the emergency use of in vitro

diagnostic tests for detection of SARS-CoV-2 virus and/or diagnosis

of COVID-19 infection under section 564(b)(1) of the Act, 21 U.S.C.

                                        360bbb-3(b) (1), unless the authorizatio

n is terminated or revoked

sooner.

When diagnostic testing is negative, the possibility of a false

negative result should be considered in the context of a patient's

recent exposures and the presence of clinical signs and symptoms

consistent with COVID-19. An individual without symptoms of COVID-19

and who is not shedding SARS-CoV-2 virus would expect to have a

negative (not detected) result in this assay.



 

                          4                         Units are mL/min/1.73 m2



Chronic Kidney Disease Staging per [facility]:



Stage I & II   GFR >=60       Normal to Mildly Decreased

Stage III      GFR 30-59      Moderately Decreased

Stage IV       GFR 15-29      Severely Decreased

Stage V        GFR <15        Very Little GFR Left

ESRD           GFR <15 on RRT



 

                          5                         Is patient fasting?  N

 

                          6                         COMPREHENSIVE METABOLIC PANE

L



 

                          7                         Male GFR Interprentation

                                        20-49 yrs     >60 mL/min   Normal

                                        50-59 yrs     >56 mL/min   Normal

                                        60-69 yrs     >49 mL/min   Normal

                                        70-79yrs      >42 mL/min   Normal

                                        80 and above  >35 mL/min   Normal

Female GFR  Interpretation

                                        20-39 yrs     >60 mL/min   Normal

                                        40-49 yrs     >58 mL/min   Normal

                                        50-59 yrs     >51 mL/min   Normal

                                        60-69 yrs     >45 mL/min   Normal

                                        70-79 yrs     >39 mL/min   Normal

                                        80 and above  >32 mL/min   Normal



 

                          8                         Is patient fasting?  Y

 

                          9                         LIPID PANEL



 

                          10                        CVE

RISK           CHOL/HDL       LDL/HDL

MEN:

                                        1/2  AVERAGE          3.43          1.00

AVERAGE          4.97          3.55

                                        2X   AVERAGE          9.55          6.25

                                        3X   AVERAGE         23.99          7.99

WOMEN:

                                        1/2  AVERAGE          3.27          1.47

AVERAGE          4.44          3.22

                                        2X   AVERAGE          7.05          5.03

                                        3X   AVERAGE         11.04          6.14



 

                          11                        \BLDo\PSA INTERPRETATION\BLD

x\

The PSA assay should not be used alone for a screening test

or diagnosis for presence or absence of malignant disease.

Predictions of disease recurrence should not be based solely

on values obtained from serial patient serum values.

The PSA result was determined by "[employer]", on the Roche

SUNITHA 6000. Values obtained with different assay methods

or kits cannot be used interchangeably.



 

                          12                        {A1]

{HB]



 

                          13                        COMPREHENSIVE METABOLIC PANE

L



 

                          14                        Male GFR Interprentation

                                        20-49 yrs     >60 mL/min   Normal

                                        50-59 yrs     >56 mL/min   Normal

                                        60-69 yrs     >49 mL/min   Normal

                                        70-79yrs      >42 mL/min   Normal

                                        80 and above  >35 mL/min   Normal

Female GFR  Interpretation

                                        20-39 yrs     >60 mL/min   Normal

                                        40-49 yrs     >58 mL/min   Normal

                                        50-59 yrs     >51 mL/min   Normal

                                        60-69 yrs     >45 mL/min   Normal

                                        70-79 yrs     >39 mL/min   Normal

                                        80 and above  >32 mL/min   Normal



 

                          15                        COMPLETE BLOOD COUNT



 

                          16                        COMPLETE BLOOD COUNT



 

                          17                        COMPREHENSIVE METABOLIC PANE

L



 

                          18                        Male GFR Interprentation

                                        20-49 yrs     >60 mL/min   Normal

                                        50-59 yrs     >56 mL/min   Normal

                                        60-69 yrs     >49 mL/min   Normal

                                        70-79yrs      >42 mL/min   Normal

                                        80 and above  >35 mL/min   Normal

Female GFR  Interpretation

                                        20-39 yrs     >60 mL/min   Normal

                                        40-49 yrs     >58 mL/min   Normal

                                        50-59 yrs     >51 mL/min   Normal

                                        60-69 yrs     >45 mL/min   Normal

                                        70-79 yrs     >39 mL/min   Normal

                                        80 and above  >32 mL/min   Normal



 

                          19                        {A1]

{HB]



 

                          20                        LIPID PANEL



 

                          21                        CVE

RISK           CHOL/HDL       LDL/HDL

MEN:

                                        1/2  AVERAGE          3.43          1.00

AVERAGE          4.97          3.55

                                        2X   AVERAGE          9.55          6.25

                                        3X   AVERAGE         23.99          7.99

WOMEN:

                                        1/2  AVERAGE          3.27          1.47

AVERAGE          4.44          3.22

                                        2X   AVERAGE          7.05          5.03

                                        3X   AVERAGE         11.04          6.14



 

                          22                        \BLDo\PSA INTERPRETATION\BLD

x\

The PSA assay should not be used alone for a screening test

or diagnosis for presence or absence of malignant disease.

Predictions of disease recurrence should not be based solely

on values obtained from serial patient serum values.

The PSA result was determined by "[employer]", on the Roche

SUNITHA 6000. Values obtained with different assay methods

or kits cannot be used interchangeably.









Procedures





                Date            Code            Description     Status

 

                10/25/2021      01060           Office/Outpatient Established Mo

d MDM 30-39 Min Completed

 

                2021      75281           Office/Outpatient Established SF

 MDM 10-19 Min Completed

 

                2021      67909           Office/Outpatient Established Mo

d MDM 30-39 Min Completed

 

                2021      90552           Office/Outpatient Established Mo

d MDM 30-39 Min Completed

 

                2021      65406           Office/Outpatient Established Mo

d MDM 30-39 Min Completed

 

                2021      44286           Electrocardiogram Complete Compl

eted







[employer]





                                        Description

 

                                        No Information Available







Encounters





                                        Description

 

                                        No Information Available







Assessments





                Date            Code            Description     Provider

 

                10/25/2021      R05.9           Cough, unspecified Crystal Nevills

, [facility]

 

                10/25/2021      Z11.52          Encounter for screening for Covi

d-19 Crystal Nevills, [facility]

 

                10/25/2021      I10             Essential (primary) hypertension

 Crystal Nevills, [facility]

 

                2021      E11.65          Type 2 diabetes mellitus with hy

perglycemia [facility]

 

                2021      E78.00          Pure hypercholesterolemia, unspe

cified [facility]

 

                2021      E11.65          Type 2 diabetes mellitus with hy

perglycemia Crystal Nevills, [facility]

 

                2021      E78.00          Pure hypercholesterolemia, unspe

cified Crystal Nevills, [facility]

 

                2021      I10             Essential (primary) hypertension

 Crystal Nevills, [facility]

 

                2021      K21.9           Gastro-esophageal reflux disease

 without esophagitis Crystal 

Nevills, [facility]

 

                2021      R91.1           Solitary pulmonary nodule Crystal 

Nevills, [facility]

 

                2021      G47.33          Obstructive sleep apnea (adult) 

(pediatric) Crystal Nevills, [facility]

 

                2021      C64.1           Malignant neoplasm of right kidn

ey, except renal pelvis Crystal 

Nevills, [facility]

 

                2021      Z79.899         Other long term (current) drug t

herapy Crystal Nevills, FNP

 

                2021      E11.65          Type 2 diabetes mellitus with hy

perglycemia Crystal Nevills, FNP

 

                2021      E78.00          Pure hypercholesterolemia, unspe

cified Crystal Nevills, FNP

 

                2021      Z12.5           Encounter for screening for jennifer

gnant neoplasm of prostate 

Crystal Nevills, FNP

 

                2021      I10             Essential (primary) hypertension

 Crystal Nevills, FNP

 

                2021      K21.9           Gastro-esophageal reflux disease

 without esophagitis Crystal 

Nevills, P

 

                2021      R91.1           Solitary pulmonary nodule Crystal 

Nevills, [facility]

 

                2021      G47.33          Obstructive sleep apnea (adult) 

(pediatric) Crystal Nevills, P

 

                2021      C64.1           Malignant neoplasm of right kidn

ey, except renal pelvis Crystal 

Nevills, [facility]

 

                2021      Z79.899         Other long term (current) drug t

herapy Crystal Nevills, P

 

                2021      E11.65          Type 2 diabetes mellitus with hy

perglycemia Crystal Nevills, P

 

                2021      E78.00          Pure hypercholesterolemia, unspe

cified Crystal Nevills, P

 

                2021      I10             Essential (primary) hypertension

 Crystal Nevills, P

 

                2021      N52.1           Erectile dysfunction due to dise

ases classified elsewhere Crystal

Nevills, P

 

                2021      R07.89          Other chest pain Crystal Nevills, 

P

 

                2021      K21.9           Gastro-esophageal reflux disease

 without esophagitis Crystal 

Nevills, [facility]

 

                2021      Z79.899         Other long term (current) drug t

herapy Crystal Nevills, P

 

                2021      E11.65          Type 2 diabetes mellitus with hy

perglycemia Crystal Nevills, P

 

                2021      E78.00          Pure hypercholesterolemia, unspe

cified Crystal Nevills, P

 

                2021      Z79.899         Other long term (current) drug t

herapy Crystal Nevills, [facility]

 

                2021      Z12.5           Encounter for screening for jennifer

gnant neoplasm of prostate 

Crystal Carrington, [facility]

 

                2021      Z68.41          Body mass index [BMI]40.0-44.9, 

adult Crystal Carrington, [facility]

 

                2021      N52.1           Erectile dysfunction due to dise

ases classified elsewhere Crystal Carrington, [facility]

 

                2021      I10             Essential (primary) hypertension

 Crystal Carrington, [facility]

 

                2021      R07.89          Other chest pain SHAVONNE Watson







Plan of Treatment

Future Appointment(s):* 2021  8:20 am - SHAVONNE Watson at [facility]

* 2021  8:00 am - [facility] at [facility]

10/25/2021 - SHAVONNE Watson* R05.9 Cough, unspecified* Comments:* He had the
   labs done to test for RSV.Continue with Tesalon Perles as needed for 
  cough.Further plans to follow the lab results.We will continue to monitor.



* Follow up:* Patient to be contacted as soon as results are back. IF CONDITIONS
   WORSEN  AS NEEDED





* Z11.52 Encounter for screening for Covid-19* Comments:* He had the swabs done 
  to test for COVID.Further plans to follow the lab results.We will continue to 
  monitor.





* I10 Essential (primary) hypertension* Comments:* His BP was noted at 
  150/82.The patient was advised to continue with the current line of therapies.
   He will benefit from maintaining a low-sodium diet.  We will continue to 
  monitor.









Functional Status





                                        Description

 

                                        No Information Available







Mental Status





                                        Description

 

                                        No Information Available







Referrals





                Refer to      Reason for Referral Status          Appt Date

 

                          Ishmael Puckett        Patient requesting referral 

for pain management of chronic 

neck and back pain.       Sent                      

 

                                        [address] [place] [address] [phone]

 

                                          

 

                          Ingrid Velázquez,   Patient s/p recent right nep

hectomy.  Post op creatinine

 elevated at 2.1 on two separate occasions.  Please evaluate.  Thank you!  Sent 

                     



 

                                        [address]

 

                                        [place] [address]

 

                                        [phone]

 

                                          

 

                          [facility] Please see this pleasant

 59 y/o male s/p Right 

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  Closed               

     2021

 

                                        [address] [place] [address]

 

                                        [phone]

 

                                          

 

                          [facility] chest 2021 jeancarlos

ws evidence for bilateral 

pleural plaquing consistent ith previous asbestos exposure. There is also a 
nodular density superior segment LLL which warrants follow up study.  Please 
evaluate.  Thank you.     Closed                    2021

 

                                        [address]

 

                                        [place] [address]

 

                                        [phone]

 

                                          

 

                                                    Please evaluate finding of m

asslike lesion in the lower pole of the right 

kidney and systic lesions in the left kidney per imaging.  MRI of the Abdomen 
and Pelvis with and without contrast states most likely a large renal cell 
carcinoma.PT will hand deliver discs at appt. PLEASE EVALUATE AND TREAT AS SOON 
AS POSSIBLE. THANK YOU.   Closed                    2021

 

                                          

 

                Caleb Powers MD Chest pain.  HTN.  Current EKG showing PVC's

.  Closed          

06/15/2021

 

                                        [address] [place] [address]

 

                                        [phone]

## 2021-11-09 NOTE — CCD
Continuity of Care Document (CCD)

                             Created on: 10/25/2021



Carlos Bryant

External Reference #: [id number]

: 1960

Sex: Male



Demographics





                          Address                   [address] [place] [address]

 

                          Home Phone                [phone]

 

                          Preferred Language        Unknown

 

                          Marital Status            Never 

 

                          Gnosticist Affiliation     Unknown

 

                          Race                      White

 

                          Ethnic Group              Not  or 





Author





                          Author                    Carlos CARRINGTON FN

 

                          Organization              Unknown

 

                          Address                   [address] [place] [address]



 

                          Phone                     [phone]







Care Team Providers





                    Care Team Member Name Role                Phone

 

                    [facility]                [phone]

 

                    Moris Desai   AUTM                [phone]

 

                    Crystal Carrington  AUTM                [phone]







Problems





                    Active Problems     Provider            Date

 

                    Type II diabetes mellitus uncontrolled BELLA Severino, P

NP Onset: 2020

 

                    Essential hypertension BELLA Severino, PNP Onset: 2020

 

                    Pure hypercholesterolemia BELLA Severino, PNP Onset: 

 

                    Gastroesophageal reflux disease BELLA Severino, PNP Onse

t: 2020

 

                    Secondary erectile dysfunction BELLA Severino, PNP Onset

: 2020

 

                    Taking medication   BELLA Severino, PNP Onset: 

0

 

                    Right side sciatica BELLA Severino, PNP Onset: 

0

 

                    Secondary erectile dysfunction BELLA Severino, PNP Onset

: 2021







Social History





                Type            Date            Description     Comments

 

                Birth Sex                       Unknown          

 

                Tobacco Use     Start: Unknown End:  Quit             

 

                Tobacco Use     Start: Unknown  Never Smoked Cigars  

 

                Tobacco Use     Start: Unknown  Never Smoked A Pipe  

 

                Tobacco Use     Start: Unknown  Never Used Smokeless Tobacco  

 

                ETOH Use                        Denies alcohol use  

 

                Recreational Drug Use                 Denies Drug Use  

 

                Tobacco Use     Start: Unknown End: Unknown Patient is a former 

smoker  







Allergies and adverse reactions





             Active Allergies Criticality  Reaction | Severity Comments     Date

 

             Iodinated Diagnostic Agents Unable to assess criticality           

                2019







Medications





           Active Medications SIG        Qnty       Indications Ordering Provide

r Date

 

                          Fenofibrate                     145mg Tablets         

          1 tablet by 

mouth every day for triglycerides 90tabs                          Darian barboza MD 2021

 

                          Glipizide ER                     10mg Tablets ER 24HR 

                  Take 1 

Tablet By Mouth Once A Day 30tabs                          Olga Lidia tuttle M.D. 2021

 

                          Trazodone HCL                     50mg Tablets        

           take 1 tab by 

mouth at bedtime. 30tabs                          Darian Gomez MD 2021

 

                                        Blood Pressure Kit/Oscillating/Digital  

                    Kit                 

                Use as directed to monitor blood pressure twice a day 1units    

                      Darian Gomez MD                                      2021

 

                          Lisinopril                     10mg Tablets           

        take two tablets 

by mouth in the morning and 1 tab at bed time 90tabs                          Moi Gomez MD 

2021

 

                          Jardiance                     25mg Tablets            

       take one by mouth 

once a day      90tabs                          BELLA Severino, PNP 20

20

 

                          Carisoprodol                     350mg Tablets        

           1 by mouth at 

bedtime as needed                                 Unknown         

 

                          Blood Glucose Test                      Strips        

           please provide 

glucose test strips, covered by pt insurance for twice daily bs test dx: e11.65 

                    E11.65              Unknown             

 

                                        Blood Glucose Monitoring System         

            W/Device Kit                

             for three times a day blood sugar testing dx: e11.65              E

11.65       Unknown      



 

                          Mucinex                     600mg Tablets ER 12HR     

              1 by mouth 

twice a day as needed                                 Unknown         

 

                          Omeprazole                     20mg Capsules DR       

            1 by mouth 

every day  Written by Dr Malik                                 Unknown         



 

                          Topamax                     50mg Tablets              

     1 by mouth twice a 

day for headaches 180tabs                         BELLA Severino, PNP 

 

                          Novolog                     100Unit/ML Solution       

            10-15 units at

bedtime per sliding scale                                 Unknown         

 

                    Simple Diagnostics Lancing Device                      Misc 

                                      

                                        Unknown             

 

                          Hydrochlorothiazide                     25mg Tablets  

                 take one 

tablet by mouth every day 90tabs                          BELLA Severino, PN

P 

 

                          Lovastatin                     40mg Tablets           

        take one tablet by

mouth every evening with meal 90tabs                          BELLA Severino

, PNP 

 

                          Metformin HCL                     1000mg Tablets      

             take one 

tablet by mouth twice a day with food 180tabs                         BELLA Severino, PNP 



 

                          Meloxicam                     15mg Tablets            

       take one tablet by 

mouth every day 90tabs                          KIT Severino-BC, PNP 

 

                          Sildenafil Citrate                     20mg Tablets   

                Take One 

Tablet By Mouth as Directed                                 Unknown         

 

                                        Pharmacist Choice Ultra Thin Lancets 31G

                     31G Misc           

                                                    Unknown      

 

                          Hydrocodone-Acetaminophen                     5-325mg 

Tablets                   

Take One Tablet By Mouth Every 4 Hours Maximum Daily Dose   6 Tablets Written by
Dr Malik                                       Unknown         







Immunizations





                                        Description

 

                                        No Information Available







Vital Signs





                Date            Vital           Result          Comment

 

                10/25/2021  1:29pm BP Systolic     150 mmHg         

 

                    BP Diastolic        82 mmHg              

 

                    Heart Rate          76 /min              

 

                    Body Temperature    98.7 F             

 

                    Respiratory Rate    16 /min              

 

                    O2 % BldC Oximetry  96 %                 

 

                    Weight              266.00 lb            

 

                    Weight              120.658 kg           

 

                    Height              70 inches           5'10"

 

                    BMI (Body Mass Index) 38.2 kg/m2           

 

                    BSA (Body Surface Area) 2.36 m2              

 

                2021  8:07am BP Systolic     154 mmHg         

 

                    BP Diastolic        92 mmHg              

 

                    Heart Rate          70 /min              

 

                    Body Temperature    98.1 F             

 

                    Respiratory Rate    16 /min              

 

                    O2 % BldC Oximetry  97 %                 

 

                    Weight              266.50 lb            

 

                    Weight              120.884 kg           

 

                    Height              70 inches           5'10"

 

                    BMI (Body Mass Index) 38.2 kg/m2           

 

                    BSA (Body Surface Area) 2.36 m2              







Results





        Test    Acquired Date Facility Test    Result  H/L     Range   Note

 

                    Laboratory test finding 10/25/2021          [facility]

             RSV Antigen  By Dfa <pending>                               

 

                    Laboratory test finding 10/25/2021          [facility]

             Covid-19     <pending>                               

 

                    Covid-19            2021          [facility]

             Sars-CoV-2, Kristie Not Detected               Not Detected 1

 

             Sars-CoV-2, Kristie 2 Day Tat Performed                               

 

                    CBC With Differential 2021          [facility]

             White Blood Count 8.5 10       Normal       4.0-10.0      

 

             Red Blood Count 4.80 10      Normal       4.30-6.10     

 

             Hemoglobin   14.5 g/dL    Normal       13.5-17.5     

 

             Hematocrit   43.8 %       Normal       42.0-52.0     

 

             Mean Corpuscular Volume 91.3 fl      Normal       80.0-96.0     

 

             Mean Corpuscular Hemoglobin 30.2 pg      Normal       27.0-33.0    

 

 

             Mean Corpuscular HGB Conc 33.1 g/dL    Normal       32.0-36.5     

 

             Red Cell Distribution Width 14.0 %       Normal       11.5-14.5    

 

 

             Platelet Count, Automated 238 10       Normal       150-450       

 

             Neutrophils % 57.8 %       Normal       36.0-66.0     

 

             Lymph %      27.3 %       Normal       24.0-44.0     

 

             Mono %       11.2 %       High         2.0-8.0       

 

             Eos %        2.3 %        Normal       0.0-3.0       

 

             Baso %       1.2 %        High         0.0-1.0       

 

             Immature Granulocyte % 0.2 %        Normal       0-3.0         

 

             Nucleated Red Blood Cell % 0.0 %        Normal       0-0           

 

             Neutrophils # 4.9 10       Normal       1.5-8.5       

 

             Lymph #      2.3 10       Normal       1.5-5.0       

 

             Mono #       1.0 10       High         0.0-0.8       

 

             Eos #        0.2 10       Normal       0.0-0.5       

 

             Baso #       0.1 10       Normal       0.0-0.2       

 

                    Comprehensive Metabolic Profil 2021          [facility]

             Glucose, Fasting 112 mg/dL    High                 

 

             Blood Urea Nitrogen 31 mg/dL     High         7-18          

 

             Creatinine For GFR 2.36 mg/dL   High         0.70-1.30     

 

             Glomerular Filtration Rate 30.0         Low          >49          2

 

             Sodium Level 140 mEq/L    Normal       136-145       

 

             Potassium Serum 3.9 mEq/L    Normal       3.5-5.1       

 

             Chloride Level 109 mEq/L    High                 

 

             Carbon Dioxide Level 23 mEq/L     Normal       21-32         

 

             Anion Gap    8 mEq/L      Normal       8-16          

 

             Calcium Level 9.7 mg/dL    Normal       8.8-10.2      

 

             Ast/Sgot     15 U/L       Normal       7-37          

 

             Alt/SGPT     26 U/L       Normal       12-78         

 

             Alkaline Phosphatase 67 U/L       Normal               

 

             Bilirubin,Total 0.4 mg/dL    Normal       0.2-1.0       

 

             Total Protein 8.0 GM/DL    Normal       6.4-8.2       

 

             Albumin      4.3 GM/DL    Normal       3.2-5.2       

 

             Albumin/Globulin Ratio 1.2          Normal                     

 

                    Comprehensive Metabolic Panel 2021          [facility]

             Comprehensive Metabo (SEE NOTE)                             3, 4

 

             Sodium       139 mEq/L                 134 - 153     

 

             Potassium    4.4 mEq/L                 3.6 - 5.0     

 

             Chloride     105 mEq/L                 98 - 107      

 

             Co2          22 mEq/L                  22 - 30       

 

             Glucose      143 mg/dL    High         70 - 99       

 

             BUN          26 mg/dL     High         7 - 21        

 

             Creatinine   2.1 mg/dL    High         0.7 - 1.5     

 

             BUN/Creat    12                        8 - 27        

 

             Total Protein 6.9 g/dL                  6.3 - 8.2     

 

             Albumin      4.5 g/dL                  3.9 - 5.0     

 

             Globulin     2.4 GM/DL                 2.4 - 3.2     

 

             A/G Ratio    1.9                       0.8 - 2.0     

 

             Calcium      9.8 mg/dL                 8.4 - 10.2    

 

             Total Bili   <0.7 mg/dL                0.2 - 1.3     

 

             Alkaline Phos 53 U/L                    38 - 126      

 

             Sgot/Ast     16 U/L                    5 - 40        

 

             SGPT/Alt     12 U/L                    7 - 56        

 

             Anion Gap    12.0 mmol/L               8.0 - 16.0    

 

             Age          60 yrs                                  

 

             Non-Aa GFR   34 mL/min                               

 

             Afr Amer GFR 42 mL/min                              5

 

                    Laboratory test finding 2021          [facility]

l

             TSH Highly Sensitive 0.57 uIU/mL               0.47 - 5.01  6

 

             PSA - Diagnostic 1.05 ng/mL                0.00 - 4.00  7

 

                    Lipid Panel         2021          [facility]

             Cve Panel    (SEE NOTE)                             8

 

             Cholesterol  208 mg/dL    High         131 - 200     

 

             Triglycerides 328 mg/dL    High         35 - 160      

 

             HDL          42 mg/dL                  29 - 86       

 

             LDL          123 mg/dL                 65 - 175      

 

             Risk Factor  5.0          High         3.4 - 4.9     

 

             LDL/HDL      2.93                      1.00 - 3.55  9

 

                    Laboratory test finding 2021          [facility]

l

             Hgba1c       6.0 %                     4.4 - 6.1    10

 

                    CMP W/Egfr          2021          [facility]

             Comprehensive Metabo (SEE NOTE)                             11

 

             Sodium       138 mEq/L                 134 - 153     

 

             Potassium    4.5 mEq/L                 3.6 - 5.0     

 

             Chloride     104 mEq/L                 98 - 107      

 

             Co2          21 mEq/L     Low          22 - 30       

 

             Glucose      134 mg/dL    High         70 - 99       

 

             BUN          31 mg/dL     High         7 - 21        

 

             Creatinine   2.1 mg/dL    High         0.7 - 1.5     

 

             BUN/Creat    15                        8 - 27        

 

             Total Protein 7.7 g/dL                  6.3 - 8.2     

 

             Albumin      4.9 g/dL                  3.9 - 5.0     

 

             Globulin     2.8 GM/DL                 2.4 - 3.2     

 

             A/G Ratio    1.8                       0.8 - 2.0     

 

             Calcium      10.4 mg/dL   High         8.4 - 10.2    

 

             Total Bili   <0.7 mg/dL                0.2 - 1.3     

 

             Alkaline Phos 66 U/L                    38 - 126      

 

             Sgot/Ast     15 U/L                    5 - 40        

 

             SGPT/Alt     11 U/L                    7 - 56        

 

             Anion Gap    13.0 mmol/L               8.0 - 16.0    

 

             Age          60 yrs                                  

 

             Non-Aa GFR   34 mL/min                               

 

             Afr Amer GFR 42 mL/min                              12

 

                    CBC W/Auto Differential 2021          [facility]

l

             CBC W/Automated Diff (SEE NOTE)                             13

 

             WBC          8.2 10^3/uL               4.2 - 11.0    

 

             RBC          4.95 10^6/uL              4.50 - 6.30   

 

             Hemoglobin   15.0 g/dL                 14.0 - 16.0   

 

             Hematocrit   45.8 %                    41.0 - 51.0   

 

             MCV          92.5 fL                   80.0 - 94.0   

 

             MCH          30.3 pg                   27.0 - 34.0   

 

             MCHC         32.8 g/dL                 31.0 - 36.0   

 

             RDW          13.7 %                    11.5 - 14.8   

 

             Platelets    257 10^3/uL               150 - 450     

 

             MPV          10.8 fL      High         7.4 - 10.4    

 

             Neut         68.1 %                    37.0 - 80.0   

 

             Lymph        18.6 %       Low          25.0 - 40.0   

 

             Mono         8.3 %        High         3.0 - 8.0     

 

             Eos          2.8 %                     0.0 - 7.0     

 

             Baso         1.8 %                     0.0 - 2.0     

 

             %Ig          0.4 %        High         0.0 - 0.0     

 

             %NRBC        0.0 %                     0.0 - 0.0     

 

             #Neut        5.57 10^3/uL              2.00 - 6.90   

 

             #Lymph       1.52 10^3/uL              0.60 - 3.40   

 

             #Mono        0.68 10^3/uL              0.00 - 0.90   

 

             #Eos         0.23 10^3/uL              0.00 - 0.70   

 

             #Baso        0.15 10^3/uL              0.00 - 0.20   

 

             #Ig          0.03 10^3/uL              0.00 - 0.10   

 

             #NRBC        0.00 10^3/uL              0.00 - 0.00   

 

             Manual Diff  NOT INDICATED                             

 

             RBC Morph    NOT INDICATED                             

 

                    CBC W/Auto Differential 2021          [facility]

             CBC W/Automated Diff (SEE NOTE)                             14

 

             WBC          9.3 10^3/uL               4.2 - 11.0    

 

             RBC          5.69 10^6/uL              4.50 - 6.30   

 

             Hemoglobin   17.4 g/dL    High         14.0 - 16.0   

 

             Hematocrit   51.0 %                    41.0 - 51.0   

 

             MCV          89.6 fL                   80.0 - 94.0   

 

             MCH          30.6 pg                   27.0 - 34.0   

 

             MCHC         34.1 g/dL                 31.0 - 36.0   

 

             RDW          12.6 %                    11.5 - 14.8   

 

             Platelets    221 10^3/uL               150 - 450     

 

             MPV          9.7 fL                    7.4 - 10.4    

 

             Neut         64.4 %                    37.0 - 80.0   

 

             Lymph        23.2 %       Low          25.0 - 40.0   

 

             Mono         8.4 %        High         3.0 - 8.0     

 

             Eos          2.3 %                     0.0 - 7.0     

 

             Baso         1.4 %                     0.0 - 2.0     

 

             %Ig          0.3 %        High         0.0 - 0.0     

 

             %NRBC        0.0 %                     0.0 - 0.0     

 

             #Neut        6.01 10^3/uL              2.00 - 6.90   

 

             #Lymph       2.16 10^3/uL              0.60 - 3.40   

 

             #Mono        0.78 10^3/uL              0.00 - 0.90   

 

             #Eos         0.21 10^3/uL              0.00 - 0.70   

 

             #Baso        0.13 10^3/uL              0.00 - 0.20   

 

             #Ig          0.03 10^3/uL              0.00 - 0.10   

 

             #NRBC        0.00 10^3/uL              0.00 - 0.00   

 

             Manual Diff  NOT INDICATED                             

 

             RBC Morph    NOT INDICATED                             

 

                    CMP W/Egfr          2021          [facility]

             Comprehensive Metabo (SEE NOTE)                             15

 

             Sodium       137 mEq/L                 134 - 153     

 

             Potassium    3.9 mEq/L                 3.6 - 5.0     

 

             Chloride     99 mEq/L                  98 - 107      

 

             Co2          25 mEq/L                  22 - 30       

 

             Glucose      166 mg/dL    High         70 - 99       

 

             BUN          19 mg/dL                  7 - 21        

 

             Creatinine   1.0 mg/dL                 0.7 - 1.5     

 

             BUN/Creat    19                        8 - 27        

 

             Total Protein 7.8 g/dL                  6.3 - 8.2     

 

             Albumin      4.8 g/dL                  3.9 - 5.0     

 

             Globulin     3.0 GM/DL                 2.4 - 3.2     

 

             A/G Ratio    1.6                       0.8 - 2.0     

 

             Calcium      10.2 mg/dL                8.4 - 10.2    

 

             Total Bili   0.7 mg/dL                 0.2 - 1.3     

 

             Alkaline Phos 84 U/L                    38 - 126      

 

             Sgot/Ast     29 U/L                    5 - 40        

 

             SGPT/Alt     29 U/L                    7 - 56        

 

             Anion Gap    13.0 mmol/L               8.0 - 16.0    

 

             Age          60 yrs                                  

 

             Non-Aa GFR   >60 mL/min                              

 

             Afr Amer GFR >60 mL/min                             16

 

                    Laboratory test finding 2021          [facility]

             Hgba1c       7.1 %        High         4.4 - 6.1    17

 

                    Lipid Panel         2021          [facility]

             Cve Panel    (SEE NOTE)                             18

 

             Cholesterol  182 mg/dL                 131 - 200     

 

             Triglycerides 304 mg/dL    High         35 - 160      

 

             HDL          44 mg/dL                  29 - 86       

 

             LDL          105 mg/dL                 65 - 175      

 

             Risk Factor  4.1                       3.4 - 4.9     

 

             LDL/HDL      2.39                      1.00 - 3.55  19

 

                    Laboratory test finding 2021          [facility]

             TSH Highly Sensitive 0.69 uIU/mL               0.47 - 5.01   

 

                    Laboratory test finding 2021          [facility]

             PSA - Diagnostic 0.71 ng/mL                0.00 - 4.00  20







                          1                         This nucleic acid amplificat

ion test was developed and its performance

characteristics determined by [employer]. Nucleic acid

amplification tests include RT-PCR and TMA. This test has not been

FDA cleared or approved. This test has been authorized by FDA under

an Emergency Use Authorization (EUA). This test is only authorized

for the duration of time the declaration that circumstances exist

justifying the authorization of the emergency use of in vitro

diagnostic tests for detection of SARS-CoV-2 virus and/or diagnosis

of COVID-19 infection under section 564(b)(1) of the Act, 21 U.S.C.

                                        360bbb-3(b) (1), unless the authorizatio

n is terminated or revoked

sooner.

When diagnostic testing is negative, the possibility of a false

negative result should be considered in the context of a patient's

recent exposures and the presence of clinical signs and symptoms

consistent with COVID-19. An individual without symptoms of COVID-19

and who is not shedding SARS-CoV-2 virus would expect to have a

negative (not detected) result in this assay.



 

                          2                         Units are mL/min/1.73 m2



Chronic Kidney Disease Staging per [facility]:



Stage I & II   GFR >=60       Normal to Mildly Decreased

Stage III      GFR 30-59      Moderately Decreased

Stage IV       GFR 15-29      Severely Decreased

Stage V        GFR <15        Very Little GFR Left

ESRD           GFR <15 on RRT



 

                          3                         Is patient fasting?  N

 

                          4                         COMPREHENSIVE METABOLIC PANE

L



 

                          5                         Male GFR Interprentation

                                        20-49 yrs     >60 mL/min   Normal

                                        50-59 yrs     >56 mL/min   Normal

                                        60-69 yrs     >49 mL/min   Normal

                                        70-79yrs      >42 mL/min   Normal

                                        80 and above  >35 mL/min   Normal

Female GFR  Interpretation

                                        20-39 yrs     >60 mL/min   Normal

                                        40-49 yrs     >58 mL/min   Normal

                                        50-59 yrs     >51 mL/min   Normal

                                        60-69 yrs     >45 mL/min   Normal

                                        70-79 yrs     >39 mL/min   Normal

                                        80 and above  >32 mL/min   Normal



 

                          6                         Is patient fasting?  Y

 

                          7                         \BLDo\PSA INTERPRETATION\BLD

x\

The PSA assay should not be used alone for a screening test

or diagnosis for presence or absence of malignant disease.

Predictions of disease recurrence should not be based solely

on values obtained from serial patient serum values.

The PSA result was determined by "[employer]", on the Roche

SUNITHA 6000. Values obtained with different assay methods

or kits cannot be used interchangeably.



 

                          8                         LIPID PANEL



 

                          9                         CVE

RISK           CHOL/HDL       LDL/HDL

MEN:

                                        1/2  AVERAGE          3.43          1.00

AVERAGE          4.97          3.55

                                        2X   AVERAGE          9.55          6.25

                                        3X   AVERAGE         23.99          7.99

WOMEN:

                                        1/2  AVERAGE          3.27          1.47

AVERAGE          4.44          3.22

                                        2X   AVERAGE          7.05          5.03

                                        3X   AVERAGE         11.04          6.14



 

                          10                        {A1]

{HB]



 

                          11                        COMPREHENSIVE METABOLIC PANE

L



 

                          12                        Male GFR Interprentation

                                        20-49 yrs     >60 mL/min   Normal

                                        50-59 yrs     >56 mL/min   Normal

                                        60-69 yrs     >49 mL/min   Normal

                                        70-79yrs      >42 mL/min   Normal

                                        80 and above  >35 mL/min   Normal

Female GFR  Interpretation

                                        20-39 yrs     >60 mL/min   Normal

                                        40-49 yrs     >58 mL/min   Normal

                                        50-59 yrs     >51 mL/min   Normal

                                        60-69 yrs     >45 mL/min   Normal

                                        70-79 yrs     >39 mL/min   Normal

                                        80 and above  >32 mL/min   Normal



 

                          13                        COMPLETE BLOOD COUNT



 

                          14                        COMPLETE BLOOD COUNT



 

                          15                        COMPREHENSIVE METABOLIC PANE

L



 

                          16                        Male GFR Interprentation

                                        20-49 yrs     >60 mL/min   Normal

                                        50-59 yrs     >56 mL/min   Normal

                                        60-69 yrs     >49 mL/min   Normal

                                        70-79yrs      >42 mL/min   Normal

                                        80 and above  >35 mL/min   Normal

Female GFR  Interpretation

                                        20-39 yrs     >60 mL/min   Normal

                                        40-49 yrs     >58 mL/min   Normal

                                        50-59 yrs     >51 mL/min   Normal

                                        60-69 yrs     >45 mL/min   Normal

                                        70-79 yrs     >39 mL/min   Normal

                                        80 and above  >32 mL/min   Normal



 

                          17                        {A1]

{HB]



 

                          18                        LIPID PANEL



 

                          19                        CVE

RISK           CHOL/HDL       LDL/HDL

MEN:

                                        1/2  AVERAGE          3.43          1.00

AVERAGE          4.97          3.55

                                        2X   AVERAGE          9.55          6.25

                                        3X   AVERAGE         23.99          7.99

WOMEN:

                                        1/2  AVERAGE          3.27          1.47

AVERAGE          4.44          3.22

                                        2X   AVERAGE          7.05          5.03

                                        3X   AVERAGE         11.04          6.14



 

                          20                        \BLDo\PSA INTERPRETATION\BLD

x\

The PSA assay should not be used alone for a screening test

or diagnosis for presence or absence of malignant disease.

Predictions of disease recurrence should not be based solely

on values obtained from serial patient serum values.

The PSA result was determined by "[employer]", on the Roche

SUNITHA 6000. Values obtained with different assay methods

or kits cannot be used interchangeably.









Procedures





                Date            Code            Description     Status

 

                2021      62110           Office/Outpatient Established SF

 MDM 10-19 Min Completed

 

                2021      19642           Office/Outpatient Established Mo

d MDM 30-39 Min Completed

 

                2021      29772           Office/Outpatient Established Mo

d MDM 30-39 Min Completed

 

                2021      53062           Office/Outpatient Established Mo

d MDM 30-39 Min Completed

 

                2021      52688           Electrocardiogram Complete Compl

eted







[employer]





                                        Description

 

                                        No Information Available







Encounters





                                        Description

 

                                        No Information Available







Assessments





                Date            Code            Description     Provider

 

                10/25/2021      R05.9           Cough, unspecified Crystal Carrington

, [facility]

 

                10/25/2021      Z11.52          Encounter for screening for Covi

d-19 Crystal Carrington, [facility]

 

                10/25/2021      I10             Essential (primary) hypertension

 Crystal Nevills, FNP

 

                2021      E11.65          Type 2 diabetes mellitus with hy

perglycemia [facility]

 

                2021      E78.00          Pure hypercholesterolemia, unspe

cified [facility]

 

                2021      E11.65          Type 2 diabetes mellitus with hy

perglycemia Crystal Nevills, FNP

 

                2021      E78.00          Pure hypercholesterolemia, unspe

cified Crystal Nevills, FNP

 

                2021      I10             Essential (primary) hypertension

 Crystal Nevills, FNP

 

                2021      K21.9           Gastro-esophageal reflux disease

 without esophagitis Crystal 

Nevills, FNP

 

                2021      R91.1           Solitary pulmonary nodule Crystal 

Nevills, FNP

 

                2021      G47.33          Obstructive sleep apnea (adult) 

(pediatric) Crystal Nevills, FNP

 

                2021      C64.1           Malignant neoplasm of right kidn

ey, except renal pelvis Crystal 

Nevills, FNP

 

                2021      Z79.899         Other long term (current) drug t

herapy Crystal Nevills, FNP

 

                2021      E11.65          Type 2 diabetes mellitus with hy

perglycemia Crystal Nevills, FNP

 

                2021      E78.00          Pure hypercholesterolemia, unspe

cified Crystal Nevills, FNP

 

                2021      Z12.5           Encounter for screening for jennifer

gnant neoplasm of prostate 

Crystal Nevills, FNP

 

                2021      I10             Essential (primary) hypertension

 Crystal Nevills, FNP

 

                2021      K21.9           Gastro-esophageal reflux disease

 without esophagitis Crystal 

Nevills, FNP

 

                2021      R91.1           Solitary pulmonary nodule Crystal 

Nevills, FNP

 

                2021      G47.33          Obstructive sleep apnea (adult) 

(pediatric) Crystal Nevills, FNP

 

                2021      C64.1           Malignant neoplasm of right kidn

ey, except renal pelvis Crystal 

Nevills, FNP

 

                2021      Z79.899         Other long term (current) drug t

herapy Crystal Nevills, FNP

 

                2021      E11.65          Type 2 diabetes mellitus with hy

perglycemia Crystal Nevills, FNP

 

                2021      E78.00          Pure hypercholesterolemia, unspe

cified Crystal Nevills, [facility]

 

                2021      I10             Essential (primary) hypertension

 Rcystal Nevills, [facility]

 

                2021      N52.1           Erectile dysfunction due to dise

ases classified elsewhere Crystal

Nevills, [facility]

 

                2021      R07.89          Other chest pain Crystal Nevills, 

[facility]

 

                2021      K21.9           Gastro-esophageal reflux disease

 without esophagitis Crystal 

Nevills, [facility]

 

                2021      Z79.899         Other long term (current) drug t

herapy Crystal Nevills, [facility]

 

                2021      E11.65          Type 2 diabetes mellitus with hy

perglycemia Crystal Nevills, [facility]

 

                2021      E78.00          Pure hypercholesterolemia, unspe

cified Crystal Nevills, [facility]

 

                2021      Z79.899         Other long term (current) drug t

herapy Crystal Nevills, [facility]

 

                2021      Z12.5           Encounter for screening for jennifer

gnant neoplasm of prostate 

Crystal Nevills, [facility]

 

                2021      Z68.41          Body mass index [BMI]40.0-44.9, 

adult Crystal Nevills, [facility]

 

                2021      N52.1           Erectile dysfunction due to dise

ases classified elsewhere Crystal

 Nevills, [facility]

 

                2021      I10             Essential (primary) hypertension

 Crystal Nevills, [facility]

 

                2021      R07.89          Other chest pain Crystal Nevills, 

FNP







Plan of Treatment

Future Appointment(s):* 2021  8:20 am - Crystal Carrington FNP at [facility]

* 2021  8:00 am - [facility] at [facility]

10/25/2021 - Crystal Nevills, FNP* R05.9 Cough, unspecified* Comments:* He had the
   labs done to test for RSV.Further plans to follow the lab results.We will 
  continue to monitor.





* Z11.52 Encounter for screening for Covid-19* Comments:* He had the swabs done 
  to test for COVID.Further plans to follow the lab results.We will continue to 
  monitor.





* I10 Essential (primary) hypertension* Comments:* His BP was noted at 
  150/82.The patient was advised to continue with the current line of therapies.
   He will benefit from maintaining a low-sodium diet.  We will continue to 
  monitor.









Functional Status





                                        Description

 

                                        No Information Available







Mental Status





                                        Description

 

                                        No Information Available







Referrals





                Refer to      Reason for Referral Status          Appt Date

 

                          Ishmael Puckett        Patient requesting referral 

for pain management of chronic 

neck and back pain.       Sent                      

 

                                        [address] [place] [address] [phone]

 

                                          

 

                          MelaniaFreeman morrispj Tyler,   Patient s/p recent right nep

hectomy.  Post op creatinine

 elevated at 2.1 on two separate occasions.  Please evaluate.  Thank you!  Sent 

                     



 

                                        [address]

 

                                        [place] [address]

 

                                        [phone]

 

                                          

 

                          [facility] Please see this pleasant

 59 y/o male s/p Right 

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  Closed               

     2021

 

                                        [address] [place] [address]

 

                                        [phone]

 

                                          

 

                          [facility] chest 2021 jeancralos

ws evidence for bilateral 

pleural plaquing consistent ith previous asbestos exposure. There is also a 
nodular density superior segment LLL which warrants follow up study.  Please 
evaluate.  Thank you.     Closed                    2021

 

                                        [address]

 

                                        [place] [address]

 

                                        [phone]

 

                                          

 

                                                    Please evaluate finding of m

asslike lesion in the lower pole of the right 

kidney and systic lesions in the left kidney per imaging.  MRI of the Abdomen 
and Pelvis with and without contrast states most likely a large renal cell 
carcinoma.PT will hand deliver discs at appt. PLEASE EVALUATE AND TREAT AS SOON 
AS POSSIBLE. THANK YOU.   Closed                    2021

 

                                          

 

                Caleb Powers MD Chest pain.  HTN.  Current EKG showing PVC's

.  Closed          

06/15/2021

 

                                        [address] [place] [address] [phone]

## 2021-11-09 NOTE — CCD
Continuity of Care Document (CCD)

                             Created on: 2021



Chiki Albertwin

External Reference #: [id number]

: 1960

Sex: Male



Demographics





                          Address                   [address] [place] [address]  [address]

 

                          Home Phone                [phone]

 

                          Preferred Language        Unknown

 

                          Marital Status            Unknown

 

                          Muslim Affiliation     Unknown

 

                          Race                      White

 

                          Ethnic Group              Not  or 





Author





                          Author                    Joe LASSITER ANP-BC C.

U.N.P.

 

                          Organization              Unknown

 

                          Address                   [address] [place] [address] [address]

[place]  [address]



 

                          Phone                     [phone]







Care Team Providers





                    Care Team Member Name Role                Phone

 

                    Caleb Powers M.D.  AUTM                [phone]

 

                    Crystal Carrington NP   AUTM                [phone]







Problems





                    Active Problems     Provider            Date

 

                    Diabetes mellitus   Carrington Russell MD  Onset: 2021







Social History





                Type            Date            Description     Comments

 

                Birth Sex                       Male             

 

                Tobacco Use     Reviewed: 21 Never Smoked Cigarettes  

 

                Smoking Status  Reviewed: 21 Never Smoked Cigarettes  

 

                ETOH Use                        Currently consumes alcohol  







Allergies, Adverse Reactions, Alerts





             Active Allergies Criticality  Reaction | Severity Comments     Date

 

             Injected Iodine Unable to assess criticality                       

    2021







Medications





           Active Medications SIG        Qnty       Indications Ordering Provide

r Date

 

                          Colace                     100mg Capsules             

      1 by mouth twice a 

day start after surgery 30caps                          Carrington Russell MD 

 

             Glipizide ER                     10mg Tablets ER 24HR              

                                            

Unknown                                 

 

                          Carisoprodol                     350mg Tablets        

           Take One Tablet

By Mouth Three Times A Day Maximum Daily Dose   3 Tablets                       

          Unknown         

 

                          Hydrocodone-Acetaminophen                     5-325mg 

Tablets                   

                                                Unknown         

 

                          Trazodone HCL                     50mg Tablets        

           Take Three 

Tablets By Mouth AT Bedtime                                 Unknown         

 

                          Lisinopril                     10mg Tablets           

        Take Two Tablets 

By Mouth Once Daily                                 Unknown         

 

                          Jardiance                     25mg Tablets            

       Take One Tablet By 

Mouth Every Day                                 Unknown         

 

                          Omeprazole                     20mg Capsules DR       

            Take One 

Capsule By Mouth Twice A Day                                 Unknown         

 

                          Fenofibrate                     145mg Tablets         

          Take One Tablet 

By Mouth Every Day For Triglycerides                                 Unknown    

     

 

                          Lovastatin                     40mg Tablets           

        Take One Tablet By

Mouth Every Evening With A Meal                                 Unknown         



 

                          Meloxicam                     15mg Tablets            

       Take One Tablet By 

Mouth Every Day                                 Unknown         

 

             Metformin HCL                     1000mg Tablets                   

                                       Connie [place] NP                        

 

                          Topiramate                     50mg Tablets           

        Take One Tablet By

Mouth Twice A Day For Headaches                                 Unknown         









Immunizations





                                        Description

 

                                        No Information Available







Vital Signs





                Date            Vital           Result          Comment

 

                2021  2:20pm Height          70 inches       5'10"

 

                    Weight              266.00 lb            

 

                    Weight              120.658 kg           

 

                    BMI (Body Mass Index) 38.2 kg/m2           

 

                    BP Systolic         159 mmHg             

 

                    BP Diastolic        100 mmHg             

 

                    Heart Rate          71 /min              

 

                    Body Temperature    98.1 F             

 

                2021  2:24pm Height          70 inches        

 

                    Weight              280.00 lb            

 

                    Weight              127.008 kg           

 

                    BMI (Body Mass Index) 40.2 kg/m2           

 

                    BP Systolic         184 mmHg             

 

                    BP Diastolic        81 mmHg              

 

                    Heart Rate          80 /min              







Results





        Test    Acquired Date Facility Test    Result  H/L     Range   Note

 

                    230 Ua Routine      2021          [facility]

REF TO DR ADDRESS ON ORDER FOR

           (870)-   - Ua Glucose >=1000 mg/dL                        

 

             Ua Protein   Negative                                

 

             Ua Nitrite   Negative                                

 

             Ua Leuko     Negative                                

 

             Ua Blood     Trace-intact                             

 

             Ua Color     Not Entered                             

 

             Ua Ketones   Negative                                

 

             Ua Clarity   Not Entered                             

 

             Ua Specific Gravity 1.010                     1.003-1.030   

 

             Ua PH        5.5                       5.0-7.5       

 

             Ua Bilirubin Negative                                

 

             Ua Urobilinogen 0.2 E.U./dL               0.0-1.0       

 

                    Basic Metabolic Panel 2021          [facility]

                                        [address] [place] [address]

           [phone] Creatinine 2.66 mg/dL High       0.72-1.25   

 

             Glucose      64.0 mg/dL   Low          70.0-99.0     

 

             Co2          21.0 mmol/L  Low          22.0-31.0     

 

             Calcium      10.1 mg/dL                8.4-10.2      

 

             BUN          35.0 mg/dL   High         7.0-24.0      

 

             BUN/Creat Ratio 13.2                                    

 

             Na           140.0 mmol/L              136.0-145.0   

 

             K            4.2 mmol/L                3.6-5.2       

 

             Cl           107.0 mmol/L              98.0-107.0    

 

             Anion Gap    16.2                                    

 

             eGFR -  Descent 29.0         Low          >60.0         

 

             eGFR -- Non- Descent 23.9         Low          >60.0        

 

 

                    Laboratory test finding 2021          [facility]

                                        [address]

[place] [address]

           [phone] Surgical Pathology LABORATORY [facility] <SEE NOTE>        

                1

 

                    Urinalysis          2021          [address] [address] [phone] Urinalysis (SEE NOTE)                        2

 

             Source       Clean Catch                             

 

             Color        yellow                    Normal: Yellow  

 

             Clarity      clear                     Normal: Clear  

 

             Spec Gravity 1.015                     1.001 - 1.030  

 

             pH           6.5                       5 - 9         

 

             Glucose      1000         Abnormal     Normal: Negative  

 

             Bilirubin    NEG                       Normal: Negative  

 

             Ketone       NEG                       Normal: Negative  

 

             Protein      30                        Normal: Negative  

 

             Nitrite      Negative                  Normal: Negative  

 

             Blood        NEG                       Normal: Negative  

 

             Leuk Est     NEG                       Normal: Negative  

 

             Urobilinogen NOR                       less than 1.0 mg/dL  

 

             Microscopic  See Below                               

 

             Epithelial   FEW                       Normal: None Seen  

 

             Mucous       Trace                     Normal: None Seen  

 

                    Urine Culture       2021          [address] [address] [phone] Culture Urine (SEE NOTE)                        3

 

                    Comprehensive Metabolic Panel 2021          [facility]

                                        [address] [place] [phone] Comprehensive Metabo (SEE NOTE)                       

 4

 

             Sodium       141 mEq/L                 134 - 153     

 

             Potassium    3.8 mEq/L                 3.6 - 5.0     

 

             Chloride     105 mEq/L                 98 - 107      

 

             Co2          24 mEq/L                  22 - 30       

 

             Glucose      129 mg/dL    High         70 - 99       

 

             BUN          15 mg/dL                  7 - 21        

 

             Creatinine   0.9 mg/dL                 0.7 - 1.5     

 

             BUN/Creat    17                        8 - 27        

 

             Total Protein 7.4 g/dL                  6.3 - 8.2     

 

             Albumin      4.7 g/dL                  3.9 - 5.0     

 

             Globulin     2.7 GM/DL                 2.4 - 3.2     

 

             A/G Ratio    1.7                       0.8 - 2.0     

 

             Calcium      9.8 mg/dL                 8.4 - 10.2    

 

             Total Bili   <0.7 mg/dL                0.2 - 1.3     

 

             Alkaline Phos 59 U/L                    38 - 126      

 

             Sgot/Ast     23 U/L                    5 - 40        

 

             SGPT/Alt     24 U/L                    7 - 56        

 

             Anion Gap    12.0 mmol/L               8.0 - 16.0    

 

             Age          60 yrs                                  

 

             Non-Aa GFR   >60 mL/min                              

 

             Afr Amer GFR >60 mL/min                             5

 

                    PT/PTT              2021          [facility].

                                        [address] [place] [phone] Protime    12.0 seconds            11.0 - 15.5  

 

             Inr          0.88         Low          0.93 - 1.23   

 

             PTT          28.8 seconds              24.8 - 36.7  6

 

                    CBC W/Automated Diff 2021          [address] [address]

           [phone] CBC W/Automated Diff (SEE NOTE)                       

 7

 

             WBC          7.8 10^3/uL               4.2 - 11.0    

 

             RBC          5.24 10^6/uL              4.50 - 6.30   

 

             Hemoglobin   15.9 g/dL                 14.0 - 16.0   

 

             Hematocrit   46.5 %                    41.0 - 51.0   

 

             MCV          88.7 fL                   80.0 - 94.0   

 

             MCH          30.3 pg                   27.0 - 34.0   

 

             MCHC         34.2 g/dL                 31.0 - 36.0   

 

             RDW          13.1 %                    11.5 - 14.8   

 

             Platelets    222 10^3/uL               150 - 450     

 

             MPV          9.4 fL                    7.4 - 10.4    

 

             Neut         68.3 %                    37.0 - 80.0   

 

             Lymph        20.2 %       Low          25.0 - 40.0   

 

             Mono         8.7 %        High         3.0 - 8.0     

 

             Eos          1.5 %                     0.0 - 7.0     

 

             Baso         1.2 %                     0.0 - 2.0     

 

             %Ig          0.1 %        High         0.0 - 0.0     

 

             %NRBC        0.0 %                     0.0 - 0.0     

 

             #Neut        5.31 10^3/uL              2.00 - 6.90   

 

             #Lymph       1.57 10^3/uL              0.60 - 3.40   

 

             #Mono        0.68 10^3/uL              0.00 - 0.90   

 

             #Eos         0.12 10^3/uL              0.00 - 0.70   

 

             #Baso        0.09 10^3/uL              0.00 - 0.20   

 

             #Ig          0.01 10^3/uL              0.00 - 0.10   

 

             #NRBC        0.00 10^3/uL              0.00 - 0.00   

 

             Manual Diff  NOT INDICATED                             

 

             RBC Morph    NOT INDICATED                             

 

                    230 Ua Routine      2021          [facility]

REF TO DR ADDRESS ON ORDER FOR

           (315)-   - Ua Glucose >=1000 mg/dL                        

 

             Ua Protein   Negative                                

 

             Ua Nitrite   Negative                                

 

             Ua Leuko     Negative                                

 

             Ua Blood     Negative                                

 

             Ua Color     Not Entered                             

 

             Ua Ketones   Negative                                

 

             Ua Clarity   Not Entered                             

 

             Ua Specific Gravity 1.020                     1.003-1.030   

 

             Ua PH        5.0                       5.0-7.5       

 

             Ua Bilirubin Negative                                

 

             Ua Urobilinogen 0.2 E.U./dL               0.0-1.0       







                                        1 

LABORATORY [facility]

                                        [address] [place] [address]

Tel# [phone]  Fax# [phone]



SURGICAL PATHOLOGY REPORT



Patient Name:JOE ALBERT

:1960



Received:2021

Accession #:[id number]

Specimen(s) Received:

 A: Right renal hilar tissue

B: Right kidney



Clinical Diagnosis and History:

 Enhancing solid mass in the lower pole of the right kidney, 6.0 x 4.1 cm.



DIAGNOSIS:

A)     RIGHT RENAL HILAR TISSUE, EXCISION - ADIPOSE TISSUE WITH ONE 

     LYMPH NODE, NEGATIVE FOR MALIGNANCY (0/1).



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 

     CARCINOMA (SEE CASE SUMMARY BELOW).





     CASE SUMMARY:



     SPECIMEN LATERALITY:

          RIGHT.



     TUMOR SITE:

          LOWER POLE.



     TUMOR SIZE:

          5.0 CM.



     TUMOR FOCALITY:

          UNIFOCAL.



     HISTOLOGIC TYPE:

          CLEAR CELL RENAL CELL CARCINOMA.



     SARCOMATOID FEATURES:

          NOT IDENTIFIED.



     RHABDOID FEATURES:

          NOT IDENTIFIED.



     HISTOLOGIC GRADE (WHO / ISUP Grade):

          GRADE 1.



     TUMOR NECROSIS:

          NOT IDENTIFIED.



     TUMOR EXTENSION:

          LIMITED TO KIDNEY.



     MARGINS:

          PERINEPHRIC FAT:  NEGATIVE.



          RENAL SINUS SOFT TISSUE:  NEGATIVE.

          

          URETERAL MARGINS:  NEGATIVE.

                         

          RENAL VEIN MARGIN:  NEGATIVE.



          RENAL ARTERY MARGIN:  NEGATIVE.

     

          GEROTA'S FASCIAL MARGIN:     NEGATIVE.



     LYMPHOVASCULAR  INVASION:

          NOT IDENTIFIED.



     REGIONAL LYMPH NODES (INCLUDING PART A):

          NUMBER OF LYMPH NODES INVOLVED:  0.



          NUMBER OF LYMPH NODES EXAMINED:  1.     



     PATHOLOGIC STAGE CLASSIFICATION (pTNM, AJCC 8TH EDITION):

          pT1b  pN0



     OTHER FINDINGS IN NON-NEOPLASTIC KIDNEY:

          1.     TWO SEPARATE URETERS, SUGGESTIVE OF DUPLEX KIDNEY.

          2.     MILD ARTERIOLAR NEPHROSCLEROSIS.

          3.     CHRONIC INFLAMMATION. 

 

GROSS DESCRIPTION:

 Specimen A received in formalin labeled "right renal hilar tissue" is an

irregular cauterized portion of yellow to brown soft tissue measuring 2.5

x 2.0 x 1.0 cm.  There is a moderate to abundant amount of cautery

artifact.  The cut surface is yellow, glistening, lobulated adipose

tissue.  No identifiable structures are found grossly.  The specimen is

sectioned and entirely submitted for microscopic examination. (1 block)  



Specimen B received in formalin labeled "right kidney" is a right radical

nephrectomy weighing 880 gm.  There is an abundant amount of attached

perinephric fat and along the superior pole a portion of the perinephric

fat is torn.  The specimen measures 20 cm from superior to inferior, 15 cm

from medial to lateral and 8 cm from anterior to deep.  There is Gerota's

fascia along the anterior to superior portion that is unremarkable,

tan-gray and measures 8.0 x 3.5 cm.  There are two ureters identified. 

The first ureter is associated with the superior pole, measures 8 cm from

the hilum, 0.5 cm in length and the mucosa is glistening, striated,

tan-gray.  The second ureter is located 1.5 cm lateral to the first,

measures 4 cm in length from the hilum, 0.5 cm in diameter and has a

glistening, striated, tan-gray mucosal surface.  The second is associated

with the mid to inferior portion of the kidney.  There is one renal vein

and one renal artery both measuring 0.5 cm in length from the hilum and

the renal vein mucosa is smooth, glistening, gray.  There is minimal

atherosclerosis of the renal artery.  Located in the inferior pole of the

kidney, toward the superficial aspect, is a cystic tumor measuring 5.0 x

                                        4.5 cm.  The tumor is mottled, orange-ye

llow to tan-red and possibly

involves the renal sinus grossly.  Portions of the tumor are encapsulated,

extend toward the hilar adipose tissue, inferior parenchyma and

superficial parenchyma, however, the tumor is still confined to the

kidney.  The tumor compress the second/inferior ureter and comes to within

                                        3.7 cm of the renal vein margin.  There 

is minimal necrosis found within

the tumor.  The inferior renal pelvis is glistening, tan-pink and the

surrounding uninvolved parenchyma is tan-pink with a well demarcated

corticomedullary junction averaging 0.9 cm in greatest dimension.  The

renal parenchyma associated with each pelvis is homogeneous, red-brown and

appears to coalesce in the mid portion.  The cut surface of the superior

renal pelvis is slightly misshapen, however, no additional anomalies are

found.  There are no hilar lymph nodes found.  The adrenal gland is not

present.  Representative sections are submitted for microscopic

examination as follows:  UM - superior ureter margin; VM - vascular

margin; UM2 - inferior ureter margin; T - tumor (4 - tumor to inferior

pelvis, 5,6 - tumor to sinus fat, 7 - tumor to superficial perpendicular,

                                        8 - tumor to lateral, 9 - tumor to media

l/hilar tissue); SP - superior

pelvis; IP - inferior pelvis; R - random, mid.  (12 blocks)    



niraj ignacio/[facility]



Reported: 2021

***Electronically Signed Out By Armando Mcfarland MD***         

[facility]



[place] [address]



Technical component performed at [facility], Histopathology, [address] [place] [address], [facility].

Reported at [facility], [address] [place] [address], Pending sale to [facility].

This report may include immunohistochemical or in-situ hybridization

results. Testing was developed and the performance characteristics

determined by [facility] [facility], as required by

CLIA '88. The FDA has determined that approval for specific use is not

necessary for clinical use. The quality of Hematoxylin and Eosin stains

and as applicable, for all immunohistochemical and/or special stains,

including positive and negative controls, were reviewed and considered

appropriate.



ICD codes:

 C64.1

CPT4 codes: 

A: 97467F

B: 75553N



 

                          2                         URINALYSIS



 

                          3                         _CULTURE URINE_

^$[id number]

^^[id number]

                                        $$011507

^^548101

                                        $$876571

                                        $$441087

                                        $$666392

                                        $$944862

                                        $$912079

                                        $$580857

                                        $$486595

                                        $$962616

                                        $$243994

                                        $$636805

                                        $$646688

                                        $$587154

                                        $$357695

                                        $$252206

                                        $$477536

                                        $$700091

                                        $$633875

                                        $$273462

                                        $$395816

                                        $$742663

                                        $$087217

                                        $$576312

                                        $$264598

^^315007

                                        $$481566

                                        $$396645

                                        $$800122



                                        -- Continued on next page --

Patient: BETY THAPA                  Order: [id number]                   Page 2

Culture: CULTURE URINE                                           Status: Final

===============================================================================





                                        -- Continued on next page --

Patient: BETY THAPA                  Order: [id number]                   Page 2

Culture: CULTURE URINE                                          Status: Prelim

===============================================================================



                                        $$[id number]

                                        $$[id number]



REPORTED DATE/TIME: 2021 15:05

Culture: CULTURE URINE                                           Status: Final



Urine Culture,Comprehensive:                                                  P1

No growth in 36 - 48 hours.

**** Previous result entered on 07/10/2021 10:46 ET ****

No growth after 18-24 hours.





P1 Test performed by: [facility] #: [id number]

                                        [address] [place] [address]

                                        [id number]

[facility] [id number]

Medical Director  :   Reyes Araceli B MD                       NPI #:

      :

                                        07/10/21.1157.XMT.SENT REF

                                        [email or web address].SENT REF





 

                          4                         COMPREHENSIVE METABOLIC PANE

L



 

                          5                         Male GFR Interprentation

                                        20-49 yrs     >60 mL/min   Normal

                                        50-59 yrs     >56 mL/min   Normal

                                        60-69 yrs     >49 mL/min   Normal

                                        70-79yrs      >42 mL/min   Normal

                                        80 and above  >35 mL/min   Normal

Female GFR  Interpretation

                                        20-39 yrs     >60 mL/min   Normal

                                        40-49 yrs     >58 mL/min   Normal

                                        50-59 yrs     >51 mL/min   Normal

                                        60-69 yrs     >45 mL/min   Normal

                                        70-79 yrs     >39 mL/min   Normal

                                        80 and above  >32 mL/min   Normal



 

                          6                         \BLDo\INR INTERPRETATION\BLD

x\

Therapeutic range for Coumadin and related oral anticoagulants.

                                        -International Normalized Ratio (INR): 2

.0 - 3.0 for Venous

Thrombosis, Pulmonary Embolus, Tissue heart valves, Acute MI

Atrial Fibrillation, Valvular heart disease and recurrent

Systemic Embolism.

                                        -International Normalized Ratio (INR): 2

.5 - 3.5 for Mechanical

Prosthetic valve.



 

                          7                         COMPLETE BLOOD COUNT









Procedures





                Date            Code            Description     Status

 

                    2021          13758               Radical Laparoscopic

 Nephrectomy W/REM Gerotas Fascia/Lymph 

Nodes                                   Completed

 

                2021      21379           Office/Outpatient New Moderate M

DM 45-59 Minutes Completed







Medical Devices





                                        Description

 

                                        No Information Available







Encounters





           Type       Date       Location   Provider   Dx         Diagnosis

 

                Office Visit    2021  3:15p [facility]

miles Russell MD                        C64.1                     Malignant neoplasm of right 

kidney, except renal pelvis

 

                Office Visit    2021  3:00p [facility]

miles Russell MD                        D49.511                   Neoplasm of unspecified beha

vior of right kidney







Assessments





                Date            Code            Description     Provider

 

                2021      C64.1           Malignant neoplasm of right kidn

ey, except renal pelvis Jo Lassiter, KIT-BC [facility]

 

                2021      C64.1           Malignant neoplasm of right kidn

ey, except renal pelvis Carrington Russell MD

 

                2021      C64.1           Malignant neoplasm of right kidn

ey, except renal pelvis Jake King MD

 

                2021      D49.511         Neoplasm of unspecified behavior

 of right kidney Carrington Russell MD

 

                2021      D49.511         Neoplasm of unspecified behavior

 of right kidney Carrington Russell MD







Plan of Treatment

Future Appointment(s):* 10/27/2021  3:45 pm - Carrington Russell MD at [facility]

2021 - Carrington Russell MD* C64.1 Malignant neoplasm of right kidney, 
  except renal pelvis* New Labs:* Comprehensive Metabolic Panel, Ordered: 
  21



* New Xrays:* CT Chest W/O Contrast, Scheduled: 10/27/21

* CT Abdomen And Pelvis W/O Contrast, Scheduled: 10/27/21









Functional Status





                                        Description

 

                                        No Information Available







Mental Status





                                        Description

 

                                        No Information Available







Referrals





                Refer to      Reason for Referral Status          Appt Date

 

                          Carrington Russell M.D.       Rubin PA  @ [facility]

are [phone] Patient has 

benefits for inpatient surgery-hospital Covered @ 100% CPT 75808, 76597 (72607, 
42872, 94836, 13467)-valid, billable-auth required as inpatient Ref# 
[id number] Authorization is through Clinical intake dept [phone] Per 
Ta NICOLE @ [employer] [phone] rep built case for me, case# [id number] Per
Kirti VERMA(nurse reviewer)[facility] CPT 20382, 46731 (99639, 46850, 63770, 25723)-
Approved Auth# [id number] Valid: 21-10/11/21 7/1/21 MW  Created             

      

 

                                        [facility]

 

                                        [address] [place] [id number] [phone]

## 2021-11-09 NOTE — CCD
Continuity of Care Document (CCD)

                             Created on: 2021



Carlos Bryant

External Reference #: [id number]

: 1960

Sex: Male



Demographics





                          Address                   [address] [place] [address]

 

                          Home Phone                [phone]

 

                          Preferred Language        Unknown

 

                          Marital Status            Never 

 

                          Synagogue Affiliation     Unknown

 

                          Race                      White

 

                          Ethnic Group              Not  or 





Author





                          Author                    Carlos CARRINGTON FN

 

                          Organization              Unknown

 

                          Address                   [address] [place] [address]



 

                          Phone                     [phone]







Care Team Providers





                    Care Team Member Name Role                Phone

 

                    [facility]                [phone]

 

                    Moris Desai   AUTM                [phone]

 

                    Crystal Carrington  AUTM                [phone]







Problems





                    Active Problems     Provider            Date

 

                    Type II diabetes mellitus uncontrolled BELLA Severino, P

NP Onset: 2020

 

                    Essential hypertension BELLA Severino, PNP Onset: 2020

 

                    Pure hypercholesterolemia BELLA Severino, PNP Onset: 

 

                    Gastroesophageal reflux disease BELLA Severino, PNP Onse

t: 2020

 

                    Secondary erectile dysfunction BELLA Severino, PNP Onset

: 2020

 

                    Taking medication   BELLA Severino, PNP Onset: 

0

 

                    Right side sciatica BELLA Severino, PNP Onset: 

0

 

                    Secondary erectile dysfunction BELLA Severino, PNP Onset

: 2021







Social History





                Type            Date            Description     Comments

 

                Birth Sex                       Unknown          

 

                Tobacco Use     Start: Unknown End:  Quit             

 

                Tobacco Use     Start: Unknown  Never Smoked Cigars  

 

                Tobacco Use     Start: Unknown  Never Smoked A Pipe  

 

                Tobacco Use     Start: Unknown  Never Used Smokeless Tobacco  

 

                ETOH Use                        Denies alcohol use  

 

                Recreational Drug Use                 Denies Drug Use  

 

                Tobacco Use     Start: Unknown End: Unknown Patient is a former 

smoker  







Allergies, Adverse Reactions, Alerts





             Active Allergies Criticality  Reaction | Severity Comments     Date

 

             Iodinated Diagnostic Agents Unable to assess criticality           

                2019







Medications





           Active Medications SIG        Qnty       Indications Ordering Provide

r Date

 

                          Fenofibrate                     145mg Tablets         

          1 tablet by 

mouth every day for triglycerides 90tabs                          Darian barboza MD 2021

 

                          Glipizide ER                     10mg Tablets ER 24HR 

                  Take 1 

Tablet By Mouth Once A Day 30tabs                          Olga Lidia tuttle M.D. 2021

 

                          Trazodone HCL                     50mg Tablets        

           take 1 tab by 

mouth at bedtime. 30tabs                          Darian Gomez MD 2021

 

                                        Blood Pressure Kit/Oscillating/Digital  

                    Kit                 

                Use as directed to monitor blood pressure twice a day 1units    

                      Darian Gomez MD                                      2021

 

                          Lisinopril                     10mg Tablets           

        take two tablets 

by mouth in the morning and 1 tab at bed time 90tabs                          Moi Gomez MD 

2021

 

                          Jardiance                     25mg Tablets            

       take one by mouth 

once a day      90tabs                          BELLA Severino, PNP 20

20

 

                          Carisoprodol                     350mg Tablets        

           1 by mouth at 

bedtime as needed                                 Unknown         

 

                          Blood Glucose Test                      Strips        

           please provide 

glucose test strips, covered by pt insurance for twice daily bs test dx: e11.65 

                    E11.65              Unknown             

 

                                        Blood Glucose Monitoring System         

            W/Device Kit                

             for three times a day blood sugar testing dx: e11.65              E

11.65       Unknown      



 

                          Mucinex                     600mg Tablets ER 12HR     

              1 by mouth 

twice a day as needed                                 Unknown         

 

                          Omeprazole                     20mg Capsules DR       

            1 by mouth 

every day  Written by Dr Malik                                 Unknown         



 

                          Topamax                     50mg Tablets              

     1 by mouth twice a 

day for headaches 180tabs                         BELLA Severino, PNP 

 

                          Novolog                     100Unit/ML Solution       

            10-15 units at

bedtime per sliding scale                                 Unknown         

 

                    Simple Diagnostics Lancing Device                      Misc 

                                      

                                        Unknown             

 

                          Hydrochlorothiazide                     25mg Tablets  

                 take one 

tablet by mouth every day 90tabs                          BELLA Severino, PN

P 

 

                          Lovastatin                     40mg Tablets           

        take one tablet by

mouth every evening with meal 90tabs                          BELLA Severino

, PNP 

 

                          Metformin HCL                     1000mg Tablets      

             take one 

tablet by mouth twice a day with food 180tabs                         BELLA Severino, PNP 



 

                          Meloxicam                     15mg Tablets            

       take one tablet by 

mouth every day 90tabs                          KIT Severino-BC, PNP 

 

                          Sildenafil Citrate                     20mg Tablets   

                Take One 

Tablet By Mouth as Directed                                 Unknown         

 

                                        Pharmacist Choice Ultra Thin Lancets 31G

                     31G Misc           

                                                    Unknown      

 

                          Hydrocodone-Acetaminophen                     5-325mg 

Tablets                   

Take One Tablet By Mouth Every 4 Hours Maximum Daily Dose   6 Tablets Written by
Dr Malik                                       Unknown         







Immunizations





                                        Description

 

                                        No Information Available







Vital Signs





                Date            Vital           Result          Comment

 

                2021  8:07am BP Systolic     154 mmHg         

 

                    BP Diastolic        92 mmHg              

 

                    Heart Rate          70 /min              

 

                    Body Temperature    98.1 F             

 

                    Respiratory Rate    16 /min              

 

                    O2 % BldC Oximetry  97 %                 

 

                    Weight              266.50 lb            

 

                    Weight              120.884 kg           

 

                    Height              70 inches           5'10"

 

                    BMI (Body Mass Index) 38.2 kg/m2           

 

                    BSA (Body Surface Area) 2.36 m2              

 

                2021  9:03am BP Systolic     148 mmHg         

 

                    BP Diastolic        80 mmHg              

 

                    Heart Rate          78 /min              

 

                    Body Temperature    96.4 F             

 

                    Respiratory Rate    18 /min              

 

                    O2 % BldC Oximetry  97 %                 

 

                    Weight              283.00 lb            

 

                    Weight              128.369 kg           

 

                    Height              70 inches           5'10"

 

                    BMI (Body Mass Index) 40.6 kg/m2           

 

                    BSA (Body Surface Area) 2.42 m2              







Results





        Test    Acquired Date Facility Test    Result  H/L     Range   Note

 

                    Covid-19            2021          [facility]

             Sars-CoV-2, Kristie Not Detected               Not Detected 1

 

             Sars-CoV-2, Kristie 2 Day Tat Performed                               

 

                    CBC With Differential 2021          [facility]

             White Blood Count 8.5 10       Normal       4.0-10.0      

 

             Red Blood Count 4.80 10      Normal       4.30-6.10     

 

             Hemoglobin   14.5 g/dL    Normal       13.5-17.5     

 

             Hematocrit   43.8 %       Normal       42.0-52.0     

 

             Mean Corpuscular Volume 91.3 fl      Normal       80.0-96.0     

 

             Mean Corpuscular Hemoglobin 30.2 pg      Normal       27.0-33.0    

 

 

             Mean Corpuscular HGB Conc 33.1 g/dL    Normal       32.0-36.5     

 

             Red Cell Distribution Width 14.0 %       Normal       11.5-14.5    

 

 

             Platelet Count, Automated 238 10       Normal       150-450       

 

             Neutrophils % 57.8 %       Normal       36.0-66.0     

 

             Lymph %      27.3 %       Normal       24.0-44.0     

 

             Mono %       11.2 %       High         2.0-8.0       

 

             Eos %        2.3 %        Normal       0.0-3.0       

 

             Baso %       1.2 %        High         0.0-1.0       

 

             Immature Granulocyte % 0.2 %        Normal       0-3.0         

 

             Nucleated Red Blood Cell % 0.0 %        Normal       0-0           

 

             Neutrophils # 4.9 10       Normal       1.5-8.5       

 

             Lymph #      2.3 10       Normal       1.5-5.0       

 

             Mono #       1.0 10       High         0.0-0.8       

 

             Eos #        0.2 10       Normal       0.0-0.5       

 

             Baso #       0.1 10       Normal       0.0-0.2       

 

                    Comprehensive Metabolic Profil 2021          [facility]

             Glucose, Fasting 112 mg/dL    High                 

 

             Blood Urea Nitrogen 31 mg/dL     High         7-18          

 

             Creatinine For GFR 2.36 mg/dL   High         0.70-1.30     

 

             Glomerular Filtration Rate 30.0         Low          >49          2

 

             Sodium Level 140 mEq/L    Normal       136-145       

 

             Potassium Serum 3.9 mEq/L    Normal       3.5-5.1       

 

             Chloride Level 109 mEq/L    High                 

 

             Carbon Dioxide Level 23 mEq/L     Normal       21-32         

 

             Anion Gap    8 mEq/L      Normal       8-16          

 

             Calcium Level 9.7 mg/dL    Normal       8.8-10.2      

 

             Ast/Sgot     15 U/L       Normal       7-37          

 

             Alt/SGPT     26 U/L       Normal       12-78         

 

             Alkaline Phosphatase 67 U/L       Normal               

 

             Bilirubin,Total 0.4 mg/dL    Normal       0.2-1.0       

 

             Total Protein 8.0 GM/DL    Normal       6.4-8.2       

 

             Albumin      4.3 GM/DL    Normal       3.2-5.2       

 

             Albumin/Globulin Ratio 1.2          Normal                     

 

                    Comprehensive Metabolic Panel 2021          [facility]

             Comprehensive Metabo (SEE NOTE)                             3, 4

 

             Sodium       139 mEq/L                 134 - 153     

 

             Potassium    4.4 mEq/L                 3.6 - 5.0     

 

             Chloride     105 mEq/L                 98 - 107      

 

             Co2          22 mEq/L                  22 - 30       

 

             Glucose      143 mg/dL    High         70 - 99       

 

             BUN          26 mg/dL     High         7 - 21        

 

             Creatinine   2.1 mg/dL    High         0.7 - 1.5     

 

             BUN/Creat    12                        8 - 27        

 

             Total Protein 6.9 g/dL                  6.3 - 8.2     

 

             Albumin      4.5 g/dL                  3.9 - 5.0     

 

             Globulin     2.4 GM/DL                 2.4 - 3.2     

 

             A/G Ratio    1.9                       0.8 - 2.0     

 

             Calcium      9.8 mg/dL                 8.4 - 10.2    

 

             Total Bili   <0.7 mg/dL                0.2 - 1.3     

 

             Alkaline Phos 53 U/L                    38 - 126      

 

             Sgot/Ast     16 U/L                    5 - 40        

 

             SGPT/Alt     12 U/L                    7 - 56        

 

             Anion Gap    12.0 mmol/L               8.0 - 16.0    

 

             Age          60 yrs                                  

 

             Non-Aa GFR   34 mL/min                               

 

             Afr Amer GFR 42 mL/min                              5

 

                    CBC W/Auto Differential 2021          [facility]

l

             CBC W/Automated Diff (SEE NOTE)                             6, 7

 

             WBC          8.2 10^3/uL               4.2 - 11.0    

 

             RBC          4.95 10^6/uL              4.50 - 6.30   

 

             Hemoglobin   15.0 g/dL                 14.0 - 16.0   

 

             Hematocrit   45.8 %                    41.0 - 51.0   

 

             MCV          92.5 fL                   80.0 - 94.0   

 

             MCH          30.3 pg                   27.0 - 34.0   

 

             MCHC         32.8 g/dL                 31.0 - 36.0   

 

             RDW          13.7 %                    11.5 - 14.8   

 

             Platelets    257 10^3/uL               150 - 450     

 

             MPV          10.8 fL      High         7.4 - 10.4    

 

             Neut         68.1 %                    37.0 - 80.0   

 

             Lymph        18.6 %       Low          25.0 - 40.0   

 

             Mono         8.3 %        High         3.0 - 8.0     

 

             Eos          2.8 %                     0.0 - 7.0     

 

             Baso         1.8 %                     0.0 - 2.0     

 

             %Ig          0.4 %        High         0.0 - 0.0     

 

             %NRBC        0.0 %                     0.0 - 0.0     

 

             #Neut        5.57 10^3/uL              2.00 - 6.90   

 

             #Lymph       1.52 10^3/uL              0.60 - 3.40   

 

             #Mono        0.68 10^3/uL              0.00 - 0.90   

 

             #Eos         0.23 10^3/uL              0.00 - 0.70   

 

             #Baso        0.15 10^3/uL              0.00 - 0.20   

 

             #Ig          0.03 10^3/uL              0.00 - 0.10   

 

             #NRBC        0.00 10^3/uL              0.00 - 0.00   

 

             Manual Diff  NOT INDICATED                             

 

             RBC Morph    NOT INDICATED                             

 

                    CMP W/Egfr          2021          [facility]

             Comprehensive Metabo (SEE NOTE)                             8

 

             Sodium       138 mEq/L                 134 - 153     

 

             Potassium    4.5 mEq/L                 3.6 - 5.0     

 

             Chloride     104 mEq/L                 98 - 107      

 

             Co2          21 mEq/L     Low          22 - 30       

 

             Glucose      134 mg/dL    High         70 - 99       

 

             BUN          31 mg/dL     High         7 - 21        

 

             Creatinine   2.1 mg/dL    High         0.7 - 1.5     

 

             BUN/Creat    15                        8 - 27        

 

             Total Protein 7.7 g/dL                  6.3 - 8.2     

 

             Albumin      4.9 g/dL                  3.9 - 5.0     

 

             Globulin     2.8 GM/DL                 2.4 - 3.2     

 

             A/G Ratio    1.8                       0.8 - 2.0     

 

             Calcium      10.4 mg/dL   High         8.4 - 10.2    

 

             Total Bili   <0.7 mg/dL                0.2 - 1.3     

 

             Alkaline Phos 66 U/L                    38 - 126      

 

             Sgot/Ast     15 U/L                    5 - 40        

 

             SGPT/Alt     11 U/L                    7 - 56        

 

             Anion Gap    13.0 mmol/L               8.0 - 16.0    

 

             Age          60 yrs                                  

 

             Non-Aa GFR   34 mL/min                               

 

             Afr Amer GFR 42 mL/min                              9

 

                    Laboratory test finding 2021          [facility]

l

             Hgba1c       6.0 %                     4.4 - 6.1    10

 

                    Lipid Panel         2021          [facility]

             Cve Panel    (SEE NOTE)                             11

 

             Cholesterol  208 mg/dL    High         131 - 200     

 

             Triglycerides 328 mg/dL    High         35 - 160      

 

             HDL          42 mg/dL                  29 - 86       

 

             LDL          123 mg/dL                 65 - 175      

 

             Risk Factor  5.0          High         3.4 - 4.9     

 

             LDL/HDL      2.93                      1.00 - 3.55  12

 

                    Laboratory test finding 2021          [facility]

l

             TSH Highly Sensitive 0.57 uIU/mL               0.47 - 5.01   

 

             PSA - Diagnostic 1.05 ng/mL                0.00 - 4.00  13

 

                    Laboratory test finding 2021          [facility]

l

             PSA - Diagnostic 0.71 ng/mL                0.00 - 4.00  14

 

                    Laboratory test finding 2021          [facility]

             TSH Highly Sensitive 0.69 uIU/mL               0.47 - 5.01   

 

                    Lipid Panel         2021          [facility]

             Cve Panel    (SEE NOTE)                             15

 

             Cholesterol  182 mg/dL                 131 - 200     

 

             Triglycerides 304 mg/dL    High         35 - 160      

 

             HDL          44 mg/dL                  29 - 86       

 

             LDL          105 mg/dL                 65 - 175      

 

             Risk Factor  4.1                       3.4 - 4.9     

 

             LDL/HDL      2.39                      1.00 - 3.55  16

 

                    Laboratory test finding 2021          [facility]

             Hgba1c       7.1 %        High         4.4 - 6.1    17

 

                    CMP W/Egfr          2021          [facility]

             Comprehensive Metabo (SEE NOTE)                             18

 

             Sodium       137 mEq/L                 134 - 153     

 

             Potassium    3.9 mEq/L                 3.6 - 5.0     

 

             Chloride     99 mEq/L                  98 - 107      

 

             Co2          25 mEq/L                  22 - 30       

 

             Glucose      166 mg/dL    High         70 - 99       

 

             BUN          19 mg/dL                  7 - 21        

 

             Creatinine   1.0 mg/dL                 0.7 - 1.5     

 

             BUN/Creat    19                        8 - 27        

 

             Total Protein 7.8 g/dL                  6.3 - 8.2     

 

             Albumin      4.8 g/dL                  3.9 - 5.0     

 

             Globulin     3.0 GM/DL                 2.4 - 3.2     

 

             A/G Ratio    1.6                       0.8 - 2.0     

 

             Calcium      10.2 mg/dL                8.4 - 10.2    

 

             Total Bili   0.7 mg/dL                 0.2 - 1.3     

 

             Alkaline Phos 84 U/L                    38 - 126      

 

             Sgot/Ast     29 U/L                    5 - 40        

 

             SGPT/Alt     29 U/L                    7 - 56        

 

             Anion Gap    13.0 mmol/L               8.0 - 16.0    

 

             Age          60 yrs                                  

 

             Non-Aa GFR   >60 mL/min                              

 

             Afr Amer GFR >60 mL/min                             19

 

                    CBC W/Auto Differential 2021          [facility]

             CBC W/Automated Diff (SEE NOTE)                             20

 

             WBC          9.3 10^3/uL               4.2 - 11.0    

 

             RBC          5.69 10^6/uL              4.50 - 6.30   

 

             Hemoglobin   17.4 g/dL    High         14.0 - 16.0   

 

             Hematocrit   51.0 %                    41.0 - 51.0   

 

             MCV          89.6 fL                   80.0 - 94.0   

 

             MCH          30.6 pg                   27.0 - 34.0   

 

             MCHC         34.1 g/dL                 31.0 - 36.0   

 

             RDW          12.6 %                    11.5 - 14.8   

 

             Platelets    221 10^3/uL               150 - 450     

 

             MPV          9.7 fL                    7.4 - 10.4    

 

             Neut         64.4 %                    37.0 - 80.0   

 

             Lymph        23.2 %       Low          25.0 - 40.0   

 

             Mono         8.4 %        High         3.0 - 8.0     

 

             Eos          2.3 %                     0.0 - 7.0     

 

             Baso         1.4 %                     0.0 - 2.0     

 

             %Ig          0.3 %        High         0.0 - 0.0     

 

             %NRBC        0.0 %                     0.0 - 0.0     

 

             #Neut        6.01 10^3/uL              2.00 - 6.90   

 

             #Lymph       2.16 10^3/uL              0.60 - 3.40   

 

             #Mono        0.78 10^3/uL              0.00 - 0.90   

 

             #Eos         0.21 10^3/uL              0.00 - 0.70   

 

             #Baso        0.13 10^3/uL              0.00 - 0.20   

 

             #Ig          0.03 10^3/uL              0.00 - 0.10   

 

             #NRBC        0.00 10^3/uL              0.00 - 0.00   

 

             Manual Diff  NOT INDICATED                             

 

             RBC Morph    NOT INDICATED                             







                          1                         This nucleic acid amplificat

ion test was developed and its performance

characteristics determined by [employer]. Nucleic acid

amplification tests include RT-PCR and TMA. This test has not been

FDA cleared or approved. This test has been authorized by FDA under

an Emergency Use Authorization (EUA). This test is only authorized

for the duration of time the declaration that circumstances exist

justifying the authorization of the emergency use of in vitro

diagnostic tests for detection of SARS-CoV-2 virus and/or diagnosis

of COVID-19 infection under section 564(b)(1) of the Act, 21 U.S.C.

                                        360bbb-3(b) (1), unless the authorizatio

n is terminated or revoked

sooner.

When diagnostic testing is negative, the possibility of a false

negative result should be considered in the context of a patient's

recent exposures and the presence of clinical signs and symptoms

consistent with COVID-19. An individual without symptoms of COVID-19

and who is not shedding SARS-CoV-2 virus would expect to have a

negative (not detected) result in this assay.



 

                          2                         Units are mL/min/1.73 m2



Chronic Kidney Disease Staging per [facility]:



Stage I & II   GFR >=60       Normal to Mildly Decreased

Stage III      GFR 30-59      Moderately Decreased

Stage IV       GFR 15-29      Severely Decreased

Stage V        GFR <15        Very Little GFR Left

ESRD           GFR <15 on RRT



 

                          3                         Is patient fasting?  N

 

                          4                         COMPREHENSIVE METABOLIC PANE

L



 

                          5                         Male GFR Interprentation

                                        20-49 yrs     >60 mL/min   Normal

                                        50-59 yrs     >56 mL/min   Normal

                                        60-69 yrs     >49 mL/min   Normal

                                        70-79yrs      >42 mL/min   Normal

                                        80 and above  >35 mL/min   Normal

Female GFR  Interpretation

                                        20-39 yrs     >60 mL/min   Normal

                                        40-49 yrs     >58 mL/min   Normal

                                        50-59 yrs     >51 mL/min   Normal

                                        60-69 yrs     >45 mL/min   Normal

                                        70-79 yrs     >39 mL/min   Normal

                                        80 and above  >32 mL/min   Normal



 

                          6                         Is patient fasting?  Y

 

                          7                         COMPLETE BLOOD COUNT



 

                          8                         COMPREHENSIVE METABOLIC PANE

L



 

                          9                         Male GFR Interprentation

                                        20-49 yrs     >60 mL/min   Normal

                                        50-59 yrs     >56 mL/min   Normal

                                        60-69 yrs     >49 mL/min   Normal

                                        70-79yrs      >42 mL/min   Normal

                                        80 and above  >35 mL/min   Normal

Female GFR  Interpretation

                                        20-39 yrs     >60 mL/min   Normal

                                        40-49 yrs     >58 mL/min   Normal

                                        50-59 yrs     >51 mL/min   Normal

                                        60-69 yrs     >45 mL/min   Normal

                                        70-79 yrs     >39 mL/min   Normal

                                        80 and above  >32 mL/min   Normal



 

                          10                        {A1]

{HB]



 

                          11                        LIPID PANEL



 

                          12                        CVE

RISK           CHOL/HDL       LDL/HDL

MEN:

                                        1/2  AVERAGE          3.43          1.00

AVERAGE          4.97          3.55

                                        2X   AVERAGE          9.55          6.25

                                        3X   AVERAGE         23.99          7.99

WOMEN:

                                        1/2  AVERAGE          3.27          1.47

AVERAGE          4.44          3.22

                                        2X   AVERAGE          7.05          5.03

                                        3X   AVERAGE         11.04          6.14



 

                          13                        \BLDo\PSA INTERPRETATION\BLD

x\

The PSA assay should not be used alone for a screening test

or diagnosis for presence or absence of malignant disease.

Predictions of disease recurrence should not be based solely

on values obtained from serial patient serum values.

The PSA result was determined by "[employer]", on the Roche

SUNITHA 6000. Values obtained with different assay methods

or kits cannot be used interchangeably.



 

                          14                        \BLDo\PSA INTERPRETATION\BLD

x\

The PSA assay should not be used alone for a screening test

or diagnosis for presence or absence of malignant disease.

Predictions of disease recurrence should not be based solely

on values obtained from serial patient serum values.

The PSA result was determined by "[employer]", on the [employer]

SUNITHA 6000. Values obtained with different assay methods

or kits cannot be used interchangeably.



 

                          15                        LIPID PANEL



 

                          16                        CVE

RISK           CHOL/HDL       LDL/HDL

MEN:

                                        1/2  AVERAGE          3.43          1.00

AVERAGE          4.97          3.55

                                        2X   AVERAGE          9.55          6.25

                                        3X   AVERAGE         23.99          7.99

WOMEN:

                                        1/2  AVERAGE          3.27          1.47

AVERAGE          4.44          3.22

                                        2X   AVERAGE          7.05          5.03

                                        3X   AVERAGE         11.04          6.14



 

                          17                        {A1]

{HB]



 

                          18                        COMPREHENSIVE METABOLIC PANE

L



 

                          19                        Male GFR Interprentation

                                        20-49 yrs     >60 mL/min   Normal

                                        50-59 yrs     >56 mL/min   Normal

                                        60-69 yrs     >49 mL/min   Normal

                                        70-79yrs      >42 mL/min   Normal

                                        80 and above  >35 mL/min   Normal

Female GFR  Interpretation

                                        20-39 yrs     >60 mL/min   Normal

                                        40-49 yrs     >58 mL/min   Normal

                                        50-59 yrs     >51 mL/min   Normal

                                        60-69 yrs     >45 mL/min   Normal

                                        70-79 yrs     >39 mL/min   Normal

                                        80 and above  >32 mL/min   Normal



 

                          20                        COMPLETE BLOOD COUNT









Procedures





                Date            Code            Description     Status

 

                2021      46296           Office/Outpatient Established SF

 MDM 10-19 Min Completed

 

                2021      56676           Office/Outpatient Established Mo

d MDM 30-39 Min Completed

 

                2021      22651           Office/Outpatient Established Mo

d MDM 30-39 Min Completed

 

                2021      74289           Office/Outpatient Established Mo

d MDM 30-39 Min Completed

 

                2021      91755           Electrocardiogram Complete Compl

eted







Medical Devices





                                        Description

 

                                        No Information Available







Encounters





                                        Description

 

                                        No Information Available







Assessments





                Date            Code            Description     Provider

 

                2021      E11.65          Type 2 diabetes mellitus with hy

perglycemia [facility]

 

                2021      E78.00          Pure hypercholesterolemia, unspe

cified [facility]

 

                2021      E11.65          Type 2 diabetes mellitus with hy

perglycemia Crystal Carrington, [facility]

 

                2021      E78.00          Pure hypercholesterolemia, unspe

cified Crystal Carrington, [facility]

 

                2021      I10             Essential (primary) hypertension

 Crystal Carrington, [facility]

 

                2021      K21.9           Gastro-esophageal reflux disease

 without esophagitis Crystal 

Nevills, FNP

 

                2021      R91.1           Solitary pulmonary nodule Crystal 

Nevills, FNP

 

                2021      G47.33          Obstructive sleep apnea (adult) 

(pediatric) Crystal Nevills, FNP

 

                2021      C64.1           Malignant neoplasm of right kidn

ey, except renal pelvis Crystal 

Nevills, FNP

 

                2021      Z79.899         Other long term (current) drug t

herapy Crystal Nevills, FNP

 

                2021      E11.65          Type 2 diabetes mellitus with hy

perglycemia Crystal Nevills, FNP

 

                2021      E78.00          Pure hypercholesterolemia, unspe

cified Crystal Nevills, FNP

 

                2021      Z12.5           Encounter for screening for jennifer

gnant neoplasm of prostate 

Crystal Nevills, FNP

 

                2021      I10             Essential (primary) hypertension

 Crystal Nevills, FNP

 

                2021      K21.9           Gastro-esophageal reflux disease

 without esophagitis Crystal 

Nevills, FNP

 

                2021      R91.1           Solitary pulmonary nodule Crystal 

Nevills, FNP

 

                2021      G47.33          Obstructive sleep apnea (adult) 

(pediatric) Crystal Nevills, FNP

 

                2021      C64.1           Malignant neoplasm of right kidn

ey, except renal pelvis Crystal 

Nevills, FNP

 

                2021      Z79.899         Other long term (current) drug t

herapy Crystal Nevills, FNP

 

                2021      E11.65          Type 2 diabetes mellitus with hy

perglycemia Crystal Nevills, FNP

 

                2021      E78.00          Pure hypercholesterolemia, unspe

cified Crystal Nevills, FNP

 

                2021      I10             Essential (primary) hypertension

 Crystal Nevills, FNP

 

                2021      N52.1           Erectile dysfunction due to dise

ases classified elsewhere Crystal

Nevills, FNP

 

                2021      R07.89          Other chest pain Crystal Nevills, 

FNP

 

                2021      K21.9           Gastro-esophageal reflux disease

 without esophagitis Crystal 

Nevills, FNP

 

                2021      Z79.899         Other long term (current) drug t

herapy Crystal Carrington, [facility]

 

                2021      E11.65          Type 2 diabetes mellitus with hy

perglycemia Crystal Carrington, [facility]

 

                2021      E78.00          Pure hypercholesterolemia, unspe

cified Crystal Carrington, [facility]

 

                2021      Z79.899         Other long term (current) drug t

herapy Crystal Carrington, [facility]

 

                2021      Z12.5           Encounter for screening for jennifer

gnant neoplasm of prostate 

Crystal Carrington, [facility]

 

                2021      Z68.41          Body mass index [BMI]40.0-44.9, 

adult Crystal Carrington, [facility]

 

                2021      N52.1           Erectile dysfunction due to dise

ases classified elsewhere Crystal Carrington, [facility]

 

                2021      I10             Essential (primary) hypertension

 rCystal Carrington, [facility]

 

                2021      R07.89          Other chest pain SHAVONNE Watson







Plan of Treatment

Future Appointment(s):* 2021  8:20 am - SHAVONNE Watson at [facility]

* 2021  8:00 am - [facility] at [facility]

2021 - SHAVONNE Watson* E11.65 Type 2 diabetes mellitus with 
  hyperglycemia* Comments:* His fasting glucose is high at 134 with an A1c at 
  6.The patient was advised to continue with his current medication regimen.  He
   will benefit from maintaining a diabetic diet and a regular exercise regimen.
    We will continue to monitor.



* Follow up:* FU in 3 months, fasting labs a week prior.





* E78.00 Pure hypercholesterolemia, unspecified* Comments:* Labs reviewed with 
  the patient in detail.Lipid panel showed:CHOL high at 208.TRG high at 328.HDL 
  at 42.LDL at 123.He will continue with his current regimen.  He was encouraged
   to maintain a low cholesterol diet and a regular exercise regimen.  We will 
  continue to monitor.Total Cholesterol and TRG elevated, discussed dietary 
  changes to improve, avoiding meat and dairy products, avoiding processed f
  oodsDecrease saturated Fats (meat, dairy products and processed foods)Increase
   Unsaturated fats (fish, plants, nuts, seeds, beans and vegetable 
  oils)Increase aerobic exerciseIncrease water intake  Read Labels



* Follow up:* FU in 3 months, fasting labs a week prior.





* I10 Essential (primary) hypertension* Comments:* The patient was advised to 
  continue with the current line of therapies. He will benefit from maintaining 
  a low-sodium diet.  We will continue to monitor.





* K21.9 Gastro-esophageal reflux disease without esophagitis* Comments:* The 
  patient was advised to continue with current medication.  Avoid spicy food and
   control diet as advised.





* R91.1 Solitary pulmonary nodule* Comments:* He was seen by Pulmonary on 
  21 for abnormal CT scan which showed calcified pleural plaques without 
  significant fibrosis.  There is one 5 mm nodule subpleural on the left.  His 
  spirometry showed obstruction with air trapping versus true concomitant 
  restriction.To have labs and CT chest as per Pulmonary.Plan of care as per 
  Pulmonary.





* G47.33 Obstructive sleep apnea (adult) (pediatric)* Comments:* He was advised 
  to continue CPAP for improvement in his symptoms by Pulmonary.





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 
  was seen by Urology on 21 for 2 weeks S/P right robotic assisted laparo
  scopic nephrectomy.He had excellent postop course.  Skin staples were removed 
  and Steri-Strips applied.  FU in 3 months with CT scan of chest, abdomen and 
  pelvis w/o contrast. Referred to Medical Oncology for their recommendations.



* Follow up:* Fasting labs in 2 weeks to asses kidney function.





* Z79.899 Other long term (current) drug therapy* Comments:* Patient to continue
   to follow the current plan of care and to look for any new or worsening 
  symptoms.  We will continue to monitor through periodic blood work.



* Follow up:* FU in 3 months, fasting labs a week prior.









Functional Status





                                        Description

 

                                        No Information Available







Mental Status





                                        Description

 

                                        No Information Available







Referrals





                Refer to Dr     Reason for Referral Status          Appt Date

 

                          Ingrid Velázquez,   Patient s/p recent right nep

hectomy.  Post op creatinine

 elevated at 2.1 on two separate occasions.  Please evaluate.  Thank you!  Sent 

                     



 

                                        [address]

 

                                        [place] [address]

 

                                        [phone]

 

                                          

 

                          [facility] Please see this pleasant

 59 y/o male s/p Right 

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  Closed               

     2021

 

                                        [address] [place] [address]

 

                                        [phone]

 

                                          

 

                          [facility] CT chest 2021 jeancarlos

ws evidence for bilateral 

pleural plaquing consistent ith previous asbestos exposure. There is also a 
nodular density superior segment LLL which warrants follow up study.  Please 
evaluate.  Thank you.     Closed                    2021

 

                                        [address]

 

                                        [place] [address]

 

                                        [phone]

 

                                          

 

                                                    Please evaluate finding of m

asslike lesion in the lower pole of the right 

kidney and systic lesions in the left kidney per imaging.  MRI of the Abdomen 
and Pelvis with and without contrast states most likely a large renal cell 
carcinoma.PT will hand deliver discs at appt. PLEASE EVALUATE AND TREAT AS SOON 
AS POSSIBLE. THANK YOU.   Closed                    2021

 

                                          

 

                Caleb Powers MD Chest pain.  HTN.  Current EKG showing PVC's

.  Closed          

06/15/2021

 

                                        [address] [place] [address]

 

                                        [phone]

## 2021-11-09 NOTE — CCD
Summarization Of Episode

                             Created on: 2021



Joe Bryant

External Reference #: [id number]

: 1960

Sex: Male



Demographics





                          Address                   [address] [id number]

 

                          Home Phone                [phone]

 

                          Preferred Language        English

 

                          Marital Status            Unknown

 

                          Episcopal Affiliation     Unknown

 

                          Race                      White

 

                          Ethnic Group              Not  or 





Author





                          Author                    [facility]

 

                          Organization              [facility]

 

                          Address                   Unknown

 

                          Phone                     Unavailable







Support





                Name            Relationship    Address         Phone

 

                UE              Next Of Kin     Unknown         Unavailable

 

                    BETYAILEENFER    Next Of Kin         [id number] [id number] [phone]

 

                    NONE, PT PER        Next Of Kin         -

                          -, -  -                   -

 

                DISABLED        Next Of Kin     Unknown         Unavailable

 

                    CONTACT, OTHER NO   Next Of Kin         -

                                        -

                          -, [place]  -                  -

 

                    MAXIMUS PATIÑO       Next Of Kin         [id number] [phone]

 

                [place] [address] Unavailable







Care Team Providers





                    Care Team Member Name Role                Phone

 

                    LUIS MANUEL RUSSELL MD  Unavailable         Unavailable

 

                    NATALIOLUIS MANUEL PIZANO MD  Unavailable         Unavailable

 

                    NATALIOLUIS MANUEL MD  Unavailable         Unavailable

 

                    NATALIOLUIS MANUEL MD  Unavailable         Unavailable

 

                    NATALIOLUIS MANUEL MD  Unavailable         Unavailable

 

                    NATALIOLUIS MANUEL MD  Unavailable         Unavailable

 

                    NATALIOLUIS MANUEL MD  Unavailable         Unavailable

 

                    NATALIOLUIS MANUEL MD  Unavailable         Unavailable

 

                    NATALIOLUIS MANUEL MD  Unavailable         Unavailable

 

                    NATALIOLUIS MANUEL MD  Unavailable         Unavailable

 

                    NATALIOLUIS MANUEL MD  Unavailable         Unavailable

 

                    NATALIOLUIS MANUEL PIZANO MD  Unavailable         Unavailable

 

                    NATALIOLUIS MANUEL PIZANO MD  Unavailable         Unavailable

 

                    NATALIOLUIS MANUEL PIZANO MD  Unavailable         Unavailable

 

                    NATALIOLUIS MANUEL PIZANO MD  Unavailable         Unavailable

 

                    NATALIOLUIS MANUEL MD  Unavailable         Unavailable

 

                    NATALIOLUIS MANUEL MD  Unavailable         Unavailable

 

                    NATALIOLUIS MANUEL MD  Unavailable         Unavailable

 

                    NATALIOLUIS MANUEL PIZANO MD  Unavailable         Unavailable

 

                    NATALIOLUIS MANUEL PIZANO MD  Unavailable         Unavailable

 

                    NATALIOLUIS MANUEL MD  Unavailable         Unavailable

 

                    NATALIOLUIS MANUEL PIZANO MD  Unavailable         Unavailable

 

                    NATALIOLUIS MANUEL MD  Unavailable         Unavailable

 

                    NATALIOLUIS MANUEL MD  Unavailable         Unavailable

 

                    NATALIOLUIS MANUEL PIZANO MD  Unavailable         Unavailable

 

                    NATALIOLUIS MANUEL PIZANO MD  Unavailable         Unavailable

 

                    NATALIOLUIS MANUEL PIZANO MD  Unavailable         Unavailable

 

                    NATALIOLUIS MANUEL PIZANO MD  Unavailable         Unavailable

 

                    NATALIOLUIS MANUEL PIZANO MD  Unavailable         Unavailable

 

                    NATALIOLUIS MANUEL PIZANO MD  Unavailable         Unavailable

 

                    NATALIOLUIS MANUEL MD  Unavailable         Unavailable

 

                    NATALIOLUIS MANUEL MD  Unavailable         Unavailable

 

                    NATALIO, LUIS MANUEL WALLACE MD  Unavailable         Unavailable

 

                    NATALIO, A RODRIGO MD  Unavailable         Unavailable

 

                    NATALIO, A RODRIGO MD  Unavailable         Unavailable

 

                    NATALIO, A RODRIGO MD  Unavailable         Unavailable

 

                    NATALIO, A RODRIGO MD  Unavailable         Unavailable

 

                    NATALIO, LUIS MANUEL BOWMANEY MD  Unavailable         Unavailable

 

                    NATALIO, A RODRIGO MD  Unavailable         Unavailable

 

                    NATALIO, A RODRIGO MD  Unavailable         Unavailable

 

                    NATALIO, A RODRIGO MD  Unavailable         Unavailable

 

                    NATALIO, A RODRIGO MD  Unavailable         Unavailable

 

                    NATALIO, A RODRIGO MD  Unavailable         Unavailable

 

                    NATALIO, A RODRIGO MD  Unavailable         Unavailable

 

                    NATALIO, A RODRIGO MD  Unavailable         Unavailable

 

                    NATALIO, A RODRIGO MD  Unavailable         Unavailable

 

                    NATALIO, A RODRIGO MD  Unavailable         Unavailable

 

                    NATALIO, A RODRIGO MD  Unavailable         Unavailable

 

                    NATALIO, A RODRIGO MD  Unavailable         Unavailable

 

                    NATALIO, A RODRIGO MD  Unavailable         Unavailable

 

                    NATALIO, A RODRIGO MD  Unavailable         Unavailable

 

                    NATALIO, LUIS MANUEL BOWMANEY MD  Unavailable         Unavailable

 

                    NATALIO, A RODRIGO MD  Unavailable         Unavailable

 

                    NATALIO, A RODRIGO MD  Unavailable         Unavailable

 

                    NATALIO, A RODRIGO MD  Unavailable         Unavailable

 

                    NATALIO, LUIS MANUEL WALLACE MD  Unavailable         Unavailable

 

                    NATALIO, LUIS MANUEL WALLACE MD  Unavailable         Unavailable

 

                    NATALIO, LUIS MANUEL WALLACE MD  Unavailable         Unavailable

 

                    NATALIO, LUIS MANUEL WALLACE MD  Unavailable         Unavailable

 

                    NATALIO, LUIS MANUEL WALLACE MD  Unavailable         Unavailable

 

                    NATALIO, A RODRIGO MD  Unavailable         Unavailable

 

                    NATALIO, A RODRIGO MD  Unavailable         Unavailable

 

                    NATALIO, LUIS MANUEL WALLACE MD  Unavailable         Unavailable

 

                    NATALIO, LUIS MANUEL WALLACE MD  Unavailable         Unavailable

 

                    NATALIO, LUIS MANUEL WALLACE MD  Unavailable         Unavailable

 

                    NATALIO, LUIS MANUEL BOWMANEY MD  Unavailable         Unavailable

 

                    NATALIO, A RODRIGO MD  Unavailable         Unavailable

 

                    NATALIO, A RODRIGO MD  Unavailable         Unavailable

 

                    NATALIO, LUIS MANUEL BOWMANEY MD  Unavailable         Unavailable

 

                    NATALIO, LUIS MANUEL WALLACE MD  Unavailable         Unavailable

 

                    NATALIO, LUIS MANUEL WALLACE MD  Unavailable         Unavailable

 

                    NATALIO, LUIS MANUEL WALLACE MD  Unavailable         Unavailable

 

                    NATALIO, LUIS MANUEL WALLACE MD  Unavailable         Unavailable

 

                    NATALIO, LUIS MANUEL WALLACE MD  Unavailable         Unavailable

 

                    NATALIO, LUIS MANUEL BOWMANEY MD  Unavailable         Unavailable

 

                    NATALIO, A RODRIGO MD  Unavailable         Unavailable

 

                    NATALIO, A RODRIGO MD  Unavailable         Unavailable

 

                    NATALIO, LUIS MANUEL WALLACE MD  Unavailable         Unavailable

 

                    NATALIO, LUIS MANUEL WALLACE MD  Unavailable         Unavailable

 

                    NATALIO, LUIS MANUEL WALLACE MD  Unavailable         Unavailable

 

                    NATALIO, A RODRIGO MD  Unavailable         Unavailable

 

                    NATALIO, A RODRIGO MD  Unavailable         Unavailable

 

                    NATALIO, A RODRIGO MD  Unavailable         Unavailable

 

                    Nevills, C Crystal NP Unavailable         Unavailable

 

                    Nevills, C Crystal NP Unavailable         Unavailable

 

                    Nevills, C Crystal NP Unavailable         Unavailable

 

                    Nevills, C Crystal NP Unavailable         Unavailable

 

                    Nevills, C Crystal NP Unavailable         Unavailable

 

                    Nevills, C Crystal NP Unavailable         Unavailable

 

                    Nevills, C Crystal NP Unavailable         Unavailable

 

                    Nevills, C Crystal NP Unavailable         Unavailable

 

                    Nevills, C Crystal NP Unavailable         Unavailable

 

                    Nevills, C Crystal NP Unavailable         Unavailable

 

                    Nevills, C Crystal NP Unavailable         Unavailable

 

                    Nevills, C Crystal NP Unavailable         Unavailable

 

                    Nevills, C Crystal NP Unavailable         Unavailable

 

                    Nevills, C Crystal NP Unavailable         Unavailable

 

                    Nevills, C Crystal NP Unavailable         Unavailable

 

                    Nevills, C Crystal NP Unavailable         Unavailable

 

                    Nevills, C Crystal NP Unavailable         Unavailable

 

                    Nevills, C Crystal NP Unavailable         Unavailable

 

                    Nevills, C Crystal NP Unavailable         Unavailable

 

                    Nevills, C Crystal NP Unavailable         Unavailable

 

                    Nevills, C Crystal NP Unavailable         Unavailable

 

                    Nevills, C Crystal NP Unavailable         Unavailable

 

                    Nevills, C Crystal NP Unavailable         Unavailable

 

                    Nevills, C Crystal NP Unavailable         Unavailable

 

                    Nevills, C Crystal NP Unavailable         Unavailable

 

                    Nevills, C Crystal NP Unavailable         Unavailable

 

                    Nevills, C Crystal NP Unavailable         Unavailable

 

                    Nevills, C Crystal NP Unavailable         Unavailable

 

                    Nevills, C Crystal NP Unavailable         Unavailable

 

                    Nevills, C Crystal NP Unavailable         Unavailable

 

                    Nevills, C Crystal NP Unavailable         Unavailable

 

                    Nevills, C Crystal NP Unavailable         Unavailable

 

                    Nevills, C Crystal NP Unavailable         Unavailable

 

                    Nevills, C Crystal NP Unavailable         Unavailable

 

                    HALL, MAXIMUS GALLOWAY MD Unavailable         Unavailable

 

                    HALL, MAXIMUS GALLOWAY MD Unavailable         Unavailable

 

                    HALL, MAXIMUS GALLOWAY MD Unavailable         Unavailable

 

                    HALL, MAXIMUS GALLOWAY MD Unavailable         Unavailable

 

                    HALL, MAXIMUS GALLOWAY MD Unavailable         Unavailable

 

                    HALL, MAXIMUS GALLOWAY MD Unavailable         Unavailable

 

                    HALL, MAXIMUS GALLOWAY MD Unavailable         Unavailable

 

                    HALL, MAXIMUS GALLOWAY MD Unavailable         Unavailable

 

                    HALL, MAXIMUS GALLOWAY MD Unavailable         Unavailable

 

                    NIDIAENOLUANNE PIMENTELER PA Unavailable         Unavailable

 

                    SYMENOLUANNE PIMENTELER PA Unavailable         Unavailable

 

                    SYMENOLUANNE PIMENTELER PA Unavailable         Unavailable

 

                    SYMENOMARGARITO G CHRISTOPHER PA Unavailable         Unavailable

 

                    SYMENOW G FABYER PA Unavailable         Unavailable

 

                    SYMENOMARGARITO G NORBERTOOPHER PA Unavailable         Unavailable

 

                    SYMENOW G CHRISTOPHER PA Unavailable         Unavailable

 

                    SYMENOW, G CHRISTOPHER PA Unavailable         Unavailable

 

                    SYMENOW G CHRISTOPHER PA Unavailable         Unavailable

 

                    SYMENOW G CHRISTOPHER PA Unavailable         Unavailable

 

                    SYMENOW G CHRISTOPHER PA Unavailable         Unavailable

 

                    SYMENOW G CHRISTOPHER PA Unavailable         Unavailable

 

                    SYMENOW, G CHRISTOPHER PA Unavailable         Unavailable

 

                    SYMENOW, G CHRISTOPHER PA Unavailable         Unavailable

 

                    SYMENOW, G CHRISTOPHER PA Unavailable         Unavailable

 

                    SYMENOW, G CHRISTOPHER PA Unavailable         Unavailable

 

                    NATALIO, LUIS MANUEL WALLACE MD  Unavailable         Unavailable

 

                    NATALIO, LUIS MANUEL WALLACE MD  Unavailable         Unavailable

 

                    NATALIO, LUIS MNAUEL WALLACE MD  Unavailable         Unavailable

 

                    NATALIO, A RODRIGO MD  Unavailable         Unavailable

 

                    NATALIO, A RODRIGO MD  Unavailable         Unavailable

 

                    NATALIO, A RODRIGO MD  Unavailable         Unavailable

 

                    NATALIO, A RODRIGO SALAS  Unavailable         Unavailable

 

                    NATALIO, A RODRIGO MD  Unavailable         Unavailable

 

                    NATALIO, A RODRIGO MD  Unavailable         Unavailable

 

                    NATALIO, A RODRIGO MD  Unavailable         Unavailable

 

                    NATALIO, A RODRIGO MD  Unavailable         Unavailable

 

                    NATALIO, A RODRIGO MD  Unavailable         Unavailable

 

                    NATALIO, A RODRIGO MD  Unavailable         Unavailable

 

                    NATALIO, A RODRIGO MD  Unavailable         Unavailable

 

                    NATALIO, A RODRIGO MD  Unavailable         Unavailable

 

                    NATALIO, LUIS MANUEL WALLACE MD  Unavailable         Unavailable

 

                    NATALIO, A RODRIGO MD  Unavailable         Unavailable

 

                    NATALIO, A RODRIGO SALAS  Unavailable         Unavailable

 

                    NATALIO, A RODRIGO MD  Unavailable         Unavailable

 

                    NATALIO, LUIS MNAUEL WALLACE MD  Unavailable         Unavailable

 

                    NATALIO, LUIS MANUEL WALLACE MD  Unavailable         Unavailable

 

                    NATALIO, LUIS MANUEL WALLACE MD  Unavailable         Unavailable

 

                    NATALIO, LUIS MANUEL WALLACE MD  Unavailable         Unavailable

 

                    NATALIO, LUIS MANUEL WALLACE MD  Unavailable         Unavailable

 

                    NATALIO, LUIS MANUEL WALLACE MD  Unavailable         Unavailable

 

                    NATALIO, A RODRIGO MD  Unavailable         Unavailable

 

                    NATALIO, LUIS MANUEL WALLACE MD  Unavailable         Unavailable

 

                    NATALIO, LUIS MANUEL WALLCAE MD  Unavailable         Unavailable

 

                    NATALIO, LUIS MANUEL WALLACE MD  Unavailable         Unavailable

 

                    NATALIO, LUIS MANUEL WALLACE MD  Unavailable         Unavailable

 

                    NATALIO, LUIS MANUEL WALLACE MD  Unavailable         Unavailable

 

                    NATALIO, LUIS MANUEL WALLACE MD  Unavailable         Unavailable

 

                    NATALIO, A RODRIGO MD  Unavailable         Unavailable

 

                    NATALIO, LUIS MANUEL WALLACE MD  Unavailable         Unavailable

 

                    NATALIO, LUIS MANUEL WALLACE MD  Unavailable         Unavailable

 

                    NATALIO, LUIS MANUEL WALLACE MD  Unavailable         Unavailable

 

                    NATALIO, LUIS MANUEL WALLACE MD  Unavailable         Unavailable

 

                    NATALIO, LUIS AMNUEL WALLACE MD  Unavailable         Unavailable

 

                    NATALIO, LUIS MANUEL WALLACE MD  Unavailable         Unavailable

 

                    NATALIO, LUIS MANUEL WALLACE MD  Unavailable         Unavailable

 

                    NATALIO, A RODRIGO SALAS  Unavailable         Unavailable

 

                    NATALIO, LUIS MANUEL WALLACE MD  Unavailable         Unavailable

 

                    NATALIO, LUIS MANUEL WALLACE MD  Unavailable         Unavailable

 

                    NATALIO, LUIS MANUEL WALLACE MD  Unavailable         Unavailable

 

                    NATALIO, LUIS MANUEL WALLACE MD  Unavailable         Unavailable

 

                    NATALIO, LUIS MANUEL WALLACE MD  Unavailable         Unavailable

 

                    NATALIO, LUIS MANUEL WALLACE MD  Unavailable         Unavailable

 

                    NATALIO, A RODRIGO SALAS  Unavailable         Unavailable

 

                    NATALIO, LUIS MANUEL WALLACE MD  Unavailable         Unavailable

 

                    NATALIO, LUIS MANUEL WALLACE MD  Unavailable         Unavailable

 

                    NATALIO, LUIS MANUEL WALLACE MD  Unavailable         Unavailable

 

                    NATALIO, A RODRIGO MD  Unavailable         Unavailable

 

                    NATALIO, A RODRIGO MD  Unavailable         Unavailable

 

                    NATALIO, A RODRIGO MD  Unavailable         Unavailable

 

                    NATALIO, A RODRIGO MD  Unavailable         Unavailable

 

                    NATALIO, A RODRIGO MD  Unavailable         Unavailable

 

                    NATALIO, A RODRIGO MD  Unavailable         Unavailable

 

                    NATALIO, A RODRIGO MD  Unavailable         Unavailable

 

                    NATALIO, A RODRIGO MD  Unavailable         Unavailable

 

                    NATALIO, A RODRIGO MD  Unavailable         Unavailable

 

                    NATALIO, A RODRIGO MD  Unavailable         Unavailable

 

                    NATALIO, A RODRIGO MD  Unavailable         Unavailable

 

                    NATALIO, A RODRIGO MD  Unavailable         Unavailable

 

                    NATALIO, A RODRIGO MD  Unavailable         Unavailable

 

                    NATALIO, A RODRIGO MD  Unavailable         Unavailable

 

                    NATALIO, A RODRIGO MD  Unavailable         Unavailable

 

                    NATALIO, A RODRIGO MD  Unavailable         Unavailable

 

                    NATALIO, A RODRIGO MD  Unavailable         Unavailable

 

                    NATALIO, A RODRIGO MD  Unavailable         Unavailable

 

                    NATALIO, A RODRIGO MD  Unavailable         Unavailable

 

                    NATALIO, A RODRIGO MD  Unavailable         Unavailable

 

                    NATALIO, A RODRIGO MD  Unavailable         Unavailable

 

                    NATALIO, A RODRIGO MD  Unavailable         Unavailable

 

                    NATALIO, A RODRIGO MD  Unavailable         Unavailable

 

                    NATALIO, A RODRIGO MD  Unavailable         Unavailable

 

                    NATALIO, A RODRIGO MD  Unavailable         Unavailable

 

                    NATALIO, A RODRIGO MD  Unavailable         Unavailable

 

                    NATALIO, A RODRIGO MD  Unavailable         Unavailable

 

                    NATALIO, A RODRIGO MD  Unavailable         Unavailable

 

                    NATALIO, A RODRIGO MD  Unavailable         Unavailable

 

                    NATALIO, A RODRIGO MD  Unavailable         Unavailable

 

                    NATALIO, A RODRIGO MD  Unavailable         Unavailable

 

                    NATALIO, A RODRIGO MD  Unavailable         Unavailable

 

                    NATALIO, A RODRIGO MD  Unavailable         Unavailable

 

                    NATALIO, A RODRIGO MD  Unavailable         Unavailable

 

                    NATALIO, A RODRIGO MD  Unavailable         Unavailable

 

                    NATALIO, A RODRIGO MD  Unavailable         Unavailable

 

                    NATALIO, A RODRIGO MD  Unavailable         Unavailable

 

                    NATALIO, A RODRIGO MD  Unavailable         Unavailable

 

                    NATALIO, A RODRIGO MD  Unavailable         Unavailable

 

                    NATALIO, A RODRIGO MD  Unavailable         Unavailable

 

                    NATALIO, A RODRIGO MD  Unavailable         Unavailable

 

                    NATALIO, A RODRIGO MD  Unavailable         Unavailable

 

                    NATALIO, A RODRIGO MD  Unavailable         Unavailable

 

                    NATALIO, A RODRIGO MD  Unavailable         Unavailable

 

                    NATALIO, A RODRIGO MD  Unavailable         Unavailable

 

                    NATALIO, A RODRIGO MD  Unavailable         Unavailable

 

                    NATALIO, A RODRIGO MD  Unavailable         Unavailable

 

                    NATALIO, A RODRIGO MD  Unavailable         Unavailable

 

                    NATALIO, A RODRIGO MD  Unavailable         Unavailable

 

                    NATALIO, A RODRIGO MD  Unavailable         Unavailable

 

                    NATALIO, A RODRIGO MD  Unavailable         Unavailable

 

                    NATALIO, A RODRIGO MD  Unavailable         Unavailable

 

                    NATALIO, A RODRIGO MD  Unavailable         Unavailable

 

                    NATALIO, A RODRIGO MD  Unavailable         Unavailable

 

                    NATALIO, A RODRIGO MD  Unavailable         Unavailable

 

                    NATALIO, A RODRIGO MD  Unavailable         Unavailable

 

                    NATALIO, A RODRIGO MD  Unavailable         Unavailable

 

                    NATALIO, LUIS MANUEL BOWMANEY MD  Unavailable         Unavailable

 

                    NATALIO, A RODRIGO MD  Unavailable         Unavailable

 

                    NATALIO, A RODRIGO MD  Unavailable         Unavailable

 

                    NATALIO, A RODRIGO MD  Unavailable         Unavailable

 

                    NATALIO, A RODRIGO MD  Unavailable         Unavailable

 

                    NATALIO, A RODRIGO MD  Unavailable         Unavailable

 

                    NATALIO, A RODRIGO MD  Unavailable         Unavailable

 

                    NATALIO, A RODRIGO MD  Unavailable         Unavailable

 

                    NATALIO, A RODRIGO MD  Unavailable         Unavailable

 

                    NATALIO, A RODRIGO MD  Unavailable         Unavailable

 

                    NATALIO, A RODRIGO MD  Unavailable         Unavailable

 

                    NATALIO, A RODRIGO MD  Unavailable         Unavailable

 

                    NATALIO, A RODRIGO MD  Unavailable         Unavailable

 

                    NATALIO, A RODRIGO MD  Unavailable         Unavailable

 

                    NATALIO, A RODRIGO MD  Unavailable         Unavailable

 

                    NATALIO, A RODRIGO MD  Unavailable         Unavailable

 

                    NATALIO, A RODRIGO MD  Unavailable         Unavailable

 

                    NATALIO, A RODRIGO MD  Unavailable         Unavailable

 

                    NATALIO, A RODRIGO MD  Unavailable         Unavailable

 

                    NATALIO, A RODRIGO MD  Unavailable         Unavailable

 

                    NATALIO, A RODRIGO MD  Unavailable         Unavailable

 

                    NATALIO, A RODRIGO MD  Unavailable         Unavailable

 

                    NATALIO, A RODRIGO MD  Unavailable         Unavailable

 

                    NATALIO, A RODRIGO MD  Unavailable         Unavailable

 

                    NATALIO, A RODRIGO MD  Unavailable         Unavailable

 

                    NATALIO, A RODRIGO MD  Unavailable         Unavailable

 

                    NATALIO, A RODRIGO MD  Unavailable         Unavailable

 

                    NATALIO, A RODRIGO MD  Unavailable         Unavailable

 

                    NATALIO, A RODRIGO MD  Unavailable         Unavailable

 

                    NATALIO, A RODRIGO MD  Unavailable         Unavailable

 

                    NATALIO, A RODRIGO MD  Unavailable         Unavailable

 

                    NATALIO, A RODRIGO MD  Unavailable         Unavailable

 

                    NATALIO, A RODRIGO MD  Unavailable         Unavailable

 

                    NATALIO, A RODRIGO MD  Unavailable         Unavailable

 

                    NATALIO, A RODRIGO MD  Unavailable         Unavailable

 

                    NATALIO, A RODRIGO MD  Unavailable         Unavailable

 

                    NATALIO, A RODRIGO MD  Unavailable         Unavailable

 

                    NATALIO, A RODRIGO MD  Unavailable         Unavailable

 

                    NATALIO, A RODRIGO MD  Unavailable         Unavailable

 

                    NATALIO, A RODRIGO MD  Unavailable         Unavailable

 

                    NATALIO, A RODRIGO MD  Unavailable         Unavailable

 

                    NATALIO, A RODRIGO MD  Unavailable         Unavailable

 

                    NATALIO, A RODRIGO MD  Unavailable         Unavailable

 

                    NATALIO, A RODRIGO MD  Unavailable         Unavailable

 

                    NATALIO, A RODRIGO MD  Unavailable         Unavailable

 

                    NATALIO, A RODRIGO MD  Unavailable         Unavailable

 

                    NATALIO, A RODRIGO MD  Unavailable         Unavailable

 

                    NATALIO, A RODRIGO MD  Unavailable         Unavailable

 

                    NATALIO, A RODRIGO MD  Unavailable         Unavailable

 

                    NATALIO, A RODRIGO MD  Unavailable         Unavailable

 

                    NATALIO, A RODRIGO MD  Unavailable         Unavailable

 

                    NATALIO, A RODRIGO MD  Unavailable         Unavailable

 

                    NATALIO, A RODRIGO MD  Unavailable         Unavailable

 

                    NATALIO, A RODRIGO MD  Unavailable         Unavailable

 

                    NATALIO, A RODRIGO MD  Unavailable         Unavailable

 

                    NATALIO, A RODRIGO MD  Unavailable         Unavailable

 

                    LUIS MANUEL RUSSELL MD  Unavailable         Unavailable

 

                    LUIS MANUEL RUSSELL MD  Unavailable         Unavailable

 

                    Crystal Carrington C    Unavailable         Unavailable

 

                    VALDIVIA, W RENÉE PA Unavailable         Unavailable

 

                    VALDIVIA, W RENÉE PA Unavailable         Unavailable

 

                    VALDIVIA, W RENÉE PA Unavailable         Unavailable

 

                    VALDIVIA, W RENÉE PA Unavailable         Unavailable

 

                    VALDIVIA, W RENÉE PA Unavailable         Unavailable

 

                    VALDIVIA, W RENÉE PA Unavailable         Unavailable

 

                    VALDIVIA, W RENÉE PA Unavailable         Unavailable

 

                    VALDIVIA, W RENÉE PA Unavailable         Unavailable

 

                    VALDIVIA, W RENÉE PA Unavailable         Unavailable

 

                    VALDIVIA, W RENÉE PA Unavailable         Unavailable

 

                    VALDIVIA, W RENÉE PA Unavailable         Unavailable

 

                    VALDIVIA, W RENÉE PA Unavailable         Unavailable

 

                    VALDIVIA, W RENÉE PA Unavailable         Unavailable

 

                    VALDIVIA, W RENÉE PA Unavailable         Unavailable

 

                    VALDIVIA, W RENÉE PA Unavailable         Unavailable

 

                    VALDVIIA, W RENÉE PA Unavailable         Unavailable

 

                    VALDIVIA, W RENÉE PA Unavailable         Unavailable

 

                    VALDIVIA, W RENÉE PA Unavailable         Unavailable

 

                    VALDIVIA, W RENÉE PA Unavailable         Unavailable

 

                    VALDIVIA, W RENÉE PA Unavailable         Unavailable

 

                    VALDIVIA, W RENÉE PA Unavailable         Unavailable

 

                    VALDIVIA, W RENÉE PA Unavailable         Unavailable

 

                    VALDIVIA, W RENÉE PA Unavailable         Unavailable

 

                    VALDIVIA, W RENÉE PA Unavailable         Unavailable

 

                    VALDIVIA, W RENÉE PA Unavailable         Unavailable

 

                    VALDIVIA, W RENÉE PA Unavailable         Unavailable

 

                    VALDIVIA, W RENÉE PA Unavailable         Unavailable

 

                    VALDIVIA, W RENÉE PA Unavailable         Unavailable

 

                    VALDIVIA, W RENÉE PA Unavailable         Unavailable

 

                    VALDIVIA, W RENÉE PA Unavailable         Unavailable

 

                    VALDIVIA, W RENÉE PA Unavailable         Unavailable

 

                    VALDIVIA, W RENÉE PA Unavailable         Unavailable

 

                    VALDIVIA, W RENÉE PA Unavailable         Unavailable

 

                    VALDIVIA, W RENÉE PA Unavailable         Unavailable

 

                    VALDIVIA, W RENÉE PA Unavailable         Unavailable

 

                    VALDIVIA, W RENÉE PA Unavailable         Unavailable

 

                    VALDIVIA, W RENÉE PA Unavailable         Unavailable

 

                    VALDIVIA, W RENÉE PA Unavailable         Unavailable

 

                    VALDIVIA, W RENÉE PA Unavailable         Unavailable

 

                    VALDIVIA, W RENÉE PA Unavailable         Unavailable

 

                    VALDIVIA, W RENÉE PA Unavailable         Unavailable

 

                    VALDIVIA, W RENÉE PA Unavailable         Unavailable

 

                    VALDIVIA, W RENÉE PA Unavailable         Unavailable

 

                    VALDIVIA, W RENÉE PA Unavailable         Unavailable

 

                    RASHARD, PRANAY SERGE PA Unavailable         Unavailable

 

                    RASHARD, PRANAY SERGE PA Unavailable         Unavailable

 

                    RASHARD, PRANAY SERGE PA Unavailable         Unavailable

 

                    RASHARD, PRANAY SERGE PA Unavailable         Unavailable

 

                    RASHARD, PRANAY SERGE PA Unavailable         Unavailable

 

                    RASHARD, PRANAY SERGE PA Unavailable         Unavailable

 

                    RASHARD, PRANAY SERGE PA Unavailable         Unavailable

 

                    RASHARD, PRANAY SERGE PA Unavailable         Unavailable

 

                    RASHARD, PRANAY SERGE PA Unavailable         Unavailable

 

                    RASHARD, PRANAY SERGE PA Unavailable         Unavailable

 

                    RASHARD, PRANAY SERGE PA Unavailable         Unavailable

 

                    RASHARD, PRANAY SERGE PA Unavailable         Unavailable

 

                    RASHARD, PRANAY SERGE PA Unavailable         Unavailable

 

                    RASHARD, PRANAY SERGE PA Unavailable         Unavailable

 

                    RASHARD, PRANAY SERGE PA Unavailable         Unavailable

 

                    RASHARD, PRANAY SERGE PA Unavailable         Unavailable

 

                    RASHARD, PRANAY SERGE PA Unavailable         Unavailable

 

                    RASHARD, PRANAY SERGE PA Unavailable         Unavailable

 

                    RASHARD, PRANAY SERGE PA Unavailable         Unavailable

 

                    RASHARD, PRANAY SERGE PA Unavailable         Unavailable

 

                    RASHARD, PRANAY SERGE PA Unavailable         Unavailable

 

                    RASHARD, PRANAY SERGE PA Unavailable         Unavailable

 

                    Jaya, Meryl Connie ANP-BC Unavailable         Unavailable

 

                    Jaya, Meryl Connie ANP-BC Unavailable         Unavailable

 

                    Jaya, Meryl Connie ANP-BC Unavailable         Unavailable

 

                    Jaya, Meryl Connie ANP-BC Unavailable         Unavailable

 

                    Jaya, Meryl Connie ANP-BC Unavailable         Unavailable

 

                    Jaya, Meryl Connie ANP-BC Unavailable         Unavailable

 

                    Jaya, Meryl Connie ANP-BC Unavailable         Unavailable

 

                    Jaya, Meryl Connie ANP-BC Unavailable         Unavailable

 

                    Jaya, Meryl Connie ANP-BC Unavailable         Unavailable

 

                    Jaya, Meryl Connie ANP-BC Unavailable         Unavailable

 

                    Jaya, Meryl Connie ANP-BC Unavailable         Unavailable

 

                    Jaya, Meryl Connie ANP-BC Unavailable         Unavailable

 

                    Jaya, Meryl Connie ANP-BC Unavailable         Unavailable

 

                    Jaya, Meryl Connie ANP-BC Unavailable         Unavailable

 

                    Jaya, Meryl Connie ANP-BC Unavailable         Unavailable

 

                    Jaya, Meryl Connie ANP-BC Unavailable         Unavailable

 

                    Jaya, Meryl Connie ANP-BC Unavailable         Unavailable

 

                    Jaya, Meryl Connie ANP-BC Unavailable         Unavailable

 

                    Jaya, Meryl Connie ANP-BC Unavailable         Unavailable

 

                    Jaya, Meryl Connie ANP-BC Unavailable         Unavailable

 

                    Jaya, Meryl Connie ANP-BC Unavailable         Unavailable

 

                    Jaya, Meryl Connie ANP-BC Unavailable         Unavailable

 

                    Jaya, Meryl Connie ANP-BC Unavailable         Unavailable

 

                    Jaya, Meryl Connie ANP-BC Unavailable         Unavailable

 

                    Jaya, Meryl Connie ANP-BC Unavailable         Unavailable

 

                    Jaya, Meryl Connie ANP-BC Unavailable         Unavailable

 

                    Jaya, Meryl Connie ANP-BC Unavailable         Unavailable

 

                    Jaya, Meryl Connie ANP-BC Unavailable         Unavailable

 

                    Jaya, Meryl Connie ANP-BC Unavailable         Unavailable

 

                    Jaya, Meryl Connie ANP-BC Unavailable         Unavailable

 

                    Jaya, Meryl Connie ANP-BC Unavailable         Unavailable

 

                    Jaya, Meryl Connie ANP-BC Unavailable         Unavailable

 

                    Jaya, Meryl Connie ANP-BC Unavailable         Unavailable

 

                    Jaya, Meryl Connie ANP-BC Unavailable         Unavailable

 

                    Jaya, Meryl Connie ANP-BC Unavailable         Unavailable

 

                    Jaya, Meryl Connie ANP-BC Unavailable         Unavailable

 

                    Jaya, Meryl Connie ANP-BC Unavailable         Unavailable

 

                    Jaya, Meryl Connie ANP-BC Unavailable         Unavailable

 

                    Jaya, Meryl Connie ANP-BC Unavailable         Unavailable

 

                    Jaya, Meryl Connie ANP-BC Unavailable         Unavailable

 

                    Jaya, Meryl Connie ANP-BC Unavailable         Unavailable

 

                    Jaya, Meryl Connie ANP-BC Unavailable         Unavailable

 

                    Jaya, Meryl Connie ANP-BC Unavailable         Unavailable

 

                    Jaya, Meryl Connie ANP-BC Unavailable         Unavailable

 

                    Jaya, Meryl Connie ANP-BC Unavailable         Unavailable

 

                    Jaya, Meryl Connie ANP-BC Unavailable         Unavailable

 

                    Jaya, Meryl Connie ANP-BC Unavailable         Unavailable

 

                    Jaya, Meryl Connie ANP-BC Unavailable         Unavailable

 

                    Jaya, Meryl Connie ANP-BC Unavailable         Unavailable

 

                    Jaya, Meryl Connie ANP-BC Unavailable         Unavailable

 

                    Jaya, Meryl Connie ANP-BC Unavailable         Unavailable

 

                    Jaya, Meryl Connie ANP-BC Unavailable         Unavailable

 

                    Jaya, Meryl Connie ANP-BC Unavailable         Unavailable

 

                    Jaya, Meryl Connie ANP-BC Unavailable         Unavailable

 

                    Jaya, Meryl Connie ANP-BC Unavailable         Unavailable

 

                    Jaya, Meryl Connie ANP-BC Unavailable         Unavailable

 

                    Jaya, Meryl Connie ANP-BC Unavailable         Unavailable

 

                    Jaya, Meryl Connie ANP-BC Unavailable         Unavailable

 

                    Jaya, Meryl Connie ANP-BC Unavailable         Unavailable

 

                    Jaay, Meryl Connie ANP-BC Unavailable         Unavailable

 

                    Jaya, Meryl Connie ANP-BC Unavailable         Unavailable

 

                    Jaya, Meryl Connie ANP-BC Unavailable         Unavailable

 

                    Jaya, Meryl Connie ANP-BC Unavailable         Unavailable

 

                    Jaya, Meryl Connie ANP-BC Unavailable         Unavailable

 

                    Jaya, Meryl Connie ANP-BC Unavailable         Unavailable

 

                    Jaya, Meryl Connie ANP-BC Unavailable         Unavailable

 

                    COREY Rosas FNP  Unavailable         Unavailable

 

                    COREY Rosas FNP  Unavailable         Unavailable

 

                    COREY Rosas FNP  Unavailable         Unavailable

 

                    Barter, D Abisai FNP  Unavailable         Unavailable

 

                    Barter, D Abisai FNP  Unavailable         Unavailable

 

                    Barter, D Abisai FNP  Unavailable         Unavailable

 

                    Barter, D Abisai FNP  Unavailable         Unavailable

 

                    Barter, D Abisai FNP  Unavailable         Unavailable

 

                    Barter, D Abisai FNP  Unavailable         Unavailable

 

                    Barter, D Abisai FNP  Unavailable         Unavailable

 

                    Barter, D Abisai FNP  Unavailable         Unavailable

 

                    Barter, D Abisai FNP  Unavailable         Unavailable

 

                    Barter, D Abisai FNP  Unavailable         Unavailable

 

                    Barter, D Abisai FNP  Unavailable         Unavailable

 

                    Barter, D Abisai FNP  Unavailable         Unavailable

 

                    Barter, D Abisai FNP  Unavailable         Unavailable

 

                    Barter, D Abisai FNP  Unavailable         Unavailable

 

                    Barter, D Abisai FNP  Unavailable         Unavailable

 

                    Barter, D Abisai FNP  Unavailable         Unavailable

 

                    Barter, D Abisai FNP  Unavailable         Unavailable

 

                    Barter, D Abisai FNP  Unavailable         Unavailable

 

                    Barter, D Abisai FNP  Unavailable         Unavailable

 

                    Barter, D Abisai FNP  Unavailable         Unavailable

 

                    Barter, D Abisai FNP  Unavailable         Unavailable

 

                    Barter, D Abisai FNP  Unavailable         Unavailable

 

                    Barter, D Abisai FNP  Unavailable         Unavailable

 

                    Barter, D Abisai FNP  Unavailable         Unavailable

 

                    Barter, D Abisai FNP  Unavailable         Unavailable

 

                    Barter, D Abisai FNP  Unavailable         Unavailable

 

                    Barter, D Abisai FNP  Unavailable         Unavailable

 

                    Barter, D Abisai FNP  Unavailable         Unavailable

 

                    Barter, D Abisai FNP  Unavailable         Unavailable

 

                    Barter, D Abisai FNP  Unavailable         Unavailable

 

                    Barter, D Abisai FNP  Unavailable         Unavailable

 

                    Barter, D Abisai FNP  Unavailable         Unavailable

 

                    Barter, D Abisai FNP  Unavailable         Unavailable

 

                    Barter, D Abisai FNP  Unavailable         Unavailable

 

                    Barter, D Abisai FNP  Unavailable         Unavailable

 

                    Barter, D Abisai FNP  Unavailable         Unavailable

 

                    Barter, D Abisai FNP  Unavailable         Unavailable

 

                    Barter, D Abisai FNP  Unavailable         Unavailable

 

                    Barter, D Abisai FNP  Unavailable         Unavailable

 

                    Barter, D Abisai FNP  Unavailable         Unavailable

 

                    Barter, D Abisai FNP  Unavailable         Unavailable

 

                    Barter, D Abisai FNP  Unavailable         Unavailable

 

                    Barter, D Abisai FNP  Unavailable         Unavailable

 

                    Barter, D Abisai FNP  Unavailable         Unavailable

 

                    Barter, D Abisai FNP  Unavailable         Unavailable

 

                    Barter, D Abisai FNP  Unavailable         Unavailable

 

                    Barter, D Abisai FNP  Unavailable         Unavailable

 

                    Barter, D Abisai FNP  Unavailable         Unavailable

 

                    Barter, D Abisai FNP  Unavailable         Unavailable

 

                    Barter, D Abisai FNP  Unavailable         Unavailable

 

                    JAY, MAQBOOL CALEB MD Unavailable         Unavailable

 

                    JAY, MAQBOOL CALEB MD Unavailable         Unavailable

 

                    JAY, MAQBOOL CALEB SALAS Unavailable         Unavailable

 

                    JAY, MAQBOOL CALEB MD Unavailable         Unavailable

 

                    JAY, MAQBOOL CALEB MD Unavailable         Unavailable

 

                    JAY, MAQBOOL CALEB MD Unavailable         Unavailable

 

                    JAY, MAQBOOL CALEB MD Unavailable         Unavailable

 

                    JAY, MAQBOOL CALEB MD Unavailable         Unavailable

 

                    JAY, MAQBOOL CALEB MD Unavailable         Unavailable

 

                    JAY, MAQBOOL CALEB MD Unavailable         Unavailable

 

                    JAY, MAQBOOL CALEB MD Unavailable         Unavailable

 

                    JAY, MAQBOOL CALEB MD Unavailable         Unavailable

 

                    JAY, MAQBOOL CALEB MD Unavailable         Unavailable

 

                    JAY, MAQBOOL CALEB MD Unavailable         Unavailable

 

                    JAY, MAQBOOL CALEB MD Unavailable         Unavailable

 

                    JAY, MAQBOOL CALEB MD Unavailable         Unavailable

 

                    JAY, MAQBOOL CALEB MD Unavailable         Unavailable

 

                    JAY, MAQBOOL CALEB MD Unavailable         Unavailable

 

                    JAY, MAQBOOL CALEB MD Unavailable         Unavailable

 

                    JAY, MAQBOOL CALEB MD Unavailable         Unavailable

 

                    JAY, MAQBOOL CALEB MD Unavailable         Unavailable

 

                    JAY, MAQBOOL CALEB MD Unavailable         Unavailable

 

                    JAY, MAQBOOL CALEB MD Unavailable         Unavailable

 

                    JAY, MAQBOOL CALEB MD Unavailable         Unavailable

 

                    JAY, MAQBOOL CALEB MD Unavailable         Unavailable

 

                    JAY, MAQBOOL CALEB MD Unavailable         Unavailable

 

                    JAY, MAQBOOL CALEB MD Unavailable         Unavailable

 

                    JAY, MAQBOOL CALEB MD Unavailable         Unavailable

 

                    JAY, MAQBOOL CALEB MD Unavailable         Unavailable

 

                    JAY, MAQBOOL CALEB MD Unavailable         Unavailable

 

                    JAY, MAQBOOL CALEB MD Unavailable         Unavailable

 

                    JAY, MAQBOOL CALEB MD Unavailable         Unavailable

 

                    JAY, MAQBOOL CALEB MD Unavailable         Unavailable

 

                    JAY, MAQBOOL CALEB MD Unavailable         Unavailable

 

                    JAY, MAQBOOL CALEB MD Unavailable         Unavailable

 

                    JAY, MAQBOOL CALEB MD Unavailable         Unavailable

 

                    JAY, MAQBOOL CALEB MD Unavailable         Unavailable

 

                    JAY, MAQBOOL CALEB MD Unavailable         Unavailable

 

                    JAY, MAQBOOL CALEB MD Unavailable         Unavailable

 

                    JAY, MAQBOOL CALEB MD Unavailable         Unavailable

 

                    JAY, MAQBOOL CALEB MD Unavailable         Unavailable

 

                    JAY, MAQBOOL CALEB MD Unavailable         Unavailable

 

                    JAY, MAQBOOL CALEB MD Unavailable         Unavailable

 

                    JAY, MAQBOOL CALEB MD Unavailable         Unavailable

 

                    JAY, MAQBOOL CALEB MD Unavailable         Unavailable

 

                    JAY, MAQBOOL CALEB MD Unavailable         Unavailable

 

                    JAY, MAQBOOL CALEB MD Unavailable         Unavailable

 

                    JAY, MAQBOOL CALEB MD Unavailable         Unavailable

 

                    JAY, MAQBOOL CALEB MD Unavailable         Unavailable

 

                    JAY, MAQBOOL CALEB MD Unavailable         Unavailable

 

                    JAY, MAQBOOL CALEB MD Unavailable         Unavailable

 

                    JAY, MAQBOOL CALEB MD Unavailable         Unavailable

 

                    JAY, MAQBOOL CALEB MD Unavailable         Unavailable

 

                    JAY, MAQBOOL CALEB MD Unavailable         Unavailable

 

                    JAY, MAQBOOL CALEB MD Unavailable         Unavailable

 

                    JAY, MAQBOOL CALEB MD Unavailable         Unavailable

 

                    JAY, MAQBOOL CALEB MD Unavailable         Unavailable

 

                    JAY, MAQBOOL CALEB MD Unavailable         Unavailable

 

                    JAY, MAQBOOL CALEB MD Unavailable         Unavailable

 

                    JAY, MAQBOOL CALEB MD Unavailable         Unavailable

 

                    JAY, MAQBOOL CALEB MD Unavailable         Unavailable

 

                    JAY, MAQBOOL CALEB MD Unavailable         Unavailable

 

                    JAY, MAQBOOL CALEB MD Unavailable         Unavailable

 

                    JAY, MAQBOOL CALEB MD Unavailable         Unavailable

 

                    JAY, MAQBOOL CALEB MD Unavailable         Unavailable

 

                    JAY, MAQBOOL CALEB MD Unavailable         Unavailable

 

                    JAY, MAQBOOL CALEB MD Unavailable         Unavailable

 

                    JAY, MAQBOOL CALEB MD Unavailable         Unavailable

 

                    JAY, MAQBOOL CALEB MD Unavailable         Unavailable

 

                    JAY, MAQBOOL CALEB MD Unavailable         Unavailable

 

                    JAY, MAQBOOL CALEB MD Unavailable         Unavailable

 

                    JAY, MAQBOOL CALEB MD Unavailable         Unavailable

 

                    JAY, MAQBOOL CALEB MD Unavailable         Unavailable

 

                    JAY, MAQBOOL CALEB MD Unavailable         Unavailable

 

                    JAY, MAQBOOL CALEB MD Unavailable         Unavailable

 

                    JAY, MAQBOOL CALEB MD Unavailable         Unavailable

 

                    JAY, MAQBOOL CALEB MD Unavailable         Unavailable

 

                    JAY, MAQBOOL CALEB MD Unavailable         Unavailable

 

                    Nevills, C Crystal NP Unavailable         Unavailable

 

                    Nevills, C Crystal NP Unavailable         Unavailable

 

                    Nevills, C Crystal NP Unavailable         Unavailable

 

                    Nevills, C Crystal NP Unavailable         Unavailable

 

                    Nevills, C Crystal NP Unavailable         Unavailable

 

                    Nevills, C Crystal NP Unavailable         Unavailable

 

                    Nevills, C Crystal NP Unavailable         Unavailable

 

                    Nevills, C Crystal NP Unavailable         Unavailable

 

                    Nevills, C Crystal NP Unavailable         Unavailable

 

                    Nevills, C Crystal NP Unavailable         Unavailable

 

                    Nevills, C Crystal NP Unavailable         Unavailable

 

                    Nevills, C Crystal NP Unavailable         Unavailable

 

                    Nevills, C Crystal NP Unavailable         Unavailable

 

                    Nevills, C Crystal NP Unavailable         Unavailable

 

                    Nevills, C Crystal NP Unavailable         Unavailable

 

                    Nevills, C Crystal NP Unavailable         Unavailable

 

                    Nevills, C Crystal NP Unavailable         Unavailable

 

                    Nevills, C Crystal NP Unavailable         Unavailable

 

                    Nevills, C Crystal NP Unavailable         Unavailable

 

                    Nevills, C Crystal NP Unavailable         Unavailable

 

                    Nevills, C Crystal NP Unavailable         Unavailable

 

                    Nevills, C Crystal NP Unavailable         Unavailable

 

                    Nevills, C Crystal NP Unavailable         Unavailable

 

                    Nevills, C Crystal NP Unavailable         Unavailable

 

                    Nevills, C Crystal NP Unavailable         Unavailable

 

                    Nevills, C Crystal NP Unavailable         Unavailable

 

                    Nevills, C Crystal NP Unavailable         Unavailable

 

                    Nevills, C Crystal NP Unavailable         Unavailable

 

                    Nevills, C Crystal NP Unavailable         Unavailable

 

                    Nevills, C Crystal NP Unavailable         Unavailable

 

                    Nevills, C Crystal NP Unavailable         Unavailable

 

                    Nevills, C Crystal NP Unavailable         Unavailable

 

                    Nevills, C Crystal NP Unavailable         Unavailable

 

                    Nevills, C Crystal NP Unavailable         Unavailable

 

                    NATALIO, LUIS MANUEL WALLACE MD  Unavailable         Unavailable

 

                    NATALIO, LUIS MANUEL WALLACE MD  Unavailable         Unavailable

 

                    NATALIO, LUIS MANUEL WALLACE MD  Unavailable         Unavailable

 

                    NATALIO, LUIS MANUEL WALLACE MD  Unavailable         Unavailable

 

                    NATALIO, LUIS MANUEL WALLACE MD  Unavailable         Unavailable

 

                    NATALIO, LUIS MANUEL WALLACE MD  Unavailable         Unavailable

 

                    NATALIO, LUIS MANUEL WALLACE MD  Unavailable         Unavailable

 

                    NATALIO, LUIS MANUEL WALLACE MD  Unavailable         Unavailable

 

                    NATALIO, LUISM ANUEL WALLACE MD  Unavailable         Unavailable

 

                    NATALIO, LUIS MANUEL WALLACE MD  Unavailable         Unavailable

 

                    NATALIO, LUIS MANUEL WALLACE MD  Unavailable         Unavailable

 

                    NATALIO, LUIS MANUEL WALLACE MD  Unavailable         Unavailable

 

                    NATALIO, LUIS MANUEL WALLACE MD  Unavailable         Unavailable

 

                    NATALIO, LUIS MANUEL WALLACE MD  Unavailable         Unavailable

 

                    NATALIO, LUIS MANUEL WALLACE MD  Unavailable         Unavailable

 

                    NATALIO, LUIS MANUEL WALLACE MD  Unavailable         Unavailable

 

                    NATALIO, LUIS MANUEL WALLACE MD  Unavailable         Unavailable

 

                    NATALIO, LUIS MANUEL WALLACE MD  Unavailable         Unavailable

 

                    NATALIO, LUIS MANUEL WALLACE MD  Unavailable         Unavailable

 

                    NATALIO, LUIS MANUEL WALLACE MD  Unavailable         Unavailable

 

                    NATALIO, LUIS MANUEL WALLACE MD  Unavailable         Unavailable

 

                    NATALIO, LUIS MANUEL WALLACE MD  Unavailable         Unavailable

 

                    NATALIO, LUIS MANUEL WALLACE MD  Unavailable         Unavailable

 

                    NATALIO, LUIS MANUEL WALLACE MD  Unavailable         Unavailable

 

                    NATALIO, LUIS MANUEL WALLACE MD  Unavailable         Unavailable

 

                    NATALIO, A RODRIGO MD  Unavailable         Unavailable

 

                    NATALIO, A RODRIGO MD  Unavailable         Unavailable

 

                    NATALIO, LUIS MANUEL BOWMANEY MD  Unavailable         Unavailable

 

                    NATALIO, A RODRIGO MD  Unavailable         Unavailable

 

                    NATALIO, A RODRIGO MD  Unavailable         Unavailable

 

                    NATALIO, A RODRIGO MD  Unavailable         Unavailable

 

                    NATALIO, A RODRIGO MD  Unavailable         Unavailable

 

                    NATALIO, A RODRIGO MD  Unavailable         Unavailable

 

                    NATALIO, A RODRIGO MD  Unavailable         Unavailable

 

                    NATALIO, A RODRIGO MD  Unavailable         Unavailable

 

                    NATALIO, A RODRIGO MD  Unavailable         Unavailable

 

                    NATALIO, A RODRIGO MD  Unavailable         Unavailable

 

                    NATALIO, A RODRIGO MD  Unavailable         Unavailable

 

                    NATALIO, A RODRIGO MD  Unavailable         Unavailable

 

                    NATALIO, A RODRIGO MD  Unavailable         Unavailable

 

                    NATALIO, A RODRIGO MD  Unavailable         Unavailable

 

                    NATALIO, A RODRIGO MD  Unavailable         Unavailable

 

                    NATALIO, A RODRIGO MD  Unavailable         Unavailable

 

                    NATALIO, A RODRIGO MD  Unavailable         Unavailable

 

                    NATALIO, A RODRIGO MD  Unavailable         Unavailable

 

                    NATALIO, A RODRIGO MD  Unavailable         Unavailable

 

                    NATALIO, A RODRIGO MD  Unavailable         Unavailable

 

                    NATALIO, A RODRIGO MD  Unavailable         Unavailable

 

                    NATALIO, A RODRIGO MD  Unavailable         Unavailable

 

                    NATALIO, A RODRIGO MD  Unavailable         Unavailable

 

                    NATALIO, A RODRIGO MD  Unavailable         Unavailable

 

                    NATALIO, A RODRIGO MD  Unavailable         Unavailable

 

                    NATALIO, A RODRIGO MD  Unavailable         Unavailable

 

                    NATALIO, A RODRIGO MD  Unavailable         Unavailable

 

                    NATALIO, A RODRIGO MD  Unavailable         Unavailable

 

                    NATALIO, A RODRIGO MD  Unavailable         Unavailable

 

                    NATALIO, A RODRIGO MD  Unavailable         Unavailable

 

                    NATALIO, A RODRIGO MD  Unavailable         Unavailable

 

                    NATALIO, A RODRIGO MD  Unavailable         Unavailable

 

                    NATALIO, A RODRIGO MD  Unavailable         Unavailable

 

                    NATALIO, A RODRIGO MD  Unavailable         Unavailable

 

                    NATALIO, A RODRIGO MD  Unavailable         Unavailable

 

                    NATALIO, A RODRIGO MD  Unavailable         Unavailable

 

                    NATALIO, A RODRIGO MD  Unavailable         Unavailable

 

                    NATALIO, A RODRIGO MD  Unavailable         Unavailable

 

                    NATALIO, A RODRIGO MD  Unavailable         Unavailable

 

                    NATALIO, A RODRIGO MD  Unavailable         Unavailable

 

                    NATALIO, A RODRIGO MD  Unavailable         Unavailable

 

                    NATALIO, A RODRIGO MD  Unavailable         Unavailable

 

                    NTAALIO, A RODRIGO MD  Unavailable         Unavailable

 

                    NATALIO, A RODRIGO MD  Unavailable         Unavailable

 

                    NATALIO, A RODRIGO MD  Unavailable         Unavailable

 

                    NATALIO, A RODRIGO MD  Unavailable         Unavailable

 

                    NATALIO, A RODRIGO MD  Unavailable         Unavailable

 

                    NATALIO, A RODRIGO MD  Unavailable         Unavailable

 

                    NATALIO, A RODRIGO MD  Unavailable         Unavailable

 

                    NATALIO, A RODRIGO MD  Unavailable         Unavailable

 

                    NATALIO, A RODRIGO MD  Unavailable         Unavailable

 

                    NATALIO, A RODRIGO MD  Unavailable         Unavailable

 

                    NATALIO, A RODRIOG MD  Unavailable         Unavailable

 

                    NATALIO, A RODRIGO MD  Unavailable         Unavailable

 

                    NATALIO, A RODRIGO MD  Unavailable         Unavailable

 

                    NATALIO, A RODRIGO MD  Unavailable         Unavailable

 

                    DANIEL, L GÉNESIS PA Unavailable         Unavailable

 

                    DANIEL, L GÉNESIS PA Unavailable         Unavailable

 

                    DANIEL, L GÉNESIS PA Unavailable         Unavailable

 

                    DANIEL, L GÉNESIS PA Unavailable         Unavailable

 

                    DANIEL, L GÉNESIS PA Unavailable         Unavailable

 

                    DANIEL, L GÉNESIS PA Unavailable         Unavailable

 

                    DANIEL, L GÉNESIS PA Unavailable         Unavailable

 

                    DANIEL, L GÉNESIS PA Unavailable         Unavailable

 

                    DANIEL, L GÉNESIS PA Unavailable         Unavailable

 

                    DANIEL, L GÉNESIS PA Unavailable         Unavailable

 

                    DANIEL, L GÉNESIS PA Unavailable         Unavailable

 

                    DANIEL, L GÉNESIS PA Unavailable         Unavailable

 

                    DANIEL, L GÉNESIS PA Unavailable         Unavailable

 

                    DANIEL, L GÉNESIS PA Unavailable         Unavailable

 

                    DANIEL, L GÉNESIS PA Unavailable         Unavailable

 

                    DANIEL, L GÉNESIS PA Unavailable         Unavailable

 

                    DANIEL, L GÉNESIS PA Unavailable         Unavailable

 

                    DANIEL, L GÉNESIS PA Unavailable         Unavailable

 

                    DANIEL, L GÉNESIS PA Unavailable         Unavailable

 

                    DANIEL, L GÉNESIS PA Unavailable         Unavailable

 

                    DANIEL, L GÉNESIS PA Unavailable         Unavailable

 

                    DANIEL, L GÉNESIS PA Unavailable         Unavailable

 

                    Dieudonne Gonzalez MD Unavailable         Unavailable

 

                    Dieudonne Gonzalez MD Unavailable         Unavailable

 

                    Dieudonne Gonzalez MD Unavailable         Unavailable

 

                    Dieudonne Gonzalez MD Unavailable         Unavailable

 

                    Dieudonne Gonzalez MD Unavailable         Unavailable

 

                    Dieudonne Gonzalez MD Unavailable         Unavailable

 

                    Dieudonne Gonzalez MD Unavailable         Unavailable

 

                    Dieudonne Gonzalez MD Unavailable         Unavailable

 

                    Dieudonne Gonzalez MD Unavailable         Unavailable

 

                    Dieudonne Gonzalez MD Unavailable         Unavailable

 

                    Dieudonne Gonzalez MD Unavailable         Unavailable

 

                    Dieudonne Gonzalez MD Unavailable         Unavailable

 

                    Dieudonne Gonzalez MD Unavailable         Unavailable

 

                    Dieudonne Gonzalez MD Unavailable         Unavailable

 

                    Dieudonne Gonzalez MD Unavailable         Unavailable

 

                    Dieudonne Gonzalez MD Unavailable         Unavailable

 

                    Dieudonne Gonzalez MD Unavailable         Unavailable

 

                    Dieudonne Gonzalez MD Unavailable         Unavailable

 

                    Dieudonne Gonzalez MD Unavailable         Unavailable

 

                    Dieudonne Gonzalez MD Unavailable         Unavailable

 

                    Dieudonne Gonzalez MD Unavailable         Unavailable

 

                    Dieudonne Gonzalez MD Unavailable         Unavailable

 

                    Dieudonne Gonzalez MD Unavailable         Unavailable

 

                    Dieudonne Gonzalez MD Unavailable         Unavailable

 

                    Dieudonne Gonzalez MD Unavailable         Unavailable

 

                    Dieudonne Gonzalez MD Unavailable         Unavailable

 

                    Dieudonne Gonzalez MD Unavailable         Unavailable

 

                    Dieudonne Gonzalez MD Unavailable         Unavailable

 

                    Dieudonne Gonzalez MD Unavailable         Unavailable

 

                    Dieudonne Gonzalez MD Unavailable         Unavailable

 

                    Dieudonne Gonzalez MD Unavailable         Unavailable

 

                    Dieudonne Gonzalez MD Unavailable         Unavailable

 

                    Dieudonne Gonzalez MD Unavailable         Unavailable

 

                    Dieudonne Gonzalez MD Unavailable         Unavailable

 

                    Dieudonne Gonzalez MD Unavailable         Unavailable

 

                    Dieudonne Gonzalez MD Unavailable         Unavailable

 

                    Dieudonne Gonzalez MD Unavailable         Unavailable

 

                    Dieudonne Gonzalez MD Unavailable         Unavailable

 

                    Dieudonne Gonzalez MD Unavailable         Unavailable

 

                    Dieudonne Gonzalez MD Unavailable         Unavailable

 

                    Dieudonne Gonzalez MD Unavailable         Unavailable

 

                    Dieudonne Gonzalez MD Unavailable         Unavailable

 

                    Dieudonne Gonzalez MD Unavailable         Unavailable

 

                    Dieudonne Gonzalez MD Unavailable         Unavailable

 

                    Dieudonne Gonzalez MD Unavailable         Unavailable

 

                    Dieudonne Gonzalez MD Unavailable         Unavailable

 

                    Dieudonne Gonzalez MD Unavailable         Unavailable

 

                    Dieudonne Gonzalez MD Unavailable         Unavailable

 

                    Dieudonne Gonzalez MD Unavailable         Unavailable

 

                    Dieudonne Gonzalez MD Unavailable         Unavailable

 

                    Dieudonne Gonzalez MD Unavailable         Unavailable

 

                    Dieudonne Gonzalez MD Unavailable         Unavailable

 

                    Dieudonne Gonzalez MD Unavailable         Unavailable

 

                    Dieudonne Gonzalez MD Unavailable         Unavailable

 

                    Jaya, Meryl Connie ANP-BC Unavailable         Unavailable

 

                    Jaya, Meryl Connie ANP-BC Unavailable         Unavailable

 

                    Jaya, Meryl Connie ANP-BC Unavailable         Unavailable

 

                    Jaya, Meryl Connie ANP-BC Unavailable         Unavailable

 

                    Jaya, Meryl Connie ANP-BC Unavailable         Unavailable

 

                    Jaya, Meryl Connie ANP-BC Unavailable         Unavailable

 

                    Jaya, Meryl Connie ANP-BC Unavailable         Unavailable

 

                    Jaya, Meryl Connie ANP-BC Unavailable         Unavailable

 

                    Jaya, Meryl Connie ANP-BC Unavailable         Unavailable

 

                    Jaya, Meryl Connie ANP-BC Unavailable         Unavailable

 

                    Jaya, Meryl Connie ANP-BC Unavailable         Unavailable

 

                    Jaya, Meryl Connie ANP-BC Unavailable         Unavailable

 

                    Jaya, Meryl Connie ANP-BC Unavailable         Unavailable

 

                    Jaya, Meryl Connie ANP-BC Unavailable         Unavailable

 

                    Jaya, Meryl Connie ANP-BC Unavailable         Unavailable

 

                    Jaya, Meryl Connie ANP-BC Unavailable         Unavailable

 

                    Jaya, Meryl Connie ANP-BC Unavailable         Unavailable

 

                    Jaya, Meryl Connie ANP-BC Unavailable         Unavailable

 

                    Jaya, Meryl Connie ANP-BC Unavailable         Unavailable

 

                    Jaya, Meryl Connie ANP-BC Unavailable         Unavailable

 

                    Jaya, Meryl Connie ANP-BC Unavailable         Unavailable

 

                    Jaya, Meryl Connie ANP-BC Unavailable         Unavailable

 

                    Jaya, Meryl Connie ANP-BC Unavailable         Unavailable

 

                    Jaya, Meryl Connie ANP-BC Unavailable         Unavailable

 

                    Jaya, Meryl Connie ANP-BC Unavailable         Unavailable

 

                    Jaya, Meryl Connie ANP-BC Unavailable         Unavailable

 

                    Jaya, Meryl Connie ANP-BC Unavailable         Unavailable

 

                    Jaya, Meryl Connie ANP-BC Unavailable         Unavailable

 

                    Jaya, Meryl Connie ANP-BC Unavailable         Unavailable

 

                    Jaya, Meryl Connie ANP-BC Unavailable         Unavailable

 

                    Jaya, Meryl Connie ANP-BC Unavailable         Unavailable

 

                    Jaya, Meryl Connie ANP-BC Unavailable         Unavailable

 

                    Jaya, Meryl Connie ANP-BC Unavailable         Unavailable

 

                    Jaya, Meryl Connie ANP-BC Unavailable         Unavailable

 

                    Jaya, Meryl Connie ANP-BC Unavailable         Unavailable

 

                    Jaya, Meryl Connie ANP-BC Unavailable         Unavailable

 

                    Jaya, Meryl Connie ANP-BC Unavailable         Unavailable

 

                    Jaya, Meryl Connie ANP-BC Unavailable         Unavailable

 

                    Jaya, Meryl Connie ANP-BC Unavailable         Unavailable

 

                    Jaya, Meryl Connie ANP-BC Unavailable         Unavailable

 

                    Jaya, Meryl Connie ANP-BC Unavailable         Unavailable

 

                    Jaya, Meryl Connie ANP-BC Unavailable         Unavailable

 

                    Jaya, Meryl Connie ANP-BC Unavailable         Unavailable

 

                    Jaya, Meryl Connie ANP-BC Unavailable         Unavailable

 

                    Jaya, Meryl Connie ANP-BC Unavailable         Unavailable

 

                    Jaya, Meryl Connie ANP-BC Unavailable         Unavailable

 

                    Jaya, Meryl Connie ANP-BC Unavailable         Unavailable

 

                    Jaya, Meryl Connie ANP-BC Unavailable         Unavailable

 

                    Jaya, Meryl Connie ANP-BC Unavailable         Unavailable

 

                    Jaya, Meryl Connie ANP-BC Unavailable         Unavailable

 

                    Jaya, Meryl Connie ANP-BC Unavailable         Unavailable

 

                    Jaya, Meryl Connie ANP-BC Unavailable         Unavailable

 

                    Jaya, Meryl Connie ANP-BC Unavailable         Unavailable

 

                    Jaya, Meryl Connie ANP-BC Unavailable         Unavailable

 

                    Jaya, Meryl Connie ANP-BC Unavailable         Unavailable

 

                    Jaya, Meryl Connie ANP-BC Unavailable         Unavailable

 

                    Jaya, Meryl Connie ANP-BC Unavailable         Unavailable

 

                    Jaya, Meryl Connie ANP-BC Unavailable         Unavailable

 

                    Jaya, Meryl Connie ANP-BC Unavailable         Unavailable

 

                    Jaya, Meryl Connie ANP-BC Unavailable         Unavailable

 

                    Jaya, Meryl Connie ANP-BC Unavailable         Unavailable

 

                    Jaya, Meryl Connie ANP-BC Unavailable         Unavailable

 

                    Jaya, Meryl Connie ANP-BC Unavailable         Unavailable

 

                    Jaya, Meryl Connie ANP-BC Unavailable         Unavailable

 

                    Jaya, Meryl Connie ANP-BC Unavailable         Unavailable

 

                    Jaya, Meryl Connie ANP-BC Unavailable         Unavailable



                                  



Re-disclosure Warning

          The records that you are about to access may contain information from 
federally-assisted alcohol or drug abuse programs. If such information is 
present, then the following federally mandated warning applies: This information
has been disclosed to you from records protected by federal confidentiality 
rules (42 CFR part 2). The federal rules prohibit you from making any further 
disclosure of this information unless further disclosure is expressly permitted 
by the written consent of the person to whom it pertains or as otherwise 
permitted by 42 CFR part 2. A general authorization for the release of medical 
or other information is NOT sufficient for this purpose. The Federal rules 
restrict any use of the information to criminally investigate or prosecute any 
alcohol or drug abuse patient.The records that you are about to access may 
contain highly sensitive health information, the redisclosure of which is 
protected by Article 27-F of the [facility] Public Health law. If you 
continue you may have access to information: Regarding HIV / AIDS; Provided by 
facilities licensed or operated by the [facility] Office of Mental Health; 
or Provided by the [facility] Office for People With Developmental 
Disabilities. If such information is present, then the following [place] 
mandated warning applies: This information has been disclosed to you from 
confidential records which are protected by state law. State law prohibits you 
from making any further disclosure of this information without the specific 
written consent of the person to whom it pertains, or as otherwise permitted by 
law. Any unauthorized further disclosure in violation of state law may result in
a fine or MCC sentence or both. A general authorization for the release of 
medical or other information is NOT sufficient authorization for further disc
losure.                                                                         
    



Family History

          



             Family Member Name Family Member Gender Family Member Status Date o

f Status 

Description                             Data Source(s)

 

           Unknown    Male       Problem                          [facility] ([facility]



                                                                                
       



Encounters

          



           Encounter  Providers  Location   Date       Indications Data Source(s

)

 

                Outpatient      Attender: Crystal Carrington NPConsultant: Abisai SCHNEIDER                 2021 

07:59:00 AM EDT - 2021 07:59:00 AM EDT                           [facility]

 

                Outpatient      Attender: Crystal Carrington  Family Practice 10/25

/2021 01:20:00 PM EDT

                                                    [facility] ([facility]

[facility]

 

                Outpatient      Attender: Crystal Carrington NPConsultant: Abisai emmanuel [facility]                 10/25/2021 

01:03:00 PM EDT - 10/25/2021 01:03:00 PM EDT                           [facility]

 

                Outpatient      Attender: Crystal Carrington NPConsultant: Abisai SCHNEIDER                 2021 

11:51:00 AM EDT - 2021 12:51:00 PM EDT                           [facility]

 

                Outpatient      Attender: Crystal GONZALEZonsultant: Abisai MARVIN                 2021 

09:24:00 AM EDT - 2021 09:24:00 AM EDT                           [facility]

 

                Outpatient      Attender: Crystal GONZALEZonsultant: Abisai SCHNEIDER                 2021 

10:39:00 AM EDT - 2021 10:39:00 AM EDT                           [facility]

 

                Outpatient      Attender: Crystal Carrington NP [facility]  10:20:00 AM EDT

                                                    MEDENT ([facility])

 

                Outpatient      Attender: Crystal Carrington NPConsultant: Abisai emmanuel [facility]                 2021 

08:02:00 AM EDT - 2021 08:02:00 AM EDT                           [facility]

 

                Outpatient      Attender: Crystal Carrington NP [facility]  08:00:00 AM EDT

                                                    MEDENT ([facility])

 

                Office Visit    Attender: RODRIGO Reilly/ [facility]

 2021 

03:15:00 PM EDT                                     MEDENT ([facility] [place])

 

           Outpatient Attender: RORDIGO RUSSELL MD            2021 06:33:21 A

M EDT            [facility]

[facility]

 

                Inpatient       Attender: RODRIOG RUSSELL MDAdmitter: RODRIGO RUSSELL MD                 2021 

05:30:00 AM EDT - 2021 01:14:00 PM EDT RIGHT KIDNEY MASS D49.511 [facility]

 

                                        RIGHT KIDNEY MASS D49.511 

 

                                        Patient discharged. 

 

                           ([facility]) Attender: VICKIE RUSSELL MDAdmitter: RODRIGO RUSSELL MDConsultant: Crystal Carrington                 2021 05:30:00 AM EDT 

                [facility]

 

                Outpatient      Attender: CALEB POWERS MD [facility]  01:30:00 PM EDT

                                                    MEDENT ([facility])

 

                Outpatient      Attender: RODRIGO RUSSELL MDConsultant: Abisai Rosas

 SOURAV                 2021 

12:31:00 PM EDT - 2021 01:31:00 PM EDT                           [facility]

 

                Outpatient      Attender: Crystal Carrington NPConsultant: Abisai MARVIN                 2021 

09:17:00 AM EDT - 2021 09:17:00 AM EDT                           [facility]

 

                Outpatient      Attender: RODRIGO Reilly/ [facility]

 2021 03:00:00 

PM EDT                                              MEDENT ([facility])

 

                Outpatient      Attender: Moris Palmer/Yi/García/CURTIS cherry 2021 

10:00:00 AM EDT                                     MEDENT ([facility]

actice, )

 

                Unknown                         [address] [place]

[place] [address] 2021 12:00:00 AM 

EDT                                                 [id number] ([facility])

 

                Outpatient      Attender: CALEB POWERS MD Medical [facility] 06/15

/2021 02:45:00 PM EDT

                                                    MEDENT ([facility])

 

                          Emergency                 Attender: MADISON HALL MDA

tender: GÉNESIS Loaiza: Connie BELTRANBC              EMERGENCY ROOM-ER         2021 08:30:00 PM EDT -

 2021 10:05:00 

PM EDT                                              [facility]

 

                                        Patient discharged. 

 

                Outpatient      Attender: Crystal Carrington NPConsultant: Abisai emmanuel [facility]                 2021 

08:57:00 AM EDT - 2021 08:57:00 AM EDT                           [facility]

 

                Outpatient      Attender: Crystal Carrington NPConsultant: Abisai emmanuel [facility]                 2021 

07:58:00 AM EDT - 2021 07:58:00 AM EDT                           [facility]

 

                Outpatient      Attender: Connie PANTOJA-AYESHAonsultant: Abisai espinoza [facility]                 2021 

08:50:00 AM EST - 2021 08:50:00 AM [facility]

 

                Outpatient      Attender: Connie PANTOJA-AYESHAonsultant: Abisai espinoza [facility]                 2021 

08:19:00 AM [facility] - 2021 08:19:00 AM [facility]

 

                Outpatient      Attender: Connie PANTOJA-AYESHAonsultant: Abisai espinoza [facility]                 2020 

08:39:00 AM [facility] - 2020 08:39:00 AM [facility]

 

                    Emergency           Attender: CHRISTOPHER SYMENOW PAReferrer

: Connie PATNOJA-BC EMERGENCY 

ROOM-ER             02/15/2020 09:00:00 AM EST - 02/15/2020 09:07:00 AM [facility]

 

                                        Patient discharged. 

 

                Emergency       Attender: SERGE MATHEWS                  10:22:00 AM EST - 

2017 11:00:00 AM [facility]

 

                Emergency       Attender: RENÉE MATHEWS                 2014 11:55:00 AM EDT - 2014

01:44:00 PM [facility]

 

                Emergency       Attender: RENÉE MATHEWS                 2014 06:52:00 PM EDT - 2014

07:49:00 PM [facility]



                                                                                
                                                                                
                                                                                
                                                                                
                                                      



Immunizations

          



             Vaccine      Date         Status       Description  Data Source(s)

 

                          COVID-19 (Moderna), mRNA, LNP-S, PF, 100 mcg/0.5 mL do

se 2021 12:00:00 AM 

EDT                 completed                               [facility]

 

             COVID-19 VACCINE Moderna 2021 12:00:00 AM EDT completed      

           [facility]

 

                                        Vaccine Series Complete: YESThis Data wa

s Submitted to [facility]. 

 

                          COVID-19 (Moderna), mRNA, LNP-S, PF, 100 mcg/0.5 mL do

se 2021 12:00:00 AM 

EDT                 completed                               [facility]

 

             COVID-19 VACCINE Moderna 2021 12:00:00 AM EDT completed      

           [facility]

 

                                        Vaccine Series Complete: NOThis Data was

 Submitted to [facility]. 



                                                                                
                                     



Medications

          



          Medication Brand Name Start Date Product Form Dose      Route     Admi

nistrative 

Instructions Pharmacy Instructions Status     Indications Reaction   Description

 Data 

Source(s)

 

                          Acetaminophen 325 MG / Hydrocodone Bitartrate 5 MG Ora

l Tablet 5-325 mg 

HYDROCODONE/ACETAMINOPHEN 2021 12:00:00 AM EDT tablet       180           

            TAKE 2 TABLETS

BY MOUTH 3 TIMES A DAY MAXIMUM DAILY DOSE = 6 TABLETS TAKE 2 TABLETS BY MOUTH 3 

TIMES A DAY MAXIMUM DAILY DOSE = 6 TABLETS SOLD: 2021                     

                   [facility]

 

      Zithromax Z-Edson Zithromax Z-Edson 2021 12:00:00 AM EDT                

               active             

                                        MEDENT ([facility])

 

                    benzonatate 100 MG Oral Capsule [Tessalon Perles] Tessalon P

erles     2021 

12:00:00 AM EDT               ORAL                 active                      M

EDENT ([facility])

 

             benzonatate 100 MG Oral Capsule BENZONATATE  2021 12:00:00 AM

 EDT capsule      

30                                      TAKE ONE CAPSULE BY MOUTH EVERY 8 HOURS 

AS NEEDED FOR COUGH TAKE ONE 

CAPSULE BY MOUTH EVERY 8 HOURS AS NEEDED FOR COUGH SOLD: 2021             

                           [facility]

 

          250 mg              2021 12:00:00 AM EDT tablet    6            

       TAKE BY MOUTH AS DIRECTED 

COMPLETE ALL OF ANTIBIOTIC AS DIRECTED  TAKE BY MOUTH AS DIRECTED COMPLETE ALL O

F

ANTIBIOTIC AS DIRECTED SOLD: 2021                                        [facility]

[facility]

 

          350 mg              10/29/2021 12:00:00 AM EDT tablet    90           

       TAKE 1 TABLET BY MOUTH 3 TIMES A

DAY MAXIMUM DAILY DOSE = 3 TABLETS      TAKE 1 TABLET BY MOUTH 3 TIMES A DAY MAX

IMUM 

DAILY DOSE = 3 TABLETS SOLD: 10/29/2021                                        [facility]

[facility]

 

                          Acetaminophen 325 MG / Hydrocodone Bitartrate 5 MG Ora

l Tablet 5-325 mg 

HYDROCODONE/ACETAMINOPHEN 10/24/2021 12:00:00 AM EDT tablet       60            

            TAKE ONE TABLET

BY MOUTH EVERY 4 HOURS MAXIMUM DAILY DOSE = 6 TABLETS TAKE ONE TABLET BY MOUTH 

EVERY 4 HOURS MAXIMUM DAILY DOSE = 6 TABLETS SOLD: 10/25/2021                   

                     [facility]

 

                          Acetaminophen 325 MG / Hydrocodone Bitartrate 5 MG Ora

l Tablet 5-325 mg 

HYDROCODONE/ACETAMINOPHEN 2021 12:00:00 AM EDT tablet       120           

            TAKE ONE 

TABLET BY MOUTH EVERY 6 HOURS . MAXIMUM DAILY DOSE = 4 TABLETS TAKE ONE TABLET 

BY MOUTH EVERY 6 HOURS . MAXIMUM DAILY DOSE = 4 TABLETS SOLD: 2021        

                                

[facility]

 

          350 mg              2021 12:00:00 AM EDT tablet    90           

       TAKE ONE TABLET BY MOUTH THREE 

TIMES A DAY , MAXIMUM DAILY DOSE = 3 TABLETS TAKE ONE TABLET BY MOUTH THREE 

TIMES A DAY , MAXIMUM DAILY DOSE = 3 TABLETS SOLD: 2021                   

                     [facility]

 

          20 mg               2021 12:00:00 AM EDT capsule,delayed release

(DR/EC) 60                  TAKE ONE 

CAPSULE BY MOUTH TWICE A DAY MAXIMUM DAILY DOSE = 2 CAPSULES TAKE ONE CAPSULE BY

MOUTH TWICE A DAY MAXIMUM DAILY DOSE = 2 CAPSULES SOLD: 2021              

                          [facility]

 

          20 mg               2021 12:00:00 AM EDT capsule,delayed release

(DR/EC) 60                  TAKE ONE 

CAPSULE BY MOUTH TWICE A DAY MAXIMUM DAILY DOSE = 2 CAPSULES TAKE ONE CAPSULE BY

MOUTH TWICE A DAY MAXIMUM DAILY DOSE = 2 CAPSULES SOLD: 10/11/2021              

                          [facility]

 

          10 mg               2021 12:00:00 AM EDT tablet extended release

 24hr 30                  TAKE 1 

TABLET BY MOUTH ONCE A DAY TAKE 1 TABLET BY MOUTH ONCE A DAY SOLD: 10/10/2021   

              

                                                    [facility]

 

          10 mg               2021 12:00:00 AM EDT tablet extended release

 24hr 30                  TAKE 1 

TABLET BY MOUTH ONCE A DAY TAKE 1 TABLET BY MOUTH ONCE A DAY SOLD: 2021   

              

                                                    [facility]

 

          350 mg              2021 12:00:00 AM EDT tablet    90           

       TAKE 1 TABLET BY MOUTH 3 TIMES A

DAY MAXIMUM DAILY DOSE = 3 TABLETS      TAKE 1 TABLET BY MOUTH 3 TIMES A DAY MAX

IMUM 

DAILY DOSE = 3 TABLETS SOLD: 2021                                        [facility]

[facility]

 

                          Acetaminophen 325 MG / Hydrocodone Bitartrate 5 MG Ora

l Tablet 5-325 mg 

HYDROCODONE/ACETAMINOPHEN 2021 12:00:00 AM EDT tablet       180           

            TAKE 1 TABLET 

BY MOUTH EVERY 4 HOURS MAX DAILY DOSE = 6 TABLETS TAKE 1 TABLET BY MOUTH EVERY 4

HOURS MAX DAILY DOSE = 6 TABLETS SOLD: 2021                               

         [facility]

 

          10 mg               2021 12:00:00 AM EDT tablet    90           

       TAKE TWO TABLETS BY MOUTH EVERY 

MORNING & 1 AT BEDTIME    TAKE TWO TABLETS BY MOUTH EVERY MORNING & 1 AT BEDTIME

 

SOLD: 10/06/2021                                                 [facility]

 

          10 mg               2021 12:00:00 AM EDT tablet    90           

       TAKE TWO TABLETS BY MOUTH EVERY 

MORNING & 1 AT BEDTIME    TAKE TWO TABLETS BY MOUTH EVERY MORNING & 1 AT BEDTIME

 

SOLD: 2021                                                 [facility]

 

                          Acetaminophen 325 MG / Hydrocodone Bitartrate 5 MG Ora

l Tablet 5-325 mg 

HYDROCODONE/ACETAMINOPHEN 2021 12:00:00 AM EDT tablet       180           

            TAKE ONE 

TABLET BY MOUTH EVERY 4 HOURS MAXIMUM DAILY DOSE = 6 TABLETS TAKE ONE TABLET BY 

MOUTH EVERY 4 HOURS MAXIMUM DAILY DOSE = 6 TABLETS SOLD: 2021             

                           [facility]

 

          350 mg              2021 12:00:00 AM EDT tablet    90           

       TAKE ONE TABLET BY MOUTH THREE 

TIMES A DAY MAXIMUM DAILY DOSE = 3 TABLETS TAKE ONE TABLET BY MOUTH THREE TIMES 

A DAY MAXIMUM DAILY DOSE = 3 TABLETS SOLD: 2021                           

             [facility]

 

      Technetium TC 99M [place]       2021 12:00:00 AM EDT              

                 completed        

                                                    MEDENT ([facility])

 

                                        Medication administered onsite 

 

       Dobutamine IV Injection 250MG        2021 12:00:00 AM EDT          

                          completed  

                                                            MEDENT ([facility])

 

                                        Medication administered onsite 

 

                Docusate Sodium 100 MG Oral Capsule [Colace] Colace          2021 12:00:00 AM EDT  

                ORAL                    active                          MEDENT (

[facility])

 

          350 mg              2021 12:00:00 AM EDT tablet    90           

       TAKE ONE TABLET BY MOUTH THREE 

TIMES A DAY MAXIMUM DAILY DOSE = 3 TABLETS TAKE ONE TABLET BY MOUTH THREE TIMES 

A DAY MAXIMUM DAILY DOSE = 3 TABLETS SOLD: 2021                           

             [facility]

 

                          Acetaminophen 325 MG / Hydrocodone Bitartrate 5 MG Ora

l Tablet 5-325 mg 

HYDROCODONE/ACETAMINOPHEN 2021 12:00:00 AM EDT tablet       180           

            TAKE ONE 

TABLET BY MOUTH EVERY 4 HOURS MAXIMUM DAILY DOSE = 6 TABLETS TAKE ONE TABLET BY 

MOUTH EVERY 4 HOURS MAXIMUM DAILY DOSE = 6 TABLETS SOLD: 2021             

                           [facility]

 

          10 mg               2021 12:00:00 AM EDT tablet    60           

       TAKE TWO TABLETS BY MOUTH ONCE 

DAILY      TAKE TWO TABLETS BY MOUTH ONCE DAILY SOLD: 2021                

                  [facility]

 

                          Acetaminophen 325 MG / Hydrocodone Bitartrate 5 MG Ora

l Tablet 5-325 mg 

HYDROCODONE/ACETAMINOPHEN 2021 12:00:00 AM EDT tablet       180           

            TAKE 1 TABLET 

BY MOUTH EVERY 4 HOURS MAX DAILY DOSE = 6 TABLETS TAKE 1 TABLET BY MOUTH EVERY 4

 HOURS MAX DAILY DOSE = 6 TABLETS SOLD: 2021                              

          [facility]

 

          350 mg              2021 12:00:00 AM EDT tablet    90           

       TAKE ONE TABLET BY MOUTH THREE 

TIMES A DAY MAXIMUM DAILY DOSE = 3 TABLETS TAKE ONE TABLET BY MOUTH THREE TIMES 

A DAY MAXIMUM DAILY DOSE = 3 TABLETS SOLD: 2021                           

             [facility]

 

          10 mg               2021 12:00:00 AM EDT tablet extended release

 24hr 30                  TAKE 1 

TABLET BY MOUTH ONCE A DAY TAKE 1 TABLET BY MOUTH ONCE A DAY SOLD: 2021   

              

                                                    [facility]

 

          10 mg               2021 12:00:00 AM EDT tablet extended release

 24hr 30                  TAKE 1 

TABLET BY MOUTH ONCE A DAY TAKE 1 TABLET BY MOUTH ONCE A DAY SOLD: 2021   

              

                                                    [facility]

 

          145 mg              2021 12:00:00 AM EDT tablet    90           

       TAKE ONE TABLET BY MOUTH EVERY 

DAY FOR TRIGLYCERIDES     TAKE ONE TABLET BY MOUTH EVERY DAY FOR TRIGLYCERIDES S

OLD:

 2021                                                     [facility]

 

          10 mg               2021 12:00:00 AM EDT tablet extended release

 24hr 30                  TAKE 1 

TABLET BY MOUTH ONCE A DAY TAKE 1 TABLET BY MOUTH ONCE A DAY SOLD: 2021   

              

                                                    [facility]

 

                    24 HR Glipizide 10 MG Extended Release Oral Tablet Glipizide

 ER        2021 

12:00:00 AM EDT                                    active                      M

EDENT ([facility])

 

          Fenofibrate 145 MG Oral Tablet Fenofibrate 2021 12:00:00 AM EDT 

                    ORAL       

                      active                                      MEDENT ([facility])

 

          50 mg               2021 12:00:00 AM EDT tablet    90           

       TAKE THREE TABLETS BY MOUTH AT 

BEDTIME    TAKE THREE TABLETS BY MOUTH AT BEDTIME SOLD: 2021              

                    [facility]

 

          50 mg               2021 12:00:00 AM EDT tablet    90           

       TAKE THREE TABLETS BY MOUTH AT 

BEDTIME    TAKE THREE TABLETS BY MOUTH AT BEDTIME SOLD: 2021              

                    [facility]

 

          10 mg               2021 12:00:00 AM EDT tablet    60           

       TAKE TWO TABLETS BY MOUTH ONCE 

DAILY      TAKE TWO TABLETS BY MOUTH ONCE DAILY SOLD: 2021                

                  [facility]

 

          50 mg               2021 12:00:00 AM EDT tablet    90           

       TAKE THREE TABLETS BY MOUTH AT 

BEDTIME    TAKE THREE TABLETS BY MOUTH AT BEDTIME SOLD: 2021              

                    [facility]

 

                    Trazodone Hydrochloride 50 MG Oral Tablet Trazodone HCL     

  2021 12:00:00 AM 

EDT                  ORAL                 active                      MEDENT ([facility])

 

        Blood Pressure Kit/Oscillating/Digital         2021 12:00:00 AM ED

T                                         

active                                                          MEDENT ([facility])

 

        Lisinopril 10 MG Oral Tablet Lisinopril 2021 12:00:00 AM EDT      

           ORAL                    

active                                                          MEDENT ([facility])

 

          50 mg               2021 12:00:00 AM EDT tablet    90           

       TAKE THREE TABLETS BY MOUTH AT 

BEDTIME    TAKE THREE TABLETS BY MOUTH AT BEDTIME SOLD: 2021              

                    [facility]

 

          10 mg               2021 12:00:00 AM EDT tablet    60           

       TAKE TWO TABLETS BY MOUTH ONCE 

DAILY      TAKE TWO TABLETS BY MOUTH ONCE DAILY SOLD: 2021                

                  [facility]

 

          50 mg               2021 12:00:00 AM EDT tablet    90           

       TAKE THREE TABLETS BY MOUTH AT 

BEDTIME    TAKE THREE TABLETS BY MOUTH AT BEDTIME SOLD: 10/10/2021              

                    [facility]

 

                          Acetaminophen 325 MG / Hydrocodone Bitartrate 5 MG Ora

l Tablet 5-325 mg 

HYDROCODONE/ACETAMINOPHEN 2021 12:00:00 AM EDT tablet       180           

            TAKE ONE 

TABLET BY MOUTH EVERY 4 HOURS MAXIMUM DAILY DOSE = 6 TABLETS TAKE ONE TABLET BY 

MOUTH EVERY 4 HOURS MAXIMUM DAILY DOSE = 6 TABLETS SOLD: 2021             

                           [facility]

 

          50 mg               2021 12:00:00 AM EDT tablet    90           

       TAKE THREE TABLETS BY MOUTH AT 

BEDTIME    TAKE THREE TABLETS BY MOUTH AT BEDTIME SOLD: 2021              

                    [facility]

 

          350 mg              2021 12:00:00 AM EDT tablet    90           

       TAKE ONE TABLET BY MOUTH THREE 

TIMES A DAY MAXIMUM DAILY DOSE = 3 TABLETS TAKE ONE TABLET BY MOUTH THREE TIMES 

A DAY MAXIMUM DAILY DOSE = 3 TABLETS SOLD: 2021                           

             [facility]

 

          350 mg              2021 12:00:00 AM EDT tablet    90           

       TAKE 1 TABLET BY MOUTH 3 TIMES A

 DAY MAXIMUM DAILY DOSE = 3 TABLETS     TAKE 1 TABLET BY MOUTH 3 TIMES A DAY MAX

IMUM

 DAILY DOSE = 3 TABLETS SOLD: 2021                                        

[facility]

 

          5-325 mg            2021 12:00:00 AM EDT tablet    180          

       TAKE ONE TABLET BY MOUTH 

EVERY 4 HOURS MAXIMUM DAILY DOSE = 6 TABLETS TAKE ONE TABLET BY MOUTH EVERY 4 

HOURS MAXIMUM DAILY DOSE = 6 TABLETS SOLD: 2021                           

             [facility]

 

          5-325 mg            2021 12:00:00 AM EST tablet    180          

       TAKE ONE TABLET BY MOUTH 

EVERY 4 HOURS MAXIMUM DAILY DOSE = 6 TABLETS TAKE ONE TABLET BY MOUTH EVERY 4 

HOURS MAXIMUM DAILY DOSE = 6 TABLETS SOLD: 2021                           

             [facility]

 

          20 mg               2021 12:00:00 AM EST capsule,delayed release

(DR/EC) 60                  TAKE ONE 

CAPSULE BY MOUTH TWICE A DAY TAKE ONE CAPSULE BY MOUTH TWICE A DAY SOLD: 

2021                                                      [facility]

 

          20 mg               2021 12:00:00 AM EST capsule,delayed release

(DR/EC) 60                  TAKE ONE 

CAPSULE BY MOUTH TWICE A DAY TAKE ONE CAPSULE BY MOUTH TWICE A DAY SOLD: 

2021                                                      [facility]

 

          20 mg               2021 12:00:00 AM EST capsule,delayed release

(DR/EC) 60                  TAKE ONE 

CAPSULE BY MOUTH TWICE A DAY TAKE ONE CAPSULE BY MOUTH TWICE A DAY SOLD: 

2021                                                      [facility]

 

          20 mg               2021 12:00:00 AM EST capsule,delayed release

(DR/EC) 60                  TAKE ONE 

CAPSULE BY MOUTH TWICE A DAY TAKE ONE CAPSULE BY MOUTH TWICE A DAY SOLD: 

2021                                                      [facility]

 

          350 mg              2021 12:00:00 AM EST tablet    90           

       TAKE ONE TABLET BY MOUTH THREE 

TIMES A DAY MAXIMUM DAILY DOSE = 3 TABLETS TAKE ONE TABLET BY MOUTH THREE TIMES 

A DAY MAXIMUM DAILY DOSE = 3 TABLETS SOLD: 2021                           

             [facility]

 

          20 mg               2021 12:00:00 AM EST capsule,delayed release

(DR/EC) 60                  TAKE ONE 

CAPSULE BY MOUTH TWICE A DAY TAKE ONE CAPSULE BY MOUTH TWICE A DAY SOLD: 

2021                                                      [facility]

 

          20 mg               2021 12:00:00 AM EST capsule,delayed release

(DR/EC) 60                  TAKE ONE 

CAPSULE BY MOUTH TWICE A DAY TAKE ONE CAPSULE BY MOUTH TWICE A DAY SOLD: 

2021                                                      [facility]

 

                    Magnesium Hydroxide 80 MG/ML Oral Suspension Milk Of Magnesi

a    2021 

12:00:00 AM EST               ORAL                 active                      M

EDENT ([facility], 

)

 

        Suprep Bowel Prep Kit Suprep Bowel Prep Kit 2021 12:00:00 AM EST  

                                

             active                                              MEDENT ([facility] )

 

             Carisoprodol 350 MG Oral Tablet CARISOPRODOL 2021 12:00:00 AM

 EST tablet       

90                                      TAKE ONE TABLET BY MOUTH THREE TIMES A D

AY MAXIMUM DAILY DOSE = 3 TABLETS 

TAKE ONE TABLET BY MOUTH THREE TIMES A DAY MAXIMUM DAILY DOSE = 3 TABLETS SOLD: 

2021                                                      [facility]

 

          5-325 mg            2021 12:00:00 AM EST tablet    180          

       TAKE ONE TABLET BY MOUTH 

EVERY 4 HOURS MAXIMUM DAILY DOSE = 6 TABLETS TAKE ONE TABLET BY MOUTH EVERY 4 

HOURS MAXIMUM DAILY DOSE = 6 TABLETS SOLD: 2021                           

             [facility]

 

          25 mg               2021 12:00:00 AM EST tablet    30           

       TAKE ONE TABLET BY MOUTH EVERY 

DAY        TAKE ONE TABLET BY MOUTH EVERY DAY SOLD: 2021                  

                [facility]

 

          25 mg               2021 12:00:00 AM EST tablet    30           

       TAKE ONE TABLET BY MOUTH EVERY 

DAY        TAKE ONE TABLET BY MOUTH EVERY DAY SOLD: 2021                  

                [facility]

 

          25 mg               2021 12:00:00 AM EST tablet    30           

       TAKE ONE TABLET BY MOUTH EVERY 

DAY        TAKE ONE TABLET BY MOUTH EVERY DAY SOLD: 2021                  

                [facility]

 

          25 mg               2021 12:00:00 AM EST tablet    30           

       TAKE ONE TABLET BY MOUTH EVERY 

DAY        TAKE ONE TABLET BY MOUTH EVERY DAY SOLD: 2021                  

                [facility]

 

          25 mg               2021 12:00:00 AM EST tablet    30           

       TAKE ONE TABLET BY MOUTH EVERY 

DAY        TAKE ONE TABLET BY MOUTH EVERY DAY SOLD: 2021                  

                [facility]

 

          25 mg               2021 12:00:00 AM EST tablet    30           

       TAKE ONE TABLET BY MOUTH EVERY 

DAY        TAKE ONE TABLET BY MOUTH EVERY DAY SOLD: 2021                  

                [facility]

 

          5 mg                01/15/2021 12:00:00 AM EST tablet    90           

       TAKE ONE TABLET BY MOUTH EVERY DAY

           TAKE ONE TABLET BY MOUTH EVERY DAY SOLD: 2021                  

                [facility]

 

          1,000 mg            01/15/2021 12:00:00 AM EST tablet    180          

       TAKE ONE TABLET BY MOUTH 

TWICE A DAY WITH FOOD     TAKE ONE TABLET BY MOUTH TWICE A DAY WITH FOOD SOLD: 

2021                                                      [facility]

 

          Lovastatin 40 MG Oral Tablet LOVASTATIN 01/15/2021 12:00:00 AM EST tab

let    90                  

TAKE ONE TABLET BY MOUTH EVERY EVENING WITH A MEAL TAKE ONE TABLET BY MOUTH 

EVERY EVENING WITH A MEAL SOLD: 2021                                      

  [facility]

 

          50 mg               01/15/2021 12:00:00 AM EST tablet    180          

       TAKE ONE TABLET BY MOUTH TWICE A

 DAY FOR HEADACHES        TAKE ONE TABLET BY MOUTH TWICE A DAY FOR HEADACHES SOL

D: 

2021                                                      [facility]

 

                    Metformin hydrochloride 1000 MG Oral Tablet 1,000 mg METFORM

IN HCL       01/15/2021 

12:00:00 AM EST tablet       180                       TAKE ONE TABLET BY MOUTH 

TWICE A DAY WITH FOOD TAKE

 ONE TABLET BY MOUTH TWICE A DAY WITH FOOD SOLD: 2021                     

                   [facility]

 

          Lovastatin 40 MG Oral Tablet LOVASTATIN 01/15/2021 12:00:00 AM EST tab

let    90                  

TAKE ONE TABLET BY MOUTH EVERY EVENING WITH A MEAL TAKE ONE TABLET BY MOUTH 

EVERY EVENING WITH A MEAL SOLD: 2021                                      

  [facility]

 

          5 mg                01/15/2021 12:00:00 AM EST tablet    90           

       TAKE ONE TABLET BY MOUTH EVERY DAY

           TAKE ONE TABLET BY MOUTH EVERY DAY SOLD: 2021                  

                [facility]

 

          Lovastatin 40 MG Oral Tablet LOVASTATIN 01/15/2021 12:00:00 AM EST tab

let    90                  

TAKE ONE TABLET BY MOUTH EVERY EVENING WITH A MEAL TAKE ONE TABLET BY MOUTH 

EVERY EVENING WITH A MEAL SOLD: 2021                                      

  [facility]

 

          1,000 mg            01/15/2021 12:00:00 AM EST tablet    180          

       TAKE ONE TABLET BY MOUTH 

TWICE A DAY WITH FOOD     TAKE ONE TABLET BY MOUTH TWICE A DAY WITH FOOD SOLD: 

2021                                                      [facility]

 

          10 mg               01/15/2021 12:00:00 AM EST tablet    90           

       TAKE ONE TABLET BY MOUTH EVERY 

DAY        TAKE ONE TABLET BY MOUTH EVERY DAY SOLD: 2021                  

                [facility]

 

          Lovastatin 40 MG Oral Tablet LOVASTATIN 01/15/2021 12:00:00 AM EST tab

let    90                  

TAKE ONE TABLET BY MOUTH EVERY EVENING WITH A MEAL TAKE ONE TABLET BY MOUTH 

EVERY EVENING WITH A MEAL SOLD: 2021                                      

  [facility]

 

          50 mg               01/15/2021 12:00:00 AM EST tablet    180          

       TAKE ONE TABLET BY MOUTH TWICE A

 DAY FOR HEADACHES        TAKE ONE TABLET BY MOUTH TWICE A DAY FOR HEADACHES SOL

D: 

2021                                                      [facility]

 

          50 mg               01/15/2021 12:00:00 AM EST tablet    180          

       TAKE ONE TABLET BY MOUTH TWICE A

 DAY FOR HEADACHES        TAKE ONE TABLET BY MOUTH TWICE A DAY FOR HEADACHES SOL

D: 

2021                                                      [facility]

 

          1,000 mg            01/15/2021 12:00:00 AM EST tablet    180          

       TAKE ONE TABLET BY MOUTH 

TWICE A DAY WITH FOOD     TAKE ONE TABLET BY MOUTH TWICE A DAY WITH FOOD SOLD: 

2021                                                      [facility]

 

          50 mg               01/15/2021 12:00:00 AM EST tablet    180          

       TAKE ONE TABLET BY MOUTH TWICE A

 DAY FOR HEADACHES        TAKE ONE TABLET BY MOUTH TWICE A DAY FOR HEADACHES SOL

D: 

2021                                                      [facility]

 

          350 mg              2021 12:00:00 AM EST tablet    90           

       TAKE ONE TABLET BY MOUTH THREE 

TIMES A DAY MAXIMUM DAILY DOSE = 3 TABLETS TAKE ONE TABLET BY MOUTH THREE TIMES 

A DAY MAXIMUM DAILY DOSE = 3 TABLETS SOLD: 2021                           

             [facility]

 

          5-325 mg            2021 12:00:00 AM EST tablet    180          

       TAKE ONE TABLET BY MOUTH 

EVERY 4 HOURS MAXIMUM DAILY DOSE = 6 TABLETS TAKE ONE TABLET BY MOUTH EVERY 4 

HOURS MAXIMUM DAILY DOSE = 6 TABLETS SOLD: 2021                           

             [facility]

 

          5-325 mg            2020 12:00:00 AM EST tablet    180          

       TAKE ONE TABLET BY MOUTH 

EVERY 4 HOURS MAXIMUM DAILY DOSE = 6 TABLETS TAKE ONE TABLET BY MOUTH EVERY 4 

HOURS MAXIMUM DAILY DOSE = 6 TABLETS SOLD: 2020                           

             [facility]

 

          350 mg              2020 12:00:00 AM EST tablet    90           

       TAKE 1 TABLET BY MOUTH 3 TIMES A

 DAY MAXIMUM DAILY DOSE = 3 TABLETS     TAKE 1 TABLET BY MOUTH 3 TIMES A DAY MAX

IMUM

 DAILY DOSE = 3 TABLETS SOLD: 2020                                        

[facility]

 

          100 mg              2020 12:00:00 AM EST capsule   28           

       TAKE ONE CAPSULE BY MOUTH TWICE

 A DAY FOR 2 WEEKS        TAKE ONE CAPSULE BY MOUTH TWICE A DAY FOR 2 WEEKS SOLD

: 

2020                                                      [facility]

 

          20 mg               11/10/2020 12:00:00 AM EST capsule,delayed release

(DR/EC) 60                  TAKE ONE 

CAPSULE BY MOUTH TWICE A DAY TAKE ONE CAPSULE BY MOUTH TWICE A DAY SOLD: 

2021                                                      [facility]

 

          20 mg               11/10/2020 12:00:00 AM EST capsule,delayed release

(DR/EC) 60                  TAKE ONE 

CAPSULE BY MOUTH TWICE A DAY TAKE ONE CAPSULE BY MOUTH TWICE A DAY SOLD: 

11/10/2020                                                      [facility]

 

          20 mg               11/10/2020 12:00:00 AM EST capsule,delayed release

(DR/EC) 60                  TAKE ONE 

CAPSULE BY MOUTH TWICE A DAY TAKE ONE CAPSULE BY MOUTH TWICE A DAY SOLD: 

2021                                                      [facility]

 

          20 mg               11/10/2020 12:00:00 AM EST capsule,delayed release

(DR/EC) 60                  TAKE ONE 

CAPSULE BY MOUTH TWICE A DAY TAKE ONE CAPSULE BY MOUTH TWICE A DAY SOLD: 

12/10/2020                                                      [facility]

 

                    empagliflozin 25 MG Oral Tablet [Jardiance] Jardiance       

    2020 12:00:00 AM EST

                     ORAL                 active                      [facility]

 

          5-325 mg            2020 12:00:00 AM EST tablet    180          

       TAKE 1 TABLET BY MOUTH EVERY 

4 HOURS MAXIMUM DAILY DOSE = 6 TABLETS  TAKE 1 TABLET BY MOUTH EVERY 4 HOURS 

MAXIMUM DAILY DOSE = 6 TABLETS SOLD: 2020                                 

       [facility]

 

          25 mg               2020 12:00:00 AM EST tablet    14           

       TAKE 1 TABLET BY MOUTH ONCE A DAY

 FOR 14 DAYS TAKE 1 TABLET BY MOUTH ONCE A DAY FOR 14 DAYS SOLD: 2020     

                      

                                        [facility]

 

          350 mg              2020 12:00:00 AM EST tablet    90           

       TAKE 1 TABLET BY MOUTH THREE 

TIMES A DAY MAXIMUM DAILY DOSE = 3 TABLETS TAKE 1 TABLET BY MOUTH THREE TIMES A 

DAY MAXIMUM DAILY DOSE = 3 TABLETS SOLD: 2020                             

           [facility]

 

          25 mg               10/14/2020 12:00:00 AM EDT tablet    90           

       TAKE ONE TABLET BY MOUTH EVERY 

DAY        TAKE ONE TABLET BY MOUTH EVERY DAY SOLD: 2021                  

                [facility]

 

          25 mg               10/14/2020 12:00:00 AM EDT tablet    90           

       TAKE ONE TABLET BY MOUTH EVERY 

DAY        TAKE ONE TABLET BY MOUTH EVERY DAY SOLD: 2021                  

                [facility]

 

          15 mg               10/14/2020 12:00:00 AM EDT tablet    90           

       TAKE ONE TABLET BY MOUTH EVERY 

DAY        TAKE ONE TABLET BY MOUTH EVERY DAY SOLD: 2020                  

                [facility]

 

          15 mg               10/14/2020 12:00:00 AM EDT tablet    90           

       TAKE ONE TABLET BY MOUTH EVERY 

DAY        TAKE ONE TABLET BY MOUTH EVERY DAY SOLD: 2021                  

                [facility]

 

          15 mg               10/14/2020 12:00:00 AM EDT tablet    90           

       TAKE ONE TABLET BY MOUTH EVERY 

DAY        TAKE ONE TABLET BY MOUTH EVERY DAY SOLD: 2021                  

                [facility]

 

          25 mg               10/14/2020 12:00:00 AM EDT tablet    90           

       TAKE ONE TABLET BY MOUTH EVERY 

DAY        TAKE ONE TABLET BY MOUTH EVERY DAY SOLD: 2020                  

                [facility]

 

          25 mg               10/14/2020 12:00:00 AM EDT tablet    90           

       TAKE ONE TABLET BY MOUTH EVERY 

DAY        TAKE ONE TABLET BY MOUTH EVERY DAY SOLD: 2021                  

                [facility]

 

          15 mg               10/14/2020 12:00:00 AM EDT tablet    90           

       TAKE ONE TABLET BY MOUTH EVERY 

DAY        TAKE ONE TABLET BY MOUTH EVERY DAY SOLD: 2021                  

                [facility]

 

          5-325 mg            10/02/2020 12:00:00 AM EDT tablet    180          

       TAKE ONE TABLET BY MOUTH 

EVERY 4 HOURS MAXIMUM DAILY DOSE = 6 TABLETS TAKE ONE TABLET BY MOUTH EVERY 4 

HOURS MAXIMUM DAILY DOSE = 6 TABLETS SOLD: 10/02/2020                           

             [facility]

 

          350 mg              10/02/2020 12:00:00 AM EDT tablet    90           

       TAKE ONE TABLET BY MOUTH THREE 

TIMES A DAY MAXIMUM DAILY DOSE = 3 TABLETS TAKE ONE TABLET BY MOUTH THREE TIMES 

A DAY MAXIMUM DAILY DOSE = 3 TABLETS SOLD: 10/02/2020                           

             [facility]

 

          300 mg              2020 12:00:00 AM EDT tablet    15           

       TAKE ONE TABLET BY MOUTH EVERY 

DAY        TAKE ONE TABLET BY MOUTH EVERY DAY SOLD: 2020                  

                [facility]

 

          300 mg              2020 12:00:00 AM EDT tablet    5            

       TAKE ONE TABLET BY MOUTH EVERY 

DAY        TAKE ONE TABLET BY MOUTH EVERY DAY SOLD: 10/19/2020                  

                [facility]

 

          20 mg               2020 12:00:00 AM EDT capsule,delayed release

(DR/EC) 60                  TAKE 1 

CAPSULE BY MOUTH TWO TIMES A DAY MAXIMUM DAILY DOSE = 2 TAKE 1 CAPSULE BY MOUTH 

TWO TIMES A DAY MAXIMUM DAILY DOSE = 2 SOLD: 10/02/2020                         

               [facility]



                                                                                
                                                                                
                                                                                
                                                                                
                                                                                
                                                                                
                                                                                
                                                                                
                                                                                
                                                                                
                                                                                
                                                                                
                                                                                
                                                                                
        



Insurance Providers

          



             Payer name   Policy type / Coverage type Policy ID    Covered party

 ID Covered 

party's relationship to [employer] Policy [employer]             Plan Information

 

                [facility] () Workers Compensation [id number]    

[id number] Self                                    4

[id number]

 

                [facility] () Workers Compensation [id number]    

[id number] Self                                    4

[id number]

 

                [facility] Workers Compensation [id number]    

[id number] Self                                    4

[id number]

 

                [facility] Workers Compensation [id number]    

[id number] Self                                    4

[id number]

 

                [facility] Workers Compensation [id number]    

[id number] Self                                    4

[id number]

 

                [facility] Workers Compensation [id number]    

[id number] Self                                    4

[id number]

 

                [facility] Workers Compensation [id number]    

[id number] Self                                    4

[id number]

 

                [facility] Workers Compensation [id number]    

[id number] Self                                    4

[id number]

 

                [facility] Workers Compensation [id number]    

[id number] Self                                    4

[id number]

 

                [facility] Workers Compensation [id number]     

[id number] Self                                    4

[id number]

 

                [facility]) Workers Compensation [id number]     

[id number] Self                                    4

[id number]

 

                [facility]) Workers Compensation [id number]     

[id number] Self                                    4

[id number]

 

                [facility] Workers Compensation [id number]     

[id number] Self                                    4

[id number]

 

                [facility] Workers Compensation [id number]     

[id number] Self                                    4

[id number]

 

                [facility] Workers Compensation [id number]     

[id number] Self                                    4

[id number]

 

                [facility] Workers Compensation [id number]     

[id number] Self                                    4

[id number]

 

                [facility] Workers Compensation [id number]     

[id number] Self                                    4

[id number]

 

          MEDICARE            [id number]           SP                  [id number]

A

 

                [employer] [facility] Workers Compensation [id number]        

[id number] Self                                    6

[id number]

 

                [employer] ([facility] Workers Compensation [id number]        

[id number] Self                                    6

[id number]

 

          MEDICARE COMPLETE           [id number]           SP                  [id number]

[id number]

 

                [facility]) Commercial      [id number]     

[id number] Self                                    9

[id number]

 

          MEDICARE COMPLETE           [id number]           SP                  [id number]

[id number]

 

          [facility]        [id number]           18            

      [id number]

 

          [employer] CP DUAL COMPL-CLINIC [place]        [id number]           18            

      [id number]

 

          [facility] MEDICARE COMPLETE - CLINIC           [id number]           18      

            [id number]

 

          [employer] CP DUAL COMP  -PHYSICIAN [place]        [id number]           18      

            [id number]

 

          [employer] CP DUAL COMPL-CLINIC [place]        [id number]           18          

        [id number]

 

                    ANSI-Medicare Part B [id number]   

                            

[id number]

 

          [employer] MEDICARE HMO         [id number]           18                  

[id number]

 

                    ANSI-Medicare Part B [id number]   

                            

[id number]

 

                    ANSI-Medicare Part B [id number]   

                            

[id number]

 

          SELF PAY  SP        UNAVAILABLE                               UNAVAILA

BLE

 

          [facility]        [id number]             S         

          [id number]

 

                              [id number]                               [id number]

A

 

                    ANSI-Medicare Part B [id number]   

                            

[id number]

 

          [employer] GOLD PLUS            -O/P         [id number]           18     

             [id number]

 

          [employer] GOLD           [id number]           SP                  [id number]

7

 

          [employer] GOLD CLASSIC           [id number]           S                   

[id number]

 

          [employer] GOLD PLUS            -PHYSICIAN         [id number]           1

[id number]                  [id number]

 

          MEDICARE  BRITTANY       [id number] S                   [id number]

[id number]

 

          [employer]    HEA       [id number] S                   [id number]

 

          MEDICARE COMPLETE           [id number]           SP                  [id number]

[id number]

 

          [employer] GOLD           [id number]           SP                  [id number]

7

 

          [employer] MEDICARE           [id number]           S          

         [id number]

 

          [facility] MEDICARE           [id number]           S            

       [id number]

 

          [facility] MEDICARE DIVISION           [id number]           S            

       [id number]

 

          [place]ICARE - [place]           [id number]           S                  

 [id number]

 

          [employer] CHOICE CARE CLAIMS   -PHYSICIAN           [id number]           1

8                  [id number]

 

          [employer] CHOICE CARE CLAIMS   -CLINIC           [id number]           18  

                [id number]

 

          MEDICARE COMPLETE           [id number]           SP                  

[id number]

 

          [employer]        [id number]           18               

   [id number]

 

          UNHC MEDICARE COMPLETE    -PHYS [place]        [id number]           18      

            [id number]

 

          [employer] [place]        [id number]           18             

     [id number]

 

          [facility] MEDICARE COMPLETE [place]        [id number]           18             

     [id number]

 

          [facility] MEDICARE COMPLETE    -PHYS [place]        [id number]           18    

              [id number]

 

          MEDICARE COMPLETE-UHC O         [id number] S               

    [id number]

 

          [employer] MEDICARE           [id number]           S            

       [id number]



                                                                                
                                                                                
                                                                                
                                                                                
                                                                                
                                                                                
                                                                                
                                                                                
                  



Problems, Conditions, and Diagnoses

          



           Code       Display Name Description Problem Type Effective Dates Data

 Source(s)

 

             I10          Essential (primary) hypertension Essential (primary) h

ypertension Diagnosis    

10/25/2021 01:03:00 PM EDT              [facility]

 

                           ENCOUNTER FOR SCREENING FOR COVID-19 ENCOUNTER F

OR SCREENING FOR COVID-19 

Diagnosis                 10/25/2021 01:03:00 PM EDT [facility]

 

                    B974                Respiratory syncytial virus as the cause

 of diseases classified elsewhere 

Respiratory syncytial virus as the cause of diseases classified elsewhere 

Diagnosis                 10/25/2021 01:03:00 PM EDT [facility]

 

                    J069                Acute upper respiratory infection, unspe

cified Acute upper respiratory 

infection, unspecified Diagnosis           10/25/2021 01:03:00 PM EDT [facility]

 

                    [id number]              Other long term (current) drug therapy O

ther long term (current) drug 

therapy             Diagnosis           2021 10:39:00 AM [facility]

 

                    C641                Malignant neoplasm of right kidney, exce

pt renal pelvis Malignant neoplasm 

of right kidney, except renal pelvis Diagnosis           2021 10:39:00 AM 

T 

[facility]

 

                                   Obstructive sleep apnea (adult) (pediatr

ic) Obstructive sleep apnea 

(adult) (pediatric) Diagnosis           2021 10:39:00 AM T [facility]

 

             R911         Solitary pulmonary nodule Solitary pulmonary nodule Di

agnosis    2021 

10:39:00 AM T                         [facility]

 

                    K219                Gastro-esophageal reflux disease without

 esophagitis Gastro-esophageal 

reflux disease without esophagitis Diagnosis           2021 10:39:00 AM ED

[facility]

 

                                   Pure hypercholesterolemia, unspecified P

ure hypercholesterolemia, 

unspecified         Diagnosis           2021 10:39:00 AM [facility]

 

                                   Type 2 diabetes mellitus with hyperglyce

kyler Type 2 diabetes mellitus with 

hyperglycemia       Diagnosis           2021 10:39:00 AM [facility]

 

                    Z125                Encounter for screening for malignant ne

oplasm of prostate Encounter for 

screening for malignant neoplasm of prostate Diagnosis                  08:02:00 AM 

[facility]

 

                    [id number]              Neoplasm of unspecified behavior of righ

t kidney Neoplasm of unspecified 

behavior of right kidney Diagnosis           2021 12:31:00 PM [facility]

 

                                   Other specified abnormal findings of blo

od chemistry Other specified 

abnormal findings of blood chemistry Diagnosis           2021 12:31:00 PM 

[facility]

 

                [id number]          Other abnormal findings in urine Other abnormal 

findings in urine 

Diagnosis                 2021 12:31:00 PM [facility]

 

             Y93.89       Activity, other specified ACTIVITY, OTHER SPECIFIED Di

agnosis    2021 

08:30:00 PM [facility]

 

                    Y92.89              Other specified places as the place of o

ccurrence of the external cause 

OTH PLACES AS THE PLACE OF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

 

                    [id number]         10/25/2021 01:25:00 PM EDT [facility] ([facility])









          Name      Value     Range     Interpretation Code Description Data Karen

rce(s) Supporting 

Document(s)

 

                                        Respiratory syncytial virus Ag [Presence

] in Unspecified specimen by Immunoassay

           Laboratory test result                                  [facility] ([facility])  









                    ID                  Date                Data Source

 

                    [id number]         2021 12:04:00 PM EDT [facility]









          Name      Value     Range     Interpretation Code Description Data Karen

rce(s) Supporting 

Document(s)

 

          SARS coronavirus 2 RNA Not Detected                               [facility]

[place]     

 

                                        This lab was ordered by [facility]

lay and reported by [facility]. 









                    ID                  Date                Data Source

 

                    [id number]     2021 04:20:00 PM EDT [facility]









          Name      Value     Range     Interpretation Code Description Data Karen

rce(s) Supporting 

Document(s)

 

          SARS-CoV-2, KRISTIE Not Detected Not Detected                     [facility]  

 

                                        This nucleic acid amplification test was

 developed and its 

performancecharacteristics determined by [facility]. Nucleic 
acidamplification tests include RT-PCR and TMA. This test has not beenFDA 
cleared or approved. This test has been authorized by FDA underan Emergency Use 
Authorization (EUA). This test is only authorizedfor the duration of time the 
declaration that circumstances existjustifying the authorization of the 
emergency use of in vitrodiagnostic tests for detection of SARS-CoV-2 virus 
and/or diagnosisof COVID-19 infection under section 564(b)(1) of the Act, 21 
U.S.C.360bbb-3(b) (1), unless the authorization is terminated or 
revokedsooner.When diagnostic testing is negative, the possibility of a 
falsenegative result should be considered in the context of a patient'srecent 
exposures and the presence of clinical signs and symptomsconsistent with COVID-
19. An individual without symptoms of COVID-19and who is not shedding SARS-CoV-2
 virus would expect to have anegative (not detected) result in this assay. 

 

          SARS-CoV-2, KRISTIE 2 DAY TAT Performed                               [facility]  









                    ID                  Date                Data Source

 

                    [id number]         2021 11:35:00 AM EDT [facility] ([facility])









          Name      Value     Range     Interpretation Code Description Data Karen

rce(s) Supporting 

Document(s)

 

           Laboratory test finding (navigational concept) Laboratory test result

                                  

[facility] ([facility])  

 

           Sars-CoV-2, Kristie Laboratory test result                               

   [facility] ([facility])                                 

 

                                        This nucleic acid amplification test was

 developed and its performance

characteristics determined by [facility]. Nucleic acid

amplification tests include RT-PCR and TMA. This test has not been

FDA cleared or approved. This test has been authorized by FDA under

an Emergency Use Authorization (EUA). This test is only authorized

for the duration of time the declaration that circumstances exist

justifying the authorization of the emergency use of in vitro

diagnostic tests for detection of SARS-CoV-2 virus and/or diagnosis

of COVID-19 infection under section 564(b)(1) of the Act, 21 U.S.C.

                                        360bbb-3(b) (1), unless the authorizatio

n is terminated or revoked

sooner.

When diagnostic testing is negative, the possibility of a false

negative result should be considered in the context of a patient's

recent exposures and the presence of clinical signs and symptoms

consistent with COVID-19. An individual without symptoms of COVID-19

and who is not shedding SARS-CoV-2 virus would expect to have a

negative (not detected) result in this assay.

 









                    ID                  Date                Data Source

 

                    [id number]         2021 03:48:00 PM EDT [facility] ([facility])









          Name      Value     Range     Interpretation Code Description Data Karen

rce(s) Supporting 

Document(s)

 

           Glucose, Fasting 112 mg/dL       Above high normal            M

[facility] ([facility])                        

 

           Blood Urea Nitrogen 31 mg/dL   7-18       Above high normal          

  [facility] ([facility])                        

 

           Creatinine For GFR 2.36 mg/dL 0.70-1.30  Above high normal           

 [facility] ([facility])                   

 

           Glomerular Filtration Rate 30.0                  Below low normal    

        [facility] ([facility])                        

 

                                        <content>Units are mL/min/1.73 

m2</content><br/><content></content><br/><content>Chronic Kidney Disease Staging
 per NKF:</content><br/><content></content><br/><content>Stage I & II   GFR >=60
       Normal to Mildly Decreased</content><br/><content>Stage III      GFR 30-
59      Moderately Decreased</content><br/><content>Stage IV       GFR 15-29    
  Severely Decreased</content><br/><content>Stage V        GFR <15        Very 
Little GFR Left</content><br/><content>ESRD           GFR <15 on 
RRT</content><br/><content></content> 

 

           Sodium Level 140 meq/L  136-145    Normal (applies to non-numeric res

ults)            [facility] 

([facility])         

 

           Potassium Serum 3.9 meq/L  3.5-5.1    Normal (applies to non-numeric 

results)            

[facility] ([facility])  

 

           Chloride Level 109 meq/L       Above high normal            [facility] ([facility])                        

 

           Carbon Dioxide Level 23 meq/L   21-32      Normal (applies to non-num

serge results)            

[facility] ([facility])  

 

           Calcium Level 9.7 mg/dL  8.8-10.2   Normal (applies to non-numeric re

sults)            

[facility] ([facility])  

 

           Anion Gap  8 meq/L    8-16       Normal (applies to non-numeric resul

ts)            [facility] 

([facility])         

 

           Ast/Sgot   15 U/L     7-37       Normal (applies to non-numeric resul

ts)            [facility] ([facility])                   

 

           Alt/SGPT   26 U/L     12-78      Normal (applies to non-numeric resul

ts)            [facility] ([facility])                  

 

           Alkaline Phosphatase 67 U/L          Normal (applies to non-num

serge results)            

[facility] ([facility])  

 

           Bilirubin,Total 0.4 mg/dL  0.2-1.0    Normal (applies to non-numeric 

results)            

[facility] ([facility])  

 

           Total Protein 8.0 GM/DL  6.4-8.2    Normal (applies to non-numeric re

sults)            [facility]        

 

           Albumin    4.3 GM/DL  3.2-5.2    Normal (applies to non-numeric resul

ts)            [facility] 

([facility])         

 

           Albumin/Globulin Ratio 1.2                   Normal (applies to non-n

umeric results)            [facility] 

([facility])         









                    ID                  Date                Data Source

 

                    [id number]         2021 03:48:00 PM EDT [facility] ([facility])









          Name      Value     Range     Interpretation Code Description Data Karen

rce(s) Supporting 

Document(s)

 

           Red Blood Count 4.80 10    4.30-6.10  Normal (applies to non-numeric 

results)            

[facility] ([facility]  

 

           White Blood Count 8.5 10     4.0-10.0   Normal (applies to non-numeri

c results)            

[facility] ([facility])  

 

           Hematocrit 43.8 %     42.0-52.0  Normal (applies to non-numeric resul

ts)            [facility]         

 

           Hemoglobin 14.5 g/dL  13.5-17.5  Normal (applies to non-numeric resul

ts)            [facility] 

([facility])         

 

                Mean Corpuscular Volume 91.3 fl         80.0-96.0       Normal (

applies to non-numeric 

results)                                [facility] ([facility]) 

 

 

                Mean Corpuscular Hemoglobin 30.2 pg         27.0-33.0       Norm

al (applies to non-numeric 

results)                                [facility] ([facility]) 

 

 

                Mean Corpuscular HGB Conc 33.1 g/dL       32.0-36.5       Normal

 (applies to non-numeric 

results)                                [facility]) 

 

 

                Platelet Count, Automated 238 10          150-450         Normal

 (applies to non-numeric results)

                                        [facility] ([facility]) 

 

 

                Red Cell Distribution Width 14.0 %          11.5-14.5       Norm

al (applies to non-numeric 

results)                                [facility]) 

 

 

           Neutrophils % 57.8 %     36.0-66.0  Normal (applies to non-numeric re

sults)            [facility] 

([facility])         

 

           Mono %     11.2 %     2.0-8.0    Above high normal            [facility] 

([facility])                                 

 

           Lymph %    27.3 %     24.0-44.0  Normal (applies to non-numeric resul

ts)            [facility] 

([facility])         

 

           Eos %      2.3 %      0.0-3.0    Normal (applies to non-numeric resul

ts)            [facility] ([facility])                   

 

           Immature Granulocyte % 0.2 %      0-3.0      Normal (applies to non-n

umeric results)            

[facility] ([facility])  

 

           Baso %     1.2 %      0.0-1.0    Above high normal            [facility] 

([facility])

                                         

 

           Neutrophils # 4.9 10     1.5-8.5    Normal (applies to non-numeric re

sults)            [facility] 

([facility])         

 

           Nucleated Red Blood Cell % 0.0 %      0-0        Normal (applies to n

on-numeric results)            

[facility] ([facility])  

 

           Lymph #    2.3 10     1.5-5.0    Normal (applies to non-numeric resul

ts)            [facility] 

([facility])         

 

           Mono #     1.0 10     0.0-0.8    Above high normal            [facility] 

([facility])                                 

 

           Eos #      0.2 10     0.0-0.5    Normal (applies to non-numeric resul

ts)            [facility] ([facility])                   

 

           Baso #     0.1 10     0.0-0.2    Normal (applies to non-numeric resul

ts)            [facility] ([facility])                  









                    ID                  Date                Data Source

 

                    [id number]     2021 07:01:00 PM EDT [facility]









          Name      Value     Range     Interpretation Code Description Data Karen

rce(s) Supporting 

Document(s)

 

          COMPREHENSIVE METABOLIC PANEL                                         

[facility]  

 

                                            COMPREHENSIVE METABOLIC PANEL 

 

           Sodium [Moles/volume] in Serum or Plasma 139 mEq/L  134 - 153        

                [facility]                                 

 

           Potassium [Moles/volume] in Serum or Plasma 4.4 mEq/L  3.6 - 5.0     

                   [facility]                            

 

           Chloride [Moles/volume] in Serum or Plasma 105 mEq/L  98 - 107       

                  [facility]                                 

 

           Carbon dioxide, total [Moles/volume] in Serum or Plasma 22 MEQ/L   22

 - 30                          

[facility]                   

 

           Glucose [Mass/volume] in Serum or Plasma 143 MG/DL  70 - 99    H     

                [facility]                                 

 

          BUN       26 MG/DL  7 - 21    H                   [facility]  

 

           Creatinine [Mass/volume] in Serum or Plasma 2.1 MG/DL  0.7 - 1.5  H  

                   [facility]                            

 

          BUN/CREAT 12        8 - 27                        [facility]  

 

           Protein [Mass/volume] in Serum or Plasma 6.9 G/DL   6.3 - 8.2        

                [facility]                                 

 

           Albumin [Mass/volume] in Serum or Plasma 4.5 G/DL   3.9 - 5.0        

                [facility]                                 

 

           Globulin [Mass/volume] in Serum by calculation 2.4 GM/DL  2.4 - 3.2  

                      [facility]                            

 

          A/G RATIO 1.9       0.8 - 2.0                     [facility]  

 

           Calcium [Mass/volume] in Serum or Plasma 9.8 MG/DL  8.4 - 10.2       

                [facility]                                 

 

           Bilirubin.total [Mass/volume] in Serum or Plasma <0.7 MG/DL 0.2 - 1.3

                        

[facility]                   

 

                    Alkaline phosphatase [Enzymatic activity/volume] in Serum or

 Plasma 53 U/L              38 - 

126                                             [facility]  

 

                          Aspartate aminotransferase [Enzymatic activity/volume]

 in Serum or Plasma 16 U/L

             5 - 40                                 [facility]  

 

                    Alanine aminotransferase [Enzymatic activity/volume] in Seru

m or Plasma 12 U/L              7

- 56                                            [facility]  

 

           Anion gap 3 in Serum or Plasma 12.0 mmol/L 8.0 - 16.0                

       [facility]

                                         

 

          AGE       60 yrs                                  [facility]

al  

 

          NON-AA GFR 34 mL/min                               [facility]

[facility]  

 

          AFR AMER GFR 42 mL/min                               [facility]

pital  

 

                                                                     Male GFR In

terprentation                  20-49 yrs

    >60 mL/min   Normal                  50-59 yrs     >56 mL/min   Normal      
           60-69 yrs     >49 mL/min   Normal                  70-79yrs      >42 
mL/min   Normal                  80 and above  >35 mL/min   Normal              
     Female GFR  Interpretation                  20-39 yrs     >60 mL/min   
Normal                  40-49 yrs     >58 mL/min   Normal                  50-59
yrs     >51 mL/min   Normal                  60-69 yrs     >45 mL/min   Normal  
               70-79 yrs     >39 mL/min   Normal                  80 and above  
>32 mL/min   Normal 









                    ID                  Date                Data Source

 

                    [id number]         2021 09:29:00 AM EDT [facility] ([facility])









          Name      Value     Range     Interpretation Code Description Data Karen

rce(s) Supporting 

Document(s)

 

           Comprehensive [facility]abo Laboratory test result                          

        [facility] ([facility])                                 

 

                                        Is patient fasting?  N 

 

          Sodium    139 meq/L 134-153                       [facility] ([facility])  

 

                                        Is patient fasting?  N 

 

          Potassium 4.4 meq/L 3.6-5.0                       [facility] ([facility])  

 

                                        Is patient fasting?  N 

 

          Chloride  105 meq/L                         [facility] ([facility])  

 

                                        Is patient fasting?  N 

 

          Co2       22 meq/L  22-30                         [facility] ([facility])  

 

                                        Is patient fasting?  N 

 

           Glucose    143 mg/dL  70-99      Above high normal            [facility] 

([facility])                                 

 

                                        Is patient fasting?  N 

 

          BUN       26 mg/dL  7-21      Above high normal           [facility]  

 

                                        Is patient fasting?  N 

 

           Creatinine 2.1 mg/dL  0.7-1.5    Above high normal            [facility] 

([facility])                                 

 

                                        Is patient fasting?  N 

 

          BUN/Creat 12        8-27                          [facility]  

 

                                        Is patient fasting?  N 

 

          Total Protein 6.9 g/dL  6.3-8.2                       [facility] ([facility])  

 

                                        Is patient fasting?  N 

 

          Albumin   4.5 g/dL  3.9-5.0                       [facility] ([facility])  

 

                                        Is patient fasting?  N 

 

          Globulin  2.4 GM/DL 2.4-3.2                       [facility]  

 

                                        Is patient fasting?  N 

 

          A/G Ratio 1.9       0.8-2.0                       [facility] ([facility])  

 

                                        Is patient fasting?  N 

 

          Calcium   9.8 mg/dL 8.4-10.2                      [facility] ([facility])  

 

                                        Is patient fasting?  N 

 

           Total Bili Laboratory test result 0.2-1.3                          [facility]

[facility] ([facility])                                 

 

                                        Is patient fasting?  N 

 

          Alkaline Phos 53 U/L                            [facility] ([facility])  

 

                                        Is patient fasting?  N 

 

          Sgot/Ast  16 U/L    5-40                          [facility] ([facility])  

 

                                        Is patient fasting?  N 

 

          SGPT/Alt  12 U/L    7-56                          [facility] ([facility])  

 

                                        Is patient fasting?  N 

 

          Anion Gap 12.0 mmol/L 8.0-16.0                      [facility] ([facility])  

 

                                        Is patient fasting?  N 

 

          Age       60 yrs                                  [facility] ([facility])  

 

                                        Is patient fasting?  N 

 

          Non-Aa GFR 34 mL/min                               [facility] ([facility])  

 

                                        Is patient fasting?  N 

 

          Afr Amer GFR 42 mL/min                               [facility] ([facility])  

 

                                        Is patient fasting?  N 









                    ID                  Date                Data Source

 

                    [id number]         2021 08:41:00 AM EDT [facility] ([facility])









          Name      Value     Range     Interpretation Code Description Data Karen

rce(s) Supporting 

Document(s)

 

           Thyrotropin [Units/volume] in Serum or Plasma 0.57 uIU/mL 0.47-5.01  

                      [facility] 

([facility])         

 

                                        Is patient fasting?  Y 

 

           Prostate specific Ag [Mass/volume] in Serum or Plasma 1.05 ng/mL 0.00

-4.00                        

[facility] ([facility])  

 

                                        Is patient fasting?  Y 









                    ID                  Date                Data Source

 

                    [id number]         2021 08:41:00 AM EDT [facility] ([facility])









          Name      Value     Range     Interpretation Code Description Data Karen

rce(s) Supporting 

Document(s)

 

          Cve Panel Laboratory test result                               [facility] 

([facility])  

 

                                        Is patient fasting?  Y 

 

           Triglycerides 328 mg/dL       Above high normal            [facility]

NT ([facility])                        

 

                                        Is patient fasting?  Y 

 

           Cholesterol 208 mg/dL  131-200    Above high normal            [facility]

 ([facility])                                

 

                                        Is patient fasting?  Y 

 

          HDL       42 mg/dL  29-86                         [facility] ([facility])  

 

                                        Is patient fasting?  Y 

 

          LDL       123 mg/dL                         [facility] ([facility])  

 

                                        Is patient fasting?  Y 

 

           Risk Factor 5.0        3.4-4.9    Above high normal            [facility]

 ([facility])                                 

 

                                        Is patient fasting?  Y 

 

          LDL/HDL   2.93      1.00-3.55                     [facility] ([facility])  

 

                                        Is patient fasting?  Y 









                    ID                  Date                Data Source

 

                    [id number]         2021 08:41:00 AM EDT [facility] ([facility])









          Name      Value     Range     Interpretation Code Description Data Karne

rce(s) Supporting 

Document(s)

 

           Hemoglobin A1c/Hemoglobin.total in Blood 6.0 %      4.4-6.1          

                [facility] ([facility])                       

 

                                        Is patient fasting?  Y 









                    ID                  Date                Data Source

 

                    [id number]         2021 08:41:00 AM EDT [facility] ([facility])









          Name      Value     Range     Interpretation Code Description Data Karen

rce(s) Supporting 

Document(s)

 

           Comprehensive Metabo Laboratory test result                          

        [facility] ([facility])                                

 

                                        Is patient fasting?  Y 

 

          Sodium    138 meq/L 134-153                       [facility] ([facility])  

 

                                        Is patient fasting?  Y 

 

          Potassium 4.5 meq/L 3.6-5.0                       [facility] ([facility])  

 

                                        Is patient fasting?  Y 

 

          Chloride  104 meq/L                         [facility] ([facility])  

 

                                        Is patient fasting?  Y 

 

          Co2       21 meq/L  22-30     Below low normal           [facility] ([facility])  

 

                                        Is patient fasting?  Y 

 

           Glucose    134 mg/dL  70-99      Above high normal            [facility] 

([facility])                                 

 

                                        Is patient fasting?  Y 

 

          BUN       31 mg/dL  7-21      Above high normal           [facility] ([facility])  

 

                                        Is patient fasting?  Y 

 

           Creatinine 2.1 mg/dL  0.7-1.5    Above high normal            [facility] 

([facility])                                 

 

                                        Is patient fasting?  Y 

 

          BUN/Creat 15        8-27                          [facility] ([facility])  

 

                                        Is patient fasting?  Y 

 

          Total Protein 7.7 g/dL  6.3-8.2                       [facility] ([facility])  

 

                                        Is patient fasting?  Y 

 

          Albumin   4.9 g/dL  3.9-5.0                       [facility] ([facility])  

 

                                        Is patient fasting?  Y 

 

          Globulin  2.8 GM/DL 2.4-3.2                       [facility] ([facility])  

 

                                        Is patient fasting?  Y 

 

          A/G Ratio 1.8       0.8-2.0                       [facility] ([facility])  

 

                                        Is patient fasting?  Y 

 

           Calcium    10.4 mg/dL 8.4-10.2   Above high normal            [facility] 

([facility])                                 

 

                                        Is patient fasting?  Y 

 

           Total Bili Laboratory test result 0.2-1.3                          ME

DENT ([facility])                                 

 

                                        Is patient fasting?  Y 

 

          Sgot/Ast  15 U/L    5-40                          [facility] ([facility])  

 

                                        Is patient fasting?  Y 

 

          Alkaline Phos 66 U/L                            [facility] ([facility])  

 

                                        Is patient fasting?  Y 

 

          SGPT/Alt  11 U/L    7-56                          [facility] ([facility])  

 

                                        Is patient fasting?  Y 

 

          Anion Gap 13.0 mmol/L 8.0-16.0                      [facility] ([facility])  

 

                                        Is patient fasting?  Y 

 

          Age       60 yrs                                  [facility] ([facility])  

 

                                        Is patient fasting?  Y 

 

          Non-Aa GFR 34 mL/min                               [facility] ([facility])  

 

                                        Is patient fasting?  Y 

 

          Afr Amer GFR 42 mL/min                               [facility] ([facility])  

 

                                        Is patient fasting?  Y 









                    ID                  Date                Data Source

 

                    [id number]         2021 08:41:00 AM EDT [facility] ([facility])









          Name      Value     Range     Interpretation Code Description Data Karen

rce(s) Supporting 

Document(s)

 

           CBC W/Automated Diff Laboratory test result                          

        [facility] ([facility])                                

 

                                        Is patient fasting?  Y 

 

          WBC       8.2 10^3/uL 4.2-11.0                      [facility] ([facility])  

 

                                        Is patient fasting?  Y 

 

          RBC       4.95 10^6/uL 4.50-6.30                     [facility] ([facility])  

 

                                        Is patient fasting?  Y 

 

          Hemoglobin 15.0 g/dL 14.0-16.0                     [facility] ([facility])  

 

                                        Is patient fasting?  Y 

 

          MCV       92.5 fL   80.0-94.0                     [facility] ([facility])  

 

                                        Is patient fasting?  Y 

 

          Hematocrit 45.8 %    41.0-51.0                     [facility] ([facility])  

 

                                        Is patient fasting?  Y 

 

          MCH       30.3 pg   27.0-34.0                     [facility] ([facility])  

 

                                        Is patient fasting?  Y 

 

          MCHC      32.8 g/dL 31.0-36.0                     [facility] ([facility])  

 

                                        Is patient fasting?  Y 

 

          RDW       13.7 %    11.5-14.8                     [facility] ([facility])  

 

                                        Is patient fasting?  Y 

 

           MPV        10.8 fL    7.4-10.4   Above high normal            [facility] 

([facility])

                                         

 

                                        Is patient fasting?  Y 

 

          Platelets 257 10^3/uL 150-450                       [facility] ([facility])  

 

                                        Is patient fasting?  Y 

 

          Neut      68.1 %    37.0-80.0                     [facility] ([facility])  

 

                                        Is patient fasting?  Y 

 

          Mono      8.3 %     3.0-8.0   Above high normal           [facility] ([facility])  

 

                                        Is patient fasting?  Y 

 

           Lymph      18.6 %     25.0-40.0  Below low normal            [facility] (

[facility])                                 

 

                                        Is patient fasting?  Y 

 

          Eos       2.8 %     0.0-7.0                       [facility] ([facility])  

 

                                        Is patient fasting?  Y 

 

          Baso      1.8 %     0.0-2.0                       [facility] ([facility])  

 

                                        Is patient fasting?  Y 

 

          %Ig       0.4 %     0.0-0.0   Above high normal           [facility] ([facility])  

 

                                        Is patient fasting?  Y 

 

          %NRBC     0.0 %     0.0-0.0                       [facility] ([facility])  

 

                                        Is patient fasting?  Y 

 

          #Neut     5.57 10^3/uL 2.00-6.90                     [facility] ([facility])  

 

                                        Is patient fasting?  Y 

 

          #Lymph    1.52 10^3/uL 0.60-3.40                     [facility] ([facility])  

 

                                        Is patient fasting?  Y 

 

          #Mono     0.68 10^3/uL 0.00-0.90                     [facility] ([facility])  

 

                                        Is patient fasting?  Y 

 

          #Eos      0.23 10^3/uL 0.00-0.70                     [facility] ([facility])  

 

                                        Is patient fasting?  Y 

 

          #Baso     0.15 10^3/uL 0.00-0.20                     [facility] ([facility])  

 

                                        Is patient fasting?  Y 

 

          #Ig       0.03 10^3/uL 0.00-0.10                     [facility] ([facility])  

 

                                        Is patient fasting?  Y 

 

          #NRBC     0.00 10^3/uL 0.00-0.00                     [facility] ([facility])  

 

                                        Is patient fasting?  Y 

 

           Manual Diff Laboratory test result                                  M

[facility] ([facility])

                                         

 

                                        Is patient fasting?  Y 

 

          RBC Morph Laboratory test result                               [facility] 

([facility])  

 

                                        Is patient fasting?  Y 









                    ID                  Date                Data Source

 

                    [id number]     2021 06:45:00 PM EDT [facility]









          Name      Value     Range     Interpretation Code Description Data Karen

rce(s) Supporting 

Document(s)

 

          CVE PANEL                                         [facility]

al  

 

                                            LIPID PANEL 

 

           Cholesterol [Mass/volume] in Serum or Plasma 208 MG/DL  131 - 200  H 

                    [facility]                            

 

           Deprecated Triglyceride [Mass/volume] in Serum or Plasma 328 MG/DL  3

5 - 160   H                     

[facility]                   

 

          HDL       42 MG/DL  29 - 86                       [facility]

al  

 

                    Cholesterol in LDL [Mass/volume] in Serum or Plasma by Direc

t assay 123 mg/dL           65

 - 175                                          [facility]  

 

                    Cholesterol.total/Cholesterol in HDL [Mass Ratio] in Serum o

r Plasma 5.0                 3.4 - 

4.9             H                               [facility]  

 

          LDL/HDL   2.93      1.00 - 3.55                     [facility]

ital  

 

                                        CVE         RISK           CHOL/HDL     

  LDL/HDLMEN:    1/2  AVERAGE          

3.43          1.00         AVERAGE          4.97          3.55    2X   AVERAGE  
       9.55          6.25    3X   AVERAGE         23.99          7.99WOMEN:    
1/2  AVERAGE          3.27          1.47         AVERAGE          4.44          
3.22    2X   AVERAGE          7.05          5.03    3X   AVERAGE         11.04  
       6.14 









                    ID                  Date                Data Source

 

                    [id number]     2021 06:45:00 PM EDT [facility]









          Name      Value     Range     Interpretation Code Description Data Karen

rce(s) Supporting 

Document(s)

 

          COMPREHENSIVE METABOLIC PANEL                                         

[facility]  

 

                                            COMPREHENSIVE METABOLIC PANEL 

 

           Sodium [Moles/volume] in Serum or Plasma 138 mEq/L  134 - 153        

                [facility]                                 

 

           Potassium [Moles/volume] in Serum or Plasma 4.5 mEq/L  3.6 - 5.0     

                   [facility]                            

 

           Chloride [Moles/volume] in Serum or Plasma 104 mEq/L  98 - 107       

                  [facility]                                 

 

           Carbon dioxide, total [Moles/volume] in Serum or Plasma 21 MEQ/L   22

 - 30    L                     

[facility]                   

 

           Glucose [Mass/volume] in Serum or Plasma 134 MG/DL  70 - 99    H     

                [facility]                                 

 

          BUN       31 MG/DL  7 - 21    H                   [facility]

al  

 

           Creatinine [Mass/volume] in Serum or Plasma 2.1 MG/DL  0.7 - 1.5  H  

                   [facility]                            

 

          BUN/CREAT 15        8 - 27                        [facility]

al  

 

           Protein [Mass/volume] in Serum or Plasma 7.7 G/DL   6.3 - 8.2        

                [facility]                                 

 

           Albumin [Mass/volume] in Serum or Plasma 4.9 G/DL   3.9 - 5.0        

                [facility]                                 

 

           Globulin [Mass/volume] in Serum by calculation 2.8 GM/DL  2.4 - 3.2  

                      [facility]                            

 

          A/G RATIO 1.8       0.8 - 2.0                     [facility]  

 

           Calcium [Mass/volume] in Serum or Plasma 10.4 MG/DL 8.4 - 10.2 H     

                [facility]                                

 

           Bilirubin.total [Mass/volume] in Serum or Plasma <0.7 MG/DL 0.2 - 1.3

                        

[facility]                   

 

                    Alkaline phosphatase [Enzymatic activity/volume] in Serum or

 Plasma 66 U/L              38 - 

126                                             [facility]  

 

                          Aspartate aminotransferase [Enzymatic activity/volume]

 in Serum or Plasma 15 U/L

             5 - 40                                 [facility]  

 

                    Alanine aminotransferase [Enzymatic activity/volume] in Seru

m or Plasma 11 U/L              7

- 56                                            [facility]  

 

           Anion gap 3 in Serum or Plasma 13.0 mmol/L 8.0 - 16.0                

       [facility]

                                         

 

          AGE       60 yrs                                  [facility]

al  

 

          NON-AA GFR 34 mL/min                               [facility]

sheila  

 

          AFR AMER GFR 42 mL/min                               [facility]

pital  

 

                                                                     Male GFR In

terprentation                  20-49 yrs

    >60 mL/min   Normal                  50-59 yrs     >56 mL/min   Normal      
           60-69 yrs     >49 mL/min   Normal                  70-79yrs      >42 
mL/min   Normal                  80 and above  >35 mL/min   Normal              
     Female GFR  Interpretation                  20-39 yrs     >60 mL/min   
Normal                  40-49 yrs     >58 mL/min   Normal                  50-59
yrs     >51 mL/min   Normal                  60-69 yrs     >45 mL/min   Normal  
               70-79 yrs     >39 mL/min   Normal                  80 and above  
>32 mL/min   Normal 









                    ID                  Date                Data Source

 

                    [id number]     2021 06:24:00 PM EDT [facility]









          Name      Value     Range     Interpretation Code Description Data Karen

rce(s) Supporting 

Document(s)

 

                          Thyrotropin [Units/volume] in Serum or Plasma by Detec

tion limit <= 0.05 mIU/L 

0.57 uIU/mL  0.47 - 5.01                            [facility]  









                    ID                  Date                Data Source

 

                    [id number]     2021 06:24:00 PM EDT [facility]









          Name      Value     Range     Interpretation Code Description Data Karen

rce(s) Supporting 

Document(s)

 

             Prostate specific Ag [Mass/volume] in Serum or Plasma 1.05 ng/mL   

0.00 - 4.00                

                          [facility]     

 

                                                            \BLDo\PSA INTERPRETA

TION\BLDx\      The PSA assay should not

 be used alone for a screening test       or diagnosis for presence or absence 
of malignant disease.      Predictions of disease recurrence should not be based
 solely          on values obtained from serial patient serum values.        The
 PSA result was determined by "[employer]", on the Roche        SUNITHA 6000. Values 
obtained with different assay methods               or kits cannot be used 
interchangeably. 









                    ID                  Date                Data Source

 

                    [id number]     2021 05:59:00 PM EDT [facility]









          Name      Value     Range     Interpretation Code Description Data Karen

rce(s) Supporting 

Document(s)

 

           Hemoglobin A1c/Hemoglobin.total in Blood 6.0 %      4.4 - 6.1        

                [facility]                                 

 

                                        {A1]{HB] 









                    ID                  Date                Data Source

 

                    [id number]     2021 05:45:00 PM EDT [facility]









          Name      Value     Range     Interpretation Code Description Data Karen

rce(s) Supporting 

Document(s)

 

          CBC W/AUTOMATED DIFF                                         [facility]  

 

                                            COMPLETE BLOOD COUNT 

 

           Leukocytes [#/volume] in Blood by Automated count 8.2 10^3/uL 4.2 - 1

1.0                       

[facility]                   

 

             Erythrocytes [#/volume] in Blood by Automated count 4.95 10^6/uL 4.

50 - 6.30                

                          [facility]     

 

           Hemoglobin [Mass/volume] in Blood 15.0 g/dL  14.0 - 16.0             

          [facility]                                 

 

           Hematocrit [Volume Fraction] of Blood by Automated count 45.8 %     4

1.0 - 51.0                       

[facility]                   

 

                    Erythrocyte mean corpuscular volume [Entitic volume] by Auto

mated count 92.5 fL             

80.0 - 94.0                                     [facility]  

 

                          Erythrocyte mean corpuscular hemoglobin [Entitic mass]

 by Automated count 30.3 

pg           27.0 - 34.0                            [facility]  

 

                                        Erythrocyte mean corpuscular hemoglobin 

concentration [Mass/volume] by Automated

 count     32.8 g/dL  31.0 - 36.0                       [facility]  

 

             Erythrocyte distribution width [Ratio] by Automated count 13.7 %   

    11.5 - 14.8                

                          [facility]     

 

           Platelets [#/volume] in Blood by Automated count 257 10^3/uL 150 - 45

0                        

[facility]                   

 

                    Platelet mean volume [Entitic volume] in Blood by Automated 

count 10.8 fL             7.4 - 

10.4            H                               [facility]  

 

           Neutrophils/100 leukocytes in Blood by Automated count 68.1 %     37.

0 - 80.0                       

[facility]                   

 

           Lymphocytes/100 leukocytes in Blood by Manual count 18.6 %     25.0 -

 40.0 L                     

[facility]                   

 

           Monocytes/100 leukocytes in Blood by Automated count 8.3 %      3.0 -

 8.0  H                     

[facility] [facility]                   

 

           Eosinophils/100 leukocytes in Blood by Automated count 2.8 %      0.0

 - 7.0                        

[facility]                   

 

           Basophils/100 leukocytes in Blood by Automated count 1.8 %      0.0 -

 2.0                        

[facility]                   

 

          %IG       0.4 %     0.0 - 0.0 H                   [facility]

al  

 

          %NRBC     0.0 %     0.0 - 0.0                     [facility]

al  

 

           Neutrophils [#/volume] in Blood by Automated count 5.57 10^3/uL 2.00 

- 6.90                       

[facility]                   

 

           Lymphocytes [#/volume] in Blood by Automated count 1.52 10^3/uL 0.60 

- 3.40                       

[facility]                   

 

           Monocytes [#/volume] in Blood by Automated count 0.68 10^3/uL 0.00 - 

0.90                       

[facility]                   

 

           Eosinophils [#/volume] in Blood by Automated count 0.23 10^3/uL 0.00 

- 0.70                       

[facility]                   

 

           Basophils [#/volume] in Blood by Automated count 0.15 10^3/uL 0.00 - 

0.20                       

[facility]                   

 

          #IG       0.03 10^3/uL 0.00 - 0.10                     [facility]

ospital  

 

          #NRBC     0.00 10^3/uL 0.00 - 0.00                     [facility]

ospital  

 

          MANUAL DIFF NOT INDICATED                               [facility]  

 

          RBC MORPH NOT INDICATED                               [facility]

spital  









                    ID                  Date                Data Source

 

                    [id number]         2021 02:48:00 PM EDT [facility] 

[facility])









          Name      Value     Range     Interpretation Code Description Data Karen

rce(s) Supporting 

Document(s)

 

           Creatinine [Mass/volume] in Serum or Plasma 2.66 mg/dL 0.72-1.25     

                   [facility] 

([facility])  

 

           Glucose [Mass/volume] in Serum or Plasma 64.0 mg/dL 70.0-99.0        

                [facility] 

([facility])  

 

             Carbon dioxide, total [Moles/volume] in Serum or Plasma 21.0 mmol/L

  22.0-31.0                 

                          [facility] ([facility])  

 

           Calcium [Mass/volume] in Serum or Plasma 10.1 mg/dL 8.4-10.2         

                [facility] 

([facility])  

 

           Urea nitrogen [Mass/volume] in Serum or Plasma 35.0 mg/dL 7.0-24.0   

                      [facility] 

([facility])  

 

           Sodium [Moles/volume] in Serum or Plasma 140.0 mmol/L 136.0-145.0    

                   [facility] 

([facility])  

 

          BUN/Creat Ratio 13.2                                    [facility])  

 

           Anion gap in Serum or Plasma 16.2                                    

    [facility])                     

 

          K         4.2 mmol/L 3.6-5.2                       [facility])  

 

           Chloride [Moles/volume] in Serum or Plasma 107.0 mmol/L 98.0-107.0   

                    [facility] 

([facility])  

 

           eGFR -  Descent 29.0                                        ME

[facility] ([facility])                                      

 

           eGFR -- Non- Descent 23.9                                     

   [facility]

 [facility])                                  









                    ID                  Date                Data Source

 

                    [id number]         2021 02:25:00 PM EDT [facility] 

[facility])









          Name      Value     Range     Interpretation Code Description Data Karen

rce(s) Supporting 

Document(s)

 

           Glucose [Presence] in Urine Laboratory test result                   

               [facility]             

 

           Protein [Presence] in Urine by Test strip Laboratory test result     

                             [facility] 

([facility])  

 

           Ua Nitrite Laboratory test result                                  ME

[facility] ([facility]

 [facility])                                  

 

           Ua Leuko   Laboratory test result                                  ME

[facility] ([facility] 

[facility])                                   

 

           Blood [Presence] in Urine by Visual Laboratory test result           

                       [facility] 

([facility])  

 

           Color of Urine Laboratory test result                                

  [facility])                     

 

           Clarity of Urine Laboratory test result                              

    [facility])                     

 

           Ketones [Presence] in Urine by Test strip Laboratory test result     

                             [facility] 

([facility])  

 

           Bilirubin.total [Presence] in Urine by Test strip Laboratory test res

ult                                  

[facility] ([facility])  

 

           pH of Urine by Test strip 5.5        5.0-7.5                         

 [facility])                     

 

           Ua Specific Gravity 1.010      1.003-1.030                       [facility]

[facility] ([facility])                     

 

           Urobilinogen [Mass/volume] in Urine by Test strip 0.2 E.U./dL 0.0-1.0

                          [facility]

 ([facility])  









                    ID                  Date                Data Source

 

                    [id number]            2021 07:50:59 AM EDT [facility]









          Name      Value     Range     Interpretation Code Description Data Karen

rce(s) Supporting 

Document(s)

 

          SODIUM    141 mmol/L (136-145)                     [facility]

  

 

          POTASSIUM 3.2 mmol/L (3.6-5.2) L                   [facility]

  

 

          CHLORIDE  108 mmol/L (100-108)                     [facility]

  

 

          CO2       22 mmol/L (22-31)                       [facility] 

 

 

          ANION GAP 11 mmol/L (7-16)                        [facility] 

 

 

          UREA NITROGEN 12 mg/dL  (7-24)                        [facility]  

 

          CREATININE 1.74 mg/dL (0.80-1.30) H                   [facility]  

 

          BUN/CREAT RATIO 6.9 RATIO (10.0-20.0) L                   [facility]  

 

          GLUCOSE   147 mg/dL (70-99)   H                   [facility] 

 

 

          CALCIUM   7.8 mg/dL (8.4-10.2) L                   [facility]

  

 

          GFR       40 ml/min/1.73m2 (>59)     L                   [facility]  

 

          GFR (AFRICAN AMER) 49 ml/min/1.73m2 (>59)     L                   [facility]  

 

          GFR INTERPRETATION                                         [facility]  

 

                                        ----------------------------------------

--NORMAL KIDNEY FUNCTION   OR MILD 

DISEASE       - GFR >OR= 60CHRONIC KIDNEY DISEASE   - GFR 15 - 59RENAL FAILURE  
          - GFR   <15------------------------------------------Est. GFR 
calculation based on the MDRDstudy equation, which assumes a steadystate for 
creatinine.  Est. GFR should notbe used for medication dosing. 









                    ID                  Date                Data Source

 

                    [id number]            2021 07:20:52 AM EDT [facility]









          Name      Value     Range     Interpretation Code Description Data Karen

rce(s) Supporting 

Document(s)

 

          WBC       9.5 10*3/uL (4.1-11.0)                     [facility]

[place]  

 

          RBC       4.64 10*6/uL (4.60-6.10)                     [facility]  

 

          HGB       14.1 g/dL (13.5-18.0)                     [facility]  

 

          HCT       42.1 %    (41.0-53.0)                     [facility]  

 

          MCV       90.9 fL   (80.0-95.0)                     [facility]  

 

          MCH       30.4 pg   (27.0-32.0)                     [facility]  

 

          MCHC      33.5 g/dL (32.0-36.0)                     [facility]  

 

          RDW       13.9 %    (10.5-14.5)                     [facility]  

 

          PLT       168 10*3/uL (150-450)                     [facility]  

 

          MPV       8.2 fL    (7.1-10.7)                     [facility]

  









                    ID                  Date                Data Source

 

                    [id number]            2021 10:03:16 PM EDT [facility]









          Name      Value     Range     Interpretation Code Description Data Karen

rce(s) Supporting 

Document(s)

 

          POC GLUCOSE 141 mg/dL (70-99)   H                   [facility]  

 

                                        NOTIFIED NURSEPERFORMED BY  CLINICAL S

TAFF 









                    ID                  Date                Data Source

 

                    [id number]         2021 12:16:00 PM EDT [facility] [place])









          Name      Value     Range     Interpretation Code Description Data Karen

rce(s) Supporting 

Document(s)

 

           Surgical pathology study Laboratory test result                      

            [facility] ([facility] [place])                    

 



[facility] [address] [place] [address]

Tel# [phone]  Fax# [phone]



SURGICAL PATHOLOGY REPORT



Patient Name:JOE BRYANT

:1960



Received:2021

Accession #:[id number]

Specimen(s) Received:

 A: Right renal hilar tissue

B: Right kidney



Clinical Diagnosis and History:

 Enhancing solid mass in the lower pole of the right kidney, 6.0 x 4.1 cm.



DIAGNOSIS:

A)     RIGHT RENAL HILAR TISSUE, EXCISION - ADIPOSE TISSUE WITH ONE 

     LYMPH NODE, NEGATIVE FOR MALIGNANCY (0/1).



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 

     CARCINOMA (SEE CASE SUMMARY BELOW).





     CASE SUMMARY:



     SPECIMEN LATERALITY:

          RIGHT.



     TUMOR SITE:

          LOWER POLE.



     TUMOR SIZE:

          5.0 CM.



     TUMOR FOCALITY:

          UNIFOCAL.



     HISTOLOGIC TYPE:

          CLEAR CELL RENAL CELL CARCINOMA.



     SARCOMATOID FEATURES:

          NOT IDENTIFIED.



     RHABDOID FEATURES:

          NOT IDENTIFIED.



     HISTOLOGIC GRADE (WHO / ISUP Grade):

          GRADE 1.



     TUMOR NECROSIS:

          NOT IDENTIFIED.



     TUMOR EXTENSION:

          LIMITED TO KIDNEY.



     MARGINS:

          PERINEPHRIC FAT:  NEGATIVE.



          RENAL SINUS SOFT TISSUE:  NEGATIVE.

          

          URETERAL MARGINS:  NEGATIVE.

                         

          RENAL VEIN MARGIN:  NEGATIVE.



          RENAL ARTERY MARGIN:  NEGATIVE.

     

          GEROTA'S FASCIAL MARGIN:     NEGATIVE.



     LYMPHOVASCULAR  INVASION:

          NOT IDENTIFIED.



     REGIONAL LYMPH NODES (INCLUDING PART A):

          NUMBER OF LYMPH NODES INVOLVED:  0.



          NUMBER OF LYMPH NODES EXAMINED:  1.     



     PATHOLOGIC STAGE CLASSIFICATION (pTNM, AJCC 8TH EDITION):

          pT1b  pN0



     OTHER FINDINGS IN NON-NEOPLASTIC KIDNEY:

          1.     TWO SEPARATE URETERS, SUGGESTIVE OF DUPLEX KIDNEY.

          2.     MILD ARTERIOLAR NEPHROSCLEROSIS.

          3.     CHRONIC INFLAMMATION. 

 

GROSS DESCRIPTION:

 Specimen A received in formalin labeled "right renal hilar tissue" is an

irregular cauterized portion of yellow to brown soft tissue measuring 2.5

x 2.0 x 1.0 cm.  There is a moderate to abundant amount of cautery

artifact.  The cut surface is yellow, glistening, lobulated adipose

tissue.  No identifiable structures are found grossly.  The specimen is

sectioned and entirely submitted for microscopic examination. (1 block)  



Specimen B received in formalin labeled "right kidney" is a right radical

nephrectomy weighing 880 gm.  There is an abundant amount of attached

perinephric fat and along the superior pole a portion of the perinephric

fat is torn.  The specimen measures 20 cm from superior to inferior, 15 cm

from medial to lateral and 8 cm from anterior to deep.  There is Gerota's

fascia along the anterior to superior portion that is unremarkable,

tan-gray and measures 8.0 x 3.5 cm.  There are two ureters identified. 

The first ureter is associated with the superior pole, measures 8 cm from

the hilum, 0.5 cm in length and the mucosa is glistening, striated,

tan-gray.  The second ureter is located 1.5 cm lateral to the first,

measures 4 cm in length from the hilum, 0.5 cm in diameter and has a

glistening, striated, tan-gray mucosal surface.  The second is associated

with the mid to inferior portion of the kidney.  There is one renal vein

and one renal artery both measuring 0.5 cm in length from the hilum and

the renal vein mucosa is smooth, glistening, gray.  There is minimal

atherosclerosis of the renal artery.  Located in the inferior pole of the

kidney, toward the superficial aspect, is a cystic tumor measuring 5.0 x

                                        4.5 cm.  The tumor is mottled, orange-ye

llow to tan-red and possibly

involves the renal sinus grossly.  Portions of the tumor are encapsulated,

extend toward the hilar adipose tissue, inferior parenchyma and

superficial parenchyma, however, the tumor is still confined to the

kidney.  The tumor compress the second/inferior ureter and comes to within

                                        3.7 cm of the renal vein margin.  There 

is minimal necrosis found within

the tumor.  The inferior renal pelvis is glistening, tan-pink and the

surrounding uninvolved parenchyma is tan-pink with a well demarcated

corticomedullary junction averaging 0.9 cm in greatest dimension.  The

renal parenchyma associated with each pelvis is homogeneous, red-brown and

appears to coalesce in the mid portion.  The cut surface of the superior

renal pelvis is slightly misshapen, however, no additional anomalies are

found.  There are no hilar lymph nodes found.  The adrenal gland is not

present.  Representative sections are submitted for microscopic

examination as follows:  UM - superior ureter margin; VM - vascular

margin; UM2 - inferior ureter margin; T - tumor (4 - tumor to inferior

pelvis, 5,6 - tumor to sinus fat, 7 - tumor to superficial perpendicular,

                                        8 - tumor to lateral, 9 - tumor to media

l/hilar tissue); SP - superior

pelvis; IP - inferior pelvis; R - random, mid.  (12 blocks)    



niraj

[facility]



Reported: 2021

***Electronically Signed Out By Armando Mcfarland MD***         

[facility]



[place] [address]



Technical component performed at [facility],

[facility], Histopathology, [address] [place] [address], [phone].

Reported at [facility], [address] [place] [address], [facility].

This report may include immunohistochemical or in-situ hybridization

results. Testing was developed and the performance characteristics

determined by [facility], as required by

CLIA '88. The FDA has determined that approval for specific use is not

necessary for clinical use. The quality of Hematoxylin and Eosin stains

and as applicable, for all immunohistochemical and/or special stains,

including positive and negative controls, were reviewed and considered

appropriate.



ICD codes:

 C64.1

CPT4 codes: 

A: 15136D

B: 34944R

 









                    ID                  Date                Data Source

 

                    [id number]            2021 07:01:49 PM EDT [place], [place] [address]Tel# [phone]  Fax# [phone] PATHOLOGY REPORTPatient 
Name:JOE BRYANT:1960Received:ccession #:[id number]
[id number](s) Received: A: Right renal hilar tissueB: Right kidneyClinical 
Diagnosis and History: Enhancing solid mass in the lower pole of the right 
kidney, 6.0 x 4.1 cm.DIAGNOSIS:A)     RIGHT RENAL HILAR TISSUE, EXCISION - 
ADIPOSE TISSUE WITH ONE      LYMPH NODE, NEGATIVE FOR MALIGNANCY (0/1).B)     
RIGHT KIDNEY, RADICAL NEPHRECTOMY - CLEAR CELL RENAL CELL      CARCINOMA (SEE 
CASE SUMMARY BELOW).     CASE SUMMARY:     SPECIMEN LATERALITY:          RIGHT. 
    TUMOR SITE:          LOWER POLE.     TUMOR SIZE:          5.0 CM.     TUMOR 
FOCALITY:          UNIFOCAL.     HISTOLOGIC TYPE:          CLEAR CELL RENAL CELL
 CARCINOMA.     SARCOMATOID FEATURES:          NOT IDENTIFIED.     RHABDOID 
FEATURES:          NOT IDENTIFIED.     HISTOLOGIC GRADE (WHO / ISUP Grade):     
     GRADE 1.     TUMOR NECROSIS:          NOT IDENTIFIED.     TUMOR EXTENSION: 
         LIMITED TO KIDNEY.     MARGINS:          PERINEPHRIC FAT:  NEGATIVE.   
       RENAL SINUS SOFT TISSUE:  NEGATIVE.                    URETERAL MARGINS: 
 NEGATIVE.                                   RENAL VEIN MARGIN:  NEGATIVE.      
    RENAL ARTERY MARGIN:  NEGATIVE.               GEROTA'S FASCIAL MARGIN:     
NEGATIVE.     LYMPHOVASCULAR  INVASION:          NOT IDENTIFIED.     REGIONAL 
LYMPH NODES (INCLUDING PART A):          NUMBER OF LYMPH NODES INVOLVED:  0.    
      NUMBER OF LYMPH NODES EXAMINED:  1.          PATHOLOGIC STAGE 
CLASSIFICATION (pTNM, AJCC 8TH EDITION):          pT1b  pN0     OTHER FINDINGS 
IN NON-NEOPLASTIC KIDNEY:          1.     TWO SEPARATE URETERS, SUGGESTIVE OF 
DUPLEX KIDNEY.          2.     MILD ARTERIOLAR NEPHROSCLEROSIS.          3.     
CHRONIC INFLAMMATION.  GROSS DESCRIPTION: Specimen A received in formalin 
labeled "right renal hilar tissue" is anirregular cauterized portion of yellow 
to brown soft tissue measuring 2.5x 2.0 x 1.0 cm.  There is a moderate to 
abundant amount of cauteryartifact.  The cut surface is yellow, glistening, 
lobulated adiposetissue.  No identifiable structures are found grossly.  The 
specimen issectioned and entirely submitted for microscopic examination. (1 
block)  Specimen B received in formalin labeled "right kidney" is a right 
radicalnephrectomy weighing 880 gm.  There is an abundant amount of 
attachedperinephric fat and along the superior pole a portion of the 
perinephricfat is torn.  The specimen measures 20 cm from superior to inferior, 
15 cmfrom medial to lateral and 8 cm from anterior to deep.  There is 
Gerota'sfascia along the anterior to superior portion that is unremarkable,tan-
gray and measures 8.0 x 3.5 cm.  There are two ureters identified. The first 
ureter is associated with the superior pole, measures 8 cm fromthe hilum, 0.5 cm
 in length and the mucosa is glistening, striated,tan-gray.  The second ureter 
is located 1.5 cm lateral to the first,measures 4 cm in length from the hilum, 
0.5 cm in diameter and has aglistening, striated, tan-gray mucosal surface.  The
 second is associatedwith the mid to inferior portion of the kidney.  There is 
one renal veinand one renal artery both measuring 0.5 cm in length from the 
hilum andthe renal vein mucosa is smooth, glistening, gray.  There is 
minimalatherosclerosis of the renal artery.  Located in the inferior pole of 
thekidney, toward the superficial aspect, is a cystic tumor measuring 5.0 x4.5 
cm.  The tumor is mottled, orange-yellow to tan-red and possiblyinvolves the r
enal sinus grossly.  Portions of the tumor are encapsulated,extend toward the 
hilar adipose tissue, inferior parenchyma andsuperficial parenchyma, however, 
the tumor is still confined to thekidney.  The tumor compress the 
second/inferior ureter and comes to within3.7 cm of the renal vein margin.  
There is minimal necrosis found withinthe tumor.  The inferior renal pelvis is 
glistening, tan-pink and thesurrounding uninvolved parenchyma is tan-pink with a
 well demarcatedcorticomedullary junction averaging 0.9 cm in greatest 
dimension.  Therenal parenchyma associated with each pelvis is homogeneous, red-
brown andappears to coalesce in the mid portion.  The cut surface of the 
superiorrenal pelvis is slightly misshapen, however, no additional anomalies 
arefound.  There are no hilar lymph nodes found.  The adrenal gland is 
notpresent.  Representative sections are submitted for microscopicexamination as
 follows:  UM - superior ureter margin; VM - vascularmargin; UM2 - inferior 
ureter margin; T - tumor (4 - tumor to inferiorpelvis, 5,6 - tumor to sinus fat,
 7 - tumor to superficial perpendicular,8 - tumor to lateral, 9 - tumor to 
medial/hilar tissue); SP - superiorpelvis; IP - inferior pelvis; R - random, 
mid.  (12 blocks)    elizabeth/mecReported: 2021***Electronically Signed Out 
By Armando Mcfarland MD***         [facility] [place], [place] [address]Technical component performed at [facility], Histopathology, [address] [place] [address], [id number].Reported at [facility], [address] [place] [address], [phone].This report may 
include immunohistochemical or in-situ hybridizationresults. Testing was 
developed and the performance characteristicsdetermined by [facility], as required byCLIA '88. The FDA has determined that 
approval for specific use is notnecessary for clinical use. The quality of 
Hematoxylin and Eosin stainsand as applicable, for all immunohistochemical 
and/or special stains,including positive and negative controls, were reviewed 
and consideredappropriate.ICD codes: C64.1CPT4 codes: A: 02361QD: 49077P  









          Name      Value     Range     Interpretation Code Description Data Karen

rce(s) Supporting 

Document(s)

 

                                                                       









                    ID                  Date                Data Source

 

                    [id number]            2021 06:41:00 AM EDT [place], [place] [address]PATIENT NAME:           BETY 

EDWINDATE OF BIRTH:          1960REPORT:                 OPERATIONPATIENT 
NUMBER:         [id number] STATUS:         IPMEDICAL RECORD NUMBER:  
[id number] OF ADMISSION:      ATE OF DISCHARGE:ROOM:             
      DATE OF PROCEDURE:      REOPERATIVE DIAGNOSIS:  Right renal 
mass.POSTOPERATIVE DIAGNOSIS:  Right renal mass.PROCEDURE PERFORMED:  Right 
robotic-assisted laparoscopic radicalnephrectomy.SURGEON:  ANDREW AngeloSSISTANT:  JULIA HortaTHESIA:  General.DRAINS:  10 mm Doug-
Mcbride.COMPLICATIONS:  None.INDICATION FOR OPERATION:  Mr. Bryant is a 60-year-old
 gentleman, recentlyfound to have a 6 cm enhancing mass of the right lower pole 
of the kidneywhich is highly worrisome for potential malignancy.  He presents 
today fora right robotic radical nephrectomy.DESCRIPTION OF PROCEDURE:  After 
adequate induction of general anesthesia,a 16-French Dominique catheter was placed 
into the bladder.  The patient wasthen prepped and draped in usual sterile 
fashion in the left lateraldecubitus position.  All pressure points were 
carefully padded.  Initially,a stab incision was made in the mid epigastrium and
 the Veress needleplaced into the peritoneal cavity.  The peritoneal cavity was 
insufflatedto 15 mmHg without difficulty.  An 8 mm trocar port was placed just 
to theright of the umbilicus.  The 30-degree angle downward angled lens was 
thenplaced into the patient's abdomen, which was carefully 
inspectedsystematically.  No significant abnormalities were seen.  Two da Vincip
orts were placed in the right lower quadrant, another in the right 
upperquadrant, a 12-mm assistant port in the right lower quadrant, and finally 
a5-mm assistant port in a subxiphoid position.  The da Becky robot was 
thendocked in a standard fashion.The colon was initially mobilized in the medial
 to psoas to expose Gerota'sfascia over the entire kidney.  Adhesions of the 
liver were incised and theliver was retracted cephalad.  The duodenum was then 
Kocherized andultimately the vena cava was exposed.  The tissue just lateral to 
the venacava was dissected down and the main renal vein was encountered in 
theusual and expected location.  The gonadal vein was similarly identified andit
 was ligated and transected.  Dissection was then carried posteriorly. 
Ultimately, with careful and tedious dissection, the left main renal veinand 
artery were individually exposed and isolated.  The artery was thenquadruply 
ligated using Weck locking hemoclips and transected.  The mainrenal vein was 
then ligated using number 1 silk tie and multiple Wecklocking hemoclips were 
placed as well.  The vein was transected. Dissection was then carried 
posteriorly.  The ureter was identified,isolated, and transected after being 
hemoclipped.  Dissection was thencarried superiorly.  The adrenal was left in 
situ and Gerota's fasciaincised so as to allow dissection of the upper pole.  
Ultimately, theentire kidney was freed up.At this point, the da Becky robot was 
undocked.  One of the right lowerquadrant incision was extended to approximately
 7 cm.  The 's handwas then placed into the abdomen and the specimen was
 removed and sent forpermanent section.  Hemostasis was reconfirmed.  The 
abdominal fascia wasclosed with a number 2 Ti-Cron suture in a running fashion 
and finally theskin edges were reapproximated using skin staples after copious 
irrigationof the wound.  Sterile dressings were applied.  A Doug-Mcbride drain 
whichhad been placed after removal of the specimen was secured with 2-0 nylon. 
The patient tolerated the procedure well and there were no 
intraoperativecomplications.  Estimated blood loss was 200 mL.  The patient was 
taken tothe recovery room in stable condition.DICTATED BY:  Rodrigo Russell, 
MDDictated:  2021 12:00DT:  2021 12:03Job #:  
[id number]**NOTE:  [facility] computer generated reports are not 
confirmed orauthenticated unless they are signed by the provider**Electronically
 Authenticated by:RODRIGO RUSSELL MD On 2021 06:41 AM EDT 









          Name      Value     Range     Interpretation Code Description Data Karen

rce(s) Supporting 

Document(s)

 

                                                                       









                    ID                  Date                Data Source

 

                    [id number]            2021 12:24:04 AM EDT [facility] 

[facility]

 

                                        SPEC EXP DATE             07/15/2021PATI

ENT ABO/Rh             O 

POSITIVEANTIBODY SCREEN            NEGATIVETESTING SITE               PERFORMED 
AT [address] [place], [place] [address]UNIT NUMBER               [id number] 
COMPONENT TYPE       LEUKOPOOR RED CELLS (PT B)UNIT DIVISION             
00STATUS OF UNIT             REL FROM ALLOCTRANSFUSION STATUS         OK TO 
TRANSFUSECROSSMATCH RESULT          COMPATIBLEUNIT NUMBER               
[id number] COMPONENT TYPE       LEUKOPOOR RED CELLS (PT B)UNIT DIVISION 
            00STATUS OF UNIT             REL FROM ALLOCTRANSFUSION STATUS       
  OK TO TRANSFUSECROSSMATCH RESULT          COMPATIBLE 









          Name      Value     Range     Interpretation Code Description Data Karen

rce(s) Supporting 

Document(s)

 

          TYPE AND SCREEN                                         [facility]  

 

                                        PATIENT ABO/Rh             O POSITIVE 









                    ID                  Date                Data Source

 

                    [id number]            2021 06:58:49 AM EDT [facility]









          Name      Value     Range     Interpretation Code Description Data Karen

rce(s) Supporting 

Document(s)

 

          SODIUM    140 mmol/L (136-145)                     [facility]

  

 

          POTASSIUM 3.7 mmol/L (3.6-5.2)                     [facility]

  

 

          CHLORIDE  106 mmol/L (100-108)                     [facility]

  

 

          CO2       24 mmol/L (22-31)                       [facility] 

 

 

          ANION GAP 10 mmol/L (7-16)                        [facility] 

 

 

          UREA NITROGEN 15 mg/dL  (7-24)                        [facility]  

 

          CREATININE 1.11 mg/dL (0.80-1.30)                     [facility]  

 

          BUN/CREAT RATIO 13.5 RATIO (10.0-20.0)                     [facility]  

 

          GLUCOSE   99 mg/dL  (70-99)                       [facility] 

 

 

          CALCIUM   9.3 mg/dL (8.4-10.2)                     [facility]

  

 

          TOTAL PROTEIN 7.8 g/dL  (6.4-8.2)                     [facility]  

 

          ALBUMIN   4.5 g/dL  (3.2-4.5)                     [facility] 

 

 

          GLOBULIN  3.3 g/dL  (2.7-4.3)                     [facility] 

 

 

          ALB/GLOB RATIO 1.4 RATIO                               [facility]  

 

          ALKALINE PHOSPHATASE 51 U/L    ()                      [facility]  

 

          BILIRUBIN,TOTAL 0.8 mg/dL (0.0-1.0)                     [facility]  

 

                                        PLEASE NOTE:Total bilirubin results may 

be falselyelevated in patients taking 

Eltrombopag. 

 

          AST (SGOT) 41 U/L    (11-39)   H                   [facility]

  

 

          ALT (SGPT) 43 U/L    (12-78)                       [facility]

  

 

          GFR       >60 ml/min/1.73m2 (>59)                         [facility]  

 

          GFR (AFRICAN AMER) >60 ml/min/1.73m2 (>59)                         [facility]  

 

          GFR INTERPRETATION                                         [facility]

[facility]  

 

                                        ----------------------------------------

--NORMAL KIDNEY FUNCTION   OR MILD 

DISEASE       - GFR >OR= 60CHRONIC KIDNEY DISEASE   - GFR 15 - 59RENAL FAILURE  
          - GFR   <15------------------------------------------Est. GFR 
calculation based on the MDRDstudy equation, which assumes a steadystate for 
creatinine.  Est. GFR should notbe used for medication dosing. 









                    ID                  Date                Data Source

 

                    [id number]            2021 06:33:21 AM EDT [facility]









          Name      Value     Range     Interpretation Code Description Data Karen

rce(s) Supporting 

Document(s)

 

          WBC       8.0 10*3/uL (4.1-11.0)                     [facility]

[place]  

 

          RBC       5.23 10*6/uL (4.60-6.10)                     [facility]

 [facility]  

 

          HGB       16.0 g/dL (13.5-18.0)                     [facility]  

 

          HCT       47.1 %    (41.0-53.0)                     [facility]  

 

                                        [facility] 

[id number] 

 

          MCV       90.1 fL   (80.0-95.0)                     [facility]  

 

          MCH       30.6 pg   (27.0-32.0)                     [facility]  

 

          MCHC      34.0 g/dL (32.0-36.0)                     [facility]  

 

          RDW       13.9 %    (10.5-14.5)                     [facility]

[facility]  

 

          PLT       207 10*3/uL (150-450)                     [facility]  

 

          MPV       7.8 fL    (7.1-10.7)                     [facility]

  









                    ID                  Date                Data Source

 

                    [id number]            2021 05:06:55 PM EDT [facility]









          Name      Value     Range     Interpretation Code Description Data Karen

rce(s) Supporting 

Document(s)

 

          POC GLUCOSE 107 mg/dL (70-99)   H                   [facility]

[facility]  

 

                                        NOTIFIED NURSEPERFORMED BY  CLINICAL S

TAFF 









                    ID                  Date                Data Source

 

                    [id number]         2021 12:36:00 PM EDT [facility] [place]









          Name      Value     Range     Interpretation Code Description Data Kaern

rce(s) Supporting 

Document(s)

 

           CBC W/Automated Diff Laboratory test result                          

        [facility] [place]                     

 

                                        COMPLETE BLOOD COUNT

 

 

           Hemoglobin 15.9 g/dL  14.0-16.0                        [facility] 

[place]                                      

 

          WBC       7.8 10^3/uL 4.2-11.0                      [facility]  

 

          RBC       5.24 10^6/uL 4.50-6.30                     [facility] [place]  

 

          Hematocrit 46.5 %    41.0-51.0                     [facility] 

 

 

          MCV       88.7 fL   80.0-94.0                     [facility] [place]  

 

          MCH       30.3 pg   27.0-34.0                     [facility] [place]  

 

          MCHC      34.2 g/dL 31.0-36.0                     [facility] [place]  

 

           Platelets  222 10^3/uL 150-450                          [facility] 

[place]                                      

 

          RDW       13.1 %    11.5-14.8                     [facility] [place]  

 

          Lymph     20.2 %    25.0-40.0                     [facility] [place]  

 

          Neut      68.3 %    37.0-80.0                     [facility]  

 

          MPV       9.4 fL    7.4-10.4                      [facility]  

 

          Eos       1.5 %     0.0-7.0                       [facility]  

 

          Mono      8.7 %     3.0-8.0                       [facility]  

 

          Baso      1.2 %     0.0-2.0                       [facility] [place]  

 

          %Ig       0.1 %     0.0-0.0                       [facility] [place]  

 

          %NRBC     0.0 %     0.0-0.0                       [facility] [place]  

 

           #Neut      5.31 10^3/uL 2.00-6.90                        [facility] [place]

                                         

 

           #Lymph     1.57 10^3/uL 0.60-3.40                        [facility]                                      

 

          #Eos      0.12 10^3/uL 0.00-0.70                     [facility] [place] 

 

 

           #Baso      0.09 10^3/uL 0.00-0.20                        [facility]

                                         

 

           #Mono      0.68 10^3/uL 0.00-0.90                        [facility]

                                         

 

           #NRBC      0.00 10^3/uL 0.00-0.00                        [facility] [place]

                                         

 

          #Ig       0.01 10^3/uL 0.00-0.10                     [facility]  

 

           Manual Diff Laboratory test result                                  M

[facility]                     

 

           RBC Morph  Laboratory test result                                  ME

[facility] [place]                                   









                    ID                  Date                Data Source

 

                    [id number]         2021 12:36:00 PM EDT [facility])









          Name      Value     Range     Interpretation Code Description Data Karen

rce(s) Supporting 

Document(s)

 

          Protime   12.0 s    11.0-15.5                     [facility]  

 

          Inr       0.88      0.93-1.23                     [facility]  

 

          PTT       28.8 s    24.8-36.7                     [facility]  

 

                                        \BLDo\INR INTERPRETATION\BLDx\

Therapeutic range for Coumadin and related oral anticoagulants.

                                        -International Normalized Ratio (INR): 2

.0 - 3.0 for Venous

Thrombosis, Pulmonary Embolus, Tissue heart valves, Acute MI

Atrial Fibrillation, Valvular heart disease and recurrent

Systemic Embolism.

                                        -International Normalized Ratio (INR): 2

.5 - 3.5 for Mechanical

Prosthetic valve.

 









                    ID                  Date                Data Source

 

                    [id number]         2021 12:36:00 PM EDT [facility]









          Name      Value     Range     Interpretation Code Description Data Karen

rce(s) Supporting 

Document(s)

 

          Sodium    141 meq/L 134-153                       [facility]  

 

           Comprehensive Metabo Laboratory test result                          

        [facility]                     

 

                                        COMPREHENSIVE METABOLIC PANEL

 

 

          Chloride  105 meq/L                         [facility]  

 

          Potassium 3.8 meq/L 3.6-5.0                       [facility] 

 

 

          Glucose   129 mg/dL 70-99                         [facility]  

 

          Co2       24 meq/L  22-30                         [facility]  

 

           Creatinine 0.9 mg/dL  0.7-1.5                          [facility]

                                         

 

          BUN       15 mg/dL  7-21                          [facility]  

 

           Total Protein 7.4 g/dL   6.3-8.2                          [facility] 

[place]                                      

 

          BUN/Creat 17        8-27                          [facility]  

 

          Globulin  2.7 GM/DL 2.4-3.2                       [facility]  

 

          Albumin   4.7 g/dL  3.9-5.0                       [facility]  

 

          A/G Ratio 1.7       0.8-2.0                       [facility]  

 

           Total Bili Laboratory test result 0.2-1.3                          [facility]

[facility]                     

 

          Calcium   9.8 mg/dL 8.4-10.2                      [facility]  

 

          Sgot/Ast  23 U/L    5-40                          [facility]  

 

          Alkaline Phos 59 U/L                            [facility] 

 

 

          SGPT/Alt  24 U/L    7-56                          [facility]  

 

           Anion Gap  12.0 mmol/L 8.0-16.0                         [facility]                                      

 

          Age       60 yrs                                  [facility]  

 

           Non-Aa GFR Laboratory test result                                  ME

[facility])                                  

 

           Afr Amer GFR Laboratory test result                                  

[facility]                     

 

                                        Male GFR Interprentation

                                        20-49 yrs     >60 mL/min   Normal

                                        50-59 yrs     >56 mL/min   Normal

                                        60-69 yrs     >49 mL/min   Normal

                                        70-79yrs      >42 mL/min   Normal

                                        80 and above  >35 mL/min   Normal

Female GFR  Interpretation

                                        20-39 yrs     >60 mL/min   Normal

                                        40-49 yrs     >58 mL/min   Normal

                                        50-59 yrs     >51 mL/min   Normal

                                        60-69 yrs     >45 mL/min   Normal

                                        70-79 yrs     >39 mL/min   Normal

                                        80 and above  >32 mL/min   Normal

 









                    ID                  Date                Data Source

 

                    [id number]         2021 12:36:00 PM EDT [facility]









          Name      Value     Range     Interpretation Code Description Data Karen

rce(s) Supporting 

Document(s)

 

           Culture Urine Laboratory test result                                 

 [facility]                     

 

                                        _CULTURE URINE_

^$[id number]

^^[id number]

                                        $$[id number]

^^[id number]

                                        $$[id number]

                                        $$[id number]

                                        $$[id number]

                                        $$[id number]

                                        $$[id number]

                                        $$[id number]

                                        $$[id number]

                                        $$[id number]

                                        $$[id number]

                                        $$[id number]

                                        $$[id number]

                                        $$[id number]

                                        $$[id number]

                                        $$[id number]

                                        $$[id number]

                                        $$[id number]

                                        $$[id number]

                                        $$[id number]

                                        $$[id number]

                                        $$[id number]

                                        $$[id number]

                                        $$[id number]

                                        $$[id number]

^^[id number]

                                        $$[id number]

                                        $$[id number]

                                        $$[id number]



                                        -- Continued on next page --

Patient: BETY THAPA                  Order: [id number]                   Page 2

Culture: CULTURE URINE                                           Status: Final

===============================================================================





                                        -- Continued on next page --

Patient: BETY THAPA                  Order: [id number]                   Page 2

Culture: CULTURE URINE                                          Status: Prelim

===============================================================================



                                        $$[id number]

                                        $$[id number]



REPORTED DATE/TIME: 2021 15:05

Culture: CULTURE URINE                                           Status: Final



Urine Culture,Comprehensive:                                                  P1

No growth in 36 - 48 hours.

**** Previous result entered on 07/10/2021 10:46 ET ****

No growth after 18-24 hours.





P1 Test performed by: [facility] Antonio MENDOZA #: [id number]

                                        [address] [place] [address]

                                        [id number]

[facility] [id number]

Medical Director  :   Reyes Araceli B MD                       NPI #:

      :

                                        07/10/21.1157.XMT.SENT REF

                                        21.0624.XMT.SENT REF



 









                    ID                  Date                Data Source

 

                    [id number]         2021 12:36:00 PM EDT [facility] [place]









          Name      Value     Range     Interpretation Code Description Data Karen

rce(s) Supporting 

Document(s)

 

           Urinalysis Laboratory test result                                  ME

[facility] [place]                                  

 

                                        URINALYSIS

 

 

           Source     Laboratory test result                                  ME

[facility] 

[place]                                      

 

           Color      Laboratory test result                                  ME

[facility]                                      

 

           Clarity    Laboratory test result                                  ME

[facility]

 [place]                                     

 

           Spec Gravity 1.015      1.001-1.030                       [facility] 

[place]                                      

 

          pH        6.5       5-9                           [facility] [place]  

 

           Glucose    1000                  Abnormal (applies to non-numeric res

ults)            [facility]             

 

           Bilirubin  Laboratory test result                                  ME

[facility] [place]                                   

 

           Ketone     Laboratory test result                                  ME

[facility] ([facility])                                      

 

          Protein   30                                      [facility] ([facility] [place])  

 

           Nitrite    Laboratory test result                                  ME

[facility] ([facility]

 [place])                                     

 

           Leuk Est   Laboratory test result                                  ME

[facility] ([facility] [place]                                   

 

           Blood      Laboratory test result                                  ME

[facility] ([facility] 

[place]                                      

 

           Urobilinogen Laboratory test result                                  

[facility] ([facility])                     

 

           Epithelial Laboratory test result                                  ME

[facility] ([facility] [place]                                  

 

           Microscopic Laboratory test result                                  M

[facility] ([facility])                     

 

           Mucous     Laboratory test result                                  ME

[facility] ([facility])                                      









                    ID                  Date                Data Source

 

                    [id number]     2021 06:24:00 AM EDT [facility]

 Hospital









          Name      Value     Range     Interpretation Code Description Data Karen

rce(s) Supporting 

Document(s)

 

          CULTURE URINE                                         [facility]

spital  

 

                                            _CULTURE 

URINE_$$[id number]$$[id number]$$217334$$839784$$682487$$937433$$900306$$891733$$351489$$

[id number]$$683125$$360721$$288388$$249036$$451095$$692477$$061285$$132271$$422233$$

[id number]$$451920$$979056$$268606$$745236$$931564$$294864$$[id number]                  
       -- Continued on next page --Patient: BETY DIAZ L                  
Order: [id number]                   Page 2Culture: CULTURE URINE                     
                      Status: 
Final===========================================================================

====                         -- Continued on next page --Patient: BETY THAPA 
                 Order: [id number]                   Page 2Culture: CULTURE URINE    
                                      Status: 
Prelim==========================================================================

=====$$[id number]$$[id number] DATE/TIME: 2021 15:05Culture: CULTURE URINE 
                                          Status: FinalUrine 
Culture,Comprehensive:                                                  P1No 
growth in 36 - 48 hours.    **** Previous result entered on 07/10/2021 10:46 ET 
****No growth after 18-24 hours.P1 Test performed by: [facility] #: [id number]                      [address] [place] [address]           
           [phone]                      [facility] [id number]-
[id number] Director  :   Reyes Araceli B MD                       NPI #:Lab Di
gilson      :                                                                   
                 07/10/21.1157.XMT.SENT REF                                     
                                               21.0624.XMT.SENT REF 









                    ID                  Date                Data Source

 

                    [id number]     2021 02:21:00 PM EDT [facility]









          Name      Value     Range     Interpretation Code Description Data Karen

rce(s) Supporting 

Document(s)

 

          URINALYSIS                                         [facility]

sheila  

 

                                            URINALYSIS 

 

          SOURCE    Clean Catch                               [facility]

ital  

 

          COLOR     yellow    NORMAL: Yellow                     [facility]

[facility]  

 

          CLARITY   clear     NORMAL: Clear                     [facility]

[facility]  

 

           Specific gravity of Urine by Test strip 1.015      1.001 - 1.030     

                  [facility]                                 

 

          pH        6.5       5 - 9                         [facility]

[facility]  

 

           Glucose [Mass/volume] in Urine by Test strip 1000       NORMAL: Negat

brittany A                     [facility]                            

 

           Bilirubin.total [Presence] in Urine by Test strip NEG        NORMAL: 

Negative                       

[facility]                   

 

           Ketones [Presence] in Urine by Test strip NEG        NORMAL: Negative

                       [facility]                                

 

           Protein [Mass/volume] in Urine by Test strip 30         NORMAL: Negat

brittany                       [facility]                            

 

           Nitrite [Presence] in Urine by Test strip Negative   NORMAL: Negative

                       [facility]                           

 

          BLOOD     NEG       NORMAL: Negative                     [facility]  

 

          LEUK EST  NEG       NORMAL: Negative                     [facility]  

 

           Urobilinogen [Mass/volume] in Urine by Test strip NOR        less eren

n 1.0 mg/dL                       

[facility]                   

 

          MICROSCOPIC See Below                               [facility]

ital  

 

          EPITHELIAL FEW       NORMAL: NONE SEEN                     [facility]  

 

                Mucus [Presence] in Urine sediment by Light microscopy Trace    

       NORMAL: NONE SEEN  

                                        [facility]  









                    ID                  Date                Data Source

 

                    [id number]     2021 01:38:00 PM EDT [facility]









          Name      Value     Range     Interpretation Code Description Data Karen

rce(s) Supporting 

Document(s)

 

          COMPREHENSIVE METABOLIC PANEL                                         

[facility]  

 

                                            COMPREHENSIVE METABOLIC PANEL 

 

           Sodium [Moles/volume] in Serum or Plasma 141 mEq/L  134 - 153        

                [facility]                                 

 

           Potassium [Moles/volume] in Serum or Plasma 3.8 mEq/L  3.6 - 5.0     

                   [facility]                            

 

           Chloride [Moles/volume] in Serum or Plasma 105 mEq/L  98 - 107       

                  [facility]                                 

 

           Carbon dioxide, total [Moles/volume] in Serum or Plasma 24 MEQ/L   22

 - 30                          

[facility]                   

 

           Glucose [Mass/volume] in Serum or Plasma 129 MG/DL  70 - 99    H     

                [facility]                                 

 

          BUN       15 MG/DL  7 - 21                        [facility]

al  

 

           Creatinine [Mass/volume] in Serum or Plasma 0.9 MG/DL  0.7 - 1.5     

                   [facility]                            

 

          BUN/CREAT 17        8 - 27                        [facility]

al  

 

           Protein [Mass/volume] in Serum or Plasma 7.4 G/DL   6.3 - 8.2        

                [facility]                                 

 

           Albumin [Mass/volume] in Serum or Plasma 4.7 G/DL   3.9 - 5.0        

                [facility]                                 

 

           Globulin [Mass/volume] in Serum by calculation 2.7 GM/DL  2.4 - 3.2  

                      [facility]                            

 

          A/G RATIO 1.7       0.8 - 2.0                     [facility]  

 

           Calcium [Mass/volume] in Serum or Plasma 9.8 MG/DL  8.4 - 10.2       

                [facility]                                 

 

           Bilirubin.total [Mass/volume] in Serum or Plasma <0.7 MG/DL 0.2 - 1.3

                        

[facility]                   

 

                    Alkaline phosphatase [Enzymatic activity/volume] in Serum or

 Plasma 59 U/L              38 - 

126                                             [facility]  

 

                          Aspartate aminotransferase [Enzymatic activity/volume]

 in Serum or Plasma 23 U/L

             5 - 40                                 [facility]  

 

                    Alanine aminotransferase [Enzymatic activity/volume] in Seru

m or Plasma 24 U/L              7

- 56                                            [facility]  

 

           Anion gap 3 in Serum or Plasma 12.0 mmol/L 8.0 - 16.0                

       [facility]

                                         

 

          AGE       60 yrs                                  [facility]

al  

 

          NON-AA GFR >60 mL/min                               [facility]

ital  

 

          AFR AMER GFR >60 mL/min                               [facility]

spital  

 

                                                                     Male GFR In

terprentation                  20-49 yrs

    >60 mL/min   Normal                  50-59 yrs     >56 mL/min   Normal      
           60-69 yrs     >49 mL/min   Normal                  70-79yrs      >42 
mL/min   Normal                  80 and above  >35 mL/min   Normal              
     Female GFR  Interpretation                  20-39 yrs     >60 mL/min   
Normal                  40-49 yrs     >58 mL/min   Normal                  50-59
yrs     >51 mL/min   Normal                  60-69 yrs     >45 mL/min   Normal  
               70-79 yrs     >39 mL/min   Normal                  80 and above  
>32 mL/min   Normal 









                    ID                  Date                Data Source

 

                    [id number]     2021 01:09:00 PM EDT [facility]









          Name      Value     Range     Interpretation Code Description Data Karen

rce(s) Supporting 

Document(s)

 

          Prothrombin time (PT) 12.0 SECONDS 11.0 - 15.5                     [facility]  

 

           INR in Platelet poor plasma by Coagulation assay 0.88       0.93 - 1.

23 L                     [facility]                            

 

           aPTT in Blood by Coagulation assay 28.8 SECONDS 24.8 - 36.7          

             [facility]                                 

 

                                                                      \BLDo\INR 

INTERPRETATION\BLDx\          

Therapeutic range for Coumadin and related oral anticoagulants.          -
International Normalized Ratio (INR): 2.0 - 3.0 for Venous           Thrombosis,
Pulmonary Embolus, Tissue heart valves, Acute MI           Atrial Fibrillation, 
Valvular heart disease and recurrent           Systemic Embolism.          -
International Normalized Ratio (INR): 2.5 - 3.5 for Mechanical           
Prosthetic valve. 









                    ID                  Date                Data Source

 

                    [id number]     2021 12:57:00 PM EDT [facility]









          Name      Value     Range     Interpretation Code Description Data Karen

rce(s) Supporting 

Document(s)

 

          CBC W/AUTOMATED DIFF                                         [facility]  

 

                                            COMPLETE BLOOD COUNT 

 

           Leukocytes [#/volume] in Blood by Automated count 7.8 10^3/uL 4.2 - 1

1.0                       

[facility]                   

 

             Erythrocytes [#/volume] in Blood by Automated count 5.24 10^6/uL 4.

50 - 6.30                

                          [facility]     

 

           Hemoglobin [Mass/volume] in Blood 15.9 g/dL  14.0 - 16.0             

          [facility]                                 

 

           Hematocrit [Volume Fraction] of Blood by Automated count 46.5 %     4

1.0 - 51.0                       

[facility]                   

 

                    Erythrocyte mean corpuscular volume [Entitic volume] by Auto

mated count 88.7 fL             

80.0 - 94.0                                     [facility]  

 

                          Erythrocyte mean corpuscular hemoglobin [Entitic mass]

 by Automated count 30.3 

pg           27.0 - 34.0                            [facility]  

 

                                        Erythrocyte mean corpuscular hemoglobin 

concentration [Mass/volume] by Automated

 count     34.2 g/dL  31.0 - 36.0                       [facility]  

 

             Erythrocyte distribution width [Ratio] by Automated count 13.1 %   

    11.5 - 14.8                

                          [facility]     

 

           Platelets [#/volume] in Blood by Automated count 222 10^3/uL 150 - 45

0                        

[facility]                   

 

                    Platelet mean volume [Entitic volume] in Blood by Automated 

count 9.4 fL              7.4 - 

10.4                                            [facility]  

 

           Neutrophils/100 leukocytes in Blood by Automated count 68.3 %     37.

0 - 80.0                       

[facility]                   

 

           Lymphocytes/100 leukocytes in Blood by Manual count 20.2 %     25.0 -

 40.0 L                     

[facility]                   

 

           Monocytes/100 leukocytes in Blood by Automated count 8.7 %      3.0 -

 8.0  H                     

[facility]                   

 

           Eosinophils/100 leukocytes in Blood by Automated count 1.5 %      0.0

 - 7.0                        

[facility]                   

 

           Basophils/100 leukocytes in Blood by Automated count 1.2 %      0.0 -

 2.0                        

[facility]                   

 

          %IG       0.1 %     0.0 - 0.0 H                   [facility]

al  

 

          %NRBC     0.0 %     0.0 - 0.0                     [facility]

al  

 

           Neutrophils [#/volume] in Blood by Automated count 5.31 10^3/uL 2.00 

- 6.90                       

[facility]                   

 

           Lymphocytes [#/volume] in Blood by Automated count 1.57 10^3/uL 0.60 

- 3.40                       

[facility]                   

 

           Monocytes [#/volume] in Blood by Automated count 0.68 10^3/uL 0.00 - 

0.90                       

[facility]                   

 

           Eosinophils [#/volume] in Blood by Automated count 0.12 10^3/uL 0.00 

- 0.70                       

[facility]                   

 

           Basophils [#/volume] in Blood by Automated count 0.09 10^3/uL 0.00 - 

0.20                       

[facility]                   

 

          #IG       0.01 10^3/uL 0.00 - 0.10                     [facility]

ospital  

 

          #NRBC     0.00 10^3/uL 0.00 - 0.00                     [facility]

ospital  

 

          MANUAL DIFF NOT INDICATED                               [facility]  

 

          RBC MORPH NOT INDICATED                               [facility]

spital  









                    ID                  Date                Data Source

 

                    [id number]         2021 02:22:00 PM EDT [facility] [place]









          Name      Value     Range     Interpretation Code Description Data Karen

rce(s) Supporting 

Document(s)

 

           Glucose [Presence] in Urine Laboratory test result                   

               [facility] ([facility])             

 

           Protein [Presence] in Urine by Test strip Laboratory test result     

                             [facility] 

([facility])  

 

           Ua Nitrite Laboratory test result                                  ME

[facility] ([facility] [place]                                  

 

           Ua Leuko   Laboratory test result                                  ME

[facility] ([facility] [place]                                   

 

           Blood [Presence] in Urine by Visual Laboratory test result           

                       [facility] 

([facility])  

 

           Color of Urine Laboratory test result                                

  [facility] ([facility]                     

 

           Ketones [Presence] in Urine by Test strip Laboratory test result     

                             [facility] 

([facility])  

 

           Clarity of Urine Laboratory test result                              

    [facility] ([facility]                     

 

           Ua Specific Gravity 1.020      1.003-1.030                       [facility]

[facility] ([facility])                     

 

           Urobilinogen [Mass/volume] in Urine by Test strip 0.2 E.U./dL 0.0-1.0

                          [facility]

 ([facility])  

 

           Bilirubin.total [Presence] in Urine by Test strip Laboratory test res

ult                                  

[facility] ([facility])  

 

           pH of Urine by Test strip 5.0        5.0-7.5                         

 [facility] ([facility])                     









                    ID                  Date                Data Source

 

                    [id number]       2021 12:55:00 AM EDT [facility]

l

 

                                          Patient: JOE BRYANT Observation Re

port - Physicians/Mid Levels MRN: 

[id number].VisitID: [id number] [place] 
[address] [id number], MRegistration Date/Time: 2021 17:56 Weight:126 kg 
(S). Height/Length:70 inches (S). BMI:39.9        FAMILY HISTORYNo significant 
family medical history.        (Electronically signed by Madison Hall M.D. 
2021 00:29)   









          Name      Value     Range     Interpretation Code Description Data Karen

rce(s) Supporting 

Document(s)

 

                                                                       









                    ID                  Date                Data Source

 

                    [id number]       2021 09:31:00 PM EDT [facility]

l

 

                                        DATE OF EXAMINATION: 2021 19:25 EDT

 ABD/PEL NO CONTRAST HISTORY: Trauma, 

pain Comparison:None TECHNIQUE: This CT exam was performed using the following 
dose reductiontechniques: automated exposure control, adjustment of mA and/or kV
 according tothe patient's size, and use of iterative reconstruction technique. 
Standard contiguous axial spiral imaging was obtained from the dome of 
thediaphragms through the symphysis pubis without oral contrast and 
withoutintravenous contrast administration and with coronal reformatting. 
Studiessuboptimal secondary to lack of intravenous contrast. Solid organ injury 
cannotbe completely evaluated. FINDINGS: ABDOMEN:Liver: Liver is mildly 
diminished in attenuation consistent with fatty change.No focal lesions are 
identified on this unenhanced examination. Gallbladder and bile ducts: The 
gallbladder is contracted and cannot beevaluated further. Pancreas: Pancreas is 
normal Spleen: Spleen is normal Adrenals: Normal adrenal glands. No adrenal 
masses Kidneys and ureters: There is a masslike lesion in the lower pole the 
rightkidney measuring 3.1 x 3.8 cm. There are complex cystic lesions extending 
fromthe mid to lower pole of the left kidney. Renal ultrasound recommended 
tofurther evaluate. No perinephric fluid or soft tissue stranding. There is a 2 
mmnonobstructing stone in the midpole of the right kidney posteriorly. Stomach 
and bowel: Stomach is unremarkable. There is a small hiatal hernia. Nodilated 
small bowel or bowel wall thickening. Moderate amount stool throughoutthe colon.
 There are scattered colonic diverticula without radiographic evidenceof divert
iculitis Appendix: The appendix is normal Peritoneum/retroperitoneum:No free air
 or free fluid. No collections Lymph nodes:There are several small mesenteric 
nodes in the root of small bowelmesentery. This is a nonspecific finding. Soft 
tissues:There is mild soft tissue stranding overlying the left hemipelvis.This 
is likely secondary to traumatic injury. There are small fat-containingbilateral
 inguinal hernias. There is a small fat-containing umbilical hernia Bones:There 
are bilateral pars defects at L5. No evidence of anterolisthesis. Novisualized 
fractures. There is degenerative disc disease at multiple levels inthe lumbar 
spine with narrowing, endplate sclerosis, and small osteophyteformation. 
Vessels:Atherosclerotic change of aorta without aneurysmal dilatation 
PELVIS:Bladder: Bladder is unremarkable. Reproductive: Unremarkable as 
visualized    IMPRESSION: 1. No fractures identified.2. No evidence of solid 
organ injury however evaluation is limited given thelack of intravenous 
contrast.3. There is mild stranding in the soft tissues overlying the left 
hemipelvislikely secondary to traumatic soft tissue injury.4. There is a 
masslike lesion in the lower pole of the right kidney. Renalultrasound or 
alternatively MRI using renal mass protocol is recommended tofurther evaluate. 
There are cystic lesions in the left kidney which could befurther evaluated with
 either ultrasound or MRI.  Findings and recommendations discussed with Dr. Hall
 at 9:24 PM on 2021  Electronically signed in  by: Nathan Rojas M.D. 
2021 21:25 EDT  









          Name      Value     Range     Interpretation Code Description Data Karen

rce(s) Supporting 

Document(s)

 

                                                                       









                    ID                  Date                Data Source

 

                    [id number]       2021 09:17:00 PM EDT [facility]

l

 

                                        PROCEDURE: CHEST W/O IV CONTRAST DATE AN

D TIME: 2021 19:25 EDT HISTORY: 

Trauma, pain COMPARISON: None TECHNIQUE: CT of the chest was performed without 
IV contrast. Coronalreformatted images were submitted for review. 
FINDINGS:Lungs, pleura and large airways: There is bilateral calcified pleural 
plaquingconsistent with previous asbestos exposure. There is a 5.9 mm nodular 
density inthe superior segment left lower lobe abutting the pleural surface, 
series 5image 67. There are focal opacities at the lung bases. On the right 
thismeasures 3.4 x 2.0 cm and on the left 2.6 x 1.3 cm. These are both seen best
 onseries 5 image 93. Heart: Normal size. No pericardial effusion. There are 
coronary arterycalcifications. Mediastinum and Shant: No enlarged lymph nodes. 
Limited evaluation of shant givenlack of IV contrast. Vessels: There is 
atherosclerotic change of aorta with ectasia of the ascendingaorta measuring 
approximately 3.7 cm Chest wall and lower neck: The patient is post cervical 
spine fusion. No acutefindings. Chest wall soft tissues are unremarkable.  
Bones: No fractures identified. IMPRESSION: No acute injury. There is evidence 
for bilateral calcified pleuralplaquing consistent with previous asbestos 
exposure. There are confluentopacities in both lung bases likely representing 
scarring however short-term, 6month follow-up is recommended. There is also a 
nodular density in the superiorsegment of the left lower lobe which could also 
be evaluated in follow-up study,6 months time.. Electronically signed in  
by: Nathan Rojas M.D. 2021 21:11 EDT  









          Name      Value     Range     Interpretation Code Description Data [facility]

rce(s) Supporting 

Document(s)

 

                                                                       









                    ID                  Date                Data Source

 

                    [id number] REFLEX 2021 06:31:00 PM EDT [facility]

ital

 

                                        TSYSORDER [id number] 









          Name      Value     Range     Interpretation Code Description [facility] 

Document(s)

 

          URINE COLOR. [facility]  

 

          URINE APPEARANCE CLEAR                                   [facility]

l  

 

          URINE GLUCOSE (UA) 500 mg/dL NEGATIVE  H                   [facility]

sheila  

 

          URINE BILIRUBIN NEGATIVE  NEGATIVE                      [facility]

  

 

          URINE KETONE NEGATIVE mg/dL NEGATIVE                      [facility]

al  

 

          SPECIFIC GRAVITY,URINE 1.025     1.005-1.030                     [facility]  

 

          URINE BLOOD NEGATIVE  NEGATIVE                      [facility]  

 

          PH,URINE  7.0       5.0-9.0                       [facility]  

 

          URINE PROTEIN NEGATIVE mg/dL NEGATIVE                      [facility]  

 

          URINE UROBILINOGEN 0.2 mg/dL 0-1                           [facility]  

 

          URINE NITRATE NEGATIVE  NEGATIVE                      [facility]  

 

          URINE LEUKOCYTE ESTERASE NEGATIVE  NEGATIVE                      [facility]  









                    ID                  Date                Data Source

 

                    [id number]     2021 11:12:00 AM EDT [facility]









          Name      Value     Range     Interpretation Code Description Data Karen

rce(s) Supporting 

Document(s)

 

             Prostate specific Ag [Mass/volume] in Serum or Plasma 0.71 ng/mL   

0.00 - 4.00                

                          [facility]     

 

                                                            \BLDo\PSA INTERPRETA

TION\BLDx\      The PSA assay should not

 be used alone for a screening test       or diagnosis for presence or absence 
of malignant disease.      Predictions of disease recurrence should not be based
 solely          on values obtained from serial patient serum values.        The
 PSA result was determined by "[employer]", on the Roche        SUNITHA 6000. Values 
obtained with different assay methods               or kits cannot be used 
interchangeably. 









                    ID                  Date                Data Source

 

                    [id number]     2021 07:19:00 PM EDT [facility]









          Name      Value     Range     Interpretation Code Description Data Karen

rce(s) Supporting 

Document(s)

 

                          Thyrotropin [Units/volume] in Serum or Plasma by Detec

tion limit <= 0.05 mIU/L 

0.69 uIU/mL  0.47 - 5.01                            [facility]  









                    ID                  Date                Data Source

 

                    [id number]     2021 07:04:00 PM EDT [facility]









          Name      Value     Range     Interpretation Code Description Data Karen

rce(s) Supporting 

Document(s)

 

          CVE PANEL                                         [facility]

al  

 

                                            LIPID PANEL 

 

           Cholesterol [Mass/volume] in Serum or Plasma 182 MG/DL  131 - 200    

                    [facility]                            

 

           Deprecated Triglyceride [Mass/volume] in Serum or Plasma 304 MG/DL  3

5 - 160   H                     

[facility]                   

 

          HDL       44 MG/DL  29 - 86                       [facility]

al  

 

                    Cholesterol in LDL [Mass/volume] in Serum or Plasma by Direc

t assay 105 mg/dL           65

 - 175                                          [facility]  

 

                    Cholesterol.total/Cholesterol in HDL [Mass Ratio] in Serum o

r Plasma 4.1                 3.4 - 

4.9                                             [facility]  

 

          LDL/HDL   2.39      1.00 - 3.55                     [facility]

ital  

 

                                        CVE         RISK           CHOL/HDL     

  LDL/HDLMEN:    1/2  AVERAGE          

3.43          1.00         AVERAGE          4.97          3.55    2X   AVERAGE  
       9.55          6.25    3X   AVERAGE         23.99          7.99WOMEN:    
1/2  AVERAGE          3.27          1.47         AVERAGE          4.44          
3.22    2X   AVERAGE          7.05          5.03    3X   AVERAGE         11.04  
       6.14 









                    ID                  Date                Data Source

 

                    [id number]     2021 07:04:00 PM EDT [facility]









          Name      Value     Range     Interpretation Code Description Data Karen

rce(s) Supporting 

Document(s)

 

          COMPREHENSIVE METABOLIC PANEL                                         

[facility]  

 

                                            COMPREHENSIVE METABOLIC PANEL 

 

           Sodium [Moles/volume] in Serum or Plasma 137 mEq/L  134 - 153        

                [facility]                                 

 

           Potassium [Moles/volume] in Serum or Plasma 3.9 mEq/L  3.6 - 5.0     

                   [facility]                            

 

           Chloride [Moles/volume] in Serum or Plasma 99 mEq/L   98 - 107       

                  [facility]                                 

 

           Carbon dioxide, total [Moles/volume] in Serum or Plasma 25 MEQ/L   22

 - 30                          

[facility]                   

 

           Glucose [Mass/volume] in Serum or Plasma 166 MG/DL  70 - 99    H     

                [facility]                                 

 

          BUN       19 MG/DL  7 - 21                        [facility]

al  

 

           Creatinine [Mass/volume] in Serum or Plasma 1.0 MG/DL  0.7 - 1.5     

                   [facility]                            

 

          BUN/CREAT 19        8 - 27                        [facility]

al  

 

           Protein [Mass/volume] in Serum or Plasma 7.8 G/DL   6.3 - 8.2        

                [facility]                                 

 

           Albumin [Mass/volume] in Serum or Plasma 4.8 G/DL   3.9 - 5.0        

                [facility]                                 

 

           Globulin [Mass/volume] in Serum by calculation 3.0 GM/DL  2.4 - 3.2  

                      [facility]                            

 

          A/G RATIO 1.6       0.8 - 2.0                     [facility]

al  

 

           Calcium [Mass/volume] in Serum or Plasma 10.2 MG/DL 8.4 - 10.2       

                [facility]                                

 

           Bilirubin.total [Mass/volume] in Serum or Plasma 0.7 MG/DL  0.2 - 1.3

                        

[facility]                   

 

                    Alkaline phosphatase [Enzymatic activity/volume] in Serum or

 Plasma 84 U/L              38 - 

126                                             [facility]  

 

                          Aspartate aminotransferase [Enzymatic activity/volume]

 in Serum or Plasma 29 U/L

             5 - 40                                 [facility]  

 

                    Alanine aminotransferase [Enzymatic activity/volume] in Seru

m or Plasma 29 U/L              7

- 56                                            [facility]  

 

           Anion gap 3 in Serum or Plasma 13.0 mmol/L 8.0 - 16.0                

       [facility]

                                         

 

          AGE       60 yrs                                  [facility]

al  

 

          NON-AA GFR >60 mL/min                               [facility]

ital  

 

          AFR AMER GFR >60 mL/min                               [facility]

spital  

 

                                                                     Male GFR In

terprentation                  20-49 yrs

    >60 mL/min   Normal                  50-59 yrs     >56 mL/min   Normal      
           60-69 yrs     >49 mL/min   Normal                  70-79yrs      >42 
mL/min   Normal                  80 and above  >35 mL/min   Normal              
     Female GFR  Interpretation                  20-39 yrs     >60 mL/min   
Normal                  40-49 yrs     >58 mL/min   Normal                  50-59
yrs     >51 mL/min   Normal                  60-69 yrs     >45 mL/min   Normal  
               70-79 yrs     >39 mL/min   Normal                  80 and above  
>32 mL/min   Normal 









                    ID                  Date                Data Source

 

                    [id number]     2021 06:54:00 PM EDT [facility]









          Name      Value     Range     Interpretation Code Description Data Karen

rce(s) Supporting 

Document(s)

 

           Hemoglobin A1c/Hemoglobin.total in Blood 7.1 %      4.4 - 6.1  H     

                [facility]                                 

 

                                        {A1]{HB] 









                    ID                  Date                Data Source

 

                    [id number]     2021 06:53:00 PM EDT [facility]









          Name      Value     Range     Interpretation Code Description Data Karen

rce(s) Supporting 

Document(s)

 

          CBC W/AUTOMATED DIFF                                         [facility]  

 

                                            COMPLETE BLOOD COUNT 

 

           Leukocytes [#/volume] in Blood by Automated count 9.3 10^3/uL 4.2 - 1

1.0                       

[facility]                   

 

             Erythrocytes [#/volume] in Blood by Automated count 5.69 10^6/uL 4.

50 - 6.30                

                          [facility]     

 

           Hemoglobin [Mass/volume] in Blood 17.4 g/dL  14.0 - 16.0 H           

          [facility]                                 

 

           Hematocrit [Volume Fraction] of Blood by Automated count 51.0 %     4

1.0 - 51.0                       

[facility]                   

 

                    Erythrocyte mean corpuscular volume [Entitic volume] by Auto

mated count 89.6 fL             

80.0 - 94.0                                     [facility]  

 

                          Erythrocyte mean corpuscular hemoglobin [Entitic mass]

 by Automated count 30.6 

pg           27.0 - 34.0                            [facility]  

 

                                        Erythrocyte mean corpuscular hemoglobin 

concentration [Mass/volume] by Automated

 count     34.1 g/dL  31.0 - 36.0                       [facility]  

 

             Erythrocyte distribution width [Ratio] by Automated count 12.6 %   

    11.5 - 14.8                

                          [facility]     

 

           Platelets [#/volume] in Blood by Automated count 221 10^3/uL 150 - 45

0                        

[facility]                   

 

                    Platelet mean volume [Entitic volume] in Blood by Automated 

count 9.7 fL              7.4 - 

10.4                                            [facility]  

 

           Neutrophils/100 leukocytes in Blood by Automated count 64.4 %     37.

0 - 80.0                       

[facility]                   

 

           Lymphocytes/100 leukocytes in Blood by Manual count 23.2 %     25.0 -

 40.0 L                     

[facility]                   

 

           Monocytes/100 leukocytes in Blood by Automated count 8.4 %      3.0 -

 8.0  H                     

[facility]                   

 

           Eosinophils/100 leukocytes in Blood by Automated count 2.3 %      0.0

 - 7.0                        

[facility]                   

 

           Basophils/100 leukocytes in Blood by Automated count 1.4 %      0.0 -

 2.0                        

[facility]                   

 

          %IG       0.3 %     0.0 - 0.0 H                   [facility]

al  

 

          %NRBC     0.0 %     0.0 - 0.0                     [facility]

al  

 

           Neutrophils [#/volume] in Blood by Automated count 6.01 10^3/uL 2.00 

- 6.90                       

[facility]                   

 

           Lymphocytes [#/volume] in Blood by Automated count 2.16 10^3/uL 0.60 

- 3.40                       

[facility]                   

 

           Monocytes [#/volume] in Blood by Automated count 0.78 10^3/uL 0.00 - 

0.90                       

[facility]                   

 

           Eosinophils [#/volume] in Blood by Automated count 0.21 10^3/uL 0.00 

- 0.70                       

[facility]                   

 

           Basophils [#/volume] in Blood by Automated count 0.13 10^3/uL 0.00 - 

0.20                       

[facility]                   

 

          #IG       0.03 10^3/uL 0.00 - 0.10                     [facility]

ospital  

 

          #NRBC     0.00 10^3/uL 0.00 - 0.00                     [facility]

ospital  

 

          MANUAL DIFF NOT INDICATED                               [facility]  

 

          RBC MORPH NOT INDICATED                               [facility]

spital  









                    ID                  Date                Data Source

 

                    [id number]         2021 08:40:00 AM EDT [facility]









          Name      Value     Range     Interpretation Code Description Data Karen

rce(s) Supporting 

Document(s)

 

           Prostate specific Ag [Mass/volume] in Serum or Plasma 0.71 ng/mL 0.00

-4.00                        

[facility] ([facility])  

 

                                        \BLDo\PSA INTERPRETATION\BLDx\

The PSA assay should not be used alone for a screening test

or diagnosis for presence or absence of malignant disease.

Predictions of disease recurrence should not be based solely

on values obtained from serial patient serum values.

The PSA result was determined by "[employer]", on the Roche

SUNITHA 6000. Values obtained with different assay methods

or kits cannot be used interchangeably.

 









                    ID                  Date                Data Source

 

                    [id number]         2021 08:40:00 AM EDT [facility]









          Name      Value     Range     Interpretation Code Description Data Karen

rce(s) Supporting 

Document(s)

 

           Thyrotropin [Units/volume] in Serum or Plasma 0.69 uIU/mL 0.47-5.01  

                      [facility] 

([facility])         

 

                                        Is patient fasting?  Y 









                    ID                  Date                Data Source

 

                    [id number]         2021 08:40:00 AM EDT [facility]









          Name      Value     Range     Interpretation Code Description Data Karen

rce(s) Supporting 

Document(s)

 

          Cve Panel Laboratory test result                               [facility] 

([facility])  

 

                                        LIPID PANEL

 

 

           Triglycerides 304 mg/dL       Above high normal            [facility]

[facility] ([facility])                        

 

                                        Is patient fasting?  Y 

 

          Cholesterol 182 mg/dL 131-200                       [facility])  

 

                                        Is patient fasting?  Y 

 

          HDL       44 mg/dL  29-86                         [facility])  

 

                                        Is patient fasting?  Y 

 

          LDL       105 mg/dL                         [facility] ([facility])  

 

                                        Is patient fasting?  Y 

 

          Risk Factor 4.1       3.4-4.9                       [facility] ([facility])  

 

                                        Is patient fasting?  Y 

 

          LDL/HDL   2.39      1.00-3.55                     [facility] ([facility])  

 

                                        CVE

RISK           CHOL/HDL       LDL/HDL

MEN:

                                        1/2  AVERAGE          3.43          1.00

AVERAGE          4.97          3.55

                                        2X   AVERAGE          9.55          6.25

                                        3X   AVERAGE         23.99          7.99

WOMEN:

                                        1/2  AVERAGE          3.27          1.47

AVERAGE          4.44          3.22

                                        2X   AVERAGE          7.05          5.03

                                        3X   AVERAGE         11.04          6.14

 









                    ID                  Date                Data Source

 

                    [id number]         2021 08:40:00 AM EDT [facility])









          Name      Value     Range     Interpretation Code Description Data Karen

rce(s) Supporting 

Document(s)

 

           Hemoglobin A1c/Hemoglobin.total in Blood 7.1 %      4.4-6.1    Above 

high normal            

[facility] ([facility])  

 

                                        {A1]

{HB]

 









                    ID                  Date                Data Source

 

                    [id number]         2021 08:40:00 AM EDT [facility])









          Name      Value     Range     Interpretation Code Description Data Karen

rce(s) Supporting 

Document(s)

 

           Comprehensive Metabo Laboratory test result                          

        [facility] ([facility])                                 

 

                                        COMPREHENSIVE METABOLIC PANEL

 

 

          Sodium    137 meq/L 134-153                       [facility] ([facility])  

 

                                        Is patient fasting?  Y 

 

          Potassium 3.9 meq/L 3.6-5.0                       [facility] ([facility])  

 

                                        Is patient fasting?  Y 

 

          Chloride  99 meq/L                          [facility])  

 

                                        Is patient fasting?  Y 

 

           Glucose    166 mg/dL  70-99      Above high normal            [facility] 

([facility])                                 

 

                                        Is patient fasting?  Y 

 

          Co2       25 meq/L  22-30                         [facility])  

 

                                        Is patient fasting?  Y 

 

          BUN       19 mg/dL  7-21                          [facility])  

 

                                        Is patient fasting?  Y 

 

          Creatinine 1.0 mg/dL 0.7-1.5                       [facility])  

 

                                        Is patient fasting?  Y 

 

          BUN/Creat 19        8-27                          [facility])  

 

                                        Is patient fasting?  Y 

 

          Albumin   4.8 g/dL  3.9-5.0                       [facility])  

 

                                        Is patient fasting?  Y 

 

          Total Protein 7.8 g/dL  6.3-8.2                       [facility])  

 

                                        Is patient fasting?  Y 

 

          Globulin  3.0 GM/DL 2.4-3.2                       [facility] ([facility])  

 

                                        Is patient fasting?  Y 

 

          Calcium   10.2 mg/dL 8.4-10.2                      [facility])  

 

                                        Is patient fasting?  Y 

 

          A/G Ratio 1.6       0.8-2.0                       [facility])  

 

                                        Is patient fasting?  Y 

 

          Total Bili 0.7 mg/dL 0.2-1.3                       [facility])  

 

                                        Is patient fasting?  Y 

 

          Sgot/Ast  29 U/L    5-40                          [facility])  

 

                                        Is patient fasting?  Y 

 

          Alkaline Phos 84 U/L                            [facility])  

 

                                        Is patient fasting?  Y 

 

          Anion Gap 13.0 mmol/L 8.0-16.0                      [facility] ([facility])  

 

                                        Is patient fasting?  Y 

 

          SGPT/Alt  29 U/L    7-56                          [facility])  

 

                                        Is patient fasting?  Y 

 

          Age       60 yrs                                  [facility])  

 

                                        Is patient fasting?  Y 

 

           Afr Amer GFR Laboratory test result                                  

[facility] ([facility])                                 

 

                                        Male GFR Interprentation

                                        20-49 yrs     >60 mL/min   Normal

                                        50-59 yrs     >56 mL/min   Normal

                                        60-69 yrs     >49 mL/min   Normal

                                        70-79yrs      >42 mL/min   Normal

                                        80 and above  >35 mL/min   Normal

Female GFR  Interpretation

                                        20-39 yrs     >60 mL/min   Normal

                                        40-49 yrs     >58 mL/min   Normal

                                        50-59 yrs     >51 mL/min   Normal

                                        60-69 yrs     >45 mL/min   Normal

                                        70-79 yrs     >39 mL/min   Normal

                                        80 and above  >32 mL/min   Normal

 

 

          Non-Aa GFR Laboratory test result                               [facility]

 ([facility]) 



 

                                        Is patient fasting?  Y 









                    ID                  Date                Data Source

 

                    [id number]         2021 08:40:00 AM EDT [facility] ([facility])









          Name      Value     Range     Interpretation Code Description Data Karen

rce(s) Supporting 

Document(s)

 

           CBC W/Automated Diff Laboratory test result                          

        [facility] ([facility])                                 

 

                                        COMPLETE BLOOD COUNT

 

 

          WBC       9.3 10^3/uL 4.2-11.0                      [facility] ([facility])  

 

                                        Is patient fasting?  Y 

 

          RBC       5.69 10^6/uL 4.50-6.30                     [facility] ([facility])  

 

                                        Is patient fasting?  Y 

 

           Hemoglobin 17.4 g/dL  14.0-16.0  Above high normal            [facility] 

([facility])                        

 

                                        Is patient fasting?  Y 

 

          Hematocrit 51.0 %    41.0-51.0                     [facility] ([facility])  

 

                                        Is patient fasting?  Y 

 

          MCV       89.6 fL   80.0-94.0                     [facility] ([facility])  

 

                                        Is patient fasting?  Y 

 

          MCH       30.6 pg   27.0-34.0                     [facility] ([facility])  

 

                                        Is patient fasting?  Y 

 

          MCHC      34.1 g/dL 31.0-36.0                     [facility] ([facility])  

 

                                        Is patient fasting?  Y 

 

          RDW       12.6 %    11.5-14.8                     [facility] ([facility])  

 

                                        Is patient fasting?  Y 

 

          Platelets 221 10^3/uL 150-450                       [facility] ([facility])  

 

                                        Is patient fasting?  Y 

 

          MPV       9.7 fL    7.4-10.4                      [facility] ([facility])  

 

                                        Is patient fasting?  Y 

 

          Neut      64.4 %    37.0-80.0                     [facility] ([facility])  

 

                                        Is patient fasting?  Y 

 

           Lymph      23.2 %     25.0-40.0  Below low normal            [facility] (

[facility])                                 

 

                                        Is patient fasting?  Y 

 

          Eos       2.3 %     0.0-7.0                       [facility] ([facility])  

 

                                        Is patient fasting?  Y 

 

          Mono      8.4 %     3.0-8.0   Above high normal           [facility] ([facility])  

 

                                        Is patient fasting?  Y 

 

          Baso      1.4 %     0.0-2.0                       [facility] ([facility])  

 

                                        Is patient fasting?  Y 

 

          %NRBC     0.0 %     0.0-0.0                       [facility] ([facility])  

 

                                        Is patient fasting?  Y 

 

          %Ig       0.3 %     0.0-0.0   Above high normal           [facility] ([facility])  

 

                                        Is patient fasting?  Y 

 

          #Neut     6.01 10^3/uL 2.00-6.90                     [facility] ([facility])  

 

                                        Is patient fasting?  Y 

 

          #Lymph    2.16 10^3/uL 0.60-3.40                     [facility] ([facility])  

 

                                        Is patient fasting?  Y 

 

          #Mono     0.78 10^3/uL 0.00-0.90                     [facility] ([facility])  

 

                                        Is patient fasting?  Y 

 

          #Eos      0.21 10^3/uL 0.00-0.70                     [facility] ([facility])  

 

                                        Is patient fasting?  Y 

 

          #Baso     0.13 10^3/uL 0.00-0.20                     [facility] ([facility])  

 

                                        Is patient fasting?  Y 

 

          #Ig       0.03 10^3/uL 0.00-0.10                     [facility] ([facility])  

 

                                        Is patient fasting?  Y 

 

          #NRBC     0.00 10^3/uL 0.00-0.00                     [facility] ([facility])  

 

                                        Is patient fasting?  Y 

 

          RBC Morph Laboratory test result                               [facility] 

([facility])  

 

                                        Is patient fasting?  Y 

 

           Manual Diff Laboratory test result                                  M

[facility] ([facility])

                                         

 

                                        Is patient fasting?  Y 









                    ID                  Date                Data Source

 

                    [id number]         2021 09:37:00 AM EDT [facility] ([facility], )









          Name      Value     Range     Interpretation Code Description Data Karen

rce(s) Supporting 

Document(s)

 

           Surgical pathology study Laboratory test result                      

            [facility] ([facility], )                            

 

                                        FINAL DIAGNOSIS



A - Colon, sigmoid polyp, polypectomy:

Fragments of tubular adenoma.



B - Colon, splenic flexure polyps, polypectomy:

One fragment of tubular adenoma.

Hyperplastic polyps also present.

                                        2021 - 112



CLINICAL DIAGNOSIS



Colon polyps, family H/O colon CA

                                        2021 - 1451



GROSS DIAGNOSIS



A - Received in formalin labeled "sigmoid colon polyp" and consists of

one fragment of tan tissue measuring 0.8 x 0.3 x 0.3 cm.   All in one.



B - Received in formalin labeled "splenic flexure polyps" and consists

of three fragments of tan tissue measuring   0.7 x 0.5 x 0.2 cm. in

aggregate.  All in one.

                                        -SV

                                        2021 - 



Signed________ HAMLET ANGEL MD 2021 1153

 









                    ID                  Date                Data Source

 

                    [id number]           2021 08:35:00 [facility]









          Name      Value     Range     Interpretation Code Description Data Karen

rce(s) Supporting 

Document(s)

 

                                        SARS-CoV-2 (COVID-19) RNA [Presence] in 

Respiratory specimen by KRISTIE with probe 

detection  Not Detected                                  [facility]      

 

                                        This lab was ordered by [facility] and reported by [employer]. 









                    ID                  Date                Data Source

 

                    [id number]         2021 08:00:00 AM [facility]









          Name      Value     Range     Interpretation Code Description Data Karen

rce(s) Supporting 

Document(s)

 

          SARS coronavirus 2 RNA Not Detected                               [facility]     

 

                                        This lab was ordered by [facility] and reported by [facility]. 









                    ID                  Date                Data Source

 

                    [id number]         2021 08:15:00 AM EST [facility] ([facility])









          Name      Value     Range     Interpretation Code Description Data Karen

rce(s) Supporting 

Document(s)

 

                Thyrotropin [Units/volume] in Serum or Plasma 0.39 uIU/mL     0.

47-5.01       Below low 

normal                                  [facility] ([facility]) 

 

 

                                        Is patient fasting?  Y 









                    ID                  Date                Data Source

 

                    [id number]         2021 08:15:00 AM EST [facility] ([facility])









          Name      Value     Range     Interpretation Code Description Data Karen

rce(s) Supporting 

Document(s)

 

          Cve Panel Laboratory test result                               [facility] 

([facility])  

 

                                        LIPID PANEL

 

 

          Cholesterol 155 mg/dL 131-200                       [facility] ([facility])  

 

                                        Is patient fasting?  Y 

 

           Triglycerides 185 mg/dL       Above high normal            [facility]

NT ([facility])                        

 

                                        Is patient fasting?  Y 

 

          HDL       38 mg/dL  29-86                         [facility] ([facility])  

 

                                        Is patient fasting?  Y 

 

          LDL       96 mg/dL                          [facility] ([facility])  

 

                                        Is patient fasting?  Y 

 

          Risk Factor 4.1       3.4-4.9                       [facility] ([facility])  

 

                                        Is patient fasting?  Y 

 

          LDL/HDL   2.53      1.00-3.55                     [facility] ([facility])  

 

                                        CVE

RISK           CHOL/HDL       LDL/HDL

MEN:

                                        1/2  AVERAGE          3.43          1.00

AVERAGE          4.97          3.55

                                        2X   AVERAGE          9.55          6.25

                                        3X   AVERAGE         23.99          7.99

WOMEN:

                                        1/2  AVERAGE          3.27          1.47

AVERAGE          4.44          3.22

                                        2X   AVERAGE          7.05          5.03

                                        3X   AVERAGE         11.04          6.14

 









                    ID                  Date                Data Source

 

                    [id number]         2021 08:15:00 AM EST [facility] ([facility])









          Name      Value     Range     Interpretation Code Description Data Karen

rce(s) Supporting 

Document(s)

 

           Hemoglobin A1c/Hemoglobin.total in Blood 7.2 %      4.4-6.1    Above 

high normal            

[facility] ([facility])  

 

                                        {A1]

{HB]

 









                    ID                  Date                Data Source

 

                    [id number]         2021 08:15:00 AM EST [facility] ([facility])









          Name      Value     Range     Interpretation Code Description Data Karen

rce(s) Supporting 

Document(s)

 

           Comprehensive Metabo Laboratory test result                          

        [facility] ([facility])                                 

 

                                        COMPREHENSIVE METABOLIC PANEL

 

 

          Sodium    139 meq/L 134-153                       [facility] ([facility])  

 

                                        Is patient fasting?  Y 

 

          Potassium 4.2 meq/L 3.6-5.0                       [facility] ([facility])  

 

                                        Is patient fasting?  Y 

 

          Chloride  104 meq/L                         [facility] ([facility])  

 

                                        Is patient fasting?  Y 

 

          Co2       22 meq/L  22-30                         [facility] ([facility])  

 

                                        Is patient fasting?  Y 

 

           Glucose    172 mg/dL  70-99      Above high normal            [facility] 

([facility])                                 

 

                                        Is patient fasting?  Y 

 

          BUN       19 mg/dL  7-21                          [facility] ([facility])  

 

                                        Is patient fasting?  Y 

 

          Creatinine 0.9 mg/dL 0.7-1.5                       [facility] ([facility])  

 

                                        Is patient fasting?  Y 

 

          BUN/Creat 21        8-27                          [facility] ([facility])  

 

                                        Is patient fasting?  Y 

 

          Total Protein 6.8 g/dL  6.3-8.2                       [facility] ([facility])  

 

                                        Is patient fasting?  Y 

 

          Albumin   4.6 g/dL  3.9-5.0                       [facility] ([facility])  

 

                                        Is patient fasting?  Y 

 

           Globulin   2.2 GM/DL  2.4-3.2    Below low normal            [facility] (

[facility])                                 

 

                                        Is patient fasting?  Y 

 

          Calcium   9.7 mg/dL 8.4-10.2                      [facility] ([facility])  

 

                                        Is patient fasting?  Y 

 

           A/G Ratio  2.1        0.8-2.0    Above high normal            [facility] 

([facility])                                 

 

                                        Is patient fasting?  Y 

 

           Total Bili Laboratory test result 0.2-1.3                          ME

[facility] ([facility])                                 

 

                                        Is patient fasting?  Y 

 

          Alkaline Phos 73 U/L                            [facility] ([facility])  

 

                                        Is patient fasting?  Y 

 

          Sgot/Ast  18 U/L    5-40                          [facility] ([facility])  

 

                                        Is patient fasting?  Y 

 

          SGPT/Alt  21 U/L    7-56                          [facility] ([facility])  

 

                                        Is patient fasting?  Y 

 

          Anion Gap 13.0 mmol/L 8.0-16.0                      [facility] ([facility])  

 

                                        Is patient fasting?  Y 

 

          Non-Aa GFR Laboratory test result                               [facility]

 ([facility]) 



 

                                        Is patient fasting?  Y 

 

          Age       60 yrs                                  [facility] ([facility])  

 

                                        Is patient fasting?  Y 

 

           Afr Amer GFR Laboratory test result                                  

[facility] ([facility])                                 

 

                                        Male GFR Interprentation

                                        20-49 yrs     >60 mL/min   Normal

                                        50-59 yrs     >56 mL/min   Normal

                                        60-69 yrs     >49 mL/min   Normal

                                        70-79yrs      >42 mL/min   Normal

                                        80 and above  >35 mL/min   Normal

Female GFR  Interpretation

                                        20-39 yrs     >60 mL/min   Normal

                                        40-49 yrs     >58 mL/min   Normal

                                        50-59 yrs     >51 mL/min   Normal

                                        60-69 yrs     >45 mL/min   Normal

                                        70-79 yrs     >39 mL/min   Normal

                                        80 and above  >32 mL/min   Normal

 









                    ID                  Date                Data Source

 

                    [id number]         2021 08:15:00 AM EST [facility] ([facility])









          Name      Value     Range     Interpretation Code Description Data Karen

rce(s) Supporting 

Document(s)

 

           CBC W/Automated Diff Laboratory test result                          

        [facility] ([facility])                                 

 

                                        COMPLETE BLOOD COUNT

 

 

          WBC       7.8 10^3/uL 4.2-11.0                      [facility] ([facility])  

 

                                        Is patient fasting?  Y 

 

          Hemoglobin 15.4 g/dL 14.0-16.0                     [facility] ([facility])  

 

                                        Is patient fasting?  Y 

 

          RBC       5.15 10^6/uL 4.50-6.30                     [facility] ([facility])  

 

                                        Is patient fasting?  Y 

 

          MCV       89.1 fL   80.0-94.0                     [facility] ([facility])  

 

                                        Is patient fasting?  Y 

 

          Hematocrit 45.9 %    41.0-51.0                     [facility] ([facility])  

 

                                        Is patient fasting?  Y 

 

          MCHC      33.6 g/dL 31.0-36.0                     [facility] ([facility])  

 

                                        Is patient fasting?  Y 

 

          MCH       29.9 pg   27.0-34.0                     [facility] ([facility])  

 

                                        Is patient fasting?  Y 

 

          RDW       13.0 %    11.5-14.8                     [facility] ([facility])  

 

                                        Is patient fasting?  Y 

 

          Platelets 224 10^3/uL 150-450                       [facility] ([facility])  

 

                                        Is patient fasting?  Y 

 

          Neut      61.0 %    37.0-80.0                     [facility] ([facility])  

 

                                        Is patient fasting?  Y 

 

          MPV       10.3 fL   7.4-10.4                      [facility] ([facility])  

 

                                        Is patient fasting?  Y 

 

          Lymph     26.0 %    25.0-40.0                     [facility] ([facility])  

 

                                        Is patient fasting?  Y 

 

          Eos       1.8 %     0.0-7.0                       [facility] ([facility])  

 

                                        Is patient fasting?  Y 

 

          Mono      9.1 %     3.0-8.0   Above high normal           [facility] ([facility])  

 

                                        Is patient fasting?  Y 

 

          Baso      1.8 %     0.0-2.0                       [facility] ([facility])  

 

                                        Is patient fasting?  Y 

 

          %Ig       0.3 %     0.0-0.0   Above high normal           [facility] ([facility])  

 

                                        Is patient fasting?  Y 

 

          #Neut     4.74 10^3/uL 2.00-6.90                     [facility] ([facility])  

 

                                        Is patient fasting?  Y 

 

          %NRBC     0.0 %     0.0-0.0                       [facility] ([facility])  

 

                                        Is patient fasting?  Y 

 

          #Lymph    2.02 10^3/uL 0.60-3.40                     [facility] ([facility])  

 

                                        Is patient fasting?  Y 

 

          #Mono     0.71 10^3/uL 0.00-0.90                     [facility] ([facility])  

 

                                        Is patient fasting?  Y 

 

          #Eos      0.14 10^3/uL 0.00-0.70                     [facility] ([facility])  

 

                                        Is patient fasting?  Y 

 

          #Baso     0.14 10^3/uL 0.00-0.20                     [facility] ([facility])  

 

                                        Is patient fasting?  Y 

 

          #NRBC     0.00 10^3/uL 0.00-0.00                     [facility] ([facility])  

 

                                        Is patient fasting?  Y 

 

          #Ig       0.02 10^3/uL 0.00-0.10                     [facility] ([facility])  

 

                                        Is patient fasting?  Y 

 

           Manual Diff Laboratory test result                                  M

[facility] ([facility])

                                         

 

                                        Is patient fasting?  Y 

 

          RBC Morph Laboratory test result                               [facility] 

([facility])  

 

                                        Is patient fasting?  Y 









                    ID                  Date                Data Source

 

                    [id number]     2021 07:41:00 PM EST [facility]









          Name      Value     Range     Interpretation Code Description Data Karen

rce(s) Supporting 

Document(s)

 

          CVE PANEL                                         [facility]

al  

 

                                            LIPID PANEL 

 

           Cholesterol [Mass/volume] in Serum or Plasma 155 MG/DL  131 - 200    

                    [facility]                            

 

           Deprecated Triglyceride [Mass/volume] in Serum or Plasma 185 MG/DL  3

5 - 160   H                     

[facility]                   

 

          HDL       38 MG/DL  29 - 86                       [facility]

al  

 

                    Cholesterol in LDL [Mass/volume] in Serum or Plasma by Dire[employer]

t assay 96 mg/dL            65 

- 175                                           [facility]  

 

                    Cholesterol.total/Cholesterol in HDL [Mass Ratio] in Serum o

r Plasma 4.1                 3.4 - 

4.9                                             [facility]  

 

          LDL/HDL   2.53      1.00 - 3.55                     [facility]

ital  

 

                                        CVE         RISK           CHOL/HDL     

  LDL/HDLMEN:    1/2  AVERAGE          

3.43          1.00         AVERAGE          4.97          3.55    2X   AVERAGE  
       9.55          6.25    3X   AVERAGE         23.99          7.99WOMEN:    
1/2  AVERAGE          3.27          1.47         AVERAGE          4.44          
3.22    2X   AVERAGE          7.05          5.03    3X   AVERAGE         11.04  
       6.14 









                    ID                  Date                Data Source

 

                    [id number]     2021 05:57:00 PM [facility]









          Name      Value     Range     Interpretation Code Description Data Karen

rce(s) Supporting 

Document(s)

 

           Hemoglobin A1c/Hemoglobin.total in Blood 7.2 %      4.4 - 6.1  H     

                [facility]                                 

 

                                        {A1]{HB] 









                    ID                  Date                Data Source

 

                    [id number]     2021 05:57:00 PM [facility]









          Name      Value     Range     Interpretation Code Description Data Karen

rce(s) Supporting 

Document(s)

 

                          Thyrotropin [Units/volume] in Serum or Plasma by Detec

tion limit <= 0.05 mIU/L 

0.39 uIU/mL  0.47 - 5.01  L                         [facility]  









                    ID                  Date                Data Source

 

                    [id number]     2021 05:57:00 PM [facility]









          Name      Value     Range     Interpretation Code Description Data Karen

rce(s) Supporting 

Document(s)

 

          COMPREHENSIVE METABOLIC PANEL                                         

[facility]  

 

                                            COMPREHENSIVE METABOLIC PANEL 

 

           Sodium [Moles/volume] in Serum or Plasma 139 mEq/L  134 - 153        

                [facility]                                 

 

           Potassium [Moles/volume] in Serum or Plasma 4.2 mEq/L  3.6 - 5.0     

                   [facility]                            

 

           Chloride [Moles/volume] in Serum or Plasma 104 mEq/L  98 - 107       

                  [facility]                                 

 

           Carbon dioxide, total [Moles/volume] in Serum or Plasma 22 MEQ/L   22

 - 30                          

[facility]                   

 

           Glucose [Mass/volume] in Serum or Plasma 172 MG/DL  70 - 99    H     

                [facility]                                 

 

          BUN       19 MG/DL  7 - 21                        [facility]

al  

 

           Creatinine [Mass/volume] in Serum or Plasma 0.9 MG/DL  0.7 - 1.5     

                   [facility]                            

 

          BUN/CREAT 21        8 - 27                        [facility]

al  

 

           Protein [Mass/volume] in Serum or Plasma 6.8 G/DL   6.3 - 8.2        

                [facility]                                 

 

           Albumin [Mass/volume] in Serum or Plasma 4.6 G/DL   3.9 - 5.0        

                [facility]                                 

 

           Globulin [Mass/volume] in Serum by calculation 2.2 GM/DL  2.4 - 3.2  

L                     [facility]                            

 

          A/G RATIO 2.1       0.8 - 2.0 H                   [facility]  

 

           Calcium [Mass/volume] in Serum or Plasma 9.7 MG/DL  8.4 - 10.2       

                [facility]                                 

 

           Bilirubin.total [Mass/volume] in Serum or Plasma <0.7 MG/DL 0.2 - 1.3

                        

[facility]                   

 

                    Alkaline phosphatase [Enzymatic activity/volume] in Serum or

 Plasma 73 U/L              38 - 

126                                             [facility]  

 

                          Aspartate aminotransferase [Enzymatic activity/volume]

 in Serum or Plasma 18 U/L

             5 - 40                                 [facility]  

 

                    Alanine aminotransferase [Enzymatic activity/volume] in Seru

m or Plasma 21 U/L              7

- 56                                            [facility]  

 

           Anion gap 3 in Serum or Plasma 13.0 mmol/L 8.0 - 16.0                

       [facility]

                                         

 

          AGE       60 yrs                                  [facility]

al  

 

          NON-AA GFR >60 mL/min                               [facility]

ital  

 

          AFR AMER GFR >60 mL/min                               [facility]

spital  

 

                                                                     Male GFR In

terprentation                  20-49 yrs

    >60 mL/min   Normal                  50-59 yrs     >56 mL/min   Normal      
           60-69 yrs     >49 mL/min   Normal                  70-79yrs      >42 
mL/min   Normal                  80 and above  >35 mL/min   Normal              
     Female GFR  Interpretation                  20-39 yrs     >60 mL/min   
Normal                  40-49 yrs     >58 mL/min   Normal                  50-59
yrs     >51 mL/min   Normal                  60-69 yrs     >45 mL/min   Normal  
               70-79 yrs     >39 mL/min   Normal                  80 and above  
>32 mL/min   Normal 









                    ID                  Date                Data Source

 

                    [id number]     2021 05:30:00 PM EST [facility]









          Name      Value     Range     Interpretation Code Description Data Karen

rce(s) Supporting 

Document(s)

 

          CBC W/AUTOMATED DIFF                                         [facility]  

 

                                            COMPLETE BLOOD COUNT 

 

           Leukocytes [#/volume] in Blood by Automated count 7.8 10^3/uL 4.2 - 1

1.0                       

[facility]                   

 

             Erythrocytes [#/volume] in Blood by Automated count 5.15 10^6/uL 4.

50 - 6.30                

                          [facility]     

 

           Hemoglobin [Mass/volume] in Blood 15.4 g/dL  14.0 - 16.0             

          [facility]                                 

 

           Hematocrit [Volume Fraction] of Blood by Automated count 45.9 %     4

1.0 - 51.0                       

[facility]                   

 

                    Erythrocyte mean corpuscular volume [Entitic volume] by Auto

mated count 89.1 fL             

80.0 - 94.0                                     [facility]  

 

                          Erythrocyte mean corpuscular hemoglobin [Entitic mass]

 by Automated count 29.9 

pg           27.0 - 34.0                            [facility]  

 

                                        Erythrocyte mean corpuscular hemoglobin 

concentration [Mass/volume] by Automated

 count     33.6 g/dL  31.0 - 36.0                       [facility]  

 

             Erythrocyte distribution width [Ratio] by Automated count 13.0 %   

    11.5 - 14.8                

                          [facility]     

 

           Platelets [#/volume] in Blood by Automated count 224 10^3/uL 150 - 45

0                        

[facility]                   

 

                    Platelet mean volume [Entitic volume] in Blood by Automated 

count 10.3 fL             7.4 - 

10.4                                            [facility]  

 

           Neutrophils/100 leukocytes in Blood by Automated count 61.0 %     37.

0 - 80.0                       

[facility]                   

 

           Lymphocytes/100 leukocytes in Blood by Manual count 26.0 %     25.0 -

 40.0                       

[facility]                   

 

           Monocytes/100 leukocytes in Blood by Automated count 9.1 %      3.0 -

 8.0  H                     

[facility]                   

 

           Eosinophils/100 leukocytes in Blood by Automated count 1.8 %      0.0

 - 7.0                        

[facility]                   

 

           Basophils/100 leukocytes in Blood by Automated count 1.8 %      0.0 -

 2.0                        

[facility]                   

 

          %IG       0.3 %     0.0 - 0.0 H                   [facility]

al  

 

          %NRBC     0.0 %     0.0 - 0.0                     [facility]

al  

 

           Neutrophils [#/volume] in Blood by Automated count 4.74 10^3/uL 2.00 

- 6.90                       

[facility]                   

 

           Lymphocytes [#/volume] in Blood by Automated count 2.02 10^3/uL 0.60 

- 3.40                       

[facility]                   

 

           Monocytes [#/volume] in Blood by Automated count 0.71 10^3/uL 0.00 - 

0.90                       

[facility]                   

 

           Eosinophils [#/volume] in Blood by Automated count 0.14 10^3/uL 0.00 

- 0.70                       

[facility]                   

 

           Basophils [#/volume] in Blood by Automated count 0.14 10^3/uL 0.00 - 

0.20                       

[facility]                   

 

          #IG       0.02 10^3/uL 0.00 - 0.10                     [facility]

ospital  

 

          #NRBC     0.00 10^3/uL 0.00 - 0.00                     [facility]

ospital  

 

          MANUAL DIFF NOT INDICATED                               [facility]  

 

          RBC MORPH NOT INDICATED                               [facility]

spital  









                    ID                  Date                Data Source

 

                    [id number]         2020 05:16:00 PM EST [facility] ([facility])









          Name      Value     Range     Interpretation Code Description Data Karen

rce(s) Supporting 

Document(s)

 

           Thyrotropin [Units/volume] in Serum or Plasma 0.60 uIU/mL 0.47-5.01  

                      [facility] 

([facility])         

 

                                        

FASTING~.~.~<DG1.3.1>E11.65</DG1.3.1><DG1.3.1>E78.00</DG1.3.1><DG1.3.1>Z79.899</

DG1.3.1><DG1 

 

           Prostate specific Ag [Mass/volume] in Serum or Plasma 0.57 ng/mL 0.00

-4.00                        

[facility] ([facility])  

 

                                        \BLDo\PSA INTERPRETATION\BLDx\

The PSA assay should not be used alone for a screening test

or diagnosis for presence or absence of malignant disease.

Predictions of disease recurrence should not be based solely

on values obtained from serial patient serum values.

The PSA result was determined by "[employer]", on the Roche

SUNITHA 6000. Values obtained with different assay methods

or kits cannot be used interchangeably.

 









                    ID                  Date                Data Source

 

                    [id number]         2020 05:16:00 PM EST [facility] ([facility])









          Name      Value     Range     Interpretation Code Description Data Karen

rce(s) Supporting 

Document(s)

 

           Comprehensive Metabo Laboratory test result                          

        [facility] ([facility])                                

 

                                        COMPREHENSIVE METABOLIC PANEL

 

 

          Sodium    136 meq/L 134-153                       [facility] ([facility])  

 

                                        

FASTING~.~.~<DG1.3.1>E11.65</DG1.3.1><DG1.3.1>E78.00</DG1.3.1><DG1.3.1>Z79.899</

DG1.3.1><DG1 

 

          Potassium 3.8 meq/L 3.6-5.0                       [facility] ([facility])  

 

                                        

FASTING~.~.~<DG1.3.1>E11.65</DG1.3.1><DG1.3.1>E78.00</DG1.3.1><DG1.3.1>Z79.899</

DG1.3.1><DG1 

 

          Chloride  100 meq/L                         [facility] ([facility])  

 

                                        

FASTING~.~.~<DG1.3.1>E11.65</DG1.3.1><DG1.3.1>E78.00</DG1.3.1><DG1.3.1>Z79.899</

DG1.3.1><DG1 

 

           Glucose    129 mg/dL       Above high normal            [facility] 

([facility])                                 

 

                                        

FASTING~.~.~<DG1.3.1>E11.65</DG1.3.1><DG1.3.1>E78.00</DG1.3.1><DG1.3.1>Z79.899</

DG1.3.1><DG1 

 

          Co2       23 meq/L  22-30                         [facility] ([facility])  

 

                                        

FASTING~.~.~<DG1.3.1>E11.65</DG1.3.1><DG1.3.1>E78.00</DG1.3.1><DG1.3.1>Z79.899</

DG1.3.1><DG1 

 

          BUN       18 mg/dL  7-21                          [facility] ([facility])  

 

                                        

FASTING~.~.~<DG1.3.1>E11.65</DG1.3.1><DG1.3.1>E78.00</DG1.3.1><DG1.3.1>Z79.899</

DG1.3.1><DG1 

 

          Creatinine 1.0 mg/dL 0.7-1.5                       [facility] ([facility])  

 

                                        

FASTING~.~.~<DG1.3.1>E11.65</DG1.3.1><DG1.3.1>E78.00</DG1.3.1><DG1.3.1>Z79.899</

DG1.3.1><DG1 

 

          BUN/Creat 18        8-27                          [facility] ([facility])  

 

                                        

FASTING~.~.~<DG1.3.1>E11.65</DG1.3.1><DG1.3.1>E78.00</DG1.3.1><DG1.3.1>Z79.899</

DG1.3.1><DG1 

 

          Total Protein 7.8 g/dL  6.3-8.2                       [facility] ([facility])  

 

                                        

FASTING~.~.~<DG1.3.1>E11.65</DG1.3.1><DG1.3.1>E78.00</DG1.3.1><DG1.3.1>Z79.899</

DG1.3.1><DG1 

 

          Albumin   4.9 g/dL  3.9-5.0                       [facility] ([facility])  

 

                                        

FASTING~.~.~<DG1.3.1>E11.65</DG1.3.1><DG1.3.1>E78.00</DG1.3.1><DG1.3.1>Z79.899</

DG1.3.1><DG1 

 

          Globulin  2.9 GM/DL 2.4-3.2                       [facility] ([facility])  

 

                                        

FASTING~.~.~<DG1.3.1>E11.65</DG1.3.1><DG1.3.1>E78.00</DG1.3.1><DG1.3.1>Z79.899</

DG1.3.1><DG1 

 

          A/G Ratio 1.7       0.8-2.0                       [facility] ([facility])  

 

                                        

FASTING~.~.~<DG1.3.1>E11.65</DG1.3.1><DG1.3.1>E78.00</DG1.3.1><DG1.3.1>Z79.899</

DG1.3.1><DG1 

 

           Calcium    10.3 mg/dL 8.4-10.2   Above high normal            [facility] 

([facility])                                 

 

                                        

FASTING~.~.~<DG1.3.1>E11.65</DG1.3.1><DG1.3.1>E78.00</DG1.3.1><DG1.3.1>Z79.899</

DG1.3.1><DG1 

 

          Total Bili 0.9 mg/dL 0.2-1.3                       [facility] ([facility])  

 

                                        

FASTING~.~.~<DG1.3.1>E11.65</DG1.3.1><DG1.3.1>E78.00</DG1.3.1><DG1.3.1>Z79.899</

DG1.3.1><DG1 

 

          Sgot/Ast  21 U/L    5-40                          [facility] ([facility])  

 

                                        

FASTING~.~.~<DG1.3.1>E11.65</DG1.3.1><DG1.3.1>E78.00</DG1.3.1><DG1.3.1>Z79.899</

DG1.3.1><DG1 

 

          Alkaline Phos 84 U/L                            [facility] ([facility])  

 

                                        

FASTING~.~.~<DG1.3.1>E11.65</DG1.3.1><DG1.3.1>E78.00</DG1.3.1><DG1.3.1>Z79.899</

DG1.3.1><DG1 

 

          SGPT/Alt  25 U/L    7-56                          [facility] ([facility])  

 

                                        

FASTING~.~.~<DG1.3.1>E11.65</DG1.3.1><DG1.3.1>E78.00</DG1.3.1><DG1.3.1>Z79.899</

DG1.3.1><DG1 

 

          Anion Gap 13.0 mmol/L 8.0-16.0                      [facility] ([facility])  

 

                                        

FASTING~.~.~<DG1.3.1>E11.65</DG1.3.1><DG1.3.1>E78.00</DG1.3.1><DG1.3.1>Z79.899</

DG1.3.1><DG1 

 

          Age       60 yrs                                  [facility] ([facility])  

 

                                        

FASTING~.~.~<DG1.3.1>E11.65</DG1.3.1><DG1.3.1>E78.00</DG1.3.1><DG1.3.1>Z79.899</

DG1.3.1><DG1 

 

          Non-Aa GFR Laboratory test result                               [facility]

 ([facility]) 

 

 

                                        

FASTING~.~.~<DG1.3.1>E11.65</DG1.3.1><DG1.3.1>E78.00</DG1.3.1><DG1.3.1>Z79.899</

DG1.3.1><DG1 

 

           Afr Amer GFR Laboratory test result                                  

[facility] ([facility])                                 

 

                                        Male GFR Interprentation

                                        20-49 yrs     >60 mL/min   Normal

                                        50-59 yrs     >56 mL/min   Normal

                                        60-69 yrs     >49 mL/min   Normal

                                        70-79yrs      >42 mL/min   Normal

                                        80 and above  >35 mL/min   Normal

Female GFR  Interpretation

                                        20-39 yrs     >60 mL/min   Normal

                                        40-49 yrs     >58 mL/min   Normal

                                        50-59 yrs     >51 mL/min   Normal

                                        60-69 yrs     >45 mL/min   Normal

                                        70-79 yrs     >39 mL/min   Normal

                                        80 and above  >32 mL/min   Normal

 









                    ID                  Date                Data Source

 

                    [id number]         2020 05:16:00 PM EST [facility] ([facility])









          Name      Value     Range     Interpretation Code Description Data Karen

rce(s) Supporting 

Document(s)

 

          Cve Panel Laboratory test result                               [facility] 

([facility])  

 

                                        LIPID PANEL

 

 

          Cholesterol 174 mg/dL 131-200                       [facility] ([facility])  

 

                                        

FASTING~.~.~<DG1.3.1>E11.65</DG1.3.1><DG1.3.1>E78.00</DG1.3.1><DG1.3.1>Z79.899</

DG1.3.1><DG1 

 

           Triglycerides 246 mg/dL       Above high normal            [facility] ([facility])                        

 

                                        

FASTING~.~.~<DG1.3.1>E11.65</DG1.3.1><DG1.3.1>E78.00</DG1.3.1><DG1.3.1>Z79.899</

DG1.3.1><DG1 

 

          HDL       38 mg/dL  29-86                         [facility] ([facility])  

 

                                        

FASTING~.~.~<DG1.3.1>E11.65</DG1.3.1><DG1.3.1>E78.00</DG1.3.1><DG1.3.1>Z79.899</

DG1.3.1><DG1 

 

          LDL       102 mg/dL                         [facility] ([facility])  

 

                                        

FASTING~.~.~<DG1.3.1>E11.65</DG1.3.1><DG1.3.1>E78.00</DG1.3.1><DG1.3.1>Z79.899</

DG1.3.1><DG1 

 

          LDL/HDL   2.68      1.00-3.55                     [facility] ([facility])  

 

                                        CVE

RISK           CHOL/HDL       LDL/HDL

MEN:

                                        1/2  AVERAGE          3.43          1.00

AVERAGE          4.97          3.55

                                        2X   AVERAGE          9.55          6.25

                                        3X   AVERAGE         23.99          7.99

WOMEN:

                                        1/2  AVERAGE          3.27          1.47

AVERAGE          4.44          3.22

                                        2X   AVERAGE          7.05          5.03

                                        3X   AVERAGE         11.04          6.14

 

 

          Risk Factor 4.6       3.4-4.9                       [facility] ([facility])  

 

                                        

FASTING~.~.~<DG1.3.1>E11.65</DG1.3.1><DG1.3.1>E78.00</DG1.3.1><DG1.3.1>Z79.899</

DG1.3.1><DG1 









                    ID                  Date                Data Source

 

                    [id number]     2020 07:30:00 PM EST [facility]

 Hospital









          Name      Value     Range     Interpretation Code Description Data Karen

rce(s) Supporting 

Document(s)

 

          CVE PANEL                                         [facility]

al  

 

                                            LIPID PANEL 

 

           Cholesterol [Mass/volume] in Serum or Plasma 174 MG/DL  131 - 200    

                    [facility]                            

 

           Deprecated Triglyceride [Mass/volume] in Serum or Plasma 246 MG/DL  3

5 - 160   H                     

[facility]                   

 

          HDL       38 MG/DL  29 - 86                       [facility]

al  

 

                    Cholesterol in LDL [Mass/volume] in Serum or Plasma by Direc

t assay 102 mg/dL           65

 - 175                                          [facility]  

 

                    Cholesterol.total/Cholesterol in HDL [Mass Ratio] in Serum o

r Plasma 4.6                 3.4 - 

4.9                                             [facility]  

 

          LDL/HDL   2.68      1.00 - 3.55                     [facility]

ital  

 

                                        CVE         RISK           CHOL/HDL     

  LDL/HDLMEN:    1/2  AVERAGE          

3.43          1.00         AVERAGE          4.97          3.55    2X   AVERAGE  
       9.55          6.25    3X   AVERAGE         23.99          7.99WOMEN:    
1/2  AVERAGE          3.27          1.47         AVERAGE          4.44          
3.22    2X   AVERAGE          7.05          5.03    3X   AVERAGE         11.04  
       6.14 









                    ID                  Date                Data Source

 

                    [id number]     2020 07:30:00 PM [facility]









          Name      Value     Range     Interpretation Code Description Data Karen

rce(s) Supporting 

Document(s)

 

          COMPREHENSIVE METABOLIC PANEL                                         

[facility]  

 

                                            COMPREHENSIVE METABOLIC PANEL 

 

           Sodium [Moles/volume] in Serum or Plasma 136 mEq/L  134 - 153        

                [facility]                                 

 

           Potassium [Moles/volume] in Serum or Plasma 3.8 mEq/L  3.6 - 5.0     

                   [facility]                            

 

           Chloride [Moles/volume] in Serum or Plasma 100 mEq/L  98 - 107       

                  [facility]                                 

 

           Carbon dioxide, total [Moles/volume] in Serum or Plasma 23 MEQ/L   22

 - 30                          

[facility]                   

 

           Glucose [Mass/volume] in Serum or Plasma 129 MG/DL  65 - 110   H     

                [facility]                                 

 

          BUN       18 MG/DL  7 - 21                        [facility]

al  

 

           Creatinine [Mass/volume] in Serum or Plasma 1.0 MG/DL  0.7 - 1.5     

                   [facility]                            

 

          BUN/CREAT 18        8 - 27                        [facility]

al  

 

           Protein [Mass/volume] in Serum or Plasma 7.8 G/DL   6.3 - 8.2        

                [facility]                                 

 

           Albumin [Mass/volume] in Serum or Plasma 4.9 G/DL   3.9 - 5.0        

                [facility]                                 

 

           Globulin [Mass/volume] in Serum by calculation 2.9 GM/DL  2.4 - 3.2  

                      [facility]                            

 

          A/G RATIO 1.7       0.8 - 2.0                     [facility]  

 

           Calcium [Mass/volume] in Serum or Plasma 10.3 MG/DL 8.4 - 10.2 H     

                [facility]                                

 

           Bilirubin.total [Mass/volume] in Serum or Plasma 0.9 MG/DL  0.2 - 1.3

                        

[facility]                   

 

                    Alkaline phosphatase [Enzymatic activity/volume] in Serum or

 Plasma 84 U/L              38 - 

126                                             [facility]  

 

                          Aspartate aminotransferase [Enzymatic activity/volume]

 in Serum or Plasma 21 U/L

             5 - 40                                 [facility]  

 

                    Alanine aminotransferase [Enzymatic activity/volume] in Seru

m or Plasma 25 U/L              7

- 56                                            [facility]  

 

           Anion gap 3 in Serum or Plasma 13.0 mmol/L 8.0 - 16.0                

       [facility]

                                         

 

          AGE       60 yrs                                  [facility]

al  

 

          NON-AA GFR >60 mL/min                               [facility]

ital  

 

          AFR AMER GFR >60 mL/min                               [facility]

spital  

 

                                                                     Male GFR In

terprentation                  20-49 yrs

    >60 mL/min   Normal                  50-59 yrs     >56 mL/min   Normal      
           60-69 yrs     >49 mL/min   Normal                  70-79yrs      >42 
mL/min   Normal                  80 and above  >35 mL/min   Normal              
     Female GFR  Interpretation                  20-39 yrs     >60 mL/min   
Normal                  40-49 yrs     >58 mL/min   Normal                  50-59
yrs     >51 mL/min   Normal                  60-69 yrs     >45 mL/min   Normal  
               70-79 yrs     >39 mL/min   Normal                  80 and above  
>32 mL/min   Normal 









                    ID                  Date                Data Source

 

                    [id number]     2020 07:20:00 PM [facility]









          Name      Value     Range     Interpretation Code Description Data Karen

rce(s) Supporting 

Document(s)

 

             Prostate specific Ag [Mass/volume] in Serum or Plasma 0.57 ng/mL   

0.00 - 4.00                

                          [facility]     

 

                                                            \BLDo\PSA INTERPRETA

TION\BLDx\      The PSA assay should not

 be used alone for a screening test       or diagnosis for presence or absence 
of malignant disease.      Predictions of disease recurrence should not be based
 solely          on values obtained from serial patient serum values.        The
 PSA result was determined by "[employer]", on the Roche        SUNITHA 6000. Values 
obtained with different assay methods               or kits cannot be used 
interchangeably. 









                    ID                  Date                Data Source

 

                    [id number]     2020 07:20:00 PM [facility]









          Name      Value     Range     Interpretation Code Description Data Karen

rce(s) Supporting 

Document(s)

 

                          Thyrotropin [Units/volume] in Serum or Plasma by Detec

tion limit <= 0.05 mIU/L 

0.60 uIU/mL  0.47 - 5.01                            [facility]  









                    ID                  Date                Data Source

 

                    [id number]         2020 05:10:00 PM EST [facility] ([facility])









          Name      Value     Range     Interpretation Code Description Data Karen

rce(s) Supporting 

Document(s)

 

           Hemoglobin A1c/Hemoglobin.total in Blood 7.1 %      4.4-6.1    Above 

high normal            

[facility] ([facility])  

 

                                        {A1]

{HB]

 









                    ID                  Date                Data Source

 

                    [id number]     2020 07:20:00 PM [facility]









          Name      Value     Range     Interpretation Code Description Data Karen

rce(s) Supporting 

Document(s)

 

           Hemoglobin A1c/Hemoglobin.total in Blood 7.1 %      4.4 - 6.1  H     

                [facility]                                 

 

                                        {A1]{HB] 









                    ID                  Date                Data Source

 

                    [id number]     2020 05:38:00 PM EST [facility]









          Name      Value     Range     Interpretation Code Description Data Karen

rce(s) Supporting 

Document(s)

 

          CBC W/AUTOMATED DIFF                                         [facility]  

 

                                            COMPLETE BLOOD COUNT 

 

           Leukocytes [#/volume] in Blood by Automated count 10.6 10^3/uL 4.2 - 

11.0                       

[facility]                   

 

             Erythrocytes [#/volume] in Blood by Automated count 5.42 10^6/uL 4.

50 - 6.30                

                          [facility]     

 

           Hemoglobin [Mass/volume] in Blood 16.6 g/dL  14.0 - 16.0 H           

          [facility]                                 

 

           Hematocrit [Volume Fraction] of Blood by Automated count 48.2 %     4

1.0 - 51.0                       

[facility]                   

 

                    Erythrocyte mean corpuscular volume [Entitic volume] by Auto

mated count 88.9 fL             

80.0 - 94.0                                     [facility]  

 

                          Erythrocyte mean corpuscular hemoglobin [Entitic mass]

 by Automated count 30.6 

pg           27.0 - 34.0                            [facility]  

 

                                        Erythrocyte mean corpuscular hemoglobin 

concentration [Mass/volume] by Automated

 count     34.4 g/dL  31.0 - 36.0                       [facility]  

 

             Erythrocyte distribution width [Ratio] by Automated count 13.2 %   

    11.5 - 14.8                

                          [facility]     

 

           Platelets [#/volume] in Blood by Automated count 246 10^3/uL 150 - 45

0                        

[facility]                   

 

                    Platelet mean volume [Entitic volume] in Blood by Automated 

count 10.4 fL             7.4 - 

10.4                                            [facility]  

 

           Neutrophils/100 leukocytes in Blood by Automated count 64.1 %     37.

0 - 80.0                       

[facility]                   

 

           Lymphocytes/100 leukocytes in Blood by Manual count 25.4 %     25.0 -

 40.0                       

[facility]                   

 

           Monocytes/100 leukocytes in Blood by Automated count 7.3 %      3.0 -

 8.0                        

[facility]                   

 

           Eosinophils/100 leukocytes in Blood by Automated count 1.6 %      0.0

 - 7.0                        

[facility]                   

 

           Basophils/100 leukocytes in Blood by Automated count 1.2 %      0.0 -

 2.0                        

[facility]                   

 

          %IG       0.4 %     0.0 - 0.0 H                   [facility]

al  

 

          %NRBC     0.0 %     0.0 - 0.0                     [facility]

al  

 

           Neutrophils [#/volume] in Blood by Automated count 6.82 10^3/uL 2.00 

- 6.90                       

[facility]                   

 

           Lymphocytes [#/volume] in Blood by Automated count 2.70 10^3/uL 0.60 

- 3.40                       

[facility]                   

 

           Monocytes [#/volume] in Blood by Automated count 0.78 10^3/uL 0.00 - 

0.90                       

[facility]                   

 

           Eosinophils [#/volume] in Blood by Automated count 0.17 10^3/uL 0.00 

- 0.70                       

[facility]                   

 

           Basophils [#/volume] in Blood by Automated count 0.13 10^3/uL 0.00 - 

0.20                       

[facility]                   

 

          #IG       0.04 10^3/uL 0.00 - 0.10                     [facility]

ospital  

 

          #NRBC     0.00 10^3/uL 0.00 - 0.00                     [facility]

ospital  

 

          MANUAL DIFF NOT INDICATED                               [facility]  

 

          RBC MORPH NOT INDICATED                               [facility]

spital  









                    ID                  Date                Data Source

 

                    [id number]         2020 09:28:00 AM EST [facility] ([facility])









          Name      Value     Range     Interpretation Code Description Data Karen

rce(s) Supporting 

Document(s)

 

          RBC       5.42 10^6/uL 4.50-6.30                     [facility] ([facility])  

 

                                        

FASTING~.~.~<DG1.3.1>E11.65</DG1.3.1><DG1.3.1>E78.00</DG1.3.1><DG1.3.1>Z79.899</

DG1.3.1><DG1 

 

          WBC       10.6 10^3/uL 4.2-11.0                      [facility] ([facility])  

 

                                        

FASTING~.~.~<DG1.3.1>E11.65</DG1.3.1><DG1.3.1>E78.00</DG1.3.1><DG1.3.1>Z79.899</

DG1.3.1><DG1 

 

           CBC W/Automated Diff Laboratory test result                          

        [facility] ([facility])                                

 

                                        

FASTING~.~.~<DG1.3.1>E11.65</DG1.3.1><DG1.3.1>E78.00</DG1.3.1><DG1.3.1>Z79.899</

DG1.3.1><DG1 

 

          Hematocrit 48.2 %    41.0-51.0                     [facility] ([facility])  

 

                                        

FASTING~.~.~<DG1.3.1>E11.65</DG1.3.1><DG1.3.1>E78.00</DG1.3.1><DG1.3.1>Z79.899</

DG1.3.1><DG1 

 

           Hemoglobin 16.6 g/dL  14.0-16.0  Above high normal            [facility] 

([facility])                        

 

                                        

FASTING~.~.~<DG1.3.1>E11.65</DG1.3.1><DG1.3.1>E78.00</DG1.3.1><DG1.3.1>Z79.899</

DG1.3.1><DG1 

 

          MCV       88.9 fL   80.0-94.0                     [facility] ([facility])  

 

                                        

FASTING~.~.~<DG1.3.1>E11.65</DG1.3.1><DG1.3.1>E78.00</DG1.3.1><DG1.3.1>Z79.899</

DG1.3.1><DG1 

 

          MCHC      34.4 g/dL 31.0-36.0                     [facility] ([facility])  

 

                                        

FASTING~.~.~<DG1.3.1>E11.65</DG1.3.1><DG1.3.1>E78.00</DG1.3.1><DG1.3.1>Z79.899</

DG1.3.1><DG1 

 

          MCH       30.6 pg   27.0-34.0                     [facility] ([facility])  

 

                                        

FASTING~.~.~<DG1.3.1>E11.65</DG1.3.1><DG1.3.1>E78.00</DG1.3.1><DG1.3.1>Z79.899</

DG1.3.1><DG1 

 

          Platelets 246 10^3/uL 150-450                       [facility] ([facility])  

 

                                        

FASTING~.~.~<DG1.3.1>E11.65</DG1.3.1><DG1.3.1>E78.00</DG1.3.1><DG1.3.1>Z79.899</

DG1.3.1><DG1 

 

          RDW       13.2 %    11.5-14.8                     [facility] ([facility])  

 

                                        

FASTING~.~.~<DG1.3.1>E11.65</DG1.3.1><DG1.3.1>E78.00</DG1.3.1><DG1.3.1>Z79.899</

DG1.3.1><DG1 

 

          MPV       10.4 fL   7.4-10.4                      [facility] ([facility])  

 

                                        

FASTING~.~.~<DG1.3.1>E11.65</DG1.3.1><DG1.3.1>E78.00</DG1.3.1><DG1.3.1>Z79.899</

DG1.3.1><DG1 

 

          Neut      64.1 %    37.0-80.0                     [facility] ([facility])  

 

                                        

FASTING~.~.~<DG1.3.1>E11.65</DG1.3.1><DG1.3.1>E78.00</DG1.3.1><DG1.3.1>Z79.899</

DG1.3.1><DG1 

 

          Mono      7.3 %     3.0-8.0                       [facility] ([facility])  

 

                                        

FASTING~.~.~<DG1.3.1>E11.65</DG1.3.1><DG1.3.1>E78.00</DG1.3.1><DG1.3.1>Z79.899</

DG1.3.1><DG1 

 

          Eos       1.6 %     0.0-7.0                       [facility] ([facility])  

 

                                        

FASTING~.~.~<DG1.3.1>E11.65</DG1.3.1><DG1.3.1>E78.00</DG1.3.1><DG1.3.1>Z79.899</

DG1.3.1><DG1 

 

          Lymph     25.4 %    25.0-40.0                     [facility] ([facility])  

 

                                        

FASTING~.~.~<DG1.3.1>E11.65</DG1.3.1><DG1.3.1>E78.00</DG1.3.1><DG1.3.1>Z79.899</

DG1.3.1><DG1 

 

          %Ig       0.4 %     0.0-0.0   Above high normal           [facility] ([facility])  

 

                                        

FASTING~.~.~<DG1.3.1>E11.65</DG1.3.1><DG1.3.1>E78.00</DG1.3.1><DG1.3.1>Z79.899</

DG1.3.1><DG1 

 

          Baso      1.2 %     0.0-2.0                       [facility] ([facility])  

 

                                        

FASTING~.~.~<DG1.3.1>E11.65</DG1.3.1><DG1.3.1>E78.00</DG1.3.1><DG1.3.1>Z79.899</

DG1.3.1><DG1 

 

          %NRBC     0.0 %     0.0-0.0                       [facility] ([facility])  

 

                                        

FASTING~.~.~<DG1.3.1>E11.65</DG1.3.1><DG1.3.1>E78.00</DG1.3.1><DG1.3.1>Z79.899</

DG1.3.1><DG1 

 

          #Lymph    2.70 10^3/uL 0.60-3.40                     [facility] ([facility])  

 

                                        

FASTING~.~.~<DG1.3.1>E11.65</DG1.3.1><DG1.3.1>E78.00</DG1.3.1><DG1.3.1>Z79.899</

DG1.3.1><DG1 

 

          #Mono     0.78 10^3/uL 0.00-0.90                     [facility] ([facility])  

 

                                        

FASTING~.~.~<DG1.3.1>E11.65</DG1.3.1><DG1.3.1>E78.00</DG1.3.1><DG1.3.1>Z79.899</

DG1.3.1><DG1 

 

          #Neut     6.82 10^3/uL 2.00-6.90                     [facility] ([facility])  

 

                                        

FASTING~.~.~<DG1.3.1>E11.65</DG1.3.1><DG1.3.1>E78.00</DG1.3.1><DG1.3.1>Z79.899</

DG1.3.1><DG1 

 

          #Eos      0.17 10^3/uL 0.00-0.70                     [facility] ([facility])  

 

                                        

FASTING~.~.~<DG1.3.1>E11.65</DG1.3.1><DG1.3.1>E78.00</DG1.3.1><DG1.3.1>Z79.899</

DG1.3.1><DG1 

 

          #Baso     0.13 10^3/uL 0.00-0.20                     [facility] ([facility])  

 

                                        

FASTING~.~.~<DG1.3.1>E11.65</DG1.3.1><DG1.3.1>E78.00</DG1.3.1><DG1.3.1>Z79.899</

DG1.3.1><DG1 

 

          #Ig       0.04 10^3/uL 0.00-0.10                     [facility] ([facility])  

 

                                        

FASTING~.~.~<DG1.3.1>E11.65</DG1.3.1><DG1.3.1>E78.00</DG1.3.1><DG1.3.1>Z79.899</

DG1.3.1><DG1 

 

          #NRBC     0.00 10^3/uL 0.00-0.00                     [facility] ([facility])  

 

                                        

FASTING~.~.~<DG1.3.1>E11.65</DG1.3.1><DG1.3.1>E78.00</DG1.3.1><DG1.3.1>Z79.899</

DG1.3.1><DG1 

 

           Manual Diff Laboratory test result                                  M

[facility] ([facility])

                                         

 

                                        

FASTING~.~.~<DG1.3.1>E11.65</DG1.3.1><DG1.3.1>E78.00</DG1.3.1><DG1.3.1>Z79.899</

DG1.3.1><DG1 

 

          RBC Morph Laboratory test result                               [facility] 

([facility])  

 

                                        

FASTING~.~.~<DG1.3.1>E11.65</DG1.3.1><DG1.3.1>E78.00</DG1.3.1><DG1.3.1>Z79.899</

DG1.3.1><DG1 









                    ID                  Date                Data Source

 

                    [id number]         2020 09:11:00 AM EST [facility] ([facility])









          Name      Value     Range     Interpretation Code Description Data Karen

rce(s) Supporting 

Document(s)

 

           Thyrotropin [Units/volume] in Serum or Plasma Laboratory test result 

                                 

[facility] ([facility])  

 

                Prostate specific Ag [Mass/volume] in Serum or Plasma Laboratory

 test result                  

                                        [facility] ([facility]) 

 









                    ID                  Date                Data Source

 

                    [id number]         2020 09:11:00 AM EST [facility] ([facility])









          Name      Value     Range     Interpretation Code Description Data Karen

rce(s) Supporting 

Document(s)

 

           Hemoglobin A1c/Hemoglobin.total in Blood Laboratory test result      

                            [facility] 

([facility])         







                                        Procedure

 

                                          



                                                                                
                                                                                
                                                                                
                                                                                
                                                                                
                                                                                
                                                                                
                                                                                
                                                                                
                                                                                
                                                                                
                                                                                
                                                                                
                  



Social History

          



           Code       Duration   Value      Status     Description Data Source(s

)

 

             Smoking      2021 12:00:00 AM EDT Never Smoked Cigarettes com

pleted    Never 

Smoked Cigarettes                       [facility] ([facility] [place]



                                                                                
                  



Vital Signs

          



                    ID                  Date                Data Source

 

                    UNK                                      









           Name       Value      Range      Interpretation Code Description Data

 Source(s)

 

           Body height 70 [in_i]                        70 [in_i]  [facility] ([facility])

 

                                        5'10" 

 

           Body mass index (BMI) [Ratio] 38.2 kg/m2                       38.2 k

g/m2 [facility] ([facility])

 

           Body surface area Derived from formula 2.36 m2                       

   2.36 m2    [facility] ([facility])

 

           Heart rate 76 /min                          76 /min    [facility] ([facility])

 

           Body temperature 98.7 [degF]                       98.7 [degF] [facility]

 ([facility])

 

           Respiratory rate 16 /min                          16 /min    [facility] (

[facility])

 

           Oxygen saturation in Arterial blood by Pulse oximetry 96 %           

                  96 %       [facility] 

([facility])

 

           Body weight 266.00 [lb_av]                       266.00 [lb_av] [facility]

T ([facility])

 

           Body weight 120.658 kg                       120.658 kg [facility] ([facility])

 

           Systolic blood pressure 150 mm[Hg]                       150 mm[Hg] M

[facility] ([facility])

 

           Diastolic blood pressure 82 mm[Hg]                        82 mm[Hg]  

[facility] ([facility])

 

           Heart rate 70 /min                          70 /min    [facility] ([facility])

 

           Systolic blood pressure 154 mm[Hg]                       154 mm[Hg] M

[facility] ([facility])

 

           Body temperature 98.1 [degF]                       98.1 [degF] [facility]

 ([facility])

 

           Body weight 266.50 [lb_av]                       266.50 [lb_av] [facility]

T ([facility])

 

           Diastolic blood pressure 92 mm[Hg]                        92 mm[Hg]  

[facility] ([facility])

 

           Respiratory rate 16 /min                          16 /min    [facility] (

[facility])

 

           Oxygen saturation in Arterial blood by Pulse oximetry 97 %           

                  97 %       [facility] 

([facility])

 

           Body weight 120.884 kg                       120.884 kg [facility] ([facility])

 

           Body height 70 [in_i]                        70 [in_i]  [facility] ([facility])

 

                                        5'10" 

 

           Body mass index (BMI) [Ratio] 38.2 kg/m2                       38.2 k

g/m2 [facility] ([facility])

 

           Body surface area Derived from formula 2.36 m2                       

   2.36 m2    [facility] ([facility])

 

           Body height 70 [in_i]                        70 [in_i]  [facility]

 

                                        5'10" 

 

           Body weight 266.00 [lb_av]                       266.00 [lb_av] [facility]

[facility] ([facility] [place])

 

           Body weight 120.658 kg                       120.658 kg [facility] [place]

 

           Body mass index (BMI) [Ratio] 38.2 kg/m2                       38.2 k

g/m2 [facility] ([facility])

 

           Systolic blood pressure 159 mm[Hg]                       159 mm[Hg] M

[facility] ([facility]

 

           Diastolic blood pressure 100 mm[Hg]                       100 mm[Hg] 

[facility] ([facility]

 

           Heart rate 71 /min                          71 /min    [facility]

 

           Body temperature 98.1 [degF]                       98.1 [degF] [facility]

 ([facility])

 

           Body height 176.784 cm            Normal (applies to non-numeric resu

lts) 176.784 cm 

[facility]

 

             Systolic blood pressure 163 mm[Hg]                Normal (applies t

o non-numeric results) 163

 mm[Hg]                                 [facility]

 

             Diastolic blood pressure 84 mm[Hg]                 Normal (applies 

to non-numeric results) 84 

mm[Hg]                                  [facility]

 

           Heart rate 68 min                Normal (applies to non-numeric resul

ts) 68 min     [facility]

 

           Respiratory rate 20 min                Normal (applies to non-numeric

 results) 20 min     [facility]

 

           Body temperature 36.6 bertrand              Normal (applies to non-numeric

 results) 36.6 bertrand   

[facility]

 

                Deprecated Oxygen saturation in Capillary blood by Oximetry 94 %

                            Normal 

(applies to non-numeric results) 94 %                      [facility]

 

                Body mass index (BMI) [Ratio] 32.49 kg/m2                     No

rmal (applies to non-numeric 

results)                  32.49 kg/m2               [facility]

 

             Body weight Measured 102.7 kg                  Normal (applies to n

on-numeric results) 102.7 kg

                                        [facility]

 

           Oxygen saturation in Arterial blood by Pulse oximetry 97 %           

                  97 %       [facility] 

(Caleb Powers [facility])

 

           Body height 70 [in_i]                        70 [in_i]  [facility] (Caleb Powers MD)

 

                                        5'10" 

 

           Body weight 273.00 [lb_av]                       273.00 [lb_av] [facility]

T (Caleb Powers MD)

 

           Body mass index (BMI) [Ratio] 39.2 kg/m2                       39.2 k

g/m2 [facility] (Caleb Powers MD)

 

           Body temperature 98.1 [degF]                       98.1 [degF] [facility]

 (Caleb Powers MD)

 

           Systolic blood pressure 180 mm[Hg]                       180 mm[Hg] M

[facility] (Caleb Powers [facility])

 

           Diastolic blood pressure 100 mm[Hg]                       100 mm[Hg] 

[facility] (Caleb Powers [facility])

 

           Heart rate 76 /min                          76 /min    [facility] (Caleb Powers [facility])

 

           Systolic blood pressure 184 mm[Hg]                       184 mm[Hg] M

[facility] ([facility])

 

           Diastolic blood pressure 81 mm[Hg]                        81 mm[Hg]  

[facility] ([facility])

 

           Heart rate 80 /min                          80 /min    [facility] ([facility]

 

           Body height 70 [in_i]                        70 [in_i]  [facility] ([facility]

 

           Body weight 280.00 [lb_av]                       280.00 [lb_av] [facility]

T ([facility])

 

           Body weight 127.008 kg                       127.008 kg [facility] ([facility] [place]

 

           Body mass index (BMI) [Ratio] 40.2 kg/m2                       40.2 k

g/m2 [facility] ([facility])

 

           Diastolic blood pressure 86 mm[Hg]                        86 mm[Hg]  

[facility] ([facility])

 

           Systolic blood pressure 190 mm[Hg]                       190 mm[Hg] [facility]

 

           Heart rate 79 /min                          79 /min    [facility] ([facility])

 

           Oxygen saturation in Arterial blood by Pulse oximetry 96 %           

                  96 %       [facility] 

([facility])

 

                                        [facility] 

 

           Body height 70 [in_i]                        70 [in_i]  [facility] ([facility])

 

                                        5'10" 

 

           Body weight 280.00 [lb_av]                       280.00 [lb_av] [facility]

T ([facility])

 

           Body mass index (BMI) [Ratio] 40.2 kg/m2                       40.2 k

g/m2 [facility] ([facility])

 

           Ideal body weight 166 [lb_av]                       166 [lb_av] [facility]

T ([facility])

 

           Body weight 127.008 kg                       127.008 kg [facility] ([facility])

 

           Body surface area Derived from formula 2.41 m2                       

   2.41 m2    [facility] ([facility])

 

           Systolic blood pressure 216 mm[Hg]                       216 mm[Hg] [facility]

 

           Body height 70 [in_i]                        70 [in_i]  [facility]

 

                                        5'10" 

 

           Diastolic blood pressure 90 mm[Hg]                        90 mm[Hg]  

[facility] ([facility]

 

           Heart rate 76 /min                          76 /min    [facility] ([facility])

 

           Oxygen saturation in Arterial blood by Pulse oximetry 94 %           

                  94 %       [facility] 

([facility])

 

           Body weight 282.00 [lb_av]                       282.00 [lb_av] [facility]

T ([facility])

 

           Body mass index (BMI) [Ratio] 40.5 kg/m2                       40.5 k

g/m2 [facility] (Caleb Powers [facility])

 

           Body temperature 97.9 [degF]                       97.9 [degF] [facility]

 ([facility])

 

           Body weight 283.00 [lb_av]                       283.00 [lb_av] [facility]

T ([facility])

 

           Body weight 128.369 kg                       128.369 kg [facility] ([facility])

 

           Body height 70 [in_i]                        70 [in_i]  [facility] ([facility])

 

                                        5'10" 

 

           Body mass index (BMI) [Ratio] 40.6 kg/m2                       40.6 k

g[facility]

 

           Body surface area Derived from formula 2.42 m2                       

   2.42 m2    [facility] ([facility])

 

           Systolic blood pressure 148 mm[Hg]                       148 mm[Hg] M

[facility] ([facility])

 

           Diastolic blood pressure 80 mm[Hg]                        80 mm[Hg]  

[facility] ([facility])

 

           Heart rate 78 /min                          78 /min    [facility] ([facility])

 

           Body temperature 96.4 [degF]                       96.4 [degF] [facility]

 ([facility])

 

           Respiratory rate 18 /min                          18 /min    [facility] (

[facility])

 

           Oxygen saturation in Arterial blood by Pulse oximetry 97 %           

                  97 %       [facility] 

([facility])

 

           Body temperature 97.2 [degF]                       97.2 [degF] [facility]

 ([facility])

 

           Systolic blood pressure 168 mm[Hg]                       168 mm[Hg] M

[facility] ([facility])

 

           Diastolic blood pressure 88 mm[Hg]                        88 mm[Hg]  

[facility] ([facility])

 

           Heart rate 90 /min                          90 /min    [facility] ([facility])

 

           Respiratory rate 18 /min                          18 /min    [facility]

 

           Oxygen saturation in Arterial blood by Pulse oximetry 97 %           

                  97 %       [facility] 

([facility])

 

           Body weight 283.25 [lb_av]                       283.25 [lb_av] [facility]

T ([facility])

 

           Body weight 128.482 kg                       128.482 [facility]

 

           Body height 70 [in_i]                        70 [in_i]  [facility] ([facility])

 

                                        5'10" 

 

           Body mass index (BMI) [Ratio] 40.6 kg/m2                       40.6 k

g/m2 [facility] ([facility])

 

           Body surface area Derived from formula 2.42 m2                       

   2.42 m2    [facility] ([facility])

 

           Body weight 287.00 [lb_av]                       287.00 [lb_av] [facility]

T ([facility])

 

           Body mass index (BMI) [Ratio] 41.2 kg/m2                       41.2 k

g/m2 [facility] ([facility])

 

           Systolic blood pressure 144 mm[Hg]                       144 mm[Hg] M

[facility] ([facility])

 

           Diastolic blood pressure 84 mm[Hg]                        84 mm[Hg]  

[facility] ([facility])

 

           Body height 70 [in_i]                        70 [in_i]  [facility] ([facility])

 

                                        5'10" 

 

           Ideal body weight 166 [lb_av]                       166 [lb_av] [facility]

T ([facility])

 

           Body weight 130.183 kg                       130.183 kg [facility] ([facility])

 

           Body surface area Derived from formula 2.43 m2                       

   2.43 m2    [facility] ([facility])

 

           Diastolic blood pressure 84 mm[Hg]                        84 mm[Hg]  

[facility] ([facility])

 

           Systolic blood pressure--sitting 180 mm[Hg]                       180

 mm[Hg] [facility] ([facility])

 

           Diastolic blood pressure--sitting 80 mm[Hg]                        80

 mm[Hg]  [facility] ([facility])

 

           Respiratory rate 18 /min                          18 /min    [facility] (

[facility])

 

           Oxygen saturation in Arterial blood by Pulse oximetry 96 %           

                  96 %       [facility] 

[facility]

 

           [facility] 74 /min                          74 /min    [facility]

 

           Body weight 131.657 kg                       131.657 [facility]

 

           Body weight 290.25 [lb_av]                       290.25 [lb_av] [facility]

T ([facility])

 

           Body height 70 [in_i]                        70 [in_i]  [facility] ([facility])

 

                                        5'10" 

 

           Body surface area Derived from formula 2.45 m2                       

   2.45 m2    [facility] ([facility])

 

           Body mass index (BMI) [Ratio] 41.6 kg/m2                       41.6 k

g/m2 [facility] ([facility])

 

           Body temperature 96.8 [degF]                       96.8 [degF] [facility]

 ([facility])

 

           Systolic blood pressure 148 mm[Hg]                       148 mm[Hg] [facility]

 

           Diastolic blood pressure 80 mm[Hg]                        80 mm[Hg]  

[facility] ([facility])

 

           Systolic blood pressure 150 mm[Hg]                       150 mm[Hg] M

[facility]

 

           Body temperature 96.8 [degF]                       96.8 [degF] [facility]

 ([facility])

 

           Oxygen saturation in Arterial blood by Pulse oximetry 96 %           

                  96 %       [facility] 

([facility])

 

           Heart rate 72 /min                          72 /min    [facility] ([facility])

 

           Respiratory rate 18 /min                          18 /min    [facility] (

[facility])

 

           Body weight 127.688 kg                       127.688 kg [facility] ([facility])

 

           Body height 70 [in_i]                        70 [in_i]  [facility] ([facility])

 

                                        5'10" 

 

           Body mass index (BMI) [Ratio] 40.4 kg/m2                       40.4 k

g/m2 [facility] ([facility])

 

           Body surface area Derived from formula 2.41 m2                       

   2.41 m2    [facility] ([facility])

 

           Body weight 281.50 [lb_av]                       281.50 [facility]lb_av] [facility]

## 2021-11-09 NOTE — CCD
Continuity of Care Document (CCD)

                             Created on: 10/29/2021



Carlos Bryant

External Reference #: [id number]

: 1960

Sex: Male



Demographics





                          Address                   [address] [place] [address]

 

                          Home Phone                [phone]

 

                          Preferred Language        Unknown

 

                          Marital Status            Never 

 

                          Judaism Affiliation     Unknown

 

                          Race                      White

 

                          Ethnic Group              Not  or 





Author





                          Author                    Carlos CARRINGTON FN

 

                          Organization              Unknown

 

                          Address                   [address] [place] [address]



 

                          Phone                     [phone]







Care Team Providers





                    Care Team Member Name Role                Phone

 

                    [facility]                [phone]

 

                    Moris Desai   AUTM                [phone]

 

                    Crystal Carrington  AUTM                [phone]







Problems





                    Active Problems     Provider            Date

 

                    Type II diabetes mellitus uncontrolled BELLA Severino, P

NP Onset: 2020

 

                    Essential hypertension BELLA Severino, PNP Onset: 2020

 

                    Pure hypercholesterolemia BELLA Severino, PNP Onset: 

 

                    Gastroesophageal reflux disease BELLA Severino, PNP Onse

t: 2020

 

                    Secondary erectile dysfunction BELLA Severino, PNP Onset

: 2020

 

                    Taking medication   BELLA Severino, PNP Onset: 

0

 

                    Right side sciatica BELLA Severino, PNP Onset: 

0

 

                    Secondary erectile dysfunction BELLA Severino, PNP Onset

: 2021







Social History





                Type            Date            Description     Comments

 

                Birth Sex                       Unknown          

 

                Tobacco Use     Start: Unknown End:  Quit             

 

                Tobacco Use     Start: Unknown  Never Smoked Cigars  

 

                Tobacco Use     Start: Unknown  Never Smoked A Pipe  

 

                Tobacco Use     Start: Unknown  Never Used Smokeless Tobacco  

 

                ETOH Use                        Denies alcohol use  

 

                Recreational Drug Use                 Denies Drug Use  

 

                Tobacco Use     Start: Unknown End: Unknown Patient is a former 

smoker  







Allergies and adverse reactions





             Active Allergies Criticality  Reaction | Severity Comments     Date

 

             Iodinated Diagnostic Agents Unable to assess criticality           

                2019







Medications





           Active Medications SIG        Qnty       Indications Ordering Provide

r Date

 

                          Fenofibrate                     145mg Tablets         

          1 tablet by 

mouth every day for triglycerides 90tabs                          Darian barboza MD 2021

 

                          Glipizide ER                     10mg Tablets ER 24HR 

                  Take 1 

Tablet By Mouth Once A Day 30tabs                          Olga Lidia tuttle M.D. 2021

 

                          Trazodone HCL                     50mg Tablets        

           take 1 tab by 

mouth at bedtime. 30tabs                          Darian Gomez MD 2021

 

                                        Blood Pressure Kit/Oscillating/Digital  

                    Kit                 

                Use as directed to monitor blood pressure twice a day 1units    

                      Darian Gomez MD                                      2021

 

                          Lisinopril                     10mg Tablets           

        take two tablets 

by mouth in the morning and 1 tab at bed time 90tabs                          Moi Gomez MD 

2021

 

                          Jardiance                     25mg Tablets            

       take one by mouth 

once a day      90tabs                          BELLA Severino, PNP 20

20

 

                          Carisoprodol                     350mg Tablets        

           1 by mouth at 

bedtime as needed                                 Unknown         

 

                          Blood Glucose Test                      Strips        

           please provide 

glucose test strips, covered by pt insurance for twice daily bs test dx: e11.65 

                    E11.65              Unknown             

 

                                        Blood Glucose Monitoring System         

            W/Device Kit                

             for three times a day blood sugar testing dx: e11.65              E

11.65       Unknown      



 

                          Mucinex                     600mg Tablets ER 12HR     

              1 by mouth 

twice a day as needed                                 Unknown         

 

                          Omeprazole                     20mg Capsules DR       

            1 by mouth 

every day  Written by Dr Malik                                 Unknown         



 

                          Topamax                     50mg Tablets              

     1 by mouth twice a 

day for headaches 180tabs                         BELLA Severino, PNP 

 

                          Novolog                     100Unit/ML Solution       

            10-15 units at

bedtime per sliding scale                                 Unknown         

 

                    Simple Diagnostics Lancing Device                      Misc 

                                      

                                        Unknown             

 

                          Hydrochlorothiazide                     25mg Tablets  

                 take one 

tablet by mouth every day 90tabs                          BELLA Severino, PN

P 

 

                          Lovastatin                     40mg Tablets           

        take one tablet by

mouth every evening with meal 90tabs                          BELLA Severino

, PNP 

 

                          Metformin HCL                     1000mg Tablets      

             take one 

tablet by mouth twice a day with food 180tabs                         BELLA Severino, PNP 



 

                          Meloxicam                     15mg Tablets            

       take one tablet by 

mouth every day 90tabs                          KIT Severino-BC, PNP 

 

                          Sildenafil Citrate                     20mg Tablets   

                Take One 

Tablet By Mouth as Directed                                 Unknown         

 

                                        Pharmacist Choice Ultra Thin Lancets 31G

                     31G Misc           

                                                    Unknown      

 

                          Hydrocodone-Acetaminophen                     5-325mg 

Tablets                   

Take One Tablet By Mouth Every 4 Hours Maximum Daily Dose   6 Tablets Written by
Dr Malik                                       Unknown         







Immunizations





                                        Description

 

                                        No Information Available







Vital Signs





                Date            Vital           Result          Comment

 

                10/25/2021  1:29pm BP Systolic     150 mmHg         

 

                    BP Diastolic        82 mmHg              

 

                    Heart Rate          76 /min              

 

                    Body Temperature    98.7 F             

 

                    Respiratory Rate    16 /min              

 

                    O2 % BldC Oximetry  96 %                 

 

                    Weight              266.00 lb            

 

                    Weight              120.658 kg           

 

                    Height              70 inches           5'10"

 

                    BMI (Body Mass Index) 38.2 kg/m2           

 

                    BSA (Body Surface Area) 2.36 m2              

 

                2021  8:07am BP Systolic     154 mmHg         

 

                    BP Diastolic        92 mmHg              

 

                    Heart Rate          70 /min              

 

                    Body Temperature    98.1 F             

 

                    Respiratory Rate    16 /min              

 

                    O2 % BldC Oximetry  97 %                 

 

                    Weight              266.50 lb            

 

                    Weight              120.884 kg           

 

                    Height              70 inches           5'10"

 

                    BMI (Body Mass Index) 38.2 kg/m2           

 

                    BSA (Body Surface Area) 2.36 m2              







Results





        Test    Acquired Date Facility Test    Result  H/L     Range   Note

 

                    RSV Dna Probe       10/25/2021          [facility]

             RSV Antigen  POSITIVE     Abnormal     Normal: Negative  

 

             RSV Antigen Reenter POSITIVE     Abnormal     Normal: Negative 1

 

                    Laboratory test finding 10/25/2021          [facility]

             Coronavirus Covid-19 Not Detected               Not Detected 2

 

                    Covid-19            2021          [facility]

             Sars-CoV-2, Kristie Not Detected               Not Detected 3

 

             Sars-CoV-2, Kristie 2 Day Tat Performed                               

 

                    CBC With Differential 2021          [facility]

             White Blood Count 8.5 10       Normal       4.0-10.0      

 

             Red Blood Count 4.80 10      Normal       4.30-6.10     

 

             Hemoglobin   14.5 g/dL    Normal       13.5-17.5     

 

             Hematocrit   43.8 %       Normal       42.0-52.0     

 

             Mean Corpuscular Volume 91.3 fl      Normal       80.0-96.0     

 

             Mean Corpuscular Hemoglobin 30.2 pg      Normal       27.0-33.0    

 

 

             Mean Corpuscular HGB Conc 33.1 g/dL    Normal       32.0-36.5     

 

             Red Cell Distribution Width 14.0 %       Normal       11.5-14.5    

 

 

             Platelet Count, Automated 238 10       Normal       150-450       

 

             Neutrophils % 57.8 %       Normal       36.0-66.0     

 

             Lymph %      27.3 %       Normal       24.0-44.0     

 

             Mono %       11.2 %       High         2.0-8.0       

 

             Eos %        2.3 %        Normal       0.0-3.0       

 

             Baso %       1.2 %        High         0.0-1.0       

 

             Immature Granulocyte % 0.2 %        Normal       0-3.0         

 

             Nucleated Red Blood Cell % 0.0 %        Normal       0-0           

 

             Neutrophils # 4.9 10       Normal       1.5-8.5       

 

             Lymph #      2.3 10       Normal       1.5-5.0       

 

             Mono #       1.0 10       High         0.0-0.8       

 

             Eos #        0.2 10       Normal       0.0-0.5       

 

             Baso #       0.1 10       Normal       0.0-0.2       

 

                    Comprehensive Metabolic Profil 2021          [facility]

             Glucose, Fasting 112 mg/dL    High                 

 

             Blood Urea Nitrogen 31 mg/dL     High         7-18          

 

             Creatinine For GFR 2.36 mg/dL   High         0.70-1.30     

 

             Glomerular Filtration Rate 30.0         Low          >49          4

 

             Sodium Level 140 mEq/L    Normal       136-145       

 

             Potassium Serum 3.9 mEq/L    Normal       3.5-5.1       

 

             Chloride Level 109 mEq/L    High                 

 

             Carbon Dioxide Level 23 mEq/L     Normal       21-32         

 

             Anion Gap    8 mEq/L      Normal       8-16          

 

             Calcium Level 9.7 mg/dL    Normal       8.8-10.2      

 

             Ast/Sgot     15 U/L       Normal       7-37          

 

             Alt/SGPT     26 U/L       Normal       12-78         

 

             Alkaline Phosphatase 67 U/L       Normal               

 

             Bilirubin,Total 0.4 mg/dL    Normal       0.2-1.0       

 

             Total Protein 8.0 GM/DL    Normal       6.4-8.2       

 

             Albumin      4.3 GM/DL    Normal       3.2-5.2       

 

             Albumin/Globulin Ratio 1.2          Normal                     

 

                    Comprehensive Metabolic Panel 2021          [facility]

             Comprehensive Metabo (SEE NOTE)                             5, 6

 

             Sodium       139 mEq/L                 134 - 153     

 

             Potassium    4.4 mEq/L                 3.6 - 5.0     

 

             Chloride     105 mEq/L                 98 - 107      

 

             Co2          22 mEq/L                  22 - 30       

 

             Glucose      143 mg/dL    High         70 - 99       

 

             BUN          26 mg/dL     High         7 - 21        

 

             Creatinine   2.1 mg/dL    High         0.7 - 1.5     

 

             BUN/Creat    12                        8 - 27        

 

             Total Protein 6.9 g/dL                  6.3 - 8.2     

 

             Albumin      4.5 g/dL                  3.9 - 5.0     

 

             Globulin     2.4 GM/DL                 2.4 - 3.2     

 

             A/G Ratio    1.9                       0.8 - 2.0     

 

             Calcium      9.8 mg/dL                 8.4 - 10.2    

 

             Total Bili   <0.7 mg/dL                0.2 - 1.3     

 

             Alkaline Phos 53 U/L                    38 - 126      

 

             Sgot/Ast     16 U/L                    5 - 40        

 

             SGPT/Alt     12 U/L                    7 - 56        

 

             Anion Gap    12.0 mmol/L               8.0 - 16.0    

 

             Age          60 yrs                                  

 

             Non-Aa GFR   34 mL/min                               

 

             Afr Amer GFR 42 mL/min                              7

 

                    Lipid Panel         2021          [facility]

             Cve Panel    (SEE NOTE)                             8, 9

 

             Cholesterol  208 mg/dL    High         131 - 200     

 

             Triglycerides 328 mg/dL    High         35 - 160      

 

             HDL          42 mg/dL                  29 - 86       

 

             LDL          123 mg/dL                 65 - 175      

 

             Risk Factor  5.0          High         3.4 - 4.9     

 

             LDL/HDL      2.93                      1.00 - 3.55  10

 

                    Laboratory test finding 2021          [facility]

l

             TSH Highly Sensitive 0.57 uIU/mL               0.47 - 5.01   

 

             PSA - Diagnostic 1.05 ng/mL                0.00 - 4.00  11

 

                    Laboratory test finding 2021          [facility]

l

             Hgba1c       6.0 %                     4.4 - 6.1    12

 

                    CMP W/Egfr          2021          [facility]

             Comprehensive Metabo (SEE NOTE)                             13

 

             Sodium       138 mEq/L                 134 - 153     

 

             Potassium    4.5 mEq/L                 3.6 - 5.0     

 

             Chloride     104 mEq/L                 98 - 107      

 

             Co2          21 mEq/L     Low          22 - 30       

 

             Glucose      134 mg/dL    High         70 - 99       

 

             BUN          31 mg/dL     High         7 - 21        

 

             Creatinine   2.1 mg/dL    High         0.7 - 1.5     

 

             BUN/Creat    15                        8 - 27        

 

             Total Protein 7.7 g/dL                  6.3 - 8.2     

 

             Albumin      4.9 g/dL                  3.9 - 5.0     

 

             Globulin     2.8 GM/DL                 2.4 - 3.2     

 

             A/G Ratio    1.8                       0.8 - 2.0     

 

             Calcium      10.4 mg/dL   High         8.4 - 10.2    

 

             Total Bili   <0.7 mg/dL                0.2 - 1.3     

 

             Alkaline Phos 66 U/L                    38 - 126      

 

             Sgot/Ast     15 U/L                    5 - 40        

 

             SGPT/Alt     11 U/L                    7 - 56        

 

             Anion Gap    13.0 mmol/L               8.0 - 16.0    

 

             Age          60 yrs                                  

 

             Non-Aa GFR   34 mL/min                               

 

             Afr Amer GFR 42 mL/min                              14

 

                    CBC W/Auto Differential 2021          [facility]

l

             CBC W/Automated Diff (SEE NOTE)                             15

 

             WBC          8.2 10^3/uL               4.2 - 11.0    

 

             RBC          4.95 10^6/uL              4.50 - 6.30   

 

             Hemoglobin   15.0 g/dL                 14.0 - 16.0   

 

             Hematocrit   45.8 %                    41.0 - 51.0   

 

             MCV          92.5 fL                   80.0 - 94.0   

 

             MCH          30.3 pg                   27.0 - 34.0   

 

             MCHC         32.8 g/dL                 31.0 - 36.0   

 

             RDW          13.7 %                    11.5 - 14.8   

 

             Platelets    257 10^3/uL               150 - 450     

 

             MPV          10.8 fL      High         7.4 - 10.4    

 

             Neut         68.1 %                    37.0 - 80.0   

 

             Lymph        18.6 %       Low          25.0 - 40.0   

 

             Mono         8.3 %        High         3.0 - 8.0     

 

             Eos          2.8 %                     0.0 - 7.0     

 

             Baso         1.8 %                     0.0 - 2.0     

 

             %Ig          0.4 %        High         0.0 - 0.0     

 

             %NRBC        0.0 %                     0.0 - 0.0     

 

             #Neut        5.57 10^3/uL              2.00 - 6.90   

 

             #Lymph       1.52 10^3/uL              0.60 - 3.40   

 

             #Mono        0.68 10^3/uL              0.00 - 0.90   

 

             #Eos         0.23 10^3/uL              0.00 - 0.70   

 

             #Baso        0.15 10^3/uL              0.00 - 0.20   

 

             #Ig          0.03 10^3/uL              0.00 - 0.10   

 

             #NRBC        0.00 10^3/uL              0.00 - 0.00   

 

             Manual Diff  NOT INDICATED                             

 

             RBC Morph    NOT INDICATED                             

 

                    CBC W/Auto Differential 2021          [facility]

             CBC W/Automated Diff (SEE NOTE)                             16

 

             WBC          9.3 10^3/uL               4.2 - 11.0    

 

             RBC          5.69 10^6/uL              4.50 - 6.30   

 

             Hemoglobin   17.4 g/dL    High         14.0 - 16.0   

 

             Hematocrit   51.0 %                    41.0 - 51.0   

 

             MCV          89.6 fL                   80.0 - 94.0   

 

             MCH          30.6 pg                   27.0 - 34.0   

 

             MCHC         34.1 g/dL                 31.0 - 36.0   

 

             RDW          12.6 %                    11.5 - 14.8   

 

             Platelets    221 10^3/uL               150 - 450     

 

             MPV          9.7 fL                    7.4 - 10.4    

 

             Neut         64.4 %                    37.0 - 80.0   

 

             Lymph        23.2 %       Low          25.0 - 40.0   

 

             Mono         8.4 %        High         3.0 - 8.0     

 

             Eos          2.3 %                     0.0 - 7.0     

 

             Baso         1.4 %                     0.0 - 2.0     

 

             %Ig          0.3 %        High         0.0 - 0.0     

 

             %NRBC        0.0 %                     0.0 - 0.0     

 

             #Neut        6.01 10^3/uL              2.00 - 6.90   

 

             #Lymph       2.16 10^3/uL              0.60 - 3.40   

 

             #Mono        0.78 10^3/uL              0.00 - 0.90   

 

             #Eos         0.21 10^3/uL              0.00 - 0.70   

 

             #Baso        0.13 10^3/uL              0.00 - 0.20   

 

             #Ig          0.03 10^3/uL              0.00 - 0.10   

 

             #NRBC        0.00 10^3/uL              0.00 - 0.00   

 

             Manual Diff  NOT INDICATED                             

 

             RBC Morph    NOT INDICATED                             

 

                    CMP W/Egfr          2021          [facility]

             Comprehensive Metabo (SEE NOTE)                             17

 

             Sodium       137 mEq/L                 134 - 153     

 

             Potassium    3.9 mEq/L                 3.6 - 5.0     

 

             Chloride     99 mEq/L                  98 - 107      

 

             Co2          25 mEq/L                  22 - 30       

 

             Glucose      166 mg/dL    High         70 - 99       

 

             BUN          19 mg/dL                  7 - 21        

 

             Creatinine   1.0 mg/dL                 0.7 - 1.5     

 

             BUN/Creat    19                        8 - 27        

 

             Total Protein 7.8 g/dL                  6.3 - 8.2     

 

             Albumin      4.8 g/dL                  3.9 - 5.0     

 

             Globulin     3.0 GM/DL                 2.4 - 3.2     

 

             A/G Ratio    1.6                       0.8 - 2.0     

 

             Calcium      10.2 mg/dL                8.4 - 10.2    

 

             Total Bili   0.7 mg/dL                 0.2 - 1.3     

 

             Alkaline Phos 84 U/L                    38 - 126      

 

             Sgot/Ast     29 U/L                    5 - 40        

 

             SGPT/Alt     29 U/L                    7 - 56        

 

             Anion Gap    13.0 mmol/L               8.0 - 16.0    

 

             Age          60 yrs                                  

 

             Non-Aa GFR   >60 mL/min                              

 

             Afr Amer GFR >60 mL/min                             18

 

                    Laboratory test finding 2021          [facility]

             Hgba1c       7.1 %        High         4.4 - 6.1    19

 

                    Lipid Panel         2021          [facility]

             Cve Panel    (SEE NOTE)                             20

 

             Cholesterol  182 mg/dL                 131 - 200     

 

             Triglycerides 304 mg/dL    High         35 - 160      

 

             HDL          44 mg/dL                  29 - 86       

 

             LDL          105 mg/dL                 65 - 175      

 

             Risk Factor  4.1                       3.4 - 4.9     

 

             LDL/HDL      2.39                      1.00 - 3.55  21

 

                    Laboratory test finding 2021          [facility]

             TSH Highly Sensitive 0.69 uIU/mL               0.47 - 5.01   

 

                    Laboratory test finding 2021          [facility]

             PSA - Diagnostic 0.71 ng/mL                0.00 - 4.00  22







                          1                         {      PROCEDURAL CONTROL   

VALID                                            )

{      KIT LOT #             [id number]                                     )

{      KIT EXP DATE          22                                     )



 

                          2                         This nucleic acid amplificat

ion test was developed and its performance

characteristics determined by [facility]. Nucleic acid

amplification tests include RT-PCR and TMA. This test has not been

FDA cleared or approved. This test has been authorized by FDA under

an Emergency Use Authorization (EUA). This test is only authorized

for the duration of time the declaration that circumstances exist

justifying the authorization of the emergency use of in vitro

diagnostic tests for detection of SARS-CoV-2 virus and/or diagnosis

of COVID-19 infection under section 564(b)(1) of the Act, 21 U.S.C.

                                        360bbb-3(b) (1), unless the authorizatio

n is terminated or revoked

sooner.

When diagnostic testing is negative, the possibility of a false

negative result should be considered in the context of a patient's

recent exposures and the presence of clinical signs and symptoms

consistent with COVID-19. An individual without symptoms of COVID-19

and who is not shedding SARS-CoV-2 virus would expect to have a

negative (not detected) result in this assay.



 

                          3                         This nucleic acid amplificat

ion test was developed and its performance

characteristics determined by [facility]. Nucleic acid

amplification tests include RT-PCR and TMA. This test has not been

FDA cleared or approved. This test has been authorized by FDA under

an Emergency Use Authorization (EUA). This test is only authorized

for the duration of time the declaration that circumstances exist

justifying the authorization of the emergency use of in vitro

diagnostic tests for detection of SARS-CoV-2 virus and/or diagnosis

of COVID-19 infection under section 564(b)(1) of the Act, 21 U.S.C.

                                        360bbb-3(b) (1), unless the authorizatio

n is terminated or revoked

sooner.

When diagnostic testing is negative, the possibility of a false

negative result should be considered in the context of a patient's

recent exposures and the presence of clinical signs and symptoms

consistent with COVID-19. An individual without symptoms of COVID-19

and who is not shedding SARS-CoV-2 virus would expect to have a

negative (not detected) result in this assay.



 

                          4                         Units are mL/min/1.73 m2



Chronic Kidney Disease Staging per [facility]:



Stage I & II   GFR >=60       Normal to Mildly Decreased

Stage III      GFR 30-59      Moderately Decreased

Stage IV       GFR 15-29      Severely Decreased

Stage V        GFR <15        Very Little GFR Left

ESRD           GFR <15 on RRT



 

                          5                         Is patient fasting?  N

 

                          6                         COMPREHENSIVE METABOLIC PANE

L



 

                          7                         Male GFR Interprentation

                                        20-49 yrs     >60 mL/min   Normal

                                        50-59 yrs     >56 mL/min   Normal

                                        60-69 yrs     >49 mL/min   Normal

                                        70-79yrs      >42 mL/min   Normal

                                        80 and above  >35 mL/min   Normal

Female GFR  Interpretation

                                        20-39 yrs     >60 mL/min   Normal

                                        40-49 yrs     >58 mL/min   Normal

                                        50-59 yrs     >51 mL/min   Normal

                                        60-69 yrs     >45 mL/min   Normal

                                        70-79 yrs     >39 mL/min   Normal

                                        80 and above  >32 mL/min   Normal



 

                          8                         Is patient fasting?  Y

 

                          9                         LIPID PANEL



 

                          10                        CVE

RISK           CHOL/HDL       LDL/HDL

MEN:

                                        1/2  AVERAGE          3.43          1.00

AVERAGE          4.97          3.55

                                        2X   AVERAGE          9.55          6.25

                                        3X   AVERAGE         23.99          7.99

WOMEN:

                                        1/2  AVERAGE          3.27          1.47

AVERAGE          4.44          3.22

                                        2X   AVERAGE          7.05          5.03

                                        3X   AVERAGE         11.04          6.14



 

                          11                        \BLDo\PSA INTERPRETATION\BLD

x\

The PSA assay should not be used alone for a screening test

or diagnosis for presence or absence of malignant disease.

Predictions of disease recurrence should not be based solely

on values obtained from serial patient serum values.

The PSA result was determined by "[employer]", on the Roche

SUNITHA 6000. Values obtained with different assay methods

or kits cannot be used interchangeably.



 

                          12                        {A1]

{HB]



 

                          13                        COMPREHENSIVE METABOLIC PANE

L



 

                          14                        Male GFR Interprentation

                                        20-49 yrs     >60 mL/min   Normal

                                        50-59 yrs     >56 mL/min   Normal

                                        60-69 yrs     >49 mL/min   Normal

                                        70-79yrs      >42 mL/min   Normal

                                        80 and above  >35 mL/min   Normal

Female GFR  Interpretation

                                        20-39 yrs     >60 mL/min   Normal

                                        40-49 yrs     >58 mL/min   Normal

                                        50-59 yrs     >51 mL/min   Normal

                                        60-69 yrs     >45 mL/min   Normal

                                        70-79 yrs     >39 mL/min   Normal

                                        80 and above  >32 mL/min   Normal



 

                          15                        COMPLETE BLOOD COUNT



 

                          16                        COMPLETE BLOOD COUNT



 

                          17                        COMPREHENSIVE METABOLIC PANE

L



 

                          18                        Male GFR Interprentation

                                        20-49 yrs     >60 mL/min   Normal

                                        50-59 yrs     >56 mL/min   Normal

                                        60-69 yrs     >49 mL/min   Normal

                                        70-79yrs      >42 mL/min   Normal

                                        80 and above  >35 mL/min   Normal

Female GFR  Interpretation

                                        20-39 yrs     >60 mL/min   Normal

                                        40-49 yrs     >58 mL/min   Normal

                                        50-59 yrs     >51 mL/min   Normal

                                        60-69 yrs     >45 mL/min   Normal

                                        70-79 yrs     >39 mL/min   Normal

                                        80 and above  >32 mL/min   Normal



 

                          19                        {A1]

{HB]



 

                          20                        LIPID PANEL



 

                          21                        CVE

RISK           CHOL/HDL       LDL/HDL

MEN:

                                        1/2  AVERAGE          3.43          1.00

AVERAGE          4.97          3.55

                                        2X   AVERAGE          9.55          6.25

                                        3X   AVERAGE         23.99          7.99

WOMEN:

                                        1/2  AVERAGE          3.27          1.47

AVERAGE          4.44          3.22

                                        2X   AVERAGE          7.05          5.03

                                        3X   AVERAGE         11.04          6.14



 

                          22                        \BLDo\PSA INTERPRETATION\BLD

x\

The PSA assay should not be used alone for a screening test

or diagnosis for presence or absence of malignant disease.

Predictions of disease recurrence should not be based solely

on values obtained from serial patient serum values.

The PSA result was determined by "[employer]", on the Roche

SUNITHA 6000. Values obtained with different assay methods

or kits cannot be used interchangeably.









Procedures





                Date            Code            Description     Status

 

                10/25/2021      88095           Office/Outpatient Established Mo

d MDM 30-39 Min Completed

 

                2021      91715           Office/Outpatient Established SF

 MDM 10-19 Min Completed

 

                2021      47585           Office/Outpatient Established Mo

d MDM 30-39 Min Completed

 

                2021      45214           Office/Outpatient Established Mo

d MDM 30-39 Min Completed

 

                2021      12960           Office/Outpatient Established Mo

d MDM 30-39 Min Completed

 

                2021      24292           Electrocardiogram Complete Compl

eted







[employer]





                                        Description

 

                                        No Information Available







Encounters





                                        Description

 

                                        No Information Available







Assessments





                Date            Code            Description     Provider

 

                10/25/2021      R05.9           Cough, unspecified Crystal Nevills

, [facility]

 

                10/25/2021      Z11.52          Encounter for screening for Covi

d-19 Crystal Nevills, [facility]

 

                10/25/2021      I10             Essential (primary) hypertension

 Crystal Nevills, [facility]

 

                2021      E11.65          Type 2 diabetes mellitus with hy

perglycemia [facility]

 

                2021      E78.00          Pure hypercholesterolemia, unspe

cified [facility]

 

                2021      E11.65          Type 2 diabetes mellitus with hy

perglycemia Crystal Nevills, [facility]

 

                2021      E78.00          Pure hypercholesterolemia, unspe

cified Crystal Nevills, [facility]

 

                2021      I10             Essential (primary) hypertension

 Crystal Nevills, [facility]

 

                2021      K21.9           Gastro-esophageal reflux disease

 without esophagitis Crystal 

Nevills, [facility]

 

                2021      R91.1           Solitary pulmonary nodule Crystal 

Nevills, [facility]

 

                2021      G47.33          Obstructive sleep apnea (adult) 

(pediatric) Crystal Nevills, [facility]

 

                2021      C64.1           Malignant neoplasm of right kidn

ey, except renal pelvis Crystal 

Nevills, [facility]

 

                2021      Z79.899         Other long term (current) drug t

herapy Crystal Nevills, FNP

 

                2021      E11.65          Type 2 diabetes mellitus with hy

perglycemia Crystal Nevills, FNP

 

                2021      E78.00          Pure hypercholesterolemia, unspe

cified Crystal Nevills, FNP

 

                2021      Z12.5           Encounter for screening for jennifer

gnant neoplasm of prostate 

Crystal Nevills, FNP

 

                2021      I10             Essential (primary) hypertension

 Crystal Nevills, FNP

 

                2021      K21.9           Gastro-esophageal reflux disease

 without esophagitis Crystal 

Nevills, P

 

                2021      R91.1           Solitary pulmonary nodule Crystal 

Nevills, [facility]

 

                2021      G47.33          Obstructive sleep apnea (adult) 

(pediatric) Crystal Nevills, P

 

                2021      C64.1           Malignant neoplasm of right kidn

ey, except renal pelvis Crystal 

Nevills, [facility]

 

                2021      Z79.899         Other long term (current) drug t

herapy Crystal Nevills, P

 

                2021      E11.65          Type 2 diabetes mellitus with hy

perglycemia Crystal Nevills, P

 

                2021      E78.00          Pure hypercholesterolemia, unspe

cified Crystal Nevills, P

 

                2021      I10             Essential (primary) hypertension

 Crystal Nevills, P

 

                2021      N52.1           Erectile dysfunction due to dise

ases classified elsewhere Crystal

Nevills, P

 

                2021      R07.89          Other chest pain Crystal Nevills, 

P

 

                2021      K21.9           Gastro-esophageal reflux disease

 without esophagitis Crystal 

Nevills, [facility]

 

                2021      Z79.899         Other long term (current) drug t

herapy Crystal Nevills, P

 

                2021      E11.65          Type 2 diabetes mellitus with hy

perglycemia Crystal Nevills, P

 

                2021      E78.00          Pure hypercholesterolemia, unspe

cified Crystal Nevills, P

 

                2021      Z79.899         Other long term (current) drug t

herapy Crystal Nevills, [facility]

 

                2021      Z12.5           Encounter for screening for jennifer

gnant neoplasm of prostate 

Crystal Carrington, [facility]

 

                2021      Z68.41          Body mass index [BMI]40.0-44.9, 

adult Crystal Carrington, [facility]

 

                2021      N52.1           Erectile dysfunction due to dise

ases classified elsewhere Crystal Carrington, [facility]

 

                2021      I10             Essential (primary) hypertension

 Crystal Carrington, [facility]

 

                2021      R07.89          Other chest pain SHAVONNE Watson







Plan of Treatment

Future Appointment(s):* 2021  8:20 am - SHAVONNE Watson at [facility]

* 2021  8:00 am - [facility] at [facility]

10/25/2021 - SHAVONNE Watson* R05.9 Cough, unspecified* Comments:* He had the
   labs done to test for RSV.Continue with Tesalon Perles as needed for 
  cough.Further plans to follow the lab results.We will continue to monitor.



* Follow up:* Patient to be contacted as soon as results are back. IF CONDITIONS
   WORSEN  AS NEEDED





* Z11.52 Encounter for screening for Covid-19* Comments:* He had the swabs done 
  to test for COVID.Further plans to follow the lab results.We will continue to 
  monitor.





* I10 Essential (primary) hypertension* Comments:* His BP was noted at 
  150/82.The patient was advised to continue with the current line of therapies.
   He will benefit from maintaining a low-sodium diet.  We will continue to 
  monitor.









Functional Status





                                        Description

 

                                        No Information Available







Mental Status





                                        Description

 

                                        No Information Available







Referrals





                Refer to      Reason for Referral Status          Appt Date

 

                          Ishmael Puckett        Patient requesting referral 

for pain management of chronic 

neck and back pain.       Sent                      

 

                                        [address] [place] [address] [phone]

 

                                          

 

                          Ingrid Velázquez,   Patient s/p recent right nep

hectomy.  Post op creatinine

 elevated at 2.1 on two separate occasions.  Please evaluate.  Thank you!  Sent 

                     



 

                                        [address]

 

                                        [place] [address]

 

                                        [phone]

 

                                          

 

                          [facility] Please see this pleasant

 61 y/o male s/p Right 

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  Closed               

     2021

 

                                        [address] [place] [address]

 

                                        [phone]

 

                                          

 

                          [facility] chest 2021 jeancarlos

ws evidence for bilateral 

pleural plaquing consistent ith previous asbestos exposure. There is also a 
nodular density superior segment LLL which warrants follow up study.  Please 
evaluate.  Thank you.     Closed                    2021

 

                                        [address]

 

                                        [place] [address]

 

                                        [phone]

 

                                          

 

                                                    Please evaluate finding of m

asslike lesion in the lower pole of the right 

kidney and systic lesions in the left kidney per imaging.  MRI of the Abdomen 
and Pelvis with and without contrast states most likely a large renal cell 
carcinoma.PT will hand deliver discs at appt. PLEASE EVALUATE AND TREAT AS SOON 
AS POSSIBLE. THANK YOU.   Closed                    2021

 

                                          

 

                Caleb Powers MD Chest pain.  HTN.  Current EKG showing PVC's

.  Closed          

06/15/2021

 

                                        [address] [place] [address]

 

                                        [phone]

## 2021-11-09 NOTE — CCD
Continuity of Care Document (CCD)

                             Created on: 2021



Carlos Bryant

External Reference #: [id number]

: 1960

Sex: Male



Demographics





                          Address                   [address] [place] [address]  [address]

 

                          Home Phone                [phone]

 

                          Preferred Language        Unknown

 

                          Marital Status            Never 

 

                          Zoroastrianism Affiliation     Unknown

 

                          Race                      White

 

                          Ethnic Group              Not  or 





Author





                          [facility]                    [facility]

brian BARBOZA

 

                          Organization              Unknown

 

                          Address                   [address] [place] [address] [address]

[place]  [address]



 

                          Phone                     [phone]







Care Team Providers





                    Care Team Member Name Role                Phone

 

                    [facility]                [phone]

 

                    Moris Desai   AUTM                [phone]

 

                    Crystal Carrington  AUTM                [phone]







Problems





                    Active Problems     Provider            Date

 

                    Type II diabetes mellitus uncontrolled BELLA Severino P

NP Onset: 2020

 

                    Essential hypertension BELLA Severino PNP Onset: 2020

 

                    Pure hypercholesterolemia BELLA Severino, PNP Onset: 

 

                    Gastroesophageal reflux disease BELLA Severino PNP Onse

t: 2020

 

                    Secondary erectile dysfunction BELLA Severino PNP Onset

: 2020

 

                    Taking medication   BELLA Severino, PNP Onset: 

0

 

                    Right side sciatica BELLA Severino, PNP Onset: 

0

 

                    Secondary erectile dysfunction BELLA Severino, PNP Onset

: 2021







Social History





                Type            Date            Description     Comments

 

                Birth Sex                       Unknown          

 

                Tobacco Use     Start: Unknown End:  Quit             

 

                Tobacco Use     Start: Unknown  Never Smoked Cigars  

 

                Tobacco Use     Start: Unknown  Never Smoked A Pipe  

 

                Tobacco Use     Start: Unknown  Never Used Smokeless Tobacco  

 

                ETOH Use                        Denies alcohol use  

 

                Recreational Drug Use                 Denies Drug Use  

 

                Tobacco Use     Start: Unknown End: Unknown Patient is a former 

smoker  







Allergies and adverse reactions





             Active Allergies Criticality  Reaction | Severity Comments     Date

 

             Iodinated Diagnostic Agents Unable to assess criticality           

                2019







Medications





           Active Medications SIG        Qnty       Indications Ordering Provide

r Date

 

                          Zithromax Z-Edson                     250mg Tablets     

              take as 

directed. complete all of antibiotic as directed. 1tabs                         

  Darian I. Gomez, MD 

2021

 

                          Tessalon Perles                     100mg Capsules    

               take 1 cap 

by mouth every 8 hours as needed for cough 30caps                          Lucien Gomez MD 

2021

 

                          Glipizide ER                     10mg Tablets ER 24HR 

                  Take 1 

Tablet By Mouth Once A Day 30tabs                          Olga Lidia tuttle M.D. 2021

 

                          Fenofibrate                     145mg Tablets         

          1 tablet by 

mouth every day for triglycerides 90tabs                          Darian barboza MD 2021

 

                          Trazodone HCL                     50mg Tablets        

           take 1 tab by 

mouth at bedtime. 30tabs                          Darian Gomez MD 2021

 

                                        Blood Pressure Kit/Oscillating/Digital  

                    Kit                 

                Use as directed to monitor blood pressure twice a day 1units    

                      Darian Gomez MD                                      2021

 

                          Lisinopril                     10mg Tablets           

        take two tablets 

by mouth in the morning and 1 tab at bed time 90tabs                          Moi Gomez MD 

2021

 

                          Jardiance                     25mg Tablets            

       take one by mouth 

once a day      90tabs                          BLELA Severino, PNP 20

20

 

                          Carisoprodol                     350mg Tablets        

           1 by mouth at 

bedtime as needed                                 Unknown         

 

                          Blood Glucose Test                      Strips        

           please provide 

glucose test strips, covered by pt insurance for twice daily bs test dx: e11.65 

                    E11.65              Unknown             

 

                                        Blood Glucose Monitoring System         

            W/Device Kit                

             for three times a day blood sugar testing dx: e11.65              E

11.65       Unknown      



 

                          Mucinex                     600mg Tablets ER 12HR     

              1 by mouth 

twice a day as needed                                 Unknown         

 

                          Omeprazole                     20mg Capsules DR       

            1 by mouth 

every day  Written by Dr Malik                                 Unknown         



 

                          Topamax                     50mg Tablets              

     1 by mouth twice a 

day for headaches 180tabs                         BELLA Severino, PNP 

 

                          Novolog                     100Unit/ML Solution       

            10-15 units at

bedtime per sliding scale                                 Unknown         

 

                    Simple Diagnostics Lancing Device                      Misc 

                                      

                                        Unknown             

 

                          Hydrochlorothiazide                     25mg Tablets  

                 take one 

tablet by mouth every day 90tabs                          BELLA Severino, PN

P 

 

                          Lovastatin                     40mg Tablets           

        take one tablet by

mouth every evening with meal 90tabs                          BELLA Severino

, PNP 

 

                          Metformin HCL                     1000mg Tablets      

             take one 

tablet by mouth twice a day with food 180tabs                         BELLA Severino, PNP 



 

                          Meloxicam                     15mg Tablets            

       take one tablet by 

mouth every day 90tabs                          BELLA Severino, PNP 

00

 

                          Sildenafil Citrate                     20mg Tablets   

                Take One 

Tablet By Mouth as Directed                                 Unknown         

 

                                        Pharmacist Choice Ultra Thin Lancets 31G

                     31G Misc           

                                                    Unknown      

 

                          Hydrocodone-Acetaminophen                     5-325mg 

Tablets                   

Take One Tablet By Mouth Every 4 Hours Maximum Daily Dose   6 Tablets Written by
Dr Malik                                       Unknown         







Immunizations





                                        Description

 

                                        No Information Available







Vital Signs





                Date            Vital           Result          Comment

 

                10/25/2021  1:29pm BP Systolic     150 mmHg         

 

                    BP Diastolic        82 mmHg              

 

                    Heart Rate          76 /min              

 

                    Body Temperature    98.7 F             

 

                    Respiratory Rate    16 /min              

 

                    O2 % BldC Oximetry  96 %                 

 

                    Weight              266.00 lb            

 

                    Weight              120.658 kg           

 

                    Height              70 inches           5'10"

 

                    BMI (Body Mass Index) 38.2 kg/m2           

 

                    BSA (Body Surface Area) 2.36 m2              

 

                2021  8:07am BP Systolic     154 mmHg         

 

                    BP Diastolic        92 mmHg              

 

                    Heart Rate          70 /min              

 

                    Body Temperature    98.1 F             

 

                    Respiratory Rate    16 /min              

 

                    O2 % BldC Oximetry  97 %                 

 

                    Weight              266.50 lb            

 

                    Weight              120.884 kg           

 

                    Height              70 inches           5'10"

 

                    BMI (Body Mass Index) 38.2 kg/m2           

 

                    BSA (Body Surface Area) 2.36 m2              







Results





        Test    Acquired Date Facility Test    Result  H/L     Range   Note

 

                    Laboratory test finding 2021          [facility]

             Hgba1c       <pending>                               

 

                    Laboratory test finding 2021          [facility]

             TSH Highly Sensitive <pending>                               

 

                    RSV Dna Probe       10/25/2021          [facility]

             RSV Antigen  POSITIVE     Abnormal     Normal: Negative  

 

             RSV Antigen Reenter POSITIVE     Abnormal     Normal: Negative 1

 

                    Laboratory test finding 10/25/2021          [facility]

             Coronavirus Covid-19 Not Detected               Not Detected 2

 

                    Covid-19            2021          [facility]

             Sars-CoV-2, Kristie Not Detected               Not Detected 3

 

             Sars-CoV-2, Kristie 2 Day Tat Performed                               

 

                    CBC With Differential 2021          [facility]

             White Blood Count 8.5 10       Normal       4.0-10.0      

 

             Red Blood Count 4.80 10      Normal       4.30-6.10     

 

             Hemoglobin   14.5 g/dL    Normal       13.5-17.5     

 

             Hematocrit   43.8 %       Normal       42.0-52.0     

 

             Mean Corpuscular Volume 91.3 fl      Normal       80.0-96.0     

 

             Mean Corpuscular Hemoglobin 30.2 pg      Normal       27.0-33.0    

 

 

             Mean Corpuscular HGB Conc 33.1 g/dL    Normal       32.0-36.5     

 

             Red Cell Distribution Width 14.0 %       Normal       11.5-14.5    

 

 

             Platelet Count, Automated 238 10       Normal       150-450       

 

             Neutrophils % 57.8 %       Normal       36.0-66.0     

 

             Lymph %      27.3 %       Normal       24.0-44.0     

 

             Mono %       11.2 %       High         2.0-8.0       

 

             Eos %        2.3 %        Normal       0.0-3.0       

 

             Baso %       1.2 %        High         0.0-1.0       

 

             Immature Granulocyte % 0.2 %        Normal       0-3.0         

 

             Nucleated Red Blood Cell % 0.0 %        Normal       0-0           

 

             Neutrophils # 4.9 10       Normal       1.5-8.5       

 

             Lymph #      2.3 10       Normal       1.5-5.0       

 

             Mono #       1.0 10       High         0.0-0.8       

 

             Eos #        0.2 10       Normal       0.0-0.5       

 

             Baso #       0.1 10       Normal       0.0-0.2       

 

                    Comprehensive Metabolic Profil 2021          [facility]

             Glucose, Fasting 112 mg/dL    High                 

 

             Blood Urea Nitrogen 31 mg/dL     High         7-18          

 

             Creatinine For GFR 2.36 mg/dL   High         0.70-1.30     

 

             Glomerular Filtration Rate 30.0         Low          >49          4

 

             Sodium Level 140 mEq/L    Normal       136-145       

 

             Potassium Serum 3.9 mEq/L    Normal       3.5-5.1       

 

             Chloride Level 109 mEq/L    High                 

 

             Carbon Dioxide Level 23 mEq/L     Normal       21-32         

 

             Anion Gap    8 mEq/L      Normal       8-16          

 

             Calcium Level 9.7 mg/dL    Normal       8.8-10.2      

 

             Ast/Sgot     15 U/L       Normal       7-37          

 

             Alt/SGPT     26 U/L       Normal       12-78         

 

             Alkaline Phosphatase 67 U/L       Normal               

 

             Bilirubin,Total 0.4 mg/dL    Normal       0.2-1.0       

 

             Total Protein 8.0 GM/DL    Normal       6.4-8.2       

 

             Albumin      4.3 GM/DL    Normal       3.2-5.2       

 

             Albumin/Globulin Ratio 1.2          Normal                     

 

                    Comprehensive Metabolic Panel 2021          [facility]

             Comprehensive Metabo (SEE NOTE)                             5, 6

 

             Sodium       139 mEq/L                 134 - 153     

 

             Potassium    4.4 mEq/L                 3.6 - 5.0     

 

             Chloride     105 mEq/L                 98 - 107      

 

             Co2          22 mEq/L                  22 - 30       

 

             Glucose      143 mg/dL    High         70 - 99       

 

             BUN          26 mg/dL     High         7 - 21        

 

             Creatinine   2.1 mg/dL    High         0.7 - 1.5     

 

             BUN/Creat    12                        8 - 27        

 

             Total Protein 6.9 g/dL                  6.3 - 8.2     

 

             Albumin      4.5 g/dL                  3.9 - 5.0     

 

             Globulin     2.4 GM/DL                 2.4 - 3.2     

 

             A/G Ratio    1.9                       0.8 - 2.0     

 

             Calcium      9.8 mg/dL                 8.4 - 10.2    

 

             Total Bili   <0.7 mg/dL                0.2 - 1.3     

 

             Alkaline Phos 53 U/L                    38 - 126      

 

             Sgot/Ast     16 U/L                    5 - 40        

 

             SGPT/Alt     12 U/L                    7 - 56        

 

             Anion Gap    12.0 mmol/L               8.0 - 16.0    

 

             Age          60 yrs                                  

 

             Non-Aa GFR   34 mL/min                               

 

             Afr Amer GFR 42 mL/min                              7

 

                    CBC W/Auto Differential 2021          [facility]

l

             CBC W/Automated Diff (SEE NOTE)                             8, 9

 

             WBC          8.2 10^3/uL               4.2 - 11.0    

 

             RBC          4.95 10^6/uL              4.50 - 6.30   

 

             Hemoglobin   15.0 g/dL                 14.0 - 16.0   

 

             Hematocrit   45.8 %                    41.0 - 51.0   

 

             MCV          92.5 fL                   80.0 - 94.0   

 

             MCH          30.3 pg                   27.0 - 34.0   

 

             MCHC         32.8 g/dL                 31.0 - 36.0   

 

             RDW          13.7 %                    11.5 - 14.8   

 

             Platelets    257 10^3/uL               150 - 450     

 

             MPV          10.8 fL      High         7.4 - 10.4    

 

             Neut         68.1 %                    37.0 - 80.0   

 

             Lymph        18.6 %       Low          25.0 - 40.0   

 

             Mono         8.3 %        High         3.0 - 8.0     

 

             Eos          2.8 %                     0.0 - 7.0     

 

             Baso         1.8 %                     0.0 - 2.0     

 

             %Ig          0.4 %        High         0.0 - 0.0     

 

             %NRBC        0.0 %                     0.0 - 0.0     

 

             #Neut        5.57 10^3/uL              2.00 - 6.90   

 

             #Lymph       1.52 10^3/uL              0.60 - 3.40   

 

             #Mono        0.68 10^3/uL              0.00 - 0.90   

 

             #Eos         0.23 10^3/uL              0.00 - 0.70   

 

             #Baso        0.15 10^3/uL              0.00 - 0.20   

 

             #Ig          0.03 10^3/uL              0.00 - 0.10   

 

             #NRBC        0.00 10^3/uL              0.00 - 0.00   

 

             Manual Diff  NOT INDICATED                             

 

             RBC Morph    NOT INDICATED                             

 

                    CMP W/Egfr          2021          [facility]

             Comprehensive Metabo (SEE NOTE)                             10

 

             Sodium       138 mEq/L                 134 - 153     

 

             Potassium    4.5 mEq/L                 3.6 - 5.0     

 

             Chloride     104 mEq/L                 98 - 107      

 

             Co2          21 mEq/L     Low          22 - 30       

 

             Glucose      134 mg/dL    High         70 - 99       

 

             BUN          31 mg/dL     High         7 - 21        

 

             Creatinine   2.1 mg/dL    High         0.7 - 1.5     

 

             BUN/Creat    15                        8 - 27        

 

             Total Protein 7.7 g/dL                  6.3 - 8.2     

 

             Albumin      4.9 g/dL                  3.9 - 5.0     

 

             Globulin     2.8 GM/DL                 2.4 - 3.2     

 

             A/G Ratio    1.8                       0.8 - 2.0     

 

             Calcium      10.4 mg/dL   High         8.4 - 10.2    

 

             Total Bili   <0.7 mg/dL                0.2 - 1.3     

 

             Alkaline Phos 66 U/L                    38 - 126      

 

             Sgot/Ast     15 U/L                    5 - 40        

 

             SGPT/Alt     11 U/L                    7 - 56        

 

             Anion Gap    13.0 mmol/L               8.0 - 16.0    

 

             Age          60 yrs                                  

 

             Non-Aa GFR   34 mL/min                               

 

             Afr Amer GFR 42 mL/min                              11

 

                    Laboratory test finding 2021          [facility]

l

             Hgba1c       6.0 %                     4.4 - 6.1    12

 

                    Lipid Panel         2021          [facility]

             Cve Panel    (SEE NOTE)                             13

 

             Cholesterol  208 mg/dL    High         131 - 200     

 

             Triglycerides 328 mg/dL    High         35 - 160      

 

             HDL          42 mg/dL                  29 - 86       

 

             LDL          123 mg/dL                 65 - 175      

 

             Risk Factor  5.0          High         3.4 - 4.9     

 

             LDL/HDL      2.93                      1.00 - 3.55  14

 

                    Laboratory test finding 2021          [facility]

l

             TSH Highly Sensitive 0.57 uIU/mL               0.47 - 5.01   

 

             PSA - Diagnostic 1.05 ng/mL                0.00 - 4.00  15







                          1                         {      PROCEDURAL CONTROL   

VALID                                            )

{      KIT LOT #             [id number]                                     )

{      KIT EXP DATE          22                                     )



 

                          2                         This nucleic acid amplificat

ion test was developed and its performance

characteristics determined by [employer]. Nucleic acid

amplification tests include RT-PCR and TMA. This test has not been

FDA cleared or approved. This test has been authorized by FDA under

an Emergency Use Authorization (EUA). This test is only authorized

for the duration of time the declaration that circumstances exist

justifying the authorization of the emergency use of in vitro

diagnostic tests for detection of SARS-CoV-2 virus and/or diagnosis

of COVID-19 infection under section 564(b)(1) of the Act, 21 U.S.C.

                                        360bbb-3(b) (1), unless the authorizatio

n is terminated or revoked

sooner.

When diagnostic testing is negative, the possibility of a false

negative result should be considered in the context of a patient's

recent exposures and the presence of clinical signs and symptoms

consistent with COVID-19. An individual without symptoms of COVID-19

and who is not shedding SARS-CoV-2 virus would expect to have a

negative (not detected) result in this assay.



 

                          3                         This nucleic acid amplificat

ion test was developed and its performance

characteristics determined by [employer]. Nucleic acid

amplification tests include RT-PCR and TMA. This test has not been

FDA cleared or approved. This test has been authorized by FDA under

an Emergency Use Authorization (EUA). This test is only authorized

for the duration of time the declaration that circumstances exist

justifying the authorization of the emergency use of in vitro

diagnostic tests for detection of SARS-CoV-2 virus and/or diagnosis

of COVID-19 infection under section 564(b)(1) of the Act, 21 U.S.C.

                                        360bbb-3(b) (1), unless the authorizatio

n is terminated or revoked

sooner.

When diagnostic testing is negative, the possibility of a false

negative result should be considered in the context of a patient's

recent exposures and the presence of clinical signs and symptoms

consistent with COVID-19. An individual without symptoms of COVID-19

and who is not shedding SARS-CoV-2 virus would expect to have a

negative (not detected) result in this assay.



 

                          4                         Units are mL/min/1.73 m2



Chronic Kidney Disease Staging per [facility]:



Stage I & II   GFR >=60       Normal to Mildly Decreased

Stage III      GFR 30-59      Moderately Decreased

Stage IV       GFR 15-29      Severely Decreased

Stage V        GFR <15        Very Little GFR Left

ESRD           GFR <15 on RRT



 

                          5                         Is patient fasting?  N

 

                          6                         COMPREHENSIVE METABOLIC PANE

L



 

                          7                         Male GFR Interprentation

                                        20-49 yrs     >60 mL/min   Normal

                                        50-59 yrs     >56 mL/min   Normal

                                        60-69 yrs     >49 mL/min   Normal

                                        70-79yrs      >42 mL/min   Normal

                                        80 and above  >35 mL/min   Normal

Female GFR  Interpretation

                                        20-39 yrs     >60 mL/min   Normal

                                        40-49 yrs     >58 mL/min   Normal

                                        50-59 yrs     >51 mL/min   Normal

                                        60-69 yrs     >45 mL/min   Normal

                                        70-79 yrs     >39 mL/min   Normal

                                        80 and above  >32 mL/min   Normal



 

                          8                         Is patient fasting?  Y

 

                          9                         COMPLETE BLOOD COUNT



 

                          10                        COMPREHENSIVE METABOLIC PANE

L



 

                          11                        Male GFR Interprentation

                                        20-49 yrs     >60 mL/min   Normal

                                        50-59 yrs     >56 mL/min   Normal

                                        60-69 yrs     >49 mL/min   Normal

                                        70-79yrs      >42 mL/min   Normal

                                        80 and above  >35 mL/min   Normal

Female GFR  Interpretation

                                        20-39 yrs     >60 mL/min   Normal

                                        40-49 yrs     >58 mL/min   Normal

                                        50-59 yrs     >51 mL/min   Normal

                                        60-69 yrs     >45 mL/min   Normal

                                        70-79 yrs     >39 mL/min   Normal

                                        80 and above  >32 mL/min   Normal



 

                          12                        {A1]

{HB]



 

                          13                        LIPID PANEL



 

                          14                        CVE

RISK           CHOL/HDL       LDL/HDL

MEN:

                                        1/2  AVERAGE          3.43          1.00

AVERAGE          4.97          3.55

                                        2X   AVERAGE          9.55          6.25

                                        3X   AVERAGE         23.99          7.99

WOMEN:

                                        1/2  AVERAGE          3.27          1.47

AVERAGE          4.44          3.22

                                        2X   AVERAGE          7.05          5.03

                                        3X   AVERAGE         11.04          6.14



 

                          15                        \BLDo\PSA INTERPRETATION\BLD

x\

The PSA assay should not be used alone for a screening test

or diagnosis for presence or absence of malignant disease.

Predictions of disease recurrence should not be based solely

on values obtained from serial patient serum values.

The PSA result was determined by "[employer]", on the Roche

SUNITHA 6000. Values obtained with different assay methods

or kits cannot be used interchangeably.









Procedures





                Date            Code            Description     Status

 

                10/25/2021      11184           Office/Outpatient Established Mo

d MDM 30-39 Min Completed

 

                2021      01663           Office/Outpatient Established SF

 MDM 10-19 Min Completed

 

                2021      99266           Office/Outpatient Established Mo

d MDM 30-39 Min Completed

 

                2021      64223           Office/Outpatient Established Mo

d MDM 30-39 Min Completed







[employer]





                                        Description

 

                                        No Information Available







Encounters





                                        Description

 

                                        No Information Available







Assessments





                Date            Code            Description     Provider

 

                2021      E11.65          Type 2 diabetes mellitus with hy

perglycemia [facility]

 

                2021      E78.00          Pure hypercholesterolemia, unspe

cified [facility]

 

                2021      Z79.899         Other long term (current) drug t

herapy [facility]

 

                10/25/2021      R05.9           Cough, unspecified Crystal Nevills

, [facility]

 

                10/25/2021      Z11.52          Encounter for screening for Covi

d-19 Crystal Nevills, [facility]

 

                10/25/2021      I10             Essential (primary) hypertension

 Crystal Nevills, [facility]

 

                2021      E11.65          Type 2 diabetes mellitus with hy

perglycemia [facility]

 

                2021      E78.00          Pure hypercholesterolemia, unspe

cified [facility]

 

                2021      E11.65          Type 2 diabetes mellitus with hy

perglycemia Crystal Nevills, [facility]

 

                2021      E78.00          Pure hypercholesterolemia, unspe

cified Crystal Nevills, [facility]

 

                2021      I10             Essential (primary) hypertension

 Crystal Nevills, [facility]

 

                2021      K21.9           Gastro-esophageal reflux disease

 without esophagitis Crystal 

Nevills, [facility]

 

                2021      R91.1           Solitary pulmonary nodule Crystal 

Nevills, [facility]

 

                2021      G47.33          Obstructive sleep apnea (adult) 

(pediatric) Crystla Nevills, FNP

 

                2021      C64.1           Malignant neoplasm of right kidn

ey, except renal pelvis Crystal 

Nevills, FNP

 

                2021      Z79.899         Other long term (current) drug t

herapy Crystal Nevills, FNP

 

                2021      E11.65          Type 2 diabetes mellitus with hy

perglycemia Crystal Nevills, FNP

 

                2021      E78.00          Pure hypercholesterolemia, unspe

cified Crystal Nevills, FNP

 

                2021      Z12.5           Encounter for screening for jennifer

gnant neoplasm of prostate 

Crystal Nevills, FN

 

                2021      I10             Essential (primary) hypertension

 Crystal Nevills, FN

 

                2021      K21.9           Gastro-esophageal reflux disease

 without esophagitis Crystal 

Nevills, FNP

 

                2021      R91.1           Solitary pulmonary nodule Crystal 

Nevills, [facility]

 

                2021      G47.33          Obstructive sleep apnea (adult) 

(pediatric) Crystal Nevills, FNP

 

                2021      C64.1           Malignant neoplasm of right kidn

ey, except renal pelvis Crystal 

Nevills, FN

 

                2021      Z79.899         Other long term (current) drug t

herapy Crystal Nevills, P

 

                2021      E11.65          Type 2 diabetes mellitus with hy

perglycemia Crystal Nevills, FNP

 

                2021      E78.00          Pure hypercholesterolemia, unspe

cified Crystal Nevills, FNP

 

                2021      I10             Essential (primary) hypertension

 Crystal Nevills, P

 

                2021      N52.1           Erectile dysfunction due to dise

ases classified elsewhere Crystal

Nevills, [facility]

 

                2021      R07.89          Other chest pain Crystal Nevills, 

[facility]

 

                2021      K21.9           Gastro-esophageal reflux disease

 without esophagitis Crystal 

Nevills, [facility]

 

                2021      Z79.899         Other long term (current) drug t

herapy SHAVONNE Watson







Plan of Treatment

Future Appointment(s):* 2021  8:20 am - SHAVONNE Watson at [facility]

10/25/2021 - SHAVONNE Watson* R05.9 Cough, unspecified* Comments:* He had the
  labs done to test for RSV.Continue with Tesalon Perles as needed for 
  cough.Further plans to follow the lab results.We will continue to monitor.



* Follow up:* Patient to be contacted as soon as results are back. IF CONDITIONS
  WORSEN  AS NEEDED





* Z11.52 Encounter for screening for Covid-19* Comments:* He had the swabs done 
  to test for COVID.Further plans to follow the lab results.We will continue to 
  monitor.





* I10 Essential (primary) hypertension* Comments:* His BP was noted at 
  150/82.The patient was advised to continue with the current line of therapies.
  He will benefit from maintaining a low-sodium diet.  We will continue to 
  monitor.









Functional Status





                                        Description

 

                                        No Information Available







Mental Status





                                        Description

 

                                        No Information Available







Referrals





                Refer to      Reason for Referral Status          Appt Date

 

                          Ishmael Puckett        Patient requesting referral 

for pain management of chronic 

neck and back pain.       Sent                      

 

                                        [address] [place] [address] [phone]

 

                                          

 

                          Ingrid Velázquez,   Patient s/p recent right nep

hectomy.  Post op creatinine

elevated at 2.1 on two separate occasions.  Please evaluate.  Thank you!  Sent  

                    



 

                                        [address]

 

                                        [place] [address]

 

                                        [phone]

 

                                          

 

                          [facility] Please see this pleasant

 59 y/o male s/p Right 

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  Closed               

     2021

 

                                        [address] [place] [address]

 

                                        [phone]

 

                                          

 

                          [facility] chest 2021 jeancarlos

ws evidence for bilateral 

pleural plaquing consistent ith previous asbestos exposure. There is also a 
nodular density superior segment LLL which warrants follow up study.  Please 
evaluate.  Thank you.     Closed                    [address]

 

                                        [place] [address]

 

                                        [phone]

 

                                          

 

                                                    Please evaluate finding of m

asslike lesion in the lower pole of the right 

kidney and systic lesions in the left kidney per imaging.  MRI of the Abdomen 
and Pelvis with and without contrast states most likely a large renal cell 
carcinoma.[facility] PLEASE EVALUATE AND TREAT AS SOON 
AS POSSIBLE. THANK YOU.   Closed                    2021

## 2021-11-09 NOTE — CCD
Continuity of Care Document (CCD)

                             Created on: 2021



Carlos Bryant

External Reference #: [id number]

: 1960

Sex: Male



Demographics





                          Address                   [address] [place] [address]

 

                          Home Phone                [phone]

 

                          Preferred Language        Unknown

 

                          Marital Status            Never 

 

                          Adventist Affiliation     Unknown

 

                          Race                      White

 

                          Ethnic Group              Not  or 





Author





                          Author                    Carlos CARRINGTON FN

 

                          Organization              Unknown

 

                          Address                   [address] [place] [address]



 

                          Phone                     [phone]







Care Team Providers





                    Care Team Member Name Role                Phone

 

                    [facility]                [phone]

 

                    Moris Desai   AUTM                [phone]

 

                    Crystal Carrington  AUTM                [phone]







Problems





                    Active Problems     Provider            Date

 

                    Type II diabetes mellitus uncontrolled BELLA Severino, P

NP Onset: 2020

 

                    Essential hypertension BELLA Severino, PNP Onset: 2020

 

                    Pure hypercholesterolemia BELLA Severino, PNP Onset: 

 

                    Gastroesophageal reflux disease BELLA Severino, PNP Onse

t: 2020

 

                    Secondary erectile dysfunction BELLA Severino, PNP Onset

: 2020

 

                    Taking medication   BELLA Severino, PNP Onset: 

0

 

                    Right side sciatica BELLA Severino, PNP Onset: 

0

 

                    Secondary erectile dysfunction BELLA Severino, PNP Onset

: 2021







Social History





                Type            Date            Description     Comments

 

                Birth Sex                       Unknown          

 

                Tobacco Use     Start: Unknown End:  Quit             

 

                Tobacco Use     Start: Unknown  Never Smoked Cigars  

 

                Tobacco Use     Start: Unknown  Never Smoked A Pipe  

 

                Tobacco Use     Start: Unknown  Never Used Smokeless Tobacco  

 

                ETOH Use                        Denies alcohol use  

 

                Recreational Drug Use                 Denies Drug Use  

 

                Tobacco Use     Start: Unknown End: Unknown Patient is a former 

smoker  







Allergies, Adverse Reactions, Alerts





             Active Allergies Criticality  Reaction | Severity Comments     Date

 

             Iodinated Diagnostic Agents Unable to assess criticality           

                2019







Medications





           Active Medications SIG        Qnty       Indications Ordering Provide

r Date

 

                          Fenofibrate                     145mg Tablets         

          1 tablet by 

mouth every day for triglycerides 90tabs                          Darian barboza MD 2021

 

                          Glipizide ER                     10mg Tablets ER 24HR 

                  Take 1 

tab by mouth once a day 30tabs                          Olga Lidia barboza M.D. 2021

 

                          Trazodone HCL                     50mg Tablets        

           take 1 tab by 

mouth at bedtime. 30tabs                          Darian Gomez MD 2021

 

                                        Blood Pressure Kit/Oscillating/Digital  

                    Kit                 

                Use as directed to monitor blood pressure twice a day 1units    

                      Darian Gomez MD                                      2021

 

                          Lisinopril                     10mg Tablets           

        take two tablets 

by mouth in the morning and 1 tab at bed time 90tabs                          Moi Gomez MD 

2021

 

                          Jardiance                     25mg Tablets            

       take one by mouth 

once a day      90tabs                          BELLA Severino, PNP 20

20

 

                          Carisoprodol                     350mg Tablets        

           1 by mouth at 

bedtime as needed                                 Unknown         

 

                          Blood Glucose Test                      Strips        

           please provide 

glucose test strips, covered by pt insurance for twice daily bs test dx: e11.65 

                    E11.65              Unknown             

 

                                        Blood Glucose Monitoring System         

            W/Device Kit                

             for three times a day blood sugar testing dx: e11.65              E

11.65       Unknown      



 

                          Mucinex                     600mg Tablets ER 12HR     

              1 by mouth 

twice a day as needed                                 Unknown         

 

                          Omeprazole                     20mg Capsules DR       

            1 by mouth 

every day  Written by Dr Malik                                 Unknown         



 

                          Topamax                     50mg Tablets              

     1 by mouth twice a 

day for headaches 180tabs                         BLELA Severino, PNP 

 

                          Novolog                     100Unit/ML Solution       

            10-15 units at

bedtime per sliding scale                                 Unknown         

 

                    Simple Diagnostics Lancing Device                      Misc 

                                      

                                        Unknown             

 

                          Hydrochlorothiazide                     25mg Tablets  

                 take one 

tablet by mouth every day 90tabs                          BELLA Severino, PN

P 

 

                          Lovastatin                     40mg Tablets           

        take one tablet by

mouth every evening with meal 90tabs                          BELLA Severino

, PNP 

 

                          Metformin HCL                     1000mg Tablets      

             take one 

tablet by mouth twice a day with food 180tabs                         BELLA Severino, PNP 



 

                          Meloxicam                     15mg Tablets            

       take one tablet by 

mouth every day 90tabs                          KIT Severino-BC, PNP 

 

                          Sildenafil Citrate                     20mg Tablets   

                Take One 

Tablet By Mouth as Directed                                 Unknown         

 

                                        Pharmacist Choice Ultra Thin Lancets 31G

                     31G Misc           

                                                    Unknown      

 

                          Hydrocodone-Acetaminophen                     5-325mg 

Tablets                   

Take One Tablet By Mouth Every 4 Hours Maximum Daily Dose   6 Tablets Written by
Dr Malik                                       Unknown         







Immunizations





                                        Description

 

                                        No Information Available







Vital Signs





                Date            Vital           Result          Comment

 

                2021  8:07am BP Systolic     154 mmHg         

 

                    BP Diastolic        92 mmHg              

 

                    Heart Rate          70 /min              

 

                    Body Temperature    98.1 F             

 

                    Respiratory Rate    16 /min              

 

                    O2 % BldC Oximetry  97 %                 

 

                    Weight              266.50 lb            

 

                    Weight              120.884 kg           

 

                    Height              70 inches           5'10"

 

                    BMI (Body Mass Index) 38.2 kg/m2           

 

                    BSA (Body Surface Area) 2.36 m2              

 

                2021  9:03am BP Systolic     148 mmHg         

 

                    BP Diastolic        80 mmHg              

 

                    Heart Rate          78 /min              

 

                    Body Temperature    96.4 F             

 

                    Respiratory Rate    18 /min              

 

                    O2 % BldC Oximetry  97 %                 

 

                    Weight              283.00 lb            

 

                    Weight              128.369 kg           

 

                    Height              70 inches           5'10"

 

                    BMI (Body Mass Index) 40.6 kg/m2           

 

                    BSA (Body Surface Area) 2.42 m2              







Results





        Test    Acquired Date Facility Test    Result  H/L     Range   Note

 

                    Comprehensive Metabolic Profil 2021          [facility]

             Glucose, Fasting 112 mg/dL    High                 

 

             Blood Urea Nitrogen 31 mg/dL     High         7-18          

 

             Creatinine For GFR 2.36 mg/dL   High         0.70-1.30     

 

             Glomerular Filtration Rate 30.0         Low          >49          1

 

             Sodium Level 140 mEq/L    Normal       136-145       

 

             Potassium Serum 3.9 mEq/L    Normal       3.5-5.1       

 

             Chloride Level 109 mEq/L    High                 

 

             Carbon Dioxide Level 23 mEq/L     Normal       21-32         

 

             Anion Gap    8 mEq/L      Normal       8-16          

 

             Calcium Level 9.7 mg/dL    Normal       8.8-10.2      

 

             Ast/Sgot     15 U/L       Normal       7-37          

 

             Alt/SGPT     26 U/L       Normal       12-78         

 

             Alkaline Phosphatase 67 U/L       Normal               

 

             Bilirubin,Total 0.4 mg/dL    Normal       0.2-1.0       

 

             Total Protein 8.0 GM/DL    Normal       6.4-8.2       

 

             Albumin      4.3 GM/DL    Normal       3.2-5.2       

 

             Albumin/Globulin Ratio 1.2          Normal                     

 

                    CBC With Differential 2021          [facility]

             White Blood Count 8.5 10       Normal       4.0-10.0      

 

             Red Blood Count 4.80 10      Normal       4.30-6.10     

 

             Hemoglobin   14.5 g/dL    Normal       13.5-17.5     

 

             Hematocrit   43.8 %       Normal       42.0-52.0     

 

             Mean Corpuscular Volume 91.3 fl      Normal       80.0-96.0     

 

             Mean Corpuscular Hemoglobin 30.2 pg      Normal       27.0-33.0    

 

 

             Mean Corpuscular HGB Conc 33.1 g/dL    Normal       32.0-36.5     

 

             Red Cell Distribution Width 14.0 %       Normal       11.5-14.5    

 

 

             Platelet Count, Automated 238 10       Normal       150-450       

 

             Neutrophils % 57.8 %       Normal       36.0-66.0     

 

             Lymph %      27.3 %       Normal       24.0-44.0     

 

             Mono %       11.2 %       High         2.0-8.0       

 

             Eos %        2.3 %        Normal       0.0-3.0       

 

             Baso %       1.2 %        High         0.0-1.0       

 

             Immature Granulocyte % 0.2 %        Normal       0-3.0         

 

             Nucleated Red Blood Cell % 0.0 %        Normal       0-0           

 

             Neutrophils # 4.9 10       Normal       1.5-8.5       

 

             Lymph #      2.3 10       Normal       1.5-5.0       

 

             Mono #       1.0 10       High         0.0-0.8       

 

             Eos #        0.2 10       Normal       0.0-0.5       

 

             Baso #       0.1 10       Normal       0.0-0.2       

 

                    [facility] Met[facility] 2021          [facility]

             Comprehensive Metabo (SEE NOTE)                             2, 3

 

             Sodium       139 mEq/L                 134 - 153     

 

             Potassium    4.4 mEq/L                 3.6 - 5.0     

 

             Chloride     105 mEq/L                 98 - 107      

 

             Co2          22 mEq/L                  22 - 30       

 

             Glucose      143 mg/dL    High         70 - 99       

 

             BUN          26 mg/dL     High         7 - 21        

 

             Creatinine   2.1 mg/dL    High         0.7 - 1.5     

 

             BUN/Creat    12                        8 - 27        

 

             Total Protein 6.9 g/dL                  6.3 - 8.2     

 

             Albumin      4.5 g/dL                  3.9 - 5.0     

 

             Globulin     2.4 GM/DL                 2.4 - 3.2     

 

             A/G Ratio    1.9                       0.8 - 2.0     

 

             Calcium      9.8 mg/dL                 8.4 - 10.2    

 

             Total Bili   <0.7 mg/dL                0.2 - 1.3     

 

             Alkaline Phos 53 U/L                    38 - 126      

 

             Sgot/Ast     16 U/L                    5 - 40        

 

             SGPT/Alt     12 U/L                    7 - 56        

 

             Anion Gap    12.0 mmol/L               8.0 - 16.0    

 

             Age          60 yrs                                  

 

             Non-Aa GFR   34 mL/min                               

 

             Afr Amer GFR 42 mL/min                              4

 

                    CBC W/Auto Differential 2021          [facility]

l

             CBC W/Automated Diff (SEE NOTE)                             5, 6

 

             WBC          8.2 10^3/uL               4.2 - 11.0    

 

             RBC          4.95 10^6/uL              4.50 - 6.30   

 

             Hemoglobin   15.0 g/dL                 14.0 - 16.0   

 

             Hematocrit   45.8 %                    41.0 - 51.0   

 

             MCV          92.5 fL                   80.0 - 94.0   

 

             MCH          30.3 pg                   27.0 - 34.0   

 

             MCHC         32.8 g/dL                 31.0 - 36.0   

 

             RDW          13.7 %                    11.5 - 14.8   

 

             Platelets    257 10^3/uL               150 - 450     

 

             MPV          10.8 fL      High         7.4 - 10.4    

 

             Neut         68.1 %                    37.0 - 80.0   

 

             Lymph        18.6 %       Low          25.0 - 40.0   

 

             Mono         8.3 %        High         3.0 - 8.0     

 

             Eos          2.8 %                     0.0 - 7.0     

 

             Baso         1.8 %                     0.0 - 2.0     

 

             %Ig          0.4 %        High         0.0 - 0.0     

 

             %NRBC        0.0 %                     0.0 - 0.0     

 

             #Neut        5.57 10^3/uL              2.00 - 6.90   

 

             #Lymph       1.52 10^3/uL              0.60 - 3.40   

 

             #Mono        0.68 10^3/uL              0.00 - 0.90   

 

             #Eos         0.23 10^3/uL              0.00 - 0.70   

 

             #Baso        0.15 10^3/uL              0.00 - 0.20   

 

             #Ig          0.03 10^3/uL              0.00 - 0.10   

 

             #NRBC        0.00 10^3/uL              0.00 - 0.00   

 

             Manual Diff  NOT INDICATED                             

 

             RBC Morph    NOT INDICATED                             

 

                    CMP W/Egfr          2021          [facility] Metabo (SEE NOTE)                             7

 

             Sodium       138 mEq/L                 134 - 153     

 

             Potassium    4.5 mEq/L                 3.6 - 5.0     

 

             Chloride     104 mEq/L                 98 - 107      

 

             Co2          21 mEq/L     Low          22 - 30       

 

             Glucose      134 mg/dL    High         70 - 99       

 

             BUN          31 mg/dL     High         7 - 21        

 

             Creatinine   2.1 mg/dL    High         0.7 - 1.5     

 

             BUN/Creat    15                        8 - 27        

 

             Total Protein 7.7 g/dL                  6.3 - 8.2     

 

             Albumin      4.9 g/dL                  3.9 - 5.0     

 

             Globulin     2.8 GM/DL                 2.4 - 3.2     

 

             A/G Ratio    1.8                       0.8 - 2.0     

 

             Calcium      10.4 mg/dL   High         8.4 - 10.2    

 

             Total Bili   <0.7 mg/dL                0.2 - 1.3     

 

             Alkaline Phos 66 U/L                    38 - 126      

 

             Sgot/Ast     15 U/L                    5 - 40        

 

             SGPT/Alt     11 U/L                    7 - 56        

 

             Anion Gap    13.0 mmol/L               8.0 - 16.0    

 

             Age          60 yrs                                  

 

             Non-Aa GFR   34 mL/min                               

 

             Afr Amer GFR 42 mL/min                              8

 

                    Laboratory test finding 2021          [facility]

l

             Hgba1c       6.0 %                     4.4 - 6.1    9

 

                    Lipid Panel         2021          [facility]

             Cve Panel    (SEE NOTE)                             10

 

             Cholesterol  208 mg/dL    High         131 - 200     

 

             Triglycerides 328 mg/dL    High         35 - 160      

 

             HDL          42 mg/dL                  29 - 86       

 

             LDL          123 mg/dL                 65 - 175      

 

             Risk Factor  5.0          High         3.4 - 4.9     

 

             LDL/HDL      2.93                      1.00 - 3.55  11

 

                    Laboratory test finding 2021          [facility]

l

             TSH Highly Sensitive 0.57 uIU/mL               0.47 - 5.01   

 

             PSA - Diagnostic 1.05 ng/mL                0.00 - 4.00  12

 

                    Laboratory test finding 2021          [facility]

             PSA - Diagnostic 0.71 ng/mL                0.00 - 4.00  13

 

                    Laboratory test finding 2021          [facility]

             TSH Highly Sensitive 0.69 uIU/mL               0.47 - 5.01   

 

                    Lipid Panel         2021          [facility]

             Cve Panel    (SEE NOTE)                             14

 

             Cholesterol  182 mg/dL                 131 - 200     

 

             Triglycerides 304 mg/dL    High         35 - 160      

 

             HDL          44 mg/dL                  29 - 86       

 

             LDL          105 mg/dL                 65 - 175      

 

             Risk Factor  4.1                       3.4 - 4.9     

 

             LDL/HDL      2.39                      1.00 - 3.55  15

 

                    Laboratory test finding 2021          [facility]

             Hgba1c       7.1 %        High         4.4 - 6.1    16

 

                    [facility] W[facility]Egfr          [facility] (SEE [facility]                             17

 

             Sodium       137 mEq/L                 134 - 153     

 

             Potassium    3.9 mEq/L                 3.6 - 5.0     

 

             Chloride     99 mEq/L                  98 - 107      

 

             Co2          25 mEq/L                  22 - 30       

 

             Glucose      166 mg/dL    High         70 - 99       

 

             BUN          19 mg/dL                  7 - 21        

 

             Creatinine   1.0 mg/dL                 0.7 - 1.5     

 

             BUN/Creat    19                        8 - 27        

 

             Total Protein 7.8 g/dL                  6.3 - 8.2     

 

             Albumin      4.8 g/dL                  3.9 - 5.0     

 

             Globulin     3.0 GM/DL                 2.4 - 3.2     

 

             A/G Ratio    1.6                       0.8 - 2.0     

 

             Calcium      10.2 mg/dL                8.4 - 10.2    

 

             Total Bili   0.7 mg/dL                 0.2 - 1.3     

 

             Alkaline Phos 84 U/L                    38 - 126      

 

             Sgot/Ast     29 U/L                    5 - 40        

 

             SGPT/Alt     29 U/L                    7 - 56        

 

             Anion Gap    13.0 mmol/L               8.0 - 16.0    

 

             Age          60 yrs                                  

 

             Non-Aa GFR   >60 mL/min                              

 

             Afr Amer GFR >60 mL/min                             18

 

                    CBC W/Auto Differential 2021          [facility]

             CBC W/Automated Diff (SEE NOTE)                             19

 

             WBC          9.3 10^3/uL               4.2 - 11.0    

 

             RBC          5.69 10^6/uL              4.50 - 6.30   

 

             Hemoglobin   17.4 g/dL    High         14.0 - 16.0   

 

             Hematocrit   51.0 %                    41.0 - 51.0   

 

             MCV          89.6 fL                   80.0 - 94.0   

 

             MCH          30.6 pg                   27.0 - 34.0   

 

             MCHC         34.1 g/dL                 31.0 - 36.0   

 

             RDW          12.6 %                    11.5 - 14.8   

 

             Platelets    221 10^3/uL               150 - 450     

 

             MPV          9.7 fL                    7.4 - 10.4    

 

             Neut         64.4 %                    37.0 - 80.0   

 

             Lymph        23.2 %       Low          25.0 - 40.0   

 

             Mono         8.4 %        High         3.0 - 8.0     

 

             Eos          2.3 %                     0.0 - 7.0     

 

             Baso         1.4 %                     0.0 - 2.0     

 

             %Ig          0.3 %        High         0.0 - 0.0     

 

             %NRBC        0.0 %                     0.0 - 0.0     

 

             #Neut        6.01 10^3/uL              2.00 - 6.90   

 

             #Lymph       2.16 10^3/uL              0.60 - 3.40   

 

             #Mono        0.78 10^3/uL              0.00 - 0.90   

 

             #Eos         0.21 10^3/uL              0.00 - 0.70   

 

             #Baso        0.13 10^3/uL              0.00 - 0.20   

 

             #Ig          0.03 10^3/uL              0.00 - 0.10   

 

             #NRBC        0.00 10^3/uL              0.00 - 0.00   

 

             Manual Diff  NOT INDICATED                             

 

             RBC Morph    NOT INDICATED                             







                          1                         Units are mL/min/1.73 m2



Chronic Kidney Disease Staging per [facility]:



Stage I & II   GFR >=60       Normal to Mildly Decreased

Stage III      GFR 30-59      Moderately Decreased

Stage IV       GFR 15-29      Severely Decreased

Stage V        GFR <15        Very Little GFR Left

ESRD           GFR <15 on RRT



 

                          2                         Is patient fasting?  N

 

                          3                         COMPREHENSIVE METABOLIC PANE

L



 

                          4                         Male GFR Interprentation

                                        20-49 yrs     >60 mL/min   Normal

                                        50-59 yrs     >56 mL/min   Normal

                                        60-69 yrs     >49 mL/min   Normal

                                        70-79yrs      >42 mL/min   Normal

                                        80 and above  >35 mL/min   Normal

Female GFR  Interpretation

                                        20-39 yrs     >60 mL/min   Normal

                                        40-49 yrs     >58 mL/min   Normal

                                        50-59 yrs     >51 mL/min   Normal

                                        60-69 yrs     >45 mL/min   Normal

                                        70-79 yrs     >39 mL/min   Normal

                                        80 and above  >32 mL/min   Normal



 

                          5                         Is patient fasting?  Y

 

                          6                         COMPLETE BLOOD COUNT



 

                          7                         COMPREHENSIVE METABOLIC PANE

L



 

                          8                         Male GFR Interprentation

                                        20-49 yrs     >60 mL/min   Normal

                                        50-59 yrs     >56 mL/min   Normal

                                        60-69 yrs     >49 mL/min   Normal

                                        70-79yrs      >42 mL/min   Normal

                                        80 and above  >35 mL/min   Normal

Female GFR  Interpretation

                                        20-39 yrs     >60 mL/min   Normal

                                        40-49 yrs     >58 mL/min   Normal

                                        50-59 yrs     >51 mL/min   Normal

                                        60-69 yrs     >45 mL/min   Normal

                                        70-79 yrs     >39 mL/min   Normal

                                        80 and above  >32 mL/min   Normal



 

                          9                         {A1]

{HB]



 

                          10                        LIPID PANEL



 

                          11                        CVE

RISK           CHOL/HDL       LDL/HDL

MEN:

                                        1/2  AVERAGE          3.43          1.00

AVERAGE          4.97          3.55

                                        2X   AVERAGE          9.55          6.25

                                        3X   AVERAGE         23.99          7.99

WOMEN:

                                        1/2  AVERAGE          3.27          1.47

AVERAGE          4.44          3.22

                                        2X   AVERAGE          7.05          5.03

                                        3X   AVERAGE         11.04          6.14



 

                          12                        \BLDo\PSA INTERPRETATION\BLD

x\

The PSA assay should not be used alone for a screening test

or diagnosis for presence or absence of malignant disease.

Predictions of disease recurrence should not be based solely

on values obtained from serial patient serum values.

The PSA result was determined by "[employer]", on the Roche

SUNITHA 6000. Values obtained with different assay methods

or kits cannot be used interchangeably.



 

                          13                        \BLDo\PSA INTERPRETATION\BLD

x\

The PSA assay should not be used alone for a screening test

or diagnosis for presence or absence of malignant disease.

Predictions of disease recurrence should not be based solely

on values obtained from serial patient serum values.

The PSA result was determined by "[employer]", on the [employer]

SUNITHA 6000. Values obtained with different assay methods

or kits cannot be used interchangeably.



 

                          14                        LIPID PANEL



 

                          15                        CVE

RISK           CHOL/HDL       LDL/HDL

MEN:

                                        1/2  AVERAGE          3.43          1.00

AVERAGE          4.97          3.55

                                        2X   AVERAGE          9.55          6.25

                                        3X   AVERAGE         23.99          7.99

WOMEN:

                                        1/2  AVERAGE          3.27          1.47

AVERAGE          4.44          3.22

                                        2X   AVERAGE          7.05          5.03

                                        3X   AVERAGE         11.04          6.14



 

                          16                        {A1]

{HB]



 

                          17                        COMPREHENSIVE METABOLIC PANE

L



 

                          18                        Male GFR Interprentation

                                        20-49 yrs     >60 mL/min   Normal

                                        50-59 yrs     >56 mL/min   Normal

                                        60-69 yrs     >49 mL/min   Normal

                                        70-79yrs      >42 mL/min   Normal

                                        80 and above  >35 mL/min   Normal

Female GFR  Interpretation

                                        20-39 yrs     >60 mL/min   Normal

                                        40-49 yrs     >58 mL/min   Normal

                                        50-59 yrs     >51 mL/min   Normal

                                        60-69 yrs     >45 mL/min   Normal

                                        70-79 yrs     >39 mL/min   Normal

                                        80 and above  >32 mL/min   Normal



 

                          19                        COMPLETE BLOOD COUNT









Procedures





                Date            Code            Description     Status

 

                2021      93741           Office/Outpatient Established SF

 MDM 10-19 Min Completed

 

                2021      01078           Office/Outpatient Established Mo

d MDM 30-39 Min Completed

 

                2021      54971           Office/Outpatient Established Mo

d MDM 30-39 Min Completed

 

                2021      73713           Office/Outpatient Established Mo

d MDM 30-39 Min Completed

 

                2021      61180           Electrocardiogram Complete Compl

eted







[employer]





                                        Description

 

                                        No Information Available







Encounters





                                        Description

 

                                        No Information Available







Assessments





                Date            Code            Description     Provider

 

                2021      E11.65          Type 2 diabetes mellitus with hy

perglycemia [facility]

 

                2021      E78.00          Pure hypercholesterolemia, unspe

cified [facility]

 

                2021      E11.65          Type 2 diabetes mellitus with hy

perglycemia Crystal Nevills, [facility]

 

                2021      E78.00          Pure hypercholesterolemia, unspe

cified Crystal Nevills, [facility]

 

                2021      I10             Essential (primary) hypertension

 Crystal Nevills, [facility]

 

                2021      K21.9           Gastro-esophageal reflux disease

 without esophagitis Crystal 

Nevills, [facility]

 

                2021      R91.1           Solitary pulmonary nodule Crystal 

Nevills, [facility]

 

                2021      G47.33          Obstructive sleep apnea (adult) 

(pediatric) Crystal Nevills, [facility]

 

                2021      C64.1           Malignant neoplasm of right kidn

ey, except renal pelvis Crystal 

Nevills, [facility]

 

                2021      Z79.899         Other long term (current) drug t

herapy Crystal Nevills, [facility]

 

                2021      E11.65          Type 2 diabetes mellitus with hy

perglycemia Crystal Nevills, [facility]

 

                2021      E78.00          Pure hypercholesterolemia, unspe

cified Crystal Nevills, [facility]

 

                2021      Z12.5           Encounter for screening for jennifer

gnant neoplasm of prostate 

Crystal Nevills, [facility]

 

                2021      I10             Essential (primary) hypertension

 Crystal Nevills, [facility]

 

                2021      K21.9           Gastro-esophageal reflux disease

 without esophagitis Crystal 

Nevills, [facility]

 

                2021      R91.1           Solitary pulmonary nodule Crystal 

Nevills, [facility]

 

                2021      G47.33          Obstructive sleep apnea (adult) 

(pediatric) Crystal Nevills, [facility]

 

                2021      C64.1           Malignant neoplasm of right kidn

ey, except renal pelvis Crystal 

Nevills, [facility]

 

                2021      Z79.899         Other long term (current) drug t

herapy Crystal Nevills, [facility]

 

                2021      E11.65          Type 2 diabetes mellitus with hy

perglycemia Crystal Nevills, [facility]

 

                2021      E78.00          Pure hypercholesterolemia, unspe

cified Crystal Nevills, [facility]

 

                2021      I10             Essential (primary) hypertension

 Crystal Nevills, [facility]

 

                2021      N52.1           Erectile dysfunction due to dise

ases classified elsewhere Crystal

Nevills, [facility]

 

                2021      R07.89          Other chest pain Crystal Nevills, 

[facility]

 

                2021      K21.9           Gastro-esophageal reflux disease

 without esophagitis Crystal 

Nevills, [facility]

 

                2021      Z79.899         Other long term (current) drug t

herapy Crystal Nevills, [facility]

 

                2021      E11.65          Type 2 diabetes mellitus with hy

perglycemia Crystal Nevills, [facility]

 

                2021      E78.00          Pure hypercholesterolemia, unspe

cified Crystal Nevills, [facility]

 

                2021      Z79.899         Other long term (current) drug t

herapy Crystal Nevills, [facility]

 

                2021      Z12.5           Encounter for screening for jennifer

gnant neoplasm of prostate 

Crystal Nevills, [facility]

 

                2021      Z68.41          Body mass index [BMI]40.0-44.9, 

adult Crystal Nevills, [facility]

 

                2021      N52.1           Erectile dysfunction due to dise

ases classified elsewhere Crystal

 Nevills, [facility]

 

                2021      I10             Essential (primary) hypertension

 Crystal Nevills, [facility]

 

                2021      R07.89          Other chest pain Crystal Nevills, 

FNP







Plan of Treatment

Future Appointment(s):* 2021  8:20 am - SHAVONNE Watson at [facility]

* 2021  8:00 am - [facility] at [facility]

2021 - CrystalSHAVONNE Del Valle* E11.65 Type 2 diabetes mellitus with 
  hyperglycemia* Comments:* His fasting glucose is high at 134 with an A1c at 
  6.The patient was advised to continue with his current medication regimen.  He
   will benefit from maintaining a diabetic diet and a regular exercise regimen.
    We will continue to monitor.



* Follow up:* FU in 3 months, fasting labs a week prior.





* E78.00 Pure hypercholesterolemia, unspecified* Comments:* Labs reviewed with 
  the patient in detail.Lipid panel showed:CHOL high at 208.TRG high at 328.HDL 
  at 42.LDL at 123.He will continue with his current regimen.  He was encouraged
   to maintain a low cholesterol diet and a regular exercise regimen.  We will 
  continue to monitor.Total Cholesterol and TRG elevated, discussed dietary 
  changes to improve, avoiding meat and dairy products, avoiding processed f
  oodsDecrease saturated Fats (meat, dairy products and processed foods)Increase
   Unsaturated fats (fish, plants, nuts, seeds, beans and vegetable 
  oils)Increase aerobic exerciseIncrease water intake  Read Labels



* Follow up:* FU in 3 months, fasting labs a week prior.





* I10 Essential (primary) hypertension* Comments:* The patient was advised to 
  continue with the current line of therapies. He will benefit from maintaining 
  a low-sodium diet.  We will continue to monitor.





* K21.9 Gastro-esophageal reflux disease without esophagitis* Comments:* The 
  patient was advised to continue with current medication.  Avoid spicy food and
   control diet as advised.





* R91.1 Solitary pulmonary nodule* Comments:* He was seen by Pulmonary on 
  21 for abnormal CT scan which showed calcified pleural plaques without 
  significant fibrosis.  There is one 5 mm nodule subpleural on the left.  His 
  spirometry showed obstruction with air trapping versus true concomitant 
  restriction.To have labs and CT chest as per Pulmonary.Plan of care as per 
  Pulmonary.





* G47.33 Obstructive sleep apnea (adult) (pediatric)* Comments:* He was advised 
  to continue CPAP for improvement in his symptoms by Pulmonary.





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 
  was seen by Urology on 21 for 2 weeks S/P right robotic assisted laparo
  scopic nephrectomy.He had excellent postop course.  Skin staples were removed 
  and Steri-Strips applied.  FU in 3 months with CT scan of chest, abdomen and 
  pelvis w/o contrast. Referred to Medical Oncology for their recommendations.



* Follow up:* Fasting labs in 2 weeks to asses kidney function.





* Z79.899 Other long term (current) drug therapy* Comments:* Patient to continue
   to follow the current plan of care and to look for any new or worsening 
  symptoms.  We will continue to monitor through periodic blood work.



* Follow up:* FU in 3 months, fasting labs a week prior.









Functional Status





                                        Description

 

                                        No Information Available







Mental Status





                                        Description

 

                                        No Information Available







Referrals





                Refer to      Reason for Referral Status          Appt Date

 

                          Freeman Velázquezpj Deionemmanuel,   Patient s/p recent right nep

hectomy.  Post op creatinine

 elevated at 2.1 on two separate occasions.  Please evaluate.  Thank you!  Sent 

                     



 

                                        [address]

 

                                        [place] [address]

 

                                        [phone]

 

                                          

 

                          [facility] Please see this pleasant

 61 y/o male s/p Right 

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  Sent                 

     2021

 

                                        [address] [place] [address]

 

                                        [phone]

 

                                          

 

                          [facility] CT chest 2021 jeancarlos

ws evidence for bilateral 

pleural plaquing consistent ith previous asbestos exposure. There is also a 
nodular density superior segment LLL which warrants follow up study.  Please 
evaluate.  Thank you.     Closed                    [address]

 

                                        [place] [address]

 

                                        [phone]

 

                                          

 

                                                    Please evaluate finding of m

asslike lesion in the lower pole of the right 

kidney and systic lesions in the left kidney per imaging.  MRI of the Abdomen 
and Pelvis with and without contrast states most likely a large renal cell 
carcinoma.PT will hand deliver discs at appt. PLEASE EVALUATE AND TREAT AS SOON 
AS POSSIBLE. THANK YOU.   Closed                    2021

 

                                          

 

                Caleb Powers MD Chest pain.  HTN.  Current EKG showing PVC's

.  Closed          

06/15/2021

 

                                        [address], [place] [id number] [phone]

## 2021-11-09 NOTE — CCD
Continuity of Care Document (CCD)

                             Created on: 10/26/2021



Carlos Bryant

External Reference #: [id number]

: 1960

Sex: Male



Demographics





                          Address                   [address] [place] [address]

 

                          Home Phone                [phone]

 

                          Preferred Language        Unknown

 

                          Marital Status            Never 

 

                          Orthodoxy Affiliation     Unknown

 

                          Race                      White

 

                          Ethnic Group              Not  or 





Author





                          Author                    Carlos CARRINGTON FN

 

                          Organization              Unknown

 

                          Address                   [address] [place] [address]



 

                          Phone                     [phone]







Care Team Providers





                    Care Team Member Name Role                Phone

 

                    [facility]                [phone]

 

                    Moris Desai   AUTM                [phone]

 

                    Crystal Carrington  AUTM                [phone]







Problems





                    Active Problems     Provider            Date

 

                    Type II diabetes mellitus uncontrolled BELLA Severino, P

NP Onset: 2020

 

                    Essential hypertension BELLA Severino, PNP Onset: 2020

 

                    Pure hypercholesterolemia BELLA Severino, PNP Onset: 

 

                    Gastroesophageal reflux disease BELLA Severino, PNP Onse

t: 2020

 

                    Secondary erectile dysfunction BELLA Severino, PNP Onset

: 2020

 

                    Taking medication   BELLA Severino, PNP Onset: 

0

 

                    Right side sciatica BELLA Severino, PNP Onset: 

0

 

                    Secondary erectile dysfunction BELLA Severino, PNP Onset

: 2021







Social History





                Type            Date            Description     Comments

 

                Birth Sex                       Unknown          

 

                Tobacco Use     Start: Unknown End:  Quit             

 

                Tobacco Use     Start: Unknown  Never Smoked Cigars  

 

                Tobacco Use     Start: Unknown  Never Smoked A Pipe  

 

                Tobacco Use     Start: Unknown  Never Used Smokeless Tobacco  

 

                ETOH Use                        Denies alcohol use  

 

                Recreational Drug Use                 Denies Drug Use  

 

                Tobacco Use     Start: Unknown End: Unknown Patient is a former 

smoker  







Allergies and adverse reactions





             Active Allergies Criticality  Reaction | Severity Comments     Date

 

             Iodinated Diagnostic Agents Unable to assess criticality           

                2019







Medications





           Active Medications SIG        Qnty       Indications Ordering Provide

r Date

 

                          Fenofibrate                     145mg Tablets         

          1 tablet by 

mouth every day for triglycerides 90tabs                          Darian barboza MD 2021

 

                          Glipizide ER                     10mg Tablets ER 24HR 

                  Take 1 

Tablet By Mouth Once A Day 30tabs                          Olga Lidia tuttle M.D. 2021

 

                          Trazodone HCL                     50mg Tablets        

           take 1 tab by 

mouth at bedtime. 30tabs                          Darian Gomez MD 2021

 

                                        Blood Pressure Kit/Oscillating/Digital  

                    Kit                 

                Use as directed to monitor blood pressure twice a day 1units    

                      Darian Gomez MD                                      2021

 

                          Lisinopril                     10mg Tablets           

        take two tablets 

by mouth in the morning and 1 tab at bed time 90tabs                          Moi Gomez MD 

2021

 

                          Jardiance                     25mg Tablets            

       take one by mouth 

once a day      90tabs                          BELLA Severino, PNP 20

20

 

                          Carisoprodol                     350mg Tablets        

           1 by mouth at 

bedtime as needed                                 Unknown         

 

                          Blood Glucose Test                      Strips        

           please provide 

glucose test strips, covered by pt insurance for twice daily bs test dx: e11.65 

                    E11.65              Unknown             

 

                                        Blood Glucose Monitoring System         

            W/Device Kit                

             for three times a day blood sugar testing dx: e11.65              E

11.65       Unknown      



 

                          Mucinex                     600mg Tablets ER 12HR     

              1 by mouth 

twice a day as needed                                 Unknown         

 

                          Omeprazole                     20mg Capsules DR       

            1 by mouth 

every day  Written by Dr Malik                                 Unknown         



 

                          Topamax                     50mg Tablets              

     1 by mouth twice a 

day for headaches 180tabs                         BELLA Severino, PNP 

 

                          Novolog                     100Unit/ML Solution       

            10-15 units at

bedtime per sliding scale                                 Unknown         

 

                    Simple Diagnostics Lancing Device                      Misc 

                                      

                                        Unknown             

 

                          Hydrochlorothiazide                     25mg Tablets  

                 take one 

tablet by mouth every day 90tabs                          BELLA Severino, PN

P 

 

                          Lovastatin                     40mg Tablets           

        take one tablet by

mouth every evening with meal 90tabs                          BELLA Severino

, PNP 

 

                          Metformin HCL                     1000mg Tablets      

             take one 

tablet by mouth twice a day with food 180tabs                         BELLA Severino, PNP 



 

                          Meloxicam                     15mg Tablets            

       take one tablet by 

mouth every day 90tabs                          KIT Severino-BC, PNP 

 

                          Sildenafil Citrate                     20mg Tablets   

                Take One 

Tablet By Mouth as Directed                                 Unknown         

 

                                        Pharmacist Choice Ultra Thin Lancets 31G

                     31G Misc           

                                                    Unknown      

 

                          Hydrocodone-Acetaminophen                     5-325mg 

Tablets                   

Take One Tablet By Mouth Every 4 Hours Maximum Daily Dose   6 Tablets Written by
Dr Malik                                       Unknown         







Immunizations





                                        Description

 

                                        No Information Available







Vital Signs





                Date            Vital           Result          Comment

 

                10/25/2021  1:29pm BP Systolic     150 mmHg         

 

                    BP Diastolic        82 mmHg              

 

                    Heart Rate          76 /min              

 

                    Body Temperature    98.7 F             

 

                    Respiratory Rate    16 /min              

 

                    O2 % BldC Oximetry  96 %                 

 

                    Weight              266.00 lb            

 

                    Weight              120.658 kg           

 

                    Height              70 inches           5'10"

 

                    BMI (Body Mass Index) 38.2 kg/m2           

 

                    BSA (Body Surface Area) 2.36 m2              

 

                2021  8:07am BP Systolic     154 mmHg         

 

                    BP Diastolic        92 mmHg              

 

                    Heart Rate          70 /min              

 

                    Body Temperature    98.1 F             

 

                    Respiratory Rate    16 /min              

 

                    O2 % BldC Oximetry  97 %                 

 

                    Weight              266.50 lb            

 

                    Weight              120.884 kg           

 

                    Height              70 inches           5'10"

 

                    BMI (Body Mass Index) 38.2 kg/m2           

 

                    BSA (Body Surface Area) 2.36 m2              







Results





        Test    Acquired Date Facility Test    Result  H/L     Range   Note

 

                    RSV Dna Probe       10/25/2021          [facility]

             RSV Antigen  POSITIVE     Abnormal     Normal: Negative  

 

             RSV Antigen Reenter POSITIVE     Abnormal     Normal: Negative 1

 

                    Laboratory test finding 10/25/2021          [facility]

             Covid-19     <pending>                               

 

                    Covid-19            2021          [facility]

             Sars-CoV-2, Kristie Not Detected               Not Detected 2

 

             Sars-CoV-2, Kristie 2 Day Tat Performed                               

 

                    CBC With Differential 2021          [facility]

             White Blood Count 8.5 10       Normal       4.0-10.0      

 

             Red Blood Count 4.80 10      Normal       4.30-6.10     

 

             Hemoglobin   14.5 g/dL    Normal       13.5-17.5     

 

             Hematocrit   43.8 %       Normal       42.0-52.0     

 

             Mean Corpuscular Volume 91.3 fl      Normal       80.0-96.0     

 

             Mean Corpuscular Hemoglobin 30.2 pg      Normal       27.0-33.0    

 

 

             Mean Corpuscular HGB Conc 33.1 g/dL    Normal       32.0-36.5     

 

             Red Cell Distribution Width 14.0 %       Normal       11.5-14.5    

 

 

             Platelet Count, Automated 238 10       Normal       150-450       

 

             Neutrophils % 57.8 %       Normal       36.0-66.0     

 

             Lymph %      27.3 %       Normal       24.0-44.0     

 

             Mono %       11.2 %       High         2.0-8.0       

 

             Eos %        2.3 %        Normal       0.0-3.0       

 

             Baso %       1.2 %        High         0.0-1.0       

 

             Immature Granulocyte % 0.2 %        Normal       0-3.0         

 

             Nucleated Red Blood Cell % 0.0 %        Normal       0-0           

 

             Neutrophils # 4.9 10       Normal       1.5-8.5       

 

             Lymph #      2.3 10       Normal       1.5-5.0       

 

             Mono #       1.0 10       High         0.0-0.8       

 

             Eos #        0.2 10       Normal       0.0-0.5       

 

             Baso #       0.1 10       Normal       0.0-0.2       

 

                    Comprehensive Metabolic Profil 2021          [facility]

             Glucose, Fasting 112 mg/dL    High                 

 

             Blood Urea Nitrogen 31 mg/dL     High         7-18          

 

             Creatinine For GFR 2.36 mg/dL   High         0.70-1.30     

 

             Glomerular Filtration Rate 30.0         Low          >49          3

 

             Sodium Level 140 mEq/L    Normal       136-145       

 

             Potassium Serum 3.9 mEq/L    Normal       3.5-5.1       

 

             Chloride Level 109 mEq/L    High                 

 

             Carbon Dioxide Level 23 mEq/L     Normal       21-32         

 

             Anion Gap    8 mEq/L      Normal       8-16          

 

             Calcium Level 9.7 mg/dL    Normal       8.8-10.2      

 

             Ast/Sgot     15 U/L       Normal       7-37          

 

             Alt/SGPT     26 U/L       Normal       12-78         

 

             Alkaline Phosphatase 67 U/L       Normal               

 

             Bilirubin,Total 0.4 mg/dL    Normal       0.2-1.0       

 

             Total Protein 8.0 GM/DL    Normal       6.4-8.2       

 

             Albumin      4.3 GM/DL    Normal       3.2-5.2       

 

             Albumin/Globulin Ratio 1.2          Normal                     

 

                    Comprehensive Metabolic Panel 2021          [facility]

             Comprehensive Metabo (SEE NOTE)                             4, 5

 

             Sodium       139 mEq/L                 134 - 153     

 

             Potassium    4.4 mEq/L                 3.6 - 5.0     

 

             Chloride     105 mEq/L                 98 - 107      

 

             Co2          22 mEq/L                  22 - 30       

 

             Glucose      143 mg/dL    High         70 - 99       

 

             BUN          26 mg/dL     High         7 - 21        

 

             Creatinine   2.1 mg/dL    High         0.7 - 1.5     

 

             BUN/Creat    12                        8 - 27        

 

             Total Protein 6.9 g/dL                  6.3 - 8.2     

 

             Albumin      4.5 g/dL                  3.9 - 5.0     

 

             Globulin     2.4 GM/DL                 2.4 - 3.2     

 

             A/G Ratio    1.9                       0.8 - 2.0     

 

             Calcium      9.8 mg/dL                 8.4 - 10.2    

 

             Total Bili   <0.7 mg/dL                0.2 - 1.3     

 

             Alkaline Phos 53 U/L                    38 - 126      

 

             Sgot/Ast     16 U/L                    5 - 40        

 

             SGPT/Alt     12 U/L                    7 - 56        

 

             Anion Gap    12.0 mmol/L               8.0 - 16.0    

 

             Age          60 yrs                                  

 

             Non-Aa GFR   34 mL/min                               

 

             Afr Amer GFR 42 mL/min                              6

 

                    Lipid Panel         2021          [facility]

             Cve Panel    (SEE NOTE)                             7, 8

 

             Cholesterol  208 mg/dL    High         131 - 200     

 

             Triglycerides 328 mg/dL    High         35 - 160      

 

             HDL          42 mg/dL                  29 - 86       

 

             LDL          123 mg/dL                 65 - 175      

 

             Risk Factor  5.0          High         3.4 - 4.9     

 

             LDL/HDL      2.93                      1.00 - 3.55  9

 

                    Laboratory test finding 2021          [facility]

l

             TSH Highly Sensitive 0.57 uIU/mL               0.47 - 5.01   

 

             PSA - Diagnostic 1.05 ng/mL                0.00 - 4.00  10

 

                    Laboratory test finding 2021          [facility]

l

             Hgba1c       6.0 %                     4.4 - 6.1    11

 

                    CMP W/Egfr          2021          [facility]

             Comprehensive Metabo (SEE NOTE)                             12

 

             Sodium       138 mEq/L                 134 - 153     

 

             Potassium    4.5 mEq/L                 3.6 - 5.0     

 

             Chloride     104 mEq/L                 98 - 107      

 

             Co2          21 mEq/L     Low          22 - 30       

 

             Glucose      134 mg/dL    High         70 - 99       

 

             BUN          31 mg/dL     High         7 - 21        

 

             Creatinine   2.1 mg/dL    High         0.7 - 1.5     

 

             BUN/Creat    15                        8 - 27        

 

             Total Protein 7.7 g/dL                  6.3 - 8.2     

 

             Albumin      4.9 g/dL                  3.9 - 5.0     

 

             Globulin     2.8 GM/DL                 2.4 - 3.2     

 

             A/G Ratio    1.8                       0.8 - 2.0     

 

             Calcium      10.4 mg/dL   High         8.4 - 10.2    

 

             Total Bili   <0.7 mg/dL                0.2 - 1.3     

 

             Alkaline Phos 66 U/L                    38 - 126      

 

             Sgot/Ast     15 U/L                    5 - 40        

 

             SGPT/Alt     11 U/L                    7 - 56        

 

             Anion Gap    13.0 mmol/L               8.0 - 16.0    

 

             Age          60 yrs                                  

 

             Non-Aa GFR   34 mL/min                               

 

             Afr Amer GFR 42 mL/min                              13

 

                    CBC W/Auto Differential 2021          [facility]

l

             CBC W/Automated Diff (SEE NOTE)                             14

 

             WBC          8.2 10^3/uL               4.2 - 11.0    

 

             RBC          4.95 10^6/uL              4.50 - 6.30   

 

             Hemoglobin   15.0 g/dL                 14.0 - 16.0   

 

             Hematocrit   45.8 %                    41.0 - 51.0   

 

             MCV          92.5 fL                   80.0 - 94.0   

 

             MCH          30.3 pg                   27.0 - 34.0   

 

             MCHC         32.8 g/dL                 31.0 - 36.0   

 

             RDW          13.7 %                    11.5 - 14.8   

 

             Platelets    257 10^3/uL               150 - 450     

 

             MPV          10.8 fL      High         7.4 - 10.4    

 

             Neut         68.1 %                    37.0 - 80.0   

 

             Lymph        18.6 %       Low          25.0 - 40.0   

 

             Mono         8.3 %        High         3.0 - 8.0     

 

             Eos          2.8 %                     0.0 - 7.0     

 

             Baso         1.8 %                     0.0 - 2.0     

 

             %Ig          0.4 %        High         0.0 - 0.0     

 

             %NRBC        0.0 %                     0.0 - 0.0     

 

             #Neut        5.57 10^3/uL              2.00 - 6.90   

 

             #Lymph       1.52 10^3/uL              0.60 - 3.40   

 

             #Mono        0.68 10^3/uL              0.00 - 0.90   

 

             #Eos         0.23 10^3/uL              0.00 - 0.70   

 

             #Baso        0.15 10^3/uL              0.00 - 0.20   

 

             #Ig          0.03 10^3/uL              0.00 - 0.10   

 

             #NRBC        0.00 10^3/uL              0.00 - 0.00   

 

             Manual Diff  NOT INDICATED                             

 

             RBC Morph    NOT INDICATED                             

 

                    CBC W/Auto Differential 2021          [facility]

l

             CBC W/Automated Diff (SEE NOTE)                             15

 

             WBC          9.3 10^3/uL               4.2 - 11.0    

 

             RBC          5.69 10^6/uL              4.50 - 6.30   

 

             Hemoglobin   17.4 g/dL    High         14.0 - 16.0   

 

             Hematocrit   51.0 %                    41.0 - 51.0   

 

             MCV          89.6 fL                   80.0 - 94.0   

 

             MCH          30.6 pg                   27.0 - 34.0   

 

             MCHC         34.1 g/dL                 31.0 - 36.0   

 

             RDW          12.6 %                    11.5 - 14.8   

 

             Platelets    221 10^3/uL               150 - 450     

 

             MPV          9.7 fL                    7.4 - 10.4    

 

             Neut         64.4 %                    37.0 - 80.0   

 

             Lymph        23.2 %       Low          25.0 - 40.0   

 

             Mono         8.4 %        High         3.0 - 8.0     

 

             Eos          2.3 %                     0.0 - 7.0     

 

             Baso         1.4 %                     0.0 - 2.0     

 

             %Ig          0.3 %        High         0.0 - 0.0     

 

             %NRBC        0.0 %                     0.0 - 0.0     

 

             #Neut        6.01 10^3/uL              2.00 - 6.90   

 

             #Lymph       2.16 10^3/uL              0.60 - 3.40   

 

             #Mono        0.78 10^3/uL              0.00 - 0.90   

 

             #Eos         0.21 10^3/uL              0.00 - 0.70   

 

             #Baso        0.13 10^3/uL              0.00 - 0.20   

 

             #Ig          0.03 10^3/uL              0.00 - 0.10   

 

             #NRBC        0.00 10^3/uL              0.00 - 0.00   

 

             Manual Diff  NOT INDICATED                             

 

             RBC Morph    NOT INDICATED                             

 

                    CMP W/Egfr          2021          [facility]

             Comprehensive Metabo (SEE NOTE)                             16

 

             Sodium       137 mEq/L                 134 - 153     

 

             Potassium    3.9 mEq/L                 3.6 - 5.0     

 

             Chloride     99 mEq/L                  98 - 107      

 

             Co2          25 mEq/L                  22 - 30       

 

             Glucose      166 mg/dL    High         70 - 99       

 

             BUN          19 mg/dL                  7 - 21        

 

             Creatinine   1.0 mg/dL                 0.7 - 1.5     

 

             BUN/Creat    19                        8 - 27        

 

             Total Protein 7.8 g/dL                  6.3 - 8.2     

 

             Albumin      4.8 g/dL                  3.9 - 5.0     

 

             Globulin     3.0 GM/DL                 2.4 - 3.2     

 

             A/G Ratio    1.6                       0.8 - 2.0     

 

             Calcium      10.2 mg/dL                8.4 - 10.2    

 

             Total Bili   0.7 mg/dL                 0.2 - 1.3     

 

             Alkaline Phos 84 U/L                    38 - 126      

 

             Sgot/Ast     29 U/L                    5 - 40        

 

             SGPT/Alt     29 U/L                    7 - 56        

 

             Anion Gap    13.0 mmol/L               8.0 - 16.0    

 

             Age          60 yrs                                  

 

             Non-Aa GFR   >60 mL/min                              

 

             Afr Amer GFR >60 mL/min                             17

 

                    Laboratory test finding 2021          [facility]

             Hgba1c       7.1 %        High         4.4 - 6.1    18

 

                    Lipid Panel         2021          [facility]

             Cve Panel    (SEE NOTE)                             19

 

             Cholesterol  182 mg/dL                 131 - 200     

 

             Triglycerides 304 mg/dL    High         35 - 160      

 

             HDL          44 mg/dL                  29 - 86       

 

             LDL          105 mg/dL                 65 - 175      

 

             Risk Factor  4.1                       3.4 - 4.9     

 

             LDL/HDL      2.39                      1.00 - 3.55  20

 

                    Laboratory test finding 2021          [facility]

             TSH Highly Sensitive 0.69 uIU/mL               0.47 - 5.01   

 

                    Laboratory test finding 2021          [facility]

             PSA - Diagnostic 0.71 ng/mL                0.00 - 4.00  21







                          1                         {      PROCEDURAL CONTROL   

VALID                                            )

{      KIT LOT #             [id number]                                     )

{      KIT EXP DATE          22                                     )



 

                          2                         This nucleic acid amplificat

ion test was developed and its performance

characteristics determined by [employer]. Nucleic acid

amplification tests include RT-PCR and TMA. This test has not been

FDA cleared or approved. This test has been authorized by FDA under

an Emergency Use Authorization (EUA). This test is only authorized

for the duration of time the declaration that circumstances exist

justifying the authorization of the emergency use of in vitro

diagnostic tests for detection of SARS-CoV-2 virus and/or diagnosis

of COVID-19 infection under section 564(b)(1) of the Act, 21 U.S.C.

                                        360bbb-3(b) (1), unless the authorizatio

n is terminated or revoked

sooner.

When diagnostic testing is negative, the possibility of a false

negative result should be considered in the context of a patient's

recent exposures and the presence of clinical signs and symptoms

consistent with COVID-19. An individual without symptoms of COVID-19

and who is not shedding SARS-CoV-2 virus would expect to have a

negative (not detected) result in this assay.



 

                          3                         Units are mL/min/1.73 m2



Chronic Kidney Disease Staging per NK[facility]:



Stage I & II   GFR >=60       Normal to Mildly Decreased

Stage III      GFR 30-59      Moderately Decreased

Stage IV       GFR 15-29      Severely Decreased

Stage V        GFR <15        Very Little GFR Left

ESRD           GFR <15 on RRT



 

                          4                         Is patient fasting?  N

 

                          5                         COMPREHENSIVE METABOLIC PANE

L



 

                          6                         Male GFR Interprentation

                                        20-49 yrs     >60 mL/min   Normal

                                        50-59 yrs     >56 mL/min   Normal

                                        60-69 yrs     >49 mL/min   Normal

                                        70-79yrs      >42 mL/min   Normal

                                        80 and above  >35 mL/min   Normal

Female GFR  Interpretation

                                        20-39 yrs     >60 mL/min   Normal

                                        40-49 yrs     >58 mL/min   Normal

                                        50-59 yrs     >51 mL/min   Normal

                                        60-69 yrs     >45 mL/min   Normal

                                        70-79 yrs     >39 mL/min   Normal

                                        80 and above  >32 mL/min   Normal



 

                          7                         Is patient fasting?  Y

 

                          8                         LIPID PANEL



 

                          9                         CVE

RISK           CHOL/HDL       LDL/HDL

MEN:

                                        1/2  AVERAGE          3.43          1.00

AVERAGE          4.97          3.55

                                        2X   AVERAGE          9.55          6.25

                                        3X   AVERAGE         23.99          7.99

WOMEN:

                                        1/2  AVERAGE          3.27          1.47

AVERAGE          4.44          3.22

                                        2X   AVERAGE          7.05          5.03

                                        3X   AVERAGE         11.04          6.14



 

                          10                        \BLDo\PSA INTERPRETATION\BLD

x\

The PSA assay should not be used alone for a screening test

or diagnosis for presence or absence of malignant disease.

Predictions of disease recurrence should not be based solely

on values obtained from serial patient serum values.

The PSA result was determined by "[employer]", on the Roche

SUNITHA 6000. Values obtained with different assay methods

or kits cannot be used interchangeably.



 

                          11                        {A1]

{HB]



 

                          12                        COMPREHENSIVE METABOLIC PANE

L



 

                          13                        Male GFR Interprentation

                                        20-49 yrs     >60 mL/min   Normal

                                        50-59 yrs     >56 mL/min   Normal

                                        60-69 yrs     >49 mL/min   Normal

                                        70-79yrs      >42 mL/min   Normal

                                        80 and above  >35 mL/min   Normal

Female GFR  Interpretation

                                        20-39 yrs     >60 mL/min   Normal

                                        40-49 yrs     >58 mL/min   Normal

                                        50-59 yrs     >51 mL/min   Normal

                                        60-69 yrs     >45 mL/min   Normal

                                        70-79 yrs     >39 mL/min   Normal

                                        80 and above  >32 mL/min   Normal



 

                          14                        COMPLETE BLOOD COUNT



 

                          15                        COMPLETE BLOOD COUNT



 

                          16                        COMPREHENSIVE METABOLIC PANE

L



 

                          17                        Male GFR Interprentation

                                        20-49 yrs     >60 mL/min   Normal

                                        50-59 yrs     >56 mL/min   Normal

                                        60-69 yrs     >49 mL/min   Normal

                                        70-79yrs      >42 mL/min   Normal

                                        80 and above  >35 mL/min   Normal

Female GFR  Interpretation

                                        20-39 yrs     >60 mL/min   Normal

                                        40-49 yrs     >58 mL/min   Normal

                                        50-59 yrs     >51 mL/min   Normal

                                        60-69 yrs     >45 mL/min   Normal

                                        70-79 yrs     >39 mL/min   Normal

                                        80 and above  >32 mL/min   Normal



 

                          18                        {A1]

{HB]



 

                          19                        LIPID PANEL



 

                          20                        CVE

RISK           CHOL/HDL       LDL/HDL

MEN:

                                        1/2  AVERAGE          3.43          1.00

AVERAGE          4.97          3.55

                                        2X   AVERAGE          9.55          6.25

                                        3X   AVERAGE         23.99          7.99

WOMEN:

                                        1/2  AVERAGE          3.27          1.47

AVERAGE          4.44          3.22

                                        2X   AVERAGE          7.05          5.03

                                        3X   AVERAGE         11.04          6.14



 

                          21                        \BLDo\PSA INTERPRETATION\BLD

x\

The PSA assay should not be used alone for a screening test

or diagnosis for presence or absence of malignant disease.

Predictions of disease recurrence should not be based solely

on values obtained from serial patient serum values.

The PSA result was determined by "[employer]", on the Roche

SUNITHA 6000. Values obtained with different assay methods

or kits cannot be used interchangeably.









Procedures





                Date            Code            Description     Status

 

                10/25/2021      00154           Office/Outpatient Established Mo

d MDM 30-39 Min Completed

 

                2021      77407           Office/Outpatient Established SF

 MDM 10-19 Min Completed

 

                2021      97850           Office/Outpatient Established Mo

d MDM 30-39 Min Completed

 

                2021      09395           Office/Outpatient Established Mo

d MDM 30-39 Min Completed

 

                2021      00505           Office/Outpatient Established Mo

d MDM 30-39 Min Completed

 

                2021      15829           Electrocardiogram Complete Compl

eted







[employer]





                                        Description

 

                                        No Information Available







Encounters





           Type       Date       Location   Provider   Dx         Diagnosis

 

             Office Visit 10/25/2021  1:20p [facility] Crystal Ne

vills, FNP 

R05.9                                   Cough, unspecified

 

                          Z11.52                    Encounter for screening for 

Covid-19

 

                          I10                       Essential (primary) hyperten

julia







Assessments





                Date            Code            Description     Provider

 

                10/25/2021      R05.9           Cough, unspecified Crystal Nevills

, FNP

 

                10/25/2021      Z11.52          Encounter for screening for Covi

d-19 Crystal Nevills, FNP

 

                10/25/2021      I10             Essential (primary) hypertension

 Crystal Nevills, FNP

 

                2021      E11.65          Type 2 diabetes mellitus with hy

perglycemia [facility]

 

                2021      E78.00          Pure hypercholesterolemia, unspe

cified [facility]

 

                2021      E11.65          Type 2 diabetes mellitus with hy

perglycemia Crystal Nevills, FNP

 

                2021      E78.00          Pure hypercholesterolemia, unspe

cified Crystal Nevills, FNP

 

                2021      I10             Essential (primary) hypertension

 Crystal Nevills, FNP

 

                2021      K21.9           Gastro-esophageal reflux disease

 without esophagitis Crystal 

Nevills, FNP

 

                2021      R91.1           Solitary pulmonary nodule Crystal 

Nevills, FN

 

                2021      G47.33          Obstructive sleep apnea (adult) 

(pediatric) Crystal Nevills, FNP

 

                2021      C64.1           Malignant neoplasm of right kidn

ey, except renal pelvis Crystal 

Nevills, FNP

 

                2021      Z79.899         Other long term (current) drug t

herapy Crystal Nevills, FNP

 

                2021      E11.65          Type 2 diabetes mellitus with hy

perglycemia Crystal Nevills, FNP

 

                2021      E78.00          Pure hypercholesterolemia, unspe

cified Crystal Nevills, FNP

 

                2021      Z12.5           Encounter for screening for jennifer

gnant neoplasm of prostate 

Crystal Nevills, FNP

 

                2021      I10             Essential (primary) hypertension

 Crystal Nevills, FNP

 

                2021      K21.9           Gastro-esophageal reflux disease

 without esophagitis Crystal 

Nevills, FNP

 

                2021      R91.1           Solitary pulmonary nodule Crystal 

Nevills, FNP

 

                2021      G47.33          Obstructive sleep apnea (adult) 

(pediatric) Crystal Nevills, P

 

                2021      C64.1           Malignant neoplasm of right kidn

ey, except renal pelvis Crystal 

Nevills, [facility]

 

                2021      Z79.899         Other long term (current) drug t

herapy Crystal Nevills, [facility]

 

                2021      E11.65          Type 2 diabetes mellitus with hy

perglycemia Crystal Nevills, [facility]

 

                2021      E78.00          Pure hypercholesterolemia, unspe

cified Crystal Nevills, [facility]

 

                2021      I10             Essential (primary) hypertension

 Crystal Nevills, [facility]

 

                2021      N52.1           Erectile dysfunction due to dise

ases classified elsewhere Crystal

Nevills, [facility]

 

                2021      R07.89          Other chest pain Crystal Nevills, 

[facility]

 

                2021      K21.9           Gastro-esophageal reflux disease

 without esophagitis Crystal 

Nevills, [facility]

 

                2021      Z79.899         Other long term (current) drug t

herapy Crystal Nevills, [facility]

 

                2021      E11.65          Type 2 diabetes mellitus with hy

perglycemia Crystal Nevills, [facility]

 

                2021      E78.00          Pure hypercholesterolemia, unspe

cified Crystal Nevills, [facility]

 

                2021      Z79.899         Other long term (current) drug t

herapy Crystal Nevills, [facility]

 

                2021      Z12.5           Encounter for screening for jennifer

gnant neoplasm of prostate 

Crystal Nevills, [facility]

 

                2021      Z68.41          Body mass index [BMI]40.0-44.9, 

adult Crystal Nevills, [facility]

 

                2021      N52.1           Erectile dysfunction due to dise

ases classified elsewhere Crystal

 Nevills, [facility]

 

                2021      I10             Essential (primary) hypertension

 Crystal Nevills, [facility]

 

                2021      R07.89          Other chest pain Crystal Nevills, 

FNP







Plan of Treatment

Future Appointment(s):* 2021  8:20 am - Crystal Carrington, FNP at [facility]

* 2021  8:00 am - [facility] at [facility]

10/25/2021 - CrystalSHAVONNE Del Valle* R05.9 Cough, unspecified* Comments:* He had the
   labs done to test for RSV.Continue with Tesalon Perles as needed for 
  cough.Further plans to follow the lab results.We will continue to monitor.



* Follow up:* Patient to be contacted as soon as results are back. IF CONDITIONS
   WORSEN  AS NEEDED





* Z11.52 Encounter for screening for Covid-19* Comments:* He had the swabs done 
  to test for COVID.Further plans to follow the lab results.We will continue to 
  monitor.





* I10 Essential (primary) hypertension* Comments:* His BP was noted at 
  150/82.The patient was advised to continue with the current line of therapies.
   He will benefit from maintaining a low-sodium diet.  We will continue to 
  monitor.









Functional Status





                                        Description

 

                                        No Information Available







Mental Status





                                        Description

 

                                        No Information Available







Referrals





                Refer to      Reason for Referral Status          Appt Date

 

                          Ishmael Puckett        Patient requesting referral 

for pain management of chronic 

neck and back pain.       Sent                      

 

                                        [address] [place] [address]

 

                                        [phone]

 

                                          

 

                          Ingrid Velázquez,   Patient s/p recent right nep

hectomy.  Post op creatinine

 elevated at 2.1 on two separate occasions.  Please evaluate.  Thank you!  Sent 

                     



 

                                        [address]

 

                                        [place] [address]

 

                                        [phone]

 

                                          

 

                          [facility] Please see this pleasant

 59 y/o male s/p Right 

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  Closed               

     2021

 

                                        [address] [place] [address]

 

                                        [phone]

 

                                          

 

                          [facility] chest 2021 jeancarlos

ws evidence for bilateral 

pleural plaquing consistent ith previous asbestos exposure. There is also a 
nodular density superior segment LLL which warrants follow up study.  Please 
evaluate.  Thank you.     Closed                    [address]

 

                                        [place] [address]

 

                                        [phone]

 

                                          

 

                                                    Please evaluate finding of m

asslike lesion in the lower pole of the right 

kidney and systic lesions in the left kidney per imaging.  MRI of the Abdomen 
and Pelvis with and without contrast states most likely a large renal cell 
carcinoma.PT will hand deliver discs at appt. PLEASE EVALUATE AND TREAT AS SOON 
AS POSSIBLE. THANK YOU.   Closed                    2021

 

                                          

 

                Caleb Powers MD Chest pain.  HTN.  Current EKG showing PVC's

.  Closed          

06/15/2021

 

                                        [address] [place] [address]

 

                                        [phone]

## 2021-11-18 ENCOUNTER — HOSPITAL ENCOUNTER (OUTPATIENT)
Dept: HOSPITAL 53 - M LAB REF | Age: 61
End: 2021-11-18
Attending: INTERNAL MEDICINE
Payer: COMMERCIAL

## 2021-11-18 DIAGNOSIS — E04.2: Primary | ICD-10-CM

## 2021-12-01 ENCOUNTER — HOSPITAL ENCOUNTER (OUTPATIENT)
Dept: HOSPITAL 53 - M RAD | Age: 61
End: 2021-12-01
Attending: INTERNAL MEDICINE
Payer: COMMERCIAL

## 2021-12-01 DIAGNOSIS — C64.9: Primary | ICD-10-CM

## 2021-12-01 PROCEDURE — 71250 CT THORAX DX C-: CPT

## 2021-12-01 PROCEDURE — 74176 CT ABD & PELVIS W/O CONTRAST: CPT

## 2022-01-31 ENCOUNTER — HOSPITAL ENCOUNTER (OUTPATIENT)
Dept: HOSPITAL 53 - M OUTALCOH | Age: 62
End: 2022-01-31
Attending: PSYCHIATRY & NEUROLOGY
Payer: COMMERCIAL

## 2022-01-31 DIAGNOSIS — Z02.89: Primary | ICD-10-CM

## 2022-02-07 ENCOUNTER — HOSPITAL ENCOUNTER (OUTPATIENT)
Dept: HOSPITAL 53 - M OUTALCOH | Age: 62
LOS: 21 days | End: 2022-02-28
Attending: PSYCHIATRY & NEUROLOGY
Payer: COMMERCIAL

## 2022-02-07 DIAGNOSIS — F10.10: Primary | ICD-10-CM

## 2022-03-14 ENCOUNTER — HOSPITAL ENCOUNTER (OUTPATIENT)
Dept: HOSPITAL 53 - M RAD | Age: 62
End: 2022-03-14
Attending: INTERNAL MEDICINE
Payer: COMMERCIAL

## 2022-03-14 DIAGNOSIS — C64.9: Primary | ICD-10-CM

## 2022-03-14 PROCEDURE — 78306 BONE IMAGING WHOLE BODY: CPT

## 2022-03-21 ENCOUNTER — HOSPITAL ENCOUNTER (OUTPATIENT)
Dept: HOSPITAL 53 - M OUTALCOH | Age: 62
End: 2022-03-21
Attending: PSYCHIATRY & NEUROLOGY
Payer: COMMERCIAL

## 2022-03-21 DIAGNOSIS — C67.9: Primary | ICD-10-CM

## 2022-03-31 ENCOUNTER — HOSPITAL ENCOUNTER (OUTPATIENT)
Dept: HOSPITAL 53 - M OUTALCOH | Age: 62
End: 2022-03-31
Attending: PSYCHIATRY & NEUROLOGY
Payer: COMMERCIAL

## 2022-03-31 DIAGNOSIS — Z72.0: ICD-10-CM

## 2022-03-31 DIAGNOSIS — F10.20: Primary | ICD-10-CM

## 2022-04-13 ENCOUNTER — HOSPITAL ENCOUNTER (OUTPATIENT)
Dept: HOSPITAL 53 - M LAB REF | Age: 62
End: 2022-04-13
Attending: INTERNAL MEDICINE
Payer: COMMERCIAL

## 2022-04-13 DIAGNOSIS — D63.1: ICD-10-CM

## 2022-04-13 DIAGNOSIS — E61.1: Primary | ICD-10-CM

## 2022-04-13 LAB
FERRITIN SERPL-MCNC: 49 NG/ML (ref 26–388)
IRON SATN MFR SERPL: 14.6 % (ref 19.7–50)
IRON SERPL-MCNC: 62 UG/DL (ref 65–175)
TIBC SERPL-MCNC: 424 UG/DL (ref 250–450)

## 2022-04-22 ENCOUNTER — HOSPITAL ENCOUNTER (OUTPATIENT)
Dept: HOSPITAL 53 - M INFU | Age: 62
Discharge: HOME | End: 2022-04-22
Attending: INTERNAL MEDICINE
Payer: COMMERCIAL

## 2022-04-22 VITALS — BODY MASS INDEX: 39.01 KG/M2 | HEIGHT: 70 IN | WEIGHT: 272.49 LBS

## 2022-04-22 VITALS — DIASTOLIC BLOOD PRESSURE: 84 MMHG | SYSTOLIC BLOOD PRESSURE: 178 MMHG

## 2022-04-22 VITALS — DIASTOLIC BLOOD PRESSURE: 82 MMHG | SYSTOLIC BLOOD PRESSURE: 163 MMHG

## 2022-04-22 DIAGNOSIS — D61.1: Primary | ICD-10-CM

## 2022-04-22 PROCEDURE — 96402 CHEMO HORMON ANTINEOPL SQ/IM: CPT

## 2022-04-22 PROCEDURE — 96365 THER/PROPH/DIAG IV INF INIT: CPT

## 2022-04-28 ENCOUNTER — HOSPITAL ENCOUNTER (OUTPATIENT)
Dept: HOSPITAL 53 - M OUTALCOH | Age: 62
LOS: 2 days | End: 2022-04-30
Attending: PSYCHIATRY & NEUROLOGY
Payer: COMMERCIAL

## 2022-04-28 DIAGNOSIS — Z72.0: ICD-10-CM

## 2022-04-28 DIAGNOSIS — F10.20: Primary | ICD-10-CM

## 2022-05-02 ENCOUNTER — HOSPITAL ENCOUNTER (OUTPATIENT)
Dept: HOSPITAL 53 - M INFU | Age: 62
Discharge: HOME | End: 2022-05-02
Attending: INTERNAL MEDICINE
Payer: COMMERCIAL

## 2022-05-02 VITALS — WEIGHT: 272.49 LBS | HEIGHT: 70 IN | BODY MASS INDEX: 39.01 KG/M2

## 2022-05-02 VITALS — DIASTOLIC BLOOD PRESSURE: 96 MMHG | SYSTOLIC BLOOD PRESSURE: 193 MMHG

## 2022-05-02 DIAGNOSIS — Z91.013: ICD-10-CM

## 2022-05-02 DIAGNOSIS — D50.9: Primary | ICD-10-CM

## 2022-05-02 PROCEDURE — 96365 THER/PROPH/DIAG IV INF INIT: CPT

## 2022-05-31 ENCOUNTER — HOSPITAL ENCOUNTER (OUTPATIENT)
Dept: HOSPITAL 53 - M OUTALCOH | Age: 62
End: 2022-05-31
Attending: PSYCHIATRY & NEUROLOGY
Payer: COMMERCIAL

## 2022-05-31 DIAGNOSIS — Z72.0: ICD-10-CM

## 2022-05-31 DIAGNOSIS — F10.20: Primary | ICD-10-CM

## 2022-06-09 ENCOUNTER — HOSPITAL ENCOUNTER (OUTPATIENT)
Dept: HOSPITAL 53 - M OUTALCOH | Age: 62
LOS: 21 days | End: 2022-06-30
Attending: PSYCHIATRY & NEUROLOGY
Payer: COMMERCIAL

## 2022-06-09 DIAGNOSIS — Z72.0: ICD-10-CM

## 2022-06-09 DIAGNOSIS — F10.20: Primary | ICD-10-CM

## 2023-09-05 ENCOUNTER — HOSPITAL ENCOUNTER (OUTPATIENT)
Dept: HOSPITAL 53 - M PLARAD | Age: 63
End: 2023-09-05
Attending: INTERNAL MEDICINE
Payer: COMMERCIAL

## 2023-09-05 DIAGNOSIS — R91.1: Primary | ICD-10-CM

## 2023-09-05 PROCEDURE — 78815 PET IMAGE W/CT SKULL-THIGH: CPT

## 2023-10-04 ENCOUNTER — HOSPITAL ENCOUNTER (OUTPATIENT)
Dept: HOSPITAL 53 - M SDC | Age: 63
Discharge: HOME | End: 2023-10-04
Attending: INTERNAL MEDICINE
Payer: COMMERCIAL

## 2023-10-04 VITALS — TEMPERATURE: 97.4 F | DIASTOLIC BLOOD PRESSURE: 69 MMHG | SYSTOLIC BLOOD PRESSURE: 146 MMHG | OXYGEN SATURATION: 96 %

## 2023-10-04 VITALS — HEIGHT: 70 IN | WEIGHT: 260 LBS | BODY MASS INDEX: 37.22 KG/M2

## 2023-10-04 DIAGNOSIS — Z79.84: ICD-10-CM

## 2023-10-04 DIAGNOSIS — J42: ICD-10-CM

## 2023-10-04 DIAGNOSIS — C77.1: Primary | ICD-10-CM

## 2023-10-04 DIAGNOSIS — C80.1: ICD-10-CM

## 2023-10-04 DIAGNOSIS — Z90.5: ICD-10-CM

## 2023-10-04 DIAGNOSIS — Z79.899: ICD-10-CM

## 2023-10-04 DIAGNOSIS — G47.33: ICD-10-CM

## 2023-10-04 DIAGNOSIS — Z85.528: ICD-10-CM

## 2023-10-04 DIAGNOSIS — E11.9: ICD-10-CM

## 2023-10-04 PROCEDURE — 88173 CYTOPATH EVAL FNA REPORT: CPT

## 2023-10-04 PROCEDURE — 71045 X-RAY EXAM CHEST 1 VIEW: CPT

## 2023-10-04 PROCEDURE — 88305 TISSUE EXAM BY PATHOLOGIST: CPT

## 2023-10-04 PROCEDURE — 31652 BRONCH EBUS SAMPLNG 1/2 NODE: CPT

## 2023-10-04 RX ADMIN — THROMBIN, TOPICAL (BOVINE) ONE UNITS: KIT at 07:09

## 2023-10-04 RX ADMIN — BENZOCAINE, BUTAMBEN, AND TETRACAINE HYDROCHLORIDE ONE SPRAY: .028; .004; .004 AEROSOL, SPRAY TOPICAL at 07:49

## 2023-10-04 RX ADMIN — LIDOCAINE HYDROCHLORIDE ONE ML: 10 INJECTION, SOLUTION EPIDURAL; INFILTRATION; INTRACAUDAL; PERINEURAL at 07:10

## 2023-10-04 RX ADMIN — DEXTROSE MONOHYDRATE ONE MG: 50 INJECTION, SOLUTION INTRAVENOUS at 07:10

## 2023-10-04 RX ADMIN — SODIUM CHLORIDE, POTASSIUM CHLORIDE, SODIUM LACTATE AND CALCIUM CHLORIDE SCH MLS/HR: 600; 310; 30; 20 INJECTION, SOLUTION INTRAVENOUS at 08:46

## 2023-11-02 ENCOUNTER — HOSPITAL ENCOUNTER (OUTPATIENT)
Dept: HOSPITAL 53 - M ONCR | Age: 63
End: 2023-11-02
Attending: RADIOLOGY
Payer: COMMERCIAL

## 2023-11-02 DIAGNOSIS — Z79.899: ICD-10-CM

## 2023-11-02 DIAGNOSIS — Z79.84: ICD-10-CM

## 2023-11-02 DIAGNOSIS — Z91.048: ICD-10-CM

## 2023-11-02 DIAGNOSIS — Z71.2: ICD-10-CM

## 2023-11-02 DIAGNOSIS — Z90.5: ICD-10-CM

## 2023-11-02 DIAGNOSIS — Z91.030: ICD-10-CM

## 2023-11-02 DIAGNOSIS — F17.210: ICD-10-CM

## 2023-11-02 DIAGNOSIS — Z91.013: ICD-10-CM

## 2023-11-02 DIAGNOSIS — C34.12: Primary | ICD-10-CM

## 2023-11-02 DIAGNOSIS — Z80.8: ICD-10-CM

## 2023-11-02 DIAGNOSIS — Z91.02: ICD-10-CM

## 2023-11-02 DIAGNOSIS — Z85.528: ICD-10-CM

## 2023-11-13 ENCOUNTER — HOSPITAL ENCOUNTER (OUTPATIENT)
Dept: HOSPITAL 53 - M IRPRO | Age: 63
End: 2023-11-13
Attending: INTERNAL MEDICINE
Payer: COMMERCIAL

## 2023-11-13 VITALS — OXYGEN SATURATION: 95 % | DIASTOLIC BLOOD PRESSURE: 62 MMHG | SYSTOLIC BLOOD PRESSURE: 136 MMHG

## 2023-11-13 VITALS — TEMPERATURE: 97.5 F

## 2023-11-13 DIAGNOSIS — C64.9: Primary | ICD-10-CM

## 2023-11-13 PROCEDURE — 36561 INSERT TUNNELED CV CATH: CPT

## 2023-11-13 PROCEDURE — 99153 MOD SED SAME PHYS/QHP EA: CPT

## 2023-11-13 PROCEDURE — 99152 MOD SED SAME PHYS/QHP 5/>YRS: CPT

## 2023-11-27 ENCOUNTER — HOSPITAL ENCOUNTER (OUTPATIENT)
Dept: HOSPITAL 53 - M PLAIMG | Age: 63
End: 2023-11-27
Attending: INTERNAL MEDICINE
Payer: COMMERCIAL

## 2023-11-27 DIAGNOSIS — C64.9: Primary | ICD-10-CM

## 2023-11-27 PROCEDURE — 70553 MRI BRAIN STEM W/O & W/DYE: CPT

## 2023-11-28 ENCOUNTER — HOSPITAL ENCOUNTER (OUTPATIENT)
Dept: HOSPITAL 53 - M RAD | Age: 63
End: 2023-11-28
Attending: RADIOLOGY
Payer: COMMERCIAL

## 2023-11-28 DIAGNOSIS — I82.621: ICD-10-CM

## 2023-11-28 DIAGNOSIS — C34.12: Primary | ICD-10-CM

## 2023-11-30 ENCOUNTER — HOSPITAL ENCOUNTER (OUTPATIENT)
Dept: HOSPITAL 53 - M ONCR | Age: 63
End: 2023-11-30
Attending: RADIOLOGY
Payer: COMMERCIAL

## 2023-11-30 DIAGNOSIS — Z51.0: Primary | ICD-10-CM

## 2023-11-30 DIAGNOSIS — C34.12: ICD-10-CM

## 2023-12-14 ENCOUNTER — HOSPITAL ENCOUNTER (OUTPATIENT)
Dept: HOSPITAL 53 - M PLAIMG | Age: 63
End: 2023-12-14
Attending: INTERNAL MEDICINE
Payer: COMMERCIAL

## 2023-12-14 DIAGNOSIS — C34.90: Primary | ICD-10-CM

## 2023-12-14 DIAGNOSIS — R29.818: ICD-10-CM

## 2023-12-14 DIAGNOSIS — R94.02: ICD-10-CM

## 2023-12-14 PROCEDURE — 70546 MR ANGIOGRAPH HEAD W/O&W/DYE: CPT

## 2023-12-29 ENCOUNTER — HOSPITAL ENCOUNTER (OUTPATIENT)
Dept: HOSPITAL 53 - M ONCR | Age: 63
LOS: 2 days | End: 2023-12-31
Attending: RADIOLOGY
Payer: COMMERCIAL

## 2023-12-29 DIAGNOSIS — Z51.0: Primary | ICD-10-CM

## 2023-12-29 DIAGNOSIS — C34.12: ICD-10-CM

## 2024-01-22 ENCOUNTER — HOSPITAL ENCOUNTER (OUTPATIENT)
Dept: HOSPITAL 53 - M ONCR | Age: 64
LOS: 9 days | End: 2024-01-31
Attending: RADIOLOGY
Payer: COMMERCIAL

## 2024-01-22 DIAGNOSIS — Z51.0: Primary | ICD-10-CM

## 2024-01-22 DIAGNOSIS — C34.12: ICD-10-CM

## 2024-02-02 ENCOUNTER — HOSPITAL ENCOUNTER (OUTPATIENT)
Dept: HOSPITAL 53 - M LAB REF | Age: 64
End: 2024-02-02
Attending: INTERNAL MEDICINE
Payer: COMMERCIAL

## 2024-02-02 ENCOUNTER — HOSPITAL ENCOUNTER (OUTPATIENT)
Dept: HOSPITAL 53 - M LAB REF | Age: 64
End: 2024-02-02
Payer: COMMERCIAL

## 2024-02-02 DIAGNOSIS — N40.1: Primary | ICD-10-CM

## 2024-02-02 DIAGNOSIS — E11.65: Primary | ICD-10-CM

## 2024-02-02 LAB
BUN SERPL-MCNC: 32 MG/DL (ref 9–23)
CALCIUM SERPL-MCNC: 9 MG/DL (ref 8.3–10.6)
CHLORIDE SERPL-SCNC: 103 MMOL/L (ref 98–107)
CO2 SERPL-SCNC: 21 MMOL/L (ref 20–31)
CREAT SERPL-MCNC: 1.48 MG/DL (ref 0.7–1.3)
EST. AVERAGE GLUCOSE BLD GHB EST-MCNC: 151 MG/DL (ref 60–110)
GFR SERPL CREATININE-BSD FRML MDRD: 51.1 ML/MIN/{1.73_M2} (ref 49–?)
GLUCOSE SERPL-MCNC: 185 MG/DL (ref 74–106)
POTASSIUM SERPL-SCNC: 3.9 MMOL/L (ref 3.5–5.1)
PSA SERPL-MCNC: 0.83 NG/ML (ref ?–4)
SODIUM SERPL-SCNC: 136 MMOL/L (ref 136–145)

## 2024-02-27 ENCOUNTER — HOSPITAL ENCOUNTER (OUTPATIENT)
Dept: HOSPITAL 53 - M PLAIMG | Age: 64
End: 2024-02-27
Attending: NURSE PRACTITIONER
Payer: COMMERCIAL

## 2024-02-27 DIAGNOSIS — C34.90: Primary | ICD-10-CM

## 2024-05-09 ENCOUNTER — HOSPITAL ENCOUNTER (OUTPATIENT)
Dept: HOSPITAL 53 - M ONCR | Age: 64
End: 2024-05-09
Attending: RADIOLOGY
Payer: COMMERCIAL

## 2024-05-09 DIAGNOSIS — Z79.01: ICD-10-CM

## 2024-05-09 DIAGNOSIS — Z79.84: ICD-10-CM

## 2024-05-09 DIAGNOSIS — Z92.21: ICD-10-CM

## 2024-05-09 DIAGNOSIS — F17.210: ICD-10-CM

## 2024-05-09 DIAGNOSIS — C34.12: Primary | ICD-10-CM

## 2024-05-09 DIAGNOSIS — Z91.013: ICD-10-CM
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
## 2024-05-09 DIAGNOSIS — Z79.52: ICD-10-CM

## 2024-05-09 DIAGNOSIS — Z77.090: ICD-10-CM

## 2024-05-09 DIAGNOSIS — Z87.891: ICD-10-CM

## 2024-05-09 DIAGNOSIS — Z79.899: ICD-10-CM

## 2024-05-09 DIAGNOSIS — Z79.620: ICD-10-CM

## 2024-05-09 DIAGNOSIS — J70.0: ICD-10-CM

## 2024-05-09 DIAGNOSIS — Z91.02: ICD-10-CM

## 2024-05-09 DIAGNOSIS — Z91.030: ICD-10-CM

## 2024-05-09 DIAGNOSIS — Z91.048: ICD-10-CM

## 2024-05-09 DIAGNOSIS — C64.9: ICD-10-CM

## 2024-05-09 DIAGNOSIS — Z92.3: ICD-10-CM

## 2024-05-09 DIAGNOSIS — Z71.2: ICD-10-CM

## 2024-05-16 ENCOUNTER — HOSPITAL ENCOUNTER (OUTPATIENT)
Dept: HOSPITAL 53 - M PLARAD | Age: 64
End: 2024-05-16
Attending: INTERNAL MEDICINE
Payer: COMMERCIAL

## 2024-05-16 DIAGNOSIS — C64.9: ICD-10-CM

## 2024-05-16 DIAGNOSIS — C34.90: Primary | ICD-10-CM

## 2024-05-24 ENCOUNTER — HOSPITAL ENCOUNTER (OUTPATIENT)
Dept: HOSPITAL 53 - M RAD | Age: 64
End: 2024-05-24
Attending: INTERNAL MEDICINE
Payer: COMMERCIAL

## 2024-05-24 DIAGNOSIS — C34.90: Primary | ICD-10-CM

## 2024-05-24 DIAGNOSIS — E04.1: ICD-10-CM

## 2024-06-14 ENCOUNTER — HOSPITAL ENCOUNTER (OUTPATIENT)
Dept: HOSPITAL 53 - M ONCR | Age: 64
End: 2024-06-14
Attending: RADIOLOGY
Payer: COMMERCIAL

## 2024-06-14 DIAGNOSIS — Z91.048: ICD-10-CM

## 2024-06-14 DIAGNOSIS — Z77.090: ICD-10-CM

## 2024-06-14 DIAGNOSIS — Z79.899: ICD-10-CM

## 2024-06-14 DIAGNOSIS — F17.210: ICD-10-CM

## 2024-06-14 DIAGNOSIS — Z79.51: ICD-10-CM

## 2024-06-14 DIAGNOSIS — Z71.2: ICD-10-CM

## 2024-06-14 DIAGNOSIS — Z92.3: ICD-10-CM

## 2024-06-14 DIAGNOSIS — Z91.02: ICD-10-CM

## 2024-06-14 DIAGNOSIS — Z91.030: ICD-10-CM

## 2024-06-14 DIAGNOSIS — Z85.528: ICD-10-CM

## 2024-06-14 DIAGNOSIS — Z91.013: ICD-10-CM

## 2024-06-14 DIAGNOSIS — Z92.21: ICD-10-CM

## 2024-06-14 DIAGNOSIS — C34.12: Primary | ICD-10-CM

## 2024-06-14 DIAGNOSIS — Z79.84: ICD-10-CM

## 2024-06-26 ENCOUNTER — HOSPITAL ENCOUNTER (OUTPATIENT)
Dept: HOSPITAL 53 - M LAB | Age: 64
End: 2024-06-26
Attending: INTERNAL MEDICINE
Payer: COMMERCIAL

## 2024-06-26 DIAGNOSIS — I49.49: Primary | ICD-10-CM

## 2024-06-26 DIAGNOSIS — R07.9: ICD-10-CM

## 2024-06-26 LAB
BASOPHILS # BLD AUTO: 0.1 10^3/UL (ref 0–0.2)
BASOPHILS NFR BLD AUTO: 1 % (ref 0–1)
BUN SERPL-MCNC: 26 MG/DL (ref 9–23)
CALCIUM SERPL-MCNC: 9.2 MG/DL (ref 8.3–10.6)
CHLORIDE SERPL-SCNC: 104 MMOL/L (ref 98–107)
CO2 SERPL-SCNC: 26 MMOL/L (ref 20–31)
CREAT SERPL-MCNC: 1.61 MG/DL (ref 0.7–1.3)
EOSINOPHIL # BLD AUTO: 0.1 10^3/UL (ref 0–0.5)
EOSINOPHIL NFR BLD AUTO: 0.8 % (ref 0–3)
GFR SERPL CREATININE-BSD FRML MDRD: 46.4 ML/MIN/{1.73_M2} (ref 49–?)
GLUCOSE SERPL-MCNC: 173 MG/DL (ref 74–106)
HCT VFR BLD AUTO: 44.2 % (ref 42–52)
HGB BLD-MCNC: 14.4 G/DL (ref 13.5–17.5)
LYMPHOCYTES # BLD AUTO: 0.9 10^3/UL (ref 1.5–5)
LYMPHOCYTES NFR BLD AUTO: 8.6 % (ref 24–44)
MCH RBC QN AUTO: 28.6 PG (ref 27–33)
MCHC RBC AUTO-ENTMCNC: 32.6 G/DL (ref 32–36.5)
MCV RBC AUTO: 87.9 FL (ref 80–96)
MONOCYTES # BLD AUTO: 1.1 10^3/UL (ref 0–0.8)
MONOCYTES NFR BLD AUTO: 10.8 % (ref 2–8)
NEUTROPHILS # BLD AUTO: 7.7 10^3/UL (ref 1.5–8.5)
NEUTROPHILS NFR BLD AUTO: 78.3 % (ref 36–66)
PLATELET # BLD AUTO: 203 10^3/UL (ref 150–450)
POTASSIUM SERPL-SCNC: 3.5 MMOL/L (ref 3.5–5.1)
RBC # BLD AUTO: 5.03 10^6/UL (ref 4.3–6.1)
SODIUM SERPL-SCNC: 136 MMOL/L (ref 136–145)
WBC # BLD AUTO: 9.9 10^3/UL (ref 4–10)

## 2024-07-02 ENCOUNTER — HOSPITAL ENCOUNTER (OUTPATIENT)
Dept: HOSPITAL 53 - M LAB | Age: 64
End: 2024-07-02
Attending: INTERNAL MEDICINE
Payer: COMMERCIAL

## 2024-07-02 DIAGNOSIS — I49.49: Primary | ICD-10-CM

## 2024-07-02 LAB
CREAT SERPL-MCNC: 1.46 MG/DL (ref 0.7–1.3)
GFR SERPL CREATININE-BSD FRML MDRD: 51.9 ML/MIN/{1.73_M2} (ref 49–?)

## 2024-07-17 ENCOUNTER — HOSPITAL ENCOUNTER (OUTPATIENT)
Dept: HOSPITAL 53 - M ONCR | Age: 64
End: 2024-07-17
Attending: RADIOLOGY
Payer: COMMERCIAL

## 2024-07-17 ENCOUNTER — HOSPITAL ENCOUNTER (OUTPATIENT)
Dept: HOSPITAL 53 - M RAD | Age: 64
End: 2024-07-17
Attending: RADIOLOGY
Payer: MEDICAID

## 2024-07-17 DIAGNOSIS — C34.12: Primary | ICD-10-CM

## 2024-07-17 DIAGNOSIS — Z77.090: ICD-10-CM

## 2024-07-17 DIAGNOSIS — Z91.013: ICD-10-CM

## 2024-07-17 DIAGNOSIS — Z71.2: ICD-10-CM

## 2024-07-17 DIAGNOSIS — Z79.620: ICD-10-CM

## 2024-07-17 DIAGNOSIS — Z79.84: ICD-10-CM

## 2024-07-17 DIAGNOSIS — Z91.02: ICD-10-CM

## 2024-07-17 DIAGNOSIS — Z79.899: ICD-10-CM

## 2024-07-17 DIAGNOSIS — Z92.21: ICD-10-CM

## 2024-07-17 DIAGNOSIS — Z91.030: ICD-10-CM

## 2024-07-17 DIAGNOSIS — Z92.3: ICD-10-CM

## 2024-07-17 DIAGNOSIS — Z91.048: ICD-10-CM

## 2024-07-17 DIAGNOSIS — Z87.891: ICD-10-CM

## 2024-07-17 PROCEDURE — 71046 X-RAY EXAM CHEST 2 VIEWS: CPT

## 2024-08-19 ENCOUNTER — HOSPITAL ENCOUNTER (OUTPATIENT)
Dept: HOSPITAL 53 - M RAD | Age: 64
End: 2024-08-19
Attending: INTERNAL MEDICINE
Payer: COMMERCIAL

## 2024-08-19 DIAGNOSIS — R91.8: ICD-10-CM

## 2024-08-19 DIAGNOSIS — Z90.5: ICD-10-CM

## 2024-08-19 DIAGNOSIS — C64.1: Primary | ICD-10-CM

## 2024-08-30 ENCOUNTER — HOSPITAL ENCOUNTER (OUTPATIENT)
Dept: HOSPITAL 53 - M RAD | Age: 64
End: 2024-08-30
Attending: RADIOLOGY
Payer: COMMERCIAL

## 2024-08-30 DIAGNOSIS — C34.12: Primary | ICD-10-CM

## 2024-08-30 PROCEDURE — 70553 MRI BRAIN STEM W/O & W/DYE: CPT

## 2024-09-03 ENCOUNTER — HOSPITAL ENCOUNTER (OUTPATIENT)
Dept: HOSPITAL 53 - M ONCR | Age: 64
End: 2024-09-03
Attending: RADIOLOGY
Payer: COMMERCIAL

## 2024-09-03 DIAGNOSIS — C79.31: Primary | ICD-10-CM

## 2024-09-03 DIAGNOSIS — I67.9: ICD-10-CM

## 2024-09-03 DIAGNOSIS — Z92.21: ICD-10-CM

## 2024-09-03 DIAGNOSIS — Z87.891: ICD-10-CM

## 2024-09-03 DIAGNOSIS — C34.12: ICD-10-CM

## 2024-09-03 DIAGNOSIS — Z91.02: ICD-10-CM

## 2024-09-03 DIAGNOSIS — Z79.01: ICD-10-CM

## 2024-09-03 DIAGNOSIS — Z92.3: ICD-10-CM

## 2024-09-03 DIAGNOSIS — Z91.048: ICD-10-CM

## 2024-09-03 DIAGNOSIS — Z79.899: ICD-10-CM

## 2024-09-03 DIAGNOSIS — Z79.84: ICD-10-CM

## 2024-09-03 DIAGNOSIS — Z91.030: ICD-10-CM

## 2024-09-03 DIAGNOSIS — Z91.013: ICD-10-CM

## 2024-09-03 DIAGNOSIS — Z85.528: ICD-10-CM

## 2024-09-21 ENCOUNTER — HOSPITAL ENCOUNTER (INPATIENT)
Dept: HOSPITAL 53 - M ED | Age: 64
LOS: 4 days | Discharge: HOME | DRG: 194 | End: 2024-09-25
Attending: HOSPITALIST | Admitting: STUDENT IN AN ORGANIZED HEALTH CARE EDUCATION/TRAINING PROGRAM
Payer: COMMERCIAL

## 2024-09-21 VITALS — TEMPERATURE: 97 F | DIASTOLIC BLOOD PRESSURE: 58 MMHG | SYSTOLIC BLOOD PRESSURE: 121 MMHG | OXYGEN SATURATION: 96 %

## 2024-09-21 VITALS — TEMPERATURE: 97.5 F | DIASTOLIC BLOOD PRESSURE: 52 MMHG | OXYGEN SATURATION: 96 % | SYSTOLIC BLOOD PRESSURE: 122 MMHG

## 2024-09-21 VITALS — WEIGHT: 251.55 LBS | HEIGHT: 70 IN | BODY MASS INDEX: 36.01 KG/M2

## 2024-09-21 DIAGNOSIS — Z91.030: ICD-10-CM

## 2024-09-21 DIAGNOSIS — K21.9: ICD-10-CM

## 2024-09-21 DIAGNOSIS — Z86.718: ICD-10-CM

## 2024-09-21 DIAGNOSIS — M19.90: ICD-10-CM

## 2024-09-21 DIAGNOSIS — N18.9: ICD-10-CM

## 2024-09-21 DIAGNOSIS — Z88.8: ICD-10-CM

## 2024-09-21 DIAGNOSIS — I12.9: ICD-10-CM

## 2024-09-21 DIAGNOSIS — E04.1: ICD-10-CM

## 2024-09-21 DIAGNOSIS — J18.9: Primary | ICD-10-CM

## 2024-09-21 DIAGNOSIS — D84.9: ICD-10-CM

## 2024-09-21 DIAGNOSIS — Z87.891: ICD-10-CM

## 2024-09-21 DIAGNOSIS — Z79.899: ICD-10-CM

## 2024-09-21 DIAGNOSIS — E66.9: ICD-10-CM

## 2024-09-21 DIAGNOSIS — C34.92: ICD-10-CM

## 2024-09-21 DIAGNOSIS — Z91.013: ICD-10-CM

## 2024-09-21 DIAGNOSIS — C79.31: ICD-10-CM

## 2024-09-21 DIAGNOSIS — E11.22: ICD-10-CM

## 2024-09-21 LAB
ALBUMIN SERPL BCG-MCNC: 3.9 G/DL (ref 3.2–5.2)
ALP SERPL-CCNC: 47 U/L (ref 46–116)
ALT SERPL W P-5'-P-CCNC: 16 U/L (ref 7–40)
AST SERPL-CCNC: 9 U/L (ref ?–34)
BASOPHILS # BLD AUTO: 0 10^3/UL (ref 0–0.2)
BASOPHILS NFR BLD AUTO: 0.3 % (ref 0–1)
BILIRUB CONJ SERPL-MCNC: 0.2 MG/DL (ref ?–0.4)
BILIRUB SERPL-MCNC: 0.6 MG/DL (ref 0.3–1.2)
BUN SERPL-MCNC: 33 MG/DL (ref 9–23)
CALCIUM SERPL-MCNC: 9.1 MG/DL (ref 8.3–10.6)
CHLORIDE SERPL-SCNC: 104 MMOL/L (ref 98–107)
CK MB CFR.DF SERPL CALC: 2.14
CK MB CFR.DF SERPL CALC: 2.4
CK MB SERPL-MCNC: 1.2 NG/ML (ref ?–3.6)
CK MB SERPL-MCNC: 1.2 NG/ML (ref ?–3.6)
CK SERPL-CCNC: 50 U/L (ref 46–171)
CK SERPL-CCNC: 56 U/L (ref 46–171)
CO2 SERPL-SCNC: 26 MMOL/L (ref 20–31)
CREAT SERPL-MCNC: 1.84 MG/DL (ref 0.7–1.3)
EOSINOPHIL # BLD AUTO: 0 10^3/UL (ref 0–0.5)
EOSINOPHIL NFR BLD AUTO: 0.1 % (ref 0–3)
GFR SERPL CREATININE-BSD FRML MDRD: 39.6 ML/MIN/{1.73_M2} (ref 49–?)
GLUCOSE SERPL-MCNC: 214 MG/DL (ref 74–106)
HCT VFR BLD AUTO: 34.8 % (ref 42–52)
HGB BLD-MCNC: 11.3 G/DL (ref 13.5–17.5)
INR PPP: 1.22
LYMPHOCYTES # BLD AUTO: 0.4 10^3/UL (ref 1.5–5)
LYMPHOCYTES NFR BLD AUTO: 2.9 % (ref 24–44)
MCH RBC QN AUTO: 28.8 PG (ref 27–33)
MCHC RBC AUTO-ENTMCNC: 32.5 G/DL (ref 32–36.5)
MCV RBC AUTO: 88.5 FL (ref 80–96)
MONOCYTES # BLD AUTO: 0.3 10^3/UL (ref 0–0.8)
MONOCYTES NFR BLD AUTO: 2.2 % (ref 2–8)
NEUTROPHILS # BLD AUTO: 13.5 10^3/UL (ref 1.5–8.5)
NEUTROPHILS NFR BLD AUTO: 94.2 % (ref 36–66)
PLATELET # BLD AUTO: 107 10^3/UL (ref 150–450)
POTASSIUM SERPL-SCNC: 4 MMOL/L (ref 3.5–5.1)
PROCALCITONIN SERPL-MCNC: 0.16 NG/ML
PROT SERPL-MCNC: 6.7 G/DL (ref 5.7–8.2)
PROTHROMBIN TIME: 15.1 SECONDS (ref 12.5–14.5)
RBC # BLD AUTO: 3.93 10^6/UL (ref 4.3–6.1)
SODIUM SERPL-SCNC: 136 MMOL/L (ref 136–145)
TSH SERPL DL<=0.005 MIU/L-ACNC: 0.25 UIU/ML (ref 0.55–4.78)
WBC # BLD AUTO: 14.3 10^3/UL (ref 4–10)

## 2024-09-21 RX ADMIN — CARBAMIDE PEROXIDE 6.5% SCH DROP: 6.5 LIQUID AURICULAR (OTIC) at 23:24

## 2024-09-21 RX ADMIN — CEFTRIAXONE ONE MLS/HR: 2 INJECTION, POWDER, FOR SOLUTION INTRAMUSCULAR; INTRAVENOUS at 14:07

## 2024-09-21 RX ADMIN — HYDRALAZINE HYDROCHLORIDE SCH MG: 50 TABLET, FILM COATED ORAL at 20:46

## 2024-09-21 RX ADMIN — TOPIRAMATE SCH MG: 25 TABLET, FILM COATED ORAL at 20:45

## 2024-09-21 RX ADMIN — AZITHROMYCIN MONOHYDRATE ONE MG: 250 TABLET ORAL at 14:07

## 2024-09-21 RX ADMIN — OMEPRAZOLE SCH MG: 20 CAPSULE, DELAYED RELEASE ORAL at 20:45

## 2024-09-21 RX ADMIN — APIXABAN SCH MG: 5 TABLET, FILM COATED ORAL at 21:50

## 2024-09-22 VITALS — DIASTOLIC BLOOD PRESSURE: 61 MMHG | SYSTOLIC BLOOD PRESSURE: 134 MMHG | TEMPERATURE: 97.5 F | OXYGEN SATURATION: 97 %

## 2024-09-22 VITALS — OXYGEN SATURATION: 94 % | TEMPERATURE: 97.3 F | SYSTOLIC BLOOD PRESSURE: 152 MMHG | DIASTOLIC BLOOD PRESSURE: 63 MMHG

## 2024-09-22 VITALS — TEMPERATURE: 97.5 F | OXYGEN SATURATION: 94 % | SYSTOLIC BLOOD PRESSURE: 128 MMHG | DIASTOLIC BLOOD PRESSURE: 53 MMHG

## 2024-09-22 VITALS — DIASTOLIC BLOOD PRESSURE: 63 MMHG | SYSTOLIC BLOOD PRESSURE: 152 MMHG | TEMPERATURE: 97.3 F | OXYGEN SATURATION: 94 %

## 2024-09-22 VITALS — DIASTOLIC BLOOD PRESSURE: 55 MMHG | SYSTOLIC BLOOD PRESSURE: 121 MMHG | TEMPERATURE: 97.2 F | OXYGEN SATURATION: 93 %

## 2024-09-22 VITALS — TEMPERATURE: 97.3 F | DIASTOLIC BLOOD PRESSURE: 63 MMHG | SYSTOLIC BLOOD PRESSURE: 147 MMHG | OXYGEN SATURATION: 93 %

## 2024-09-22 VITALS — SYSTOLIC BLOOD PRESSURE: 157 MMHG | DIASTOLIC BLOOD PRESSURE: 67 MMHG | TEMPERATURE: 97.3 F | OXYGEN SATURATION: 92 %

## 2024-09-22 VITALS — OXYGEN SATURATION: 95 % | SYSTOLIC BLOOD PRESSURE: 146 MMHG | TEMPERATURE: 97.1 F | DIASTOLIC BLOOD PRESSURE: 67 MMHG

## 2024-09-22 VITALS — OXYGEN SATURATION: 100 % | DIASTOLIC BLOOD PRESSURE: 68 MMHG | SYSTOLIC BLOOD PRESSURE: 122 MMHG | TEMPERATURE: 97.7 F

## 2024-09-22 RX ADMIN — METHYLPREDNISOLONE SODIUM SUCCINATE ONE MG: 125 INJECTION, POWDER, FOR SOLUTION INTRAMUSCULAR; INTRAVENOUS at 13:32

## 2024-09-22 RX ADMIN — SIMVASTATIN SCH MG: 40 TABLET, FILM COATED ORAL at 08:39

## 2024-09-22 RX ADMIN — IPRATROPIUM BROMIDE AND ALBUTEROL SULFATE PRN ML: .5; 3 SOLUTION RESPIRATORY (INHALATION) at 19:07

## 2024-09-22 RX ADMIN — CEFTRIAXONE SCH MLS/HR: 2 INJECTION, POWDER, FOR SOLUTION INTRAMUSCULAR; INTRAVENOUS at 14:38

## 2024-09-22 RX ADMIN — HYDROCODONE BITARTRATE AND ACETAMINOPHEN PRN TAB: 5; 325 TABLET ORAL at 16:51

## 2024-09-22 RX ADMIN — AZITHROMYCIN MONOHYDRATE SCH MG: 250 TABLET ORAL at 08:39

## 2024-09-22 RX ADMIN — BENZONATATE PRN MG: 100 CAPSULE ORAL at 10:30

## 2024-09-22 RX ADMIN — SODIUM CHLORIDE SOLN NEBU 3% SCH ML: 3 NEBU SOLN at 08:00

## 2024-09-22 RX ADMIN — INSULIN LISPRO SCH UNITS: 100 INJECTION, SOLUTION INTRAVENOUS; SUBCUTANEOUS at 08:37

## 2024-09-23 VITALS — DIASTOLIC BLOOD PRESSURE: 59 MMHG | TEMPERATURE: 97.3 F | OXYGEN SATURATION: 95 % | SYSTOLIC BLOOD PRESSURE: 156 MMHG

## 2024-09-23 VITALS — SYSTOLIC BLOOD PRESSURE: 145 MMHG | DIASTOLIC BLOOD PRESSURE: 74 MMHG | TEMPERATURE: 97.3 F

## 2024-09-23 VITALS — SYSTOLIC BLOOD PRESSURE: 139 MMHG | TEMPERATURE: 97.3 F | DIASTOLIC BLOOD PRESSURE: 55 MMHG | OXYGEN SATURATION: 94 %

## 2024-09-23 VITALS — TEMPERATURE: 97.7 F | OXYGEN SATURATION: 93 % | SYSTOLIC BLOOD PRESSURE: 148 MMHG | DIASTOLIC BLOOD PRESSURE: 67 MMHG

## 2024-09-23 VITALS — OXYGEN SATURATION: 92 % | DIASTOLIC BLOOD PRESSURE: 61 MMHG | SYSTOLIC BLOOD PRESSURE: 140 MMHG | TEMPERATURE: 97.5 F

## 2024-09-23 VITALS — SYSTOLIC BLOOD PRESSURE: 139 MMHG | TEMPERATURE: 97.5 F | OXYGEN SATURATION: 93 % | DIASTOLIC BLOOD PRESSURE: 65 MMHG

## 2024-09-23 LAB
BUN SERPL-MCNC: 35 MG/DL (ref 9–23)
CALCIUM SERPL-MCNC: 9.3 MG/DL (ref 8.3–10.6)
CHLORIDE SERPL-SCNC: 106 MMOL/L (ref 98–107)
CO2 SERPL-SCNC: 25 MMOL/L (ref 20–31)
CREAT SERPL-MCNC: 1.83 MG/DL (ref 0.7–1.3)
GFR SERPL CREATININE-BSD FRML MDRD: 39.9 ML/MIN/{1.73_M2} (ref 49–?)
GLUCOSE SERPL-MCNC: 163 MG/DL (ref 74–106)
HCT VFR BLD AUTO: 34.5 % (ref 42–52)
HGB BLD-MCNC: 11 G/DL (ref 13.5–17.5)
MAGNESIUM SERPL-MCNC: 2.3 MG/DL (ref 1.8–2.4)
MCH RBC QN AUTO: 28.1 PG (ref 27–33)
MCHC RBC AUTO-ENTMCNC: 31.9 G/DL (ref 32–36.5)
MCV RBC AUTO: 88 FL (ref 80–96)
PLATELET # BLD AUTO: 103 10^3/UL (ref 150–450)
POTASSIUM SERPL-SCNC: 3.5 MMOL/L (ref 3.5–5.1)
RBC # BLD AUTO: 3.92 10^6/UL (ref 4.3–6.1)
SODIUM SERPL-SCNC: 139 MMOL/L (ref 136–145)
WBC # BLD AUTO: 11.1 10^3/UL (ref 4–10)

## 2024-09-24 VITALS — DIASTOLIC BLOOD PRESSURE: 48 MMHG | OXYGEN SATURATION: 92 % | TEMPERATURE: 97.7 F | SYSTOLIC BLOOD PRESSURE: 117 MMHG

## 2024-09-24 VITALS — DIASTOLIC BLOOD PRESSURE: 83 MMHG | TEMPERATURE: 97.5 F | SYSTOLIC BLOOD PRESSURE: 129 MMHG | OXYGEN SATURATION: 92 %

## 2024-09-24 VITALS — OXYGEN SATURATION: 90 % | TEMPERATURE: 97.5 F | SYSTOLIC BLOOD PRESSURE: 134 MMHG | DIASTOLIC BLOOD PRESSURE: 55 MMHG

## 2024-09-24 VITALS — SYSTOLIC BLOOD PRESSURE: 135 MMHG | DIASTOLIC BLOOD PRESSURE: 67 MMHG

## 2024-09-24 VITALS — SYSTOLIC BLOOD PRESSURE: 121 MMHG | OXYGEN SATURATION: 94 % | TEMPERATURE: 97.2 F | DIASTOLIC BLOOD PRESSURE: 61 MMHG

## 2024-09-24 LAB
BUN SERPL-MCNC: 35 MG/DL (ref 9–23)
CALCIUM SERPL-MCNC: 9 MG/DL (ref 8.3–10.6)
CHLORIDE SERPL-SCNC: 106 MMOL/L (ref 98–107)
CO2 SERPL-SCNC: 25 MMOL/L (ref 20–31)
CREAT SERPL-MCNC: 1.89 MG/DL (ref 0.7–1.3)
GFR SERPL CREATININE-BSD FRML MDRD: 38.4 ML/MIN/{1.73_M2} (ref 49–?)
GLUCOSE SERPL-MCNC: 171 MG/DL (ref 74–106)
HCT VFR BLD AUTO: 34.7 % (ref 42–52)
HGB BLD-MCNC: 10.9 G/DL (ref 13.5–17.5)
MAGNESIUM SERPL-MCNC: 2.2 MG/DL (ref 1.8–2.4)
MCH RBC QN AUTO: 27.8 PG (ref 27–33)
MCHC RBC AUTO-ENTMCNC: 31.4 G/DL (ref 32–36.5)
MCV RBC AUTO: 88.5 FL (ref 80–96)
PLATELET # BLD AUTO: 102 10^3/UL (ref 150–450)
POTASSIUM SERPL-SCNC: 3.3 MMOL/L (ref 3.5–5.1)
RBC # BLD AUTO: 3.92 10^6/UL (ref 4.3–6.1)
SODIUM SERPL-SCNC: 141 MMOL/L (ref 136–145)
WBC # BLD AUTO: 10 10^3/UL (ref 4–10)

## 2024-09-24 RX ADMIN — POTASSIUM CHLORIDE SCH MEQ: 750 TABLET, EXTENDED RELEASE ORAL at 09:43

## 2024-09-24 RX ADMIN — POLYETHYLENE GLYCOL 3350 PRN PKT: 17 POWDER, FOR SOLUTION ORAL at 09:42

## 2024-09-24 RX ADMIN — ACETAMINOPHEN PRN MG: 500 TABLET ORAL at 13:13

## 2024-09-25 VITALS — DIASTOLIC BLOOD PRESSURE: 73 MMHG | TEMPERATURE: 97.5 F | OXYGEN SATURATION: 91 % | SYSTOLIC BLOOD PRESSURE: 156 MMHG

## 2024-09-25 VITALS — DIASTOLIC BLOOD PRESSURE: 83 MMHG | SYSTOLIC BLOOD PRESSURE: 168 MMHG

## 2024-09-25 VITALS — TEMPERATURE: 97.3 F | OXYGEN SATURATION: 93 % | DIASTOLIC BLOOD PRESSURE: 81 MMHG | SYSTOLIC BLOOD PRESSURE: 172 MMHG

## 2024-09-25 LAB
BUN SERPL-MCNC: 31 MG/DL (ref 9–23)
CALCIUM SERPL-MCNC: 10.1 MG/DL (ref 8.3–10.6)
CHLORIDE SERPL-SCNC: 106 MMOL/L (ref 98–107)
CO2 SERPL-SCNC: 27 MMOL/L (ref 20–31)
CREAT SERPL-MCNC: 1.85 MG/DL (ref 0.7–1.3)
GFR SERPL CREATININE-BSD FRML MDRD: 39.4 ML/MIN/{1.73_M2} (ref 49–?)
GLUCOSE SERPL-MCNC: 157 MG/DL (ref 74–106)
HCT VFR BLD AUTO: 37.3 % (ref 42–52)
HGB BLD-MCNC: 11.7 G/DL (ref 13.5–17.5)
MAGNESIUM SERPL-MCNC: 2.3 MG/DL (ref 1.8–2.4)
MCH RBC QN AUTO: 27.5 PG (ref 27–33)
MCHC RBC AUTO-ENTMCNC: 31.4 G/DL (ref 32–36.5)
MCV RBC AUTO: 87.8 FL (ref 80–96)
PLATELET # BLD AUTO: 86 10^3/UL (ref 150–450)
POTASSIUM SERPL-SCNC: 3.9 MMOL/L (ref 3.5–5.1)
RBC # BLD AUTO: 4.25 10^6/UL (ref 4.3–6.1)
SODIUM SERPL-SCNC: 140 MMOL/L (ref 136–145)
WBC # BLD AUTO: 10.3 10^3/UL (ref 4–10)

## 2024-09-26 ENCOUNTER — HOSPITAL ENCOUNTER (OUTPATIENT)
Dept: HOSPITAL 53 - M ONCR | Age: 64
LOS: 4 days | End: 2024-09-30
Attending: RADIOLOGY
Payer: COMMERCIAL

## 2024-09-26 DIAGNOSIS — Z51.0: Primary | ICD-10-CM

## 2024-09-26 DIAGNOSIS — C34.12: ICD-10-CM

## 2024-09-29 ENCOUNTER — HOSPITAL ENCOUNTER (INPATIENT)
Dept: HOSPITAL 53 - M ED | Age: 64
LOS: 1 days | Discharge: ANOTHER HOSPITAL | DRG: 178 | End: 2024-09-30
Attending: INTERNAL MEDICINE | Admitting: INTERNAL MEDICINE
Payer: COMMERCIAL

## 2024-09-29 VITALS — TEMPERATURE: 98.7 F | SYSTOLIC BLOOD PRESSURE: 188 MMHG | OXYGEN SATURATION: 83 % | DIASTOLIC BLOOD PRESSURE: 78 MMHG

## 2024-09-29 VITALS — BODY MASS INDEX: 37.53 KG/M2 | HEIGHT: 70 IN | WEIGHT: 262.13 LBS

## 2024-09-29 VITALS — OXYGEN SATURATION: 94 %

## 2024-09-29 VITALS — SYSTOLIC BLOOD PRESSURE: 157 MMHG | OXYGEN SATURATION: 96 % | DIASTOLIC BLOOD PRESSURE: 68 MMHG | TEMPERATURE: 98 F

## 2024-09-29 VITALS — SYSTOLIC BLOOD PRESSURE: 187 MMHG | TEMPERATURE: 98.8 F | DIASTOLIC BLOOD PRESSURE: 79 MMHG | OXYGEN SATURATION: 92 %

## 2024-09-29 VITALS — TEMPERATURE: 98.9 F | DIASTOLIC BLOOD PRESSURE: 74 MMHG | OXYGEN SATURATION: 91 % | SYSTOLIC BLOOD PRESSURE: 171 MMHG

## 2024-09-29 VITALS — SYSTOLIC BLOOD PRESSURE: 148 MMHG | DIASTOLIC BLOOD PRESSURE: 64 MMHG

## 2024-09-29 VITALS — OXYGEN SATURATION: 83 % | SYSTOLIC BLOOD PRESSURE: 188 MMHG | DIASTOLIC BLOOD PRESSURE: 78 MMHG

## 2024-09-29 VITALS — SYSTOLIC BLOOD PRESSURE: 198 MMHG | DIASTOLIC BLOOD PRESSURE: 137 MMHG | OXYGEN SATURATION: 99 %

## 2024-09-29 VITALS — SYSTOLIC BLOOD PRESSURE: 148 MMHG | TEMPERATURE: 98 F | DIASTOLIC BLOOD PRESSURE: 64 MMHG | OXYGEN SATURATION: 96 %

## 2024-09-29 DIAGNOSIS — Z91.030: ICD-10-CM

## 2024-09-29 DIAGNOSIS — E66.9: ICD-10-CM

## 2024-09-29 DIAGNOSIS — D69.6: ICD-10-CM

## 2024-09-29 DIAGNOSIS — R04.2: ICD-10-CM

## 2024-09-29 DIAGNOSIS — C34.90: ICD-10-CM

## 2024-09-29 DIAGNOSIS — K21.9: ICD-10-CM

## 2024-09-29 DIAGNOSIS — Z88.8: ICD-10-CM

## 2024-09-29 DIAGNOSIS — Z87.891: ICD-10-CM

## 2024-09-29 DIAGNOSIS — C79.31: ICD-10-CM

## 2024-09-29 DIAGNOSIS — Z79.899: ICD-10-CM

## 2024-09-29 DIAGNOSIS — E11.9: ICD-10-CM

## 2024-09-29 DIAGNOSIS — G89.3: ICD-10-CM

## 2024-09-29 DIAGNOSIS — I12.9: ICD-10-CM

## 2024-09-29 DIAGNOSIS — J15.69: Primary | ICD-10-CM

## 2024-09-29 DIAGNOSIS — R51.9: ICD-10-CM

## 2024-09-29 DIAGNOSIS — F39: ICD-10-CM

## 2024-09-29 DIAGNOSIS — N18.9: ICD-10-CM

## 2024-09-29 DIAGNOSIS — Z86.73: ICD-10-CM

## 2024-09-29 DIAGNOSIS — Z91.013: ICD-10-CM

## 2024-09-29 DIAGNOSIS — Z86.718: ICD-10-CM

## 2024-09-29 LAB
ALBUMIN SERPL BCG-MCNC: 3.6 G/DL (ref 3.2–5.2)
ALP SERPL-CCNC: 56 U/L (ref 46–116)
ALT SERPL W P-5'-P-CCNC: 14 U/L (ref 7–40)
APTT BLD: 24.7 SECONDS (ref 24.8–34.2)
AST SERPL-CCNC: 8 U/L (ref ?–34)
BASOPHILS # BLD AUTO: 0.1 10^3/UL (ref 0–0.2)
BASOPHILS # BLD AUTO: 0.1 10^3/UL (ref 0–0.2)
BASOPHILS NFR BLD AUTO: 0.5 % (ref 0–1)
BASOPHILS NFR BLD AUTO: 0.5 % (ref 0–1)
BILIRUB CONJ SERPL-MCNC: 0.1 MG/DL (ref ?–0.4)
BILIRUB SERPL-MCNC: 0.3 MG/DL (ref 0.3–1.2)
BUN SERPL-MCNC: 42 MG/DL (ref 9–23)
BUN SERPL-MCNC: 48 MG/DL (ref 9–23)
CALCIUM SERPL-MCNC: 9 MG/DL (ref 8.3–10.6)
CALCIUM SERPL-MCNC: 9.2 MG/DL (ref 8.3–10.6)
CHLORIDE SERPL-SCNC: 106 MMOL/L (ref 98–107)
CHLORIDE SERPL-SCNC: 107 MMOL/L (ref 98–107)
CO2 SERPL-SCNC: 26 MMOL/L (ref 20–31)
CO2 SERPL-SCNC: 27 MMOL/L (ref 20–31)
CREAT SERPL-MCNC: 1.96 MG/DL (ref 0.7–1.3)
CREAT SERPL-MCNC: 2 MG/DL (ref 0.7–1.3)
EOSINOPHIL # BLD AUTO: 0.1 10^3/UL (ref 0–0.5)
EOSINOPHIL # BLD AUTO: 0.2 10^3/UL (ref 0–0.5)
EOSINOPHIL NFR BLD AUTO: 0.6 % (ref 0–3)
EOSINOPHIL NFR BLD AUTO: 1.3 % (ref 0–3)
GFR SERPL CREATININE-BSD FRML MDRD: 36 ML/MIN/{1.73_M2} (ref 49–?)
GFR SERPL CREATININE-BSD FRML MDRD: 36.8 ML/MIN/{1.73_M2} (ref 49–?)
GLUCOSE SERPL-MCNC: 141 MG/DL (ref 74–106)
GLUCOSE SERPL-MCNC: 195 MG/DL (ref 74–106)
HCT VFR BLD AUTO: 32.1 % (ref 42–52)
HCT VFR BLD AUTO: 32.3 % (ref 42–52)
HGB BLD-MCNC: 10.2 G/DL (ref 13.5–17.5)
HGB BLD-MCNC: 10.3 G/DL (ref 13.5–17.5)
INR PPP: 1.16
INR PPP: 1.17
LYMPHOCYTES # BLD AUTO: 0.5 10^3/UL (ref 1.5–5)
LYMPHOCYTES # BLD AUTO: 1.9 10^3/UL (ref 1.5–5)
LYMPHOCYTES NFR BLD AUTO: 14.5 % (ref 24–44)
LYMPHOCYTES NFR BLD AUTO: 3.8 % (ref 24–44)
MCH RBC QN AUTO: 27.8 PG (ref 27–33)
MCH RBC QN AUTO: 28.3 PG (ref 27–33)
MCHC RBC AUTO-ENTMCNC: 31.8 G/DL (ref 32–36.5)
MCHC RBC AUTO-ENTMCNC: 31.9 G/DL (ref 32–36.5)
MCV RBC AUTO: 87.3 FL (ref 80–96)
MCV RBC AUTO: 88.9 FL (ref 80–96)
MONOCYTES # BLD AUTO: 0.9 10^3/UL (ref 0–0.8)
MONOCYTES # BLD AUTO: 1.4 10^3/UL (ref 0–0.8)
MONOCYTES NFR BLD AUTO: 10.6 % (ref 2–8)
MONOCYTES NFR BLD AUTO: 6.1 % (ref 2–8)
NEUTROPHILS # BLD AUTO: 12.7 10^3/UL (ref 1.5–8.5)
NEUTROPHILS # BLD AUTO: 9.3 10^3/UL (ref 1.5–8.5)
NEUTROPHILS NFR BLD AUTO: 72.6 % (ref 36–66)
NEUTROPHILS NFR BLD AUTO: 88.4 % (ref 36–66)
PLATELET # BLD AUTO: 83 10^3/UL (ref 150–450)
PLATELET # BLD AUTO: 84 10^3/UL (ref 150–450)
POTASSIUM SERPL-SCNC: 3.5 MMOL/L (ref 3.5–5.1)
POTASSIUM SERPL-SCNC: 3.7 MMOL/L (ref 3.5–5.1)
PROCALCITONIN SERPL-MCNC: 0.21 NG/ML
PROT SERPL-MCNC: 6.5 G/DL (ref 5.7–8.2)
PROTHROMBIN TIME: 14.4 SECONDS (ref 12.5–14.5)
PROTHROMBIN TIME: 14.6 SECONDS (ref 12.5–14.5)
RBC # BLD AUTO: 3.61 10^6/UL (ref 4.3–6.1)
RBC # BLD AUTO: 3.7 10^6/UL (ref 4.3–6.1)
SODIUM SERPL-SCNC: 139 MMOL/L (ref 136–145)
SODIUM SERPL-SCNC: 139 MMOL/L (ref 136–145)
WBC # BLD AUTO: 12.8 10^3/UL (ref 4–10)
WBC # BLD AUTO: 14.3 10^3/UL (ref 4–10)

## 2024-09-29 RX ADMIN — SODIUM CHLORIDE, POTASSIUM CHLORIDE, SODIUM LACTATE AND CALCIUM CHLORIDE ONE MLS/HR: 600; 310; 30; 20 INJECTION, SOLUTION INTRAVENOUS at 23:11

## 2024-09-29 RX ADMIN — INSULIN LISPRO SCH UNITS: 100 INJECTION, SOLUTION INTRAVENOUS; SUBCUTANEOUS at 21:00

## 2024-09-29 RX ADMIN — HYDRALAZINE HYDROCHLORIDE SCH MG: 50 TABLET, FILM COATED ORAL at 16:36

## 2024-09-29 RX ADMIN — INSULIN LISPRO SCH UNITS: 100 INJECTION, SOLUTION INTRAVENOUS; SUBCUTANEOUS at 12:56

## 2024-09-29 RX ADMIN — DEXTROSE MONOHYDRATE SCH MLS/HR: 5 INJECTION INTRAVENOUS at 10:32

## 2024-09-29 RX ADMIN — CARBAMIDE PEROXIDE 6.5% SCH DROP: 6.5 LIQUID AURICULAR (OTIC) at 22:44

## 2024-09-29 RX ADMIN — METHYLPREDNISOLONE SODIUM SUCCINATE ONE MG: 40 INJECTION, POWDER, FOR SOLUTION INTRAMUSCULAR; INTRAVENOUS at 23:38

## 2024-09-29 RX ADMIN — ASPIRIN 81 MG CHEWABLE TABLET SCH MG: 81 TABLET CHEWABLE at 16:35

## 2024-09-29 RX ADMIN — IPRATROPIUM BROMIDE AND ALBUTEROL SULFATE ONE ML: .5; 3 SOLUTION RESPIRATORY (INHALATION) at 22:20

## 2024-09-29 RX ADMIN — ASPIRIN ONE MG: 300 SUPPOSITORY RECTAL at 22:44

## 2024-09-29 RX ADMIN — DEXTROSE MONOHYDRATE SCH MLS/HR: 5 INJECTION INTRAVENOUS at 11:57

## 2024-09-30 VITALS — TEMPERATURE: 99.1 F | SYSTOLIC BLOOD PRESSURE: 175 MMHG | OXYGEN SATURATION: 94 % | DIASTOLIC BLOOD PRESSURE: 85 MMHG

## 2024-09-30 LAB — EST. AVERAGE GLUCOSE BLD GHB EST-MCNC: 143 MG/DL (ref 60–110)
